# Patient Record
Sex: FEMALE | Race: WHITE | Employment: OTHER | URBAN - METROPOLITAN AREA
[De-identification: names, ages, dates, MRNs, and addresses within clinical notes are randomized per-mention and may not be internally consistent; named-entity substitution may affect disease eponyms.]

---

## 2017-02-28 ENCOUNTER — OFFICE VISIT (OUTPATIENT)
Dept: INTERNAL MEDICINE CLINIC | Age: 62
End: 2017-02-28

## 2017-02-28 VITALS
WEIGHT: 156 LBS | HEART RATE: 82 BPM | SYSTOLIC BLOOD PRESSURE: 156 MMHG | BODY MASS INDEX: 28.53 KG/M2 | TEMPERATURE: 97.8 F | RESPIRATION RATE: 18 BRPM | OXYGEN SATURATION: 98 % | DIASTOLIC BLOOD PRESSURE: 92 MMHG

## 2017-02-28 DIAGNOSIS — L43.9 LICHEN PLANUS: ICD-10-CM

## 2017-02-28 DIAGNOSIS — R03.0 ELEVATED BP WITHOUT DIAGNOSIS OF HYPERTENSION: ICD-10-CM

## 2017-02-28 DIAGNOSIS — E78.5 DYSLIPIDEMIA: Primary | ICD-10-CM

## 2017-02-28 PROCEDURE — 99213 OFFICE O/P EST LOW 20 MIN: CPT | Performed by: INTERNAL MEDICINE

## 2017-02-28 RX ORDER — TRIAMCINOLONE ACETONIDE 1 MG/G
CREAM TOPICAL 2 TIMES DAILY
COMMUNITY
End: 2018-12-19 | Stop reason: SDUPTHER

## 2017-02-28 ASSESSMENT — ENCOUNTER SYMPTOMS: COLOR CHANGE: 0

## 2017-03-01 LAB
CHOLESTEROL, TOTAL: 177 MG/DL (ref 0–199)
HDLC SERPL-MCNC: 42 MG/DL (ref 40–60)
LDL CHOLESTEROL CALCULATED: 109 MG/DL
TRIGL SERPL-MCNC: 128 MG/DL (ref 0–150)
VLDLC SERPL CALC-MCNC: 26 MG/DL

## 2017-04-04 ENCOUNTER — OFFICE VISIT (OUTPATIENT)
Dept: INTERNAL MEDICINE CLINIC | Age: 62
End: 2017-04-04

## 2017-04-04 VITALS
OXYGEN SATURATION: 97 % | HEART RATE: 80 BPM | BODY MASS INDEX: 28.35 KG/M2 | RESPIRATION RATE: 18 BRPM | TEMPERATURE: 97.7 F | DIASTOLIC BLOOD PRESSURE: 80 MMHG | SYSTOLIC BLOOD PRESSURE: 140 MMHG | WEIGHT: 155 LBS

## 2017-04-04 DIAGNOSIS — E78.5 DYSLIPIDEMIA: ICD-10-CM

## 2017-04-04 DIAGNOSIS — L43.9 LICHEN PLANUS: ICD-10-CM

## 2017-04-04 DIAGNOSIS — I10 HTN, GOAL BELOW 130/80: Primary | ICD-10-CM

## 2017-04-04 PROCEDURE — 99214 OFFICE O/P EST MOD 30 MIN: CPT | Performed by: INTERNAL MEDICINE

## 2017-04-04 RX ORDER — LISINOPRIL 10 MG/1
5 TABLET ORAL DAILY
Qty: 30 TABLET | Refills: 3 | Status: SHIPPED | OUTPATIENT
Start: 2017-04-04 | End: 2017-06-30 | Stop reason: SDUPTHER

## 2017-04-04 ASSESSMENT — ENCOUNTER SYMPTOMS: COLOR CHANGE: 1

## 2017-04-04 ASSESSMENT — PATIENT HEALTH QUESTIONNAIRE - PHQ9
SUM OF ALL RESPONSES TO PHQ QUESTIONS 1-9: 0
1. LITTLE INTEREST OR PLEASURE IN DOING THINGS: 0
SUM OF ALL RESPONSES TO PHQ9 QUESTIONS 1 & 2: 0
2. FEELING DOWN, DEPRESSED OR HOPELESS: 0

## 2017-05-16 ENCOUNTER — NURSE ONLY (OUTPATIENT)
Dept: INTERNAL MEDICINE CLINIC | Age: 62
End: 2017-05-16

## 2017-05-16 VITALS — DIASTOLIC BLOOD PRESSURE: 80 MMHG | SYSTOLIC BLOOD PRESSURE: 136 MMHG

## 2017-05-16 DIAGNOSIS — I10 HTN, GOAL BELOW 130/80: Primary | ICD-10-CM

## 2017-06-30 DIAGNOSIS — I10 HTN, GOAL BELOW 130/80: ICD-10-CM

## 2017-07-03 RX ORDER — LISINOPRIL 10 MG/1
5 TABLET ORAL DAILY
Qty: 30 TABLET | Refills: 3 | Status: SHIPPED | OUTPATIENT
Start: 2017-07-03 | End: 2017-07-05

## 2017-07-05 ENCOUNTER — OFFICE VISIT (OUTPATIENT)
Dept: INTERNAL MEDICINE CLINIC | Age: 62
End: 2017-07-05

## 2017-07-05 VITALS
RESPIRATION RATE: 18 BRPM | SYSTOLIC BLOOD PRESSURE: 134 MMHG | TEMPERATURE: 98.6 F | HEIGHT: 61 IN | OXYGEN SATURATION: 98 % | HEART RATE: 74 BPM | DIASTOLIC BLOOD PRESSURE: 80 MMHG

## 2017-07-05 DIAGNOSIS — E78.5 DYSLIPIDEMIA: ICD-10-CM

## 2017-07-05 DIAGNOSIS — I10 HTN, GOAL BELOW 130/80: Primary | ICD-10-CM

## 2017-07-05 DIAGNOSIS — Z72.0 TOBACCO ABUSE: ICD-10-CM

## 2017-07-05 DIAGNOSIS — Z13.1 ENCOUNTER FOR SCREENING FOR DIABETES MELLITUS: ICD-10-CM

## 2017-07-05 DIAGNOSIS — Z12.31 ENCOUNTER FOR SCREENING MAMMOGRAM FOR BREAST CANCER: ICD-10-CM

## 2017-07-05 LAB
A/G RATIO: 1 (ref 1.1–2.2)
ALBUMIN SERPL-MCNC: 4.1 G/DL (ref 3.4–5)
ALP BLD-CCNC: 731 U/L (ref 40–129)
ALT SERPL-CCNC: 65 U/L (ref 10–40)
ANION GAP SERPL CALCULATED.3IONS-SCNC: 19 MMOL/L (ref 3–16)
AST SERPL-CCNC: 66 U/L (ref 15–37)
BILIRUB SERPL-MCNC: 0.4 MG/DL (ref 0–1)
BUN BLDV-MCNC: 9 MG/DL (ref 7–20)
CALCIUM SERPL-MCNC: 9.7 MG/DL (ref 8.3–10.6)
CHLORIDE BLD-SCNC: 100 MMOL/L (ref 99–110)
CHOLESTEROL, TOTAL: 197 MG/DL (ref 0–199)
CO2: 23 MMOL/L (ref 21–32)
CREAT SERPL-MCNC: 0.6 MG/DL (ref 0.6–1.2)
GFR AFRICAN AMERICAN: >60
GFR NON-AFRICAN AMERICAN: >60
GLOBULIN: 4 G/DL
GLUCOSE BLD-MCNC: 106 MG/DL (ref 70–99)
HDLC SERPL-MCNC: 44 MG/DL (ref 40–60)
LDL CHOLESTEROL CALCULATED: 121 MG/DL
POTASSIUM SERPL-SCNC: 4.4 MMOL/L (ref 3.5–5.1)
SODIUM BLD-SCNC: 142 MMOL/L (ref 136–145)
TOTAL PROTEIN: 8.1 G/DL (ref 6.4–8.2)
TRIGL SERPL-MCNC: 161 MG/DL (ref 0–150)
VLDLC SERPL CALC-MCNC: 32 MG/DL

## 2017-07-05 PROCEDURE — 99213 OFFICE O/P EST LOW 20 MIN: CPT | Performed by: INTERNAL MEDICINE

## 2017-07-05 RX ORDER — LISINOPRIL 10 MG/1
10 TABLET ORAL DAILY
Qty: 90 TABLET | Refills: 3 | Status: SHIPPED | OUTPATIENT
Start: 2017-07-05 | End: 2018-01-05 | Stop reason: SDUPTHER

## 2017-07-05 RX ORDER — ATORVASTATIN CALCIUM 80 MG/1
80 TABLET, FILM COATED ORAL DAILY
Qty: 90 TABLET | Refills: 3 | Status: SHIPPED | OUTPATIENT
Start: 2017-07-05 | End: 2018-01-05 | Stop reason: SDUPTHER

## 2017-07-05 ASSESSMENT — PATIENT HEALTH QUESTIONNAIRE - PHQ9
2. FEELING DOWN, DEPRESSED OR HOPELESS: 0
SUM OF ALL RESPONSES TO PHQ9 QUESTIONS 1 & 2: 0
1. LITTLE INTEREST OR PLEASURE IN DOING THINGS: 0
SUM OF ALL RESPONSES TO PHQ QUESTIONS 1-9: 0

## 2017-07-05 ASSESSMENT — ENCOUNTER SYMPTOMS: GASTROINTESTINAL NEGATIVE: 1

## 2017-07-28 ENCOUNTER — HOSPITAL ENCOUNTER (OUTPATIENT)
Dept: MAMMOGRAPHY | Age: 62
Discharge: HOME | End: 2017-07-28
Attending: INTERNAL MEDICINE | Admitting: INTERNAL MEDICINE

## 2017-07-28 DIAGNOSIS — Z12.31 ENCOUNTER FOR SCREENING MAMMOGRAM FOR BREAST CANCER: ICD-10-CM

## 2018-01-05 ENCOUNTER — OFFICE VISIT (OUTPATIENT)
Dept: INTERNAL MEDICINE CLINIC | Age: 63
End: 2018-01-05

## 2018-01-05 VITALS
BODY MASS INDEX: 29.27 KG/M2 | RESPIRATION RATE: 16 BRPM | DIASTOLIC BLOOD PRESSURE: 76 MMHG | SYSTOLIC BLOOD PRESSURE: 130 MMHG | TEMPERATURE: 97.5 F | HEIGHT: 61 IN | HEART RATE: 76 BPM | OXYGEN SATURATION: 99 % | WEIGHT: 155 LBS

## 2018-01-05 DIAGNOSIS — R74.8 ELEVATED SERUM ALKALINE PHOSPHATASE LEVEL: Primary | ICD-10-CM

## 2018-01-05 DIAGNOSIS — E78.5 DYSLIPIDEMIA: ICD-10-CM

## 2018-01-05 DIAGNOSIS — Z72.89 OTHER PROBLEMS RELATED TO LIFESTYLE: ICD-10-CM

## 2018-01-05 DIAGNOSIS — I10 HTN, GOAL BELOW 130/80: ICD-10-CM

## 2018-01-05 LAB
A/G RATIO: 1 (ref 1.1–2.2)
ALBUMIN SERPL-MCNC: 3.9 G/DL (ref 3.4–5)
ALP BLD-CCNC: 712 U/L (ref 40–129)
ALT SERPL-CCNC: 49 U/L (ref 10–40)
ANION GAP SERPL CALCULATED.3IONS-SCNC: 14 MMOL/L (ref 3–16)
AST SERPL-CCNC: 54 U/L (ref 15–37)
BILIRUB SERPL-MCNC: 0.4 MG/DL (ref 0–1)
BUN BLDV-MCNC: 9 MG/DL (ref 7–20)
CALCIUM SERPL-MCNC: 9.7 MG/DL (ref 8.3–10.6)
CHLORIDE BLD-SCNC: 102 MMOL/L (ref 99–110)
CHOLESTEROL, TOTAL: 177 MG/DL (ref 0–199)
CO2: 25 MMOL/L (ref 21–32)
CREAT SERPL-MCNC: 0.6 MG/DL (ref 0.6–1.2)
GFR AFRICAN AMERICAN: >60
GFR NON-AFRICAN AMERICAN: >60
GLOBULIN: 4 G/DL
GLUCOSE BLD-MCNC: 102 MG/DL (ref 70–99)
HDLC SERPL-MCNC: 41 MG/DL (ref 40–60)
LDL CHOLESTEROL CALCULATED: 102 MG/DL
POTASSIUM SERPL-SCNC: 4.5 MMOL/L (ref 3.5–5.1)
SODIUM BLD-SCNC: 141 MMOL/L (ref 136–145)
TOTAL PROTEIN: 7.9 G/DL (ref 6.4–8.2)
TRIGL SERPL-MCNC: 169 MG/DL (ref 0–150)
VLDLC SERPL CALC-MCNC: 34 MG/DL

## 2018-01-05 PROCEDURE — 3017F COLORECTAL CA SCREEN DOC REV: CPT | Performed by: INTERNAL MEDICINE

## 2018-01-05 PROCEDURE — 4004F PT TOBACCO SCREEN RCVD TLK: CPT | Performed by: INTERNAL MEDICINE

## 2018-01-05 PROCEDURE — 3014F SCREEN MAMMO DOC REV: CPT | Performed by: INTERNAL MEDICINE

## 2018-01-05 PROCEDURE — 99213 OFFICE O/P EST LOW 20 MIN: CPT | Performed by: INTERNAL MEDICINE

## 2018-01-05 PROCEDURE — G8417 CALC BMI ABV UP PARAM F/U: HCPCS | Performed by: INTERNAL MEDICINE

## 2018-01-05 PROCEDURE — G8484 FLU IMMUNIZE NO ADMIN: HCPCS | Performed by: INTERNAL MEDICINE

## 2018-01-05 PROCEDURE — G8427 DOCREV CUR MEDS BY ELIG CLIN: HCPCS | Performed by: INTERNAL MEDICINE

## 2018-01-05 RX ORDER — ATORVASTATIN CALCIUM 80 MG/1
80 TABLET, FILM COATED ORAL DAILY
Qty: 90 TABLET | Refills: 3 | Status: SHIPPED | OUTPATIENT
Start: 2018-01-05 | End: 2018-05-08 | Stop reason: SDUPTHER

## 2018-01-05 RX ORDER — LISINOPRIL 10 MG/1
10 TABLET ORAL DAILY
Qty: 90 TABLET | Refills: 3 | Status: SHIPPED | OUTPATIENT
Start: 2018-01-05 | End: 2018-05-08 | Stop reason: SDUPTHER

## 2018-01-05 ASSESSMENT — ENCOUNTER SYMPTOMS
GASTROINTESTINAL NEGATIVE: 1
EYES NEGATIVE: 1
RESPIRATORY NEGATIVE: 1

## 2018-01-05 NOTE — PROGRESS NOTES
times daily Apply topically 2 times daily.  Multiple Vitamins-Minerals (MULTIVITAMIN PO) Take 1 capsule by mouth daily.  Ascorbic Acid (VITAMIN C) 500 MG tablet Take 500 mg by mouth daily.  vitamin E 400 UNIT capsule Take 400 Units by mouth daily. No current facility-administered medications for this visit. Vitals:    01/05/18 1000   BP: 130/76   Pulse: 76   Resp: 16   Temp: 97.5 °F (36.4 °C)   TempSrc: Oral   SpO2: 99%   Weight: 155 lb (70.3 kg)   Height: 5' 1\" (1.549 m)     Body mass index is 29.29 kg/m². Wt Readings from Last 3 Encounters:   01/05/18 155 lb (70.3 kg)   04/04/17 155 lb (70.3 kg)   02/28/17 156 lb (70.8 kg)     BP Readings from Last 3 Encounters:   01/05/18 130/76   07/05/17 134/80   05/16/17 136/80       Objective:   Physical Exam   Constitutional: She is oriented to person, place, and time. She appears well-developed and well-nourished. HENT:   Head: Normocephalic and atraumatic. Eyes: Conjunctivae and EOM are normal. Pupils are equal, round, and reactive to light. Neck: Normal range of motion. Neck supple. Cardiovascular: Normal rate, regular rhythm, normal heart sounds and intact distal pulses. Pulmonary/Chest: Effort normal and breath sounds normal. No respiratory distress. Abdominal: Soft. She exhibits no distension. Musculoskeletal: Normal range of motion. She exhibits no edema. Neurological: She is alert and oriented to person, place, and time. Skin: Skin is warm. Psychiatric: She has a normal mood and affect. Her behavior is normal. Judgment and thought content normal.   Nursing note and vitals reviewed. Lab Review   No visits with results within 6 Month(s) from this visit.    Latest known visit with results is:   Office Visit on 07/05/2017   Component Date Value    Cholesterol, Total 07/05/2017 197     Triglycerides 07/05/2017 161*    HDL 07/05/2017 44     LDL Calculated 07/05/2017 121*    VLDL CHOLESTEROL CALCULA* 07/05/2017 32

## 2018-01-08 LAB — HIV-1 AND HIV-2 ANTIBODIES: NORMAL

## 2018-01-11 ENCOUNTER — HOSPITAL ENCOUNTER (OUTPATIENT)
Dept: ULTRASOUND IMAGING | Age: 63
Discharge: HOME | End: 2018-01-11
Attending: INTERNAL MEDICINE | Admitting: INTERNAL MEDICINE

## 2018-01-11 ENCOUNTER — TELEPHONE (OUTPATIENT)
Dept: INTERNAL MEDICINE CLINIC | Age: 63
End: 2018-01-11

## 2018-01-11 DIAGNOSIS — R74.8 ELEVATED SERUM ALKALINE PHOSPHATASE LEVEL: ICD-10-CM

## 2018-01-11 DIAGNOSIS — R74.8 ABNORMAL LEVELS OF OTHER SERUM ENZYMES: ICD-10-CM

## 2018-01-11 DIAGNOSIS — R74.8 ELEVATED SERUM GGT LEVEL: Primary | ICD-10-CM

## 2018-01-11 LAB
BILIRUB SERPL-MCNC: 0.4 MG/DL (ref 0–1)
BILIRUBIN DIRECT: <0.2 MG/DL (ref 0–0.3)
BILIRUBIN, INDIRECT: NORMAL MG/DL (ref 0–1)
GAMMA GLUTAMYL TRANSFERASE: 699 U/L (ref 5–36)
TSH SERPL DL<=0.05 MIU/L-ACNC: 2.6 UIU/ML (ref 0.27–4.2)

## 2018-01-11 NOTE — TELEPHONE ENCOUNTER
----- Message from Prosper Messer MD sent at 1/5/2018 10:37 PM EST -----  Please call and ask her to schedule ultrasound and lab work. Please call until she answers.

## 2018-01-12 ENCOUNTER — TELEPHONE (OUTPATIENT)
Dept: INTERNAL MEDICINE CLINIC | Age: 63
End: 2018-01-12

## 2018-01-12 DIAGNOSIS — R74.8 ELEVATED SERUM GGT LEVEL: Primary | ICD-10-CM

## 2018-01-12 LAB
MITOCHONDRIAL M2 AB, IGG: 102.7 UNITS (ref 0–20)
MYELOPEROXIDASE AB: 3 AU/ML (ref 0–19)
SERINE PROTEASE 3 AB: 1 AU/ML (ref 0–19)

## 2018-01-12 NOTE — TELEPHONE ENCOUNTER
Patient would like to know if Dr. Karena Tejeda has decided on further testing as far as Dr. Karena Tejeda being in contact with another physician after viewing patient's blood results. Patient prefers not to wait until Monday.        Patient Phone   [phone]

## 2018-02-06 ENCOUNTER — TELEPHONE (OUTPATIENT)
Dept: INTERNAL MEDICINE CLINIC | Age: 63
End: 2018-02-06

## 2018-05-08 ENCOUNTER — OFFICE VISIT (OUTPATIENT)
Dept: INTERNAL MEDICINE CLINIC | Age: 63
End: 2018-05-08

## 2018-05-08 VITALS
HEIGHT: 61 IN | WEIGHT: 147.8 LBS | DIASTOLIC BLOOD PRESSURE: 65 MMHG | OXYGEN SATURATION: 98 % | SYSTOLIC BLOOD PRESSURE: 142 MMHG | BODY MASS INDEX: 27.9 KG/M2 | HEART RATE: 82 BPM | RESPIRATION RATE: 18 BRPM

## 2018-05-08 DIAGNOSIS — E78.5 DYSLIPIDEMIA: ICD-10-CM

## 2018-05-08 DIAGNOSIS — I10 HTN, GOAL BELOW 130/80: ICD-10-CM

## 2018-05-08 PROCEDURE — 99214 OFFICE O/P EST MOD 30 MIN: CPT | Performed by: INTERNAL MEDICINE

## 2018-05-08 RX ORDER — LISINOPRIL 20 MG/1
20 TABLET ORAL DAILY
Qty: 90 TABLET | Refills: 0 | Status: SHIPPED | OUTPATIENT
Start: 2018-05-08 | End: 2018-06-19 | Stop reason: SDUPTHER

## 2018-05-08 RX ORDER — ATORVASTATIN CALCIUM 80 MG/1
80 TABLET, FILM COATED ORAL DAILY
Qty: 90 TABLET | Refills: 3 | Status: SHIPPED | OUTPATIENT
Start: 2018-05-08 | End: 2018-06-19 | Stop reason: SDUPTHER

## 2018-05-08 ASSESSMENT — ENCOUNTER SYMPTOMS
SHORTNESS OF BREATH: 0
BLURRED VISION: 0
ORTHOPNEA: 0

## 2018-05-22 ENCOUNTER — NURSE ONLY (OUTPATIENT)
Dept: INTERNAL MEDICINE CLINIC | Age: 63
End: 2018-05-22

## 2018-05-22 VITALS — SYSTOLIC BLOOD PRESSURE: 122 MMHG | DIASTOLIC BLOOD PRESSURE: 70 MMHG

## 2018-05-22 DIAGNOSIS — I10 HTN, GOAL BELOW 130/80: ICD-10-CM

## 2018-05-22 PROCEDURE — 93790 AMBL BP MNTR W/SW I&R: CPT | Performed by: INTERNAL MEDICINE

## 2018-06-07 ENCOUNTER — HOSPITAL ENCOUNTER (OUTPATIENT)
Dept: ENDOSCOPY | Age: 63
Discharge: HOME | End: 2018-06-07
Attending: INTERNAL MEDICINE | Admitting: INTERNAL MEDICINE

## 2018-06-19 ENCOUNTER — OFFICE VISIT (OUTPATIENT)
Dept: INTERNAL MEDICINE CLINIC | Age: 63
End: 2018-06-19

## 2018-06-19 VITALS
HEIGHT: 61 IN | RESPIRATION RATE: 18 BRPM | DIASTOLIC BLOOD PRESSURE: 74 MMHG | WEIGHT: 144 LBS | OXYGEN SATURATION: 98 % | TEMPERATURE: 98 F | BODY MASS INDEX: 27.19 KG/M2 | SYSTOLIC BLOOD PRESSURE: 126 MMHG | HEART RATE: 76 BPM

## 2018-06-19 DIAGNOSIS — I10 HTN, GOAL BELOW 130/80: Primary | ICD-10-CM

## 2018-06-19 DIAGNOSIS — E78.5 DYSLIPIDEMIA: ICD-10-CM

## 2018-06-19 DIAGNOSIS — R74.8 ELEVATED SERUM GGT LEVEL: ICD-10-CM

## 2018-06-19 PROCEDURE — 99213 OFFICE O/P EST LOW 20 MIN: CPT | Performed by: INTERNAL MEDICINE

## 2018-06-19 RX ORDER — ATORVASTATIN CALCIUM 80 MG/1
80 TABLET, FILM COATED ORAL DAILY
Qty: 90 TABLET | Refills: 3 | Status: SHIPPED | OUTPATIENT
Start: 2018-06-19 | End: 2019-06-05 | Stop reason: SDUPTHER

## 2018-06-19 RX ORDER — LISINOPRIL 20 MG/1
20 TABLET ORAL DAILY
Qty: 90 TABLET | Refills: 0 | Status: SHIPPED | OUTPATIENT
Start: 2018-06-19 | End: 2018-11-07 | Stop reason: SDUPTHER

## 2018-08-09 DIAGNOSIS — I10 HTN, GOAL BELOW 130/80: ICD-10-CM

## 2018-08-14 RX ORDER — LISINOPRIL 20 MG/1
20 TABLET ORAL DAILY
Qty: 90 TABLET | Refills: 0 | Status: SHIPPED | OUTPATIENT
Start: 2018-08-14 | End: 2018-12-19 | Stop reason: SDUPTHER

## 2018-08-17 ENCOUNTER — TELEPHONE (OUTPATIENT)
Dept: INTERNAL MEDICINE CLINIC | Age: 63
End: 2018-08-17

## 2018-08-27 ENCOUNTER — PRE-PROCEDURE TELEPHONE (OUTPATIENT)
Dept: INTERVENTIONAL RADIOLOGY/VASCULAR | Age: 63
End: 2018-08-27

## 2018-08-29 ENCOUNTER — HOSPITAL ENCOUNTER (OUTPATIENT)
Dept: CT IMAGING | Age: 63
Discharge: HOME | End: 2018-08-29
Attending: INTERNAL MEDICINE | Admitting: INTERNAL MEDICINE

## 2018-08-29 DIAGNOSIS — R93.89 ABNORMAL FINDINGS ON DIAGNOSTIC IMAGING OF OTHER SPECIFIED BODY STRUCTURES: ICD-10-CM

## 2018-08-29 DIAGNOSIS — R93.89 ABNORMAL ULTRASOUND: ICD-10-CM

## 2018-08-29 LAB
BUN BLDV-MCNC: 7 MG/DL (ref 7–20)
CREAT SERPL-MCNC: 0.6 MG/DL (ref 0.6–1.2)
GFR AFRICAN AMERICAN: >60
GFR NON-AFRICAN AMERICAN: >60

## 2018-08-30 ENCOUNTER — HOSPITAL ENCOUNTER (OUTPATIENT)
Dept: INTERVENTIONAL RADIOLOGY/VASCULAR | Age: 63
Discharge: HOME | End: 2018-08-30
Attending: INTERNAL MEDICINE | Admitting: INTERNAL MEDICINE

## 2018-08-30 VITALS
OXYGEN SATURATION: 98 % | TEMPERATURE: 98 F | WEIGHT: 145 LBS | SYSTOLIC BLOOD PRESSURE: 127 MMHG | DIASTOLIC BLOOD PRESSURE: 69 MMHG | RESPIRATION RATE: 16 BRPM | HEIGHT: 61 IN | HEART RATE: 62 BPM | BODY MASS INDEX: 27.38 KG/M2

## 2018-08-30 DIAGNOSIS — K75.4 AUTOIMMUNE HEPATITIS (HCC): ICD-10-CM

## 2018-08-30 DIAGNOSIS — R93.89 ABNORMAL FINDINGS ON DIAGNOSTIC IMAGING OF OTHER SPECIFIED BODY STRUCTURES: ICD-10-CM

## 2018-08-30 LAB
A/G RATIO: 0.8 (ref 1.1–2.2)
ALBUMIN SERPL-MCNC: 3.6 G/DL (ref 3.4–5)
ALP BLD-CCNC: 314 U/L (ref 40–129)
ALT SERPL-CCNC: 32 U/L (ref 10–40)
ANION GAP SERPL CALCULATED.3IONS-SCNC: 14 MMOL/L (ref 3–16)
APTT: 44.3 SEC (ref 26–36)
AST SERPL-CCNC: 38 U/L (ref 15–37)
BANDED NEUTROPHILS RELATIVE PERCENT: 2 % (ref 0–7)
BASOPHILS ABSOLUTE: 0 K/UL (ref 0–0.2)
BASOPHILS RELATIVE PERCENT: 0 %
BILIRUB SERPL-MCNC: 0.4 MG/DL (ref 0–1)
BUN BLDV-MCNC: 8 MG/DL (ref 7–20)
CALCIUM SERPL-MCNC: 9.1 MG/DL (ref 8.3–10.6)
CHLORIDE BLD-SCNC: 100 MMOL/L (ref 99–110)
CO2: 23 MMOL/L (ref 21–32)
CREAT SERPL-MCNC: 0.5 MG/DL (ref 0.6–1.2)
EOSINOPHILS ABSOLUTE: 0.3 K/UL (ref 0–0.6)
EOSINOPHILS RELATIVE PERCENT: 2 %
GFR AFRICAN AMERICAN: >60
GFR NON-AFRICAN AMERICAN: >60
GLOBULIN: 4.4 G/DL
GLUCOSE BLD-MCNC: 119 MG/DL (ref 70–99)
HCT VFR BLD CALC: 43.9 % (ref 36–48)
HEMOGLOBIN: 14.6 G/DL (ref 12–16)
INR BLD: 1.04 (ref 0.86–1.14)
LYMPHOCYTES ABSOLUTE: 4.9 K/UL (ref 1–5.1)
LYMPHOCYTES RELATIVE PERCENT: 30 %
MCH RBC QN AUTO: 29.1 PG (ref 26–34)
MCHC RBC AUTO-ENTMCNC: 33.3 G/DL (ref 31–36)
MCV RBC AUTO: 87.2 FL (ref 80–100)
MONOCYTES ABSOLUTE: 1.5 K/UL (ref 0–1.3)
MONOCYTES RELATIVE PERCENT: 9 %
NEUTROPHILS ABSOLUTE: 9.6 K/UL (ref 1.7–7.7)
NEUTROPHILS RELATIVE PERCENT: 57 %
PDW BLD-RTO: 15.1 % (ref 12.4–15.4)
PLATELET # BLD: 333 K/UL (ref 135–450)
PMV BLD AUTO: 9.8 FL (ref 5–10.5)
POTASSIUM SERPL-SCNC: 4.1 MMOL/L (ref 3.5–5.1)
PROTHROMBIN TIME: 11.8 SEC (ref 9.8–13)
RBC # BLD: 5.03 M/UL (ref 4–5.2)
SODIUM BLD-SCNC: 137 MMOL/L (ref 136–145)
TOTAL PROTEIN: 8 G/DL (ref 6.4–8.2)
WBC # BLD: 16.3 K/UL (ref 4–11)

## 2018-08-30 RX ORDER — FENTANYL CITRATE 50 UG/ML
INJECTION, SOLUTION INTRAMUSCULAR; INTRAVENOUS
Status: COMPLETED | OUTPATIENT
Start: 2018-08-30 | End: 2018-08-30

## 2018-08-30 RX ORDER — MIDAZOLAM HYDROCHLORIDE 1 MG/ML
INJECTION INTRAMUSCULAR; INTRAVENOUS
Status: COMPLETED | OUTPATIENT
Start: 2018-08-30 | End: 2018-08-30

## 2018-08-30 RX ORDER — ACETAMINOPHEN 325 MG/1
650 TABLET ORAL EVERY 4 HOURS PRN
Status: DISCONTINUED | OUTPATIENT
Start: 2018-08-30 | End: 2018-08-31 | Stop reason: HOSPADM

## 2018-08-30 RX ORDER — CALCIUM CARBONATE 500(1250)
500 TABLET ORAL DAILY
COMMUNITY
End: 2019-06-19

## 2018-08-30 RX ADMIN — FENTANYL CITRATE 50 MCG: 50 INJECTION, SOLUTION INTRAMUSCULAR; INTRAVENOUS at 08:20

## 2018-08-30 RX ADMIN — MIDAZOLAM HYDROCHLORIDE 1 MG: 1 INJECTION INTRAMUSCULAR; INTRAVENOUS at 08:17

## 2018-08-30 RX ADMIN — MIDAZOLAM HYDROCHLORIDE 1 MG: 1 INJECTION INTRAMUSCULAR; INTRAVENOUS at 08:20

## 2018-08-30 RX ADMIN — FENTANYL CITRATE 50 MCG: 50 INJECTION, SOLUTION INTRAMUSCULAR; INTRAVENOUS at 08:17

## 2018-08-30 ASSESSMENT — PAIN SCALES - GENERAL: PAINLEVEL_OUTOF10: 0

## 2018-08-30 NOTE — BRIEF OP NOTE
Brief Postoperative Note    Rhina Linder  YOB: 1955  [id number]    Pre-operative Diagnosis: Elevated LFTs    Post-operative Diagnosis: Same    Procedure: US guided non target liver biopsy    Anesthesia: Moderate Sedation    Surgeons/Assistants: Deidre Chau DO    Estimated Blood Loss: less than 50     Complications: None    Specimens: Was Obtained: Two 18 gauge core samples were obtained from the inferior RHL under US guidance and sent for analysis. Findings: Two 18 gauge core samples were obtained from the inferior RHL under US guidance and sent for analysis.      Electronically signed by Brigida Rodriguez MD on 8/30/2018 at 8:31 AM

## 2018-08-30 NOTE — PRE SEDATION
Sedation Pre-Procedure Note    Patient Name: Chel Martínez   YOB: 1955  Room/Bed: Room/bed info not found  Medical Record Number: [id number]  Date: 2018   Time: 10:10 AM       Indication:  Elevated LFTs    Consent: I have discussed with the patient and/or the patient representative the indication, alternatives, and the possible risks and/or complications of the planned procedure and the anesthesia methods. The patient and/or patient representative appear to understand and agree to proceed. Vital Signs:   Vitals:    18 0925   BP: 122/69   Pulse: 64   Resp: 16   Temp:    SpO2: 93%       Past Medical History:   has a past medical history of Hyperlipidemia; Hypertension; and Lichen planus. Past Surgical History:   has a past surgical history that includes  section; Carpal tunnel release; Endometrial ablation; and Tonsillectomy. Medications:   Scheduled Meds:   Continuous Infusions:   PRN Meds: acetaminophen  Home Meds:   Prior to Admission medications    Medication Sig Start Date End Date Taking? Authorizing Provider   calcium carbonate (OSCAL) 500 MG TABS tablet Take 500 mg by mouth daily   Yes Historical Provider, MD   lisinopril (PRINIVIL;ZESTRIL) 20 MG tablet TAKE 1 TABLET BY MOUTH DAILY 18  Yes Agustin Whitaker MD   lisinopril (PRINIVIL;ZESTRIL) 20 MG tablet Take 1 tablet by mouth daily 18  Yes Agustin Whitaker MD   atorvastatin (LIPITOR) 80 MG tablet Take 1 tablet by mouth daily 18  Yes Agustin Whitaker MD   diphenhydrAMINE (BENADRYL) 25 MG tablet Take 25 mg by mouth every 6 hours as needed for Itching   Yes Historical Provider, MD   triamcinolone (KENALOG) 0.1 % cream Apply topically 2 times daily Apply topically 2 times daily. Yes Historical Provider, MD   Multiple Vitamins-Minerals (MULTIVITAMIN PO) Take 1 capsule by mouth daily. Yes Historical Provider, MD   Ascorbic Acid (VITAMIN C) 500 MG tablet Take 500 mg by mouth daily.

## 2018-10-17 LAB
ALBUMIN SERPL-MCNC: 4 G/DL (ref 3.4–5)
ALP BLD-CCNC: 306 U/L (ref 40–129)
ALT SERPL-CCNC: 22 U/L (ref 10–40)
AST SERPL-CCNC: 25 U/L (ref 15–37)
BILIRUB SERPL-MCNC: <0.2 MG/DL (ref 0–1)
BILIRUBIN DIRECT: <0.2 MG/DL (ref 0–0.3)
BILIRUBIN, INDIRECT: ABNORMAL MG/DL (ref 0–1)
HEPATITIS B SURFACE ANTIGEN INTERPRETATION: NORMAL
TOTAL PROTEIN: 7.7 G/DL (ref 6.4–8.2)
VITAMIN D 25-HYDROXY: 32.8 NG/ML

## 2018-10-21 LAB
ALPHA-TOCOPHEROL: 10.8 MG/L (ref 5.5–18)
GAMMA-TOCOPHEROL: 0.4 MG/L (ref 0–6)
RETINYL PALMITATE: 0.02 MG/L (ref 0–0.1)
VITAMIN A LEVEL: 0.41 MG/L (ref 0.3–1.2)
VITAMIN A, INTERP: NORMAL

## 2018-10-25 ENCOUNTER — HOSPITAL ENCOUNTER (OUTPATIENT)
Dept: GENERAL RADIOLOGY | Age: 63
Discharge: HOME | End: 2018-10-25
Payer: COMMERCIAL

## 2018-10-25 DIAGNOSIS — K74.3 PRIMARY BILIARY CHOLANGITIS (HCC): ICD-10-CM

## 2018-10-25 PROCEDURE — 77080 DXA BONE DENSITY AXIAL: CPT

## 2018-11-07 DIAGNOSIS — I10 HTN, GOAL BELOW 130/80: ICD-10-CM

## 2018-11-08 RX ORDER — LISINOPRIL 20 MG/1
20 TABLET ORAL DAILY
Qty: 90 TABLET | Refills: 0 | Status: SHIPPED | OUTPATIENT
Start: 2018-11-08 | End: 2018-12-19 | Stop reason: SDUPTHER

## 2018-11-19 ENCOUNTER — HOSPITAL ENCOUNTER (OUTPATIENT)
Dept: WOMENS IMAGING | Age: 63
Discharge: HOME | End: 2018-11-19
Payer: COMMERCIAL

## 2018-11-19 DIAGNOSIS — Z12.31 ENCOUNTER FOR SCREENING MAMMOGRAM FOR BREAST CANCER: ICD-10-CM

## 2018-11-19 PROCEDURE — 77067 SCR MAMMO BI INCL CAD: CPT

## 2018-12-19 ENCOUNTER — OFFICE VISIT (OUTPATIENT)
Dept: INTERNAL MEDICINE CLINIC | Age: 63
End: 2018-12-19
Payer: COMMERCIAL

## 2018-12-19 VITALS
WEIGHT: 147.2 LBS | DIASTOLIC BLOOD PRESSURE: 76 MMHG | RESPIRATION RATE: 16 BRPM | HEIGHT: 63 IN | OXYGEN SATURATION: 97 % | HEART RATE: 76 BPM | SYSTOLIC BLOOD PRESSURE: 124 MMHG | TEMPERATURE: 98.2 F | BODY MASS INDEX: 26.08 KG/M2

## 2018-12-19 DIAGNOSIS — I10 HTN, GOAL BELOW 130/80: ICD-10-CM

## 2018-12-19 DIAGNOSIS — R73.9 HYPERGLYCEMIA: Primary | ICD-10-CM

## 2018-12-19 DIAGNOSIS — L43.9 LICHEN PLANUS: ICD-10-CM

## 2018-12-19 LAB
CHOLESTEROL, TOTAL: 119 MG/DL (ref 0–199)
CREATININE URINE: 219.8 MG/DL (ref 28–259)
ESTIMATED AVERAGE GLUCOSE: 139.9 MG/DL
HBA1C MFR BLD: 6.5 %
HDLC SERPL-MCNC: 36 MG/DL (ref 40–60)
LDL CHOLESTEROL CALCULATED: 63 MG/DL
MICROALBUMIN UR-MCNC: <1.2 MG/DL
MICROALBUMIN/CREAT UR-RTO: NORMAL MG/G (ref 0–30)
TRIGL SERPL-MCNC: 99 MG/DL (ref 0–150)
VLDLC SERPL CALC-MCNC: 20 MG/DL

## 2018-12-19 PROCEDURE — 90471 IMMUNIZATION ADMIN: CPT | Performed by: INTERNAL MEDICINE

## 2018-12-19 PROCEDURE — 90732 PPSV23 VACC 2 YRS+ SUBQ/IM: CPT | Performed by: INTERNAL MEDICINE

## 2018-12-19 PROCEDURE — 99213 OFFICE O/P EST LOW 20 MIN: CPT | Performed by: INTERNAL MEDICINE

## 2018-12-19 RX ORDER — TRIAMCINOLONE ACETONIDE 1 MG/G
CREAM TOPICAL 2 TIMES DAILY
Qty: 453.6 G | Refills: 3 | Status: SHIPPED | OUTPATIENT
Start: 2018-12-19 | End: 2019-12-16 | Stop reason: SDUPTHER

## 2018-12-19 RX ORDER — LISINOPRIL 20 MG/1
20 TABLET ORAL DAILY
Qty: 90 TABLET | Refills: 1 | Status: SHIPPED | OUTPATIENT
Start: 2018-12-19 | End: 2019-08-02 | Stop reason: SDUPTHER

## 2018-12-19 RX ORDER — LISINOPRIL 20 MG/1
20 TABLET ORAL DAILY
Qty: 90 TABLET | Refills: 1 | Status: SHIPPED | OUTPATIENT
Start: 2018-12-19 | End: 2019-08-21

## 2018-12-19 RX ORDER — URSODIOL 250 MG/1
250 TABLET, FILM COATED ORAL 4 TIMES DAILY
COMMUNITY
End: 2020-04-17 | Stop reason: ALTCHOICE

## 2018-12-19 ASSESSMENT — ENCOUNTER SYMPTOMS
CHANGE IN BOWEL HABIT: 0
BLURRED VISION: 0
SWOLLEN GLANDS: 0
VISUAL CHANGE: 0
ORTHOPNEA: 0
VOMITING: 0
SORE THROAT: 0
ABDOMINAL PAIN: 0
SHORTNESS OF BREATH: 0
NAUSEA: 0

## 2018-12-19 ASSESSMENT — PATIENT HEALTH QUESTIONNAIRE - PHQ9
SUM OF ALL RESPONSES TO PHQ9 QUESTIONS 1 & 2: 0
1. LITTLE INTEREST OR PLEASURE IN DOING THINGS: 0
SUM OF ALL RESPONSES TO PHQ QUESTIONS 1-9: 0
SUM OF ALL RESPONSES TO PHQ QUESTIONS 1-9: 0
2. FEELING DOWN, DEPRESSED OR HOPELESS: 0

## 2018-12-19 NOTE — PATIENT INSTRUCTIONS
prescribed. Call your doctor if you think you are having a problem with your medicine. ? You may need to try several antidepressant medicines before you find the one that works best for you. ? Don't stop taking antidepressants, even after your symptoms go away. If you continue to take them, it helps prevent depression from coming back. ? Antidepressants may have side effects, but the side effects go away after a while. Talk to your doctor about any side effects or other concerns. · Get at least 30 minutes of exercise on most days of the week. Try to exercise first thing in the morning during winter. This may help improve your energy level and relieve depression. Walking is a good choice. You also may want to do other activities, such as running, swimming, cycling, or playing tennis or team sports. In bad weather, you can use an indoor treadmill or walk at a mall. · Eat a healthy, balanced diet to relieve some of the symptoms of SAD. · Try to spend time outside each day. Natural sunlight, even if hidden by clouds, is always helpful for people with SAD. · Ask your doctor about using complementary medicine, such as melatonin. · Do not use illegal drugs, and limit your use of alcohol. · Stay active. Try to do the things you usually enjoy, even if you don't feel like doing them. · Do not make major life decisions when you are depressed. You will make better decisions after you feel better. · If home treatment does not seem to work, consider counseling. A counselor can help you understand SAD and may help you prevent symptoms. When should you call for help? Call 911 anytime you think you may need emergency care.  For example, call if:    · Someone you know is about to attempt or is attempting suicide.     · You feel you cannot stop from hurting yourself or someone else.   [facility] your doctor now or seek immediate medical care if:    · You hear voices.     · Someone you know has depression and:  ? Starts to give away his or her possessions. ? Uses illegal drugs or drinks alcohol heavily. ? Talks or writes about death, including writing suicide notes and talking about guns, knives, or pills. ? Starts to spend a lot of time alone. ? Acts very aggressively or suddenly appears calm.     · You feel like hurting yourself, even in small ways.    Watch closely for changes in your health, and be sure to contact your doctor if:    · Your depression does not get better. Where can you learn more? Go to https://[email or web address].[employer]. org and sign in to your [employer] account. Enter I818 in the eFans box to learn more about \"Seasonal Affective Disorder: Care Instructions. \"     If you do not have an account, please click on the \"Sign Up Now\" link. Current as of: December 7, 2017  Content Version: 11.8  © 5765-0834 Healthwise, Incorporated. Care instructions adapted under license by [facility] ([facility]). If you have questions about a medical condition or this instruction, always ask your healthcare professional. [address] any warranty or liability for your use of this information.

## 2019-01-16 LAB
ALBUMIN SERPL-MCNC: 4 G/DL (ref 3.4–5)
ALP BLD-CCNC: 272 U/L (ref 40–129)
ALT SERPL-CCNC: 18 U/L (ref 10–40)
AST SERPL-CCNC: 22 U/L (ref 15–37)
BILIRUB SERPL-MCNC: 0.3 MG/DL (ref 0–1)
BILIRUBIN DIRECT: <0.2 MG/DL (ref 0–0.3)
BILIRUBIN, INDIRECT: ABNORMAL MG/DL (ref 0–1)
HEPATITIS B SURFACE ANTIGEN INTERPRETATION: NORMAL
TOTAL PROTEIN: 7.4 G/DL (ref 6.4–8.2)

## 2019-01-18 LAB — HEPATITIS B CORE TOTAL ANTIBODY: NEGATIVE

## 2019-05-21 LAB — HEPATITIS B SURFACE ANTIGEN INTERPRETATION: NORMAL

## 2019-05-22 LAB
ALBUMIN SERPL-MCNC: 4 G/DL (ref 3.4–5)
ALP BLD-CCNC: 301 U/L (ref 40–129)
ALT SERPL-CCNC: 16 U/L (ref 10–40)
AST SERPL-CCNC: 20 U/L (ref 15–37)
BILIRUB SERPL-MCNC: 0.3 MG/DL (ref 0–1)
BILIRUBIN DIRECT: <0.2 MG/DL (ref 0–0.3)
BILIRUBIN, INDIRECT: ABNORMAL MG/DL (ref 0–1)
TOTAL PROTEIN: 7.8 G/DL (ref 6.4–8.2)

## 2019-05-23 LAB — HEPATITIS BE ANTIGEN: NEGATIVE

## 2019-06-05 DIAGNOSIS — E78.5 DYSLIPIDEMIA: ICD-10-CM

## 2019-06-05 RX ORDER — ATORVASTATIN CALCIUM 80 MG/1
80 TABLET, FILM COATED ORAL DAILY
Qty: 90 TABLET | Refills: 0 | Status: SHIPPED | OUTPATIENT
Start: 2019-06-05 | End: 2019-06-19 | Stop reason: SDUPTHER

## 2019-06-12 NOTE — TELEPHONE ENCOUNTER
[facility]  [facility] Record Number:  [id number]  Place of Service where encounter took place:  [facility] [[id number]]  Chief Complaint   Patient presents with     Nursing Home Acute       HPI:    Josiane Zurita  is a 68 year old (1951), who is being seen today for an episodic care visit.  HPI information obtained from: facility chart records, facility staff, patient report and [facility] chart review. Today's concern is:     Sepsis, due to unspecified organism (H)  Mild intermittent asthma without complication  Lymphedema of both lower extremities  Morbid obesity (H)   Josiane reports she has been off O2 for at least 2 days, feeling well. She feels stronger, hopes to return to her apartment soon.  Nursing reports no new concerns.       Past Medical and Surgical History reviewed in Epic today.    MEDICATIONS:  Current Outpatient Medications   Medication Sig Dispense Refill     acetaminophen (TYLENOL) 500 MG tablet Take 500 mg by mouth every 6 hours as needed for mild pain       albuterol (PROAIR HFA/PROVENTIL HFA/VENTOLIN HFA) 108 (90 Base) MCG/ACT inhaler Inhale 2 puffs into the lungs every 6 hours as needed for shortness of breath / dyspnea or wheezing 1 Inhaler 11     albuterol (PROVENTIL) (2.5 MG/3ML) 0.083% neb solution Take 1 vial (2.5 mg) by nebulization 4 times daily       apixaban ANTICOAGULANT (ELIQUIS) 5 MG tablet Take 1 tablet (5 mg) by mouth 2 times daily 60 tablet 0     calcium carbonate (OS-DRAKE 500 MG [place]. [place]) 500 MG tablet Take 500 mg by mouth 2 times daily       digoxin (LANOXIN) 125 MCG tablet Take 2 tablets (250 mcg) by mouth daily 60 tablet 0     Ferrous Sulfate (IRON) 325 (65 Fe) MG tablet Take 1 tablet by mouth daily (with breakfast) 30 tablet 1     fluticasone (FLOVENT DISKUS) 250 MCG/BLIST inhaler Inhale 1 puff into the lungs every 12 hours       furosemide (LASIX) 20 MG tablet Take 1 tablet (20 mg) by mouth 2 times daily       loperamide  Report from [address]   Scanned and placed in basket (IMODIUM A-D) 2 MG tablet Take 1 tablet (2 mg) by mouth 4 times daily as needed for diarrhea       metoprolol tartrate (LOPRESSOR) 25 MG tablet Take 1 tablet (25 mg) by mouth 2 times daily 180 tablet 1     miconazole (MICATIN/MICRO GUARD) 2 % external powder Apply topically 2 times daily       multivitamin, therapeutic (THERA-VIT) TABS Take 1 tablet by mouth daily       potassium chloride ER (K-DUR/KLOR-CON M) 20 MEQ CR tablet Take 20 mEq by mouth daily        REVIEW OF SYSTEMS:  4 point ROS including Respiratory, CV, GI and , other than that noted in the HPI,  is negative    Objective:  /65   Pulse 96   Temp 97.6  F (36.4  C)   Resp 19   Wt (!) 154.2 kg (340 lb)   SpO2 95%   BMI 60.23 kg/m    Exam:  GENERAL APPEARANCE:  Alert, in no distress   HEAD:  Normal, normocephalic, atraumatic  EYE EXAM: normal external eye, conjunctiva, lids, KATAL[place]  CHEST/RESP:  respiratory effort normal, lung sounds CTA , no respiratory distress  CV:  Rate reg, rhythm reg, no  Murmur  M/S:   extremities abnormal, gait abnormal-walking only a few feet with rolling walker and staff assist.   NEUROLOGIC EXAM: Normal gross motor movement, tone and coordination. No tremor. Cranial nerves 2-12 are normal tested and grossly at patient's baseline  PSYCH:  Alert and oriented to person-place-time , affect pleasant     Labs:   Recent labs in EPIC reviewed by me today.     ASSESSMENT/PLAN:  Sepsis, due to unspecified organism (H)  Resolved, no further symptoms. No fever, feeling well.     Mild intermittent asthma without complication  No new symptoms, off o2 for several days.  O2 sats maintaining.   -discontinue scheduled duonebs    Lymphedema of both lower extremities  Chronic, has lymphedema wraps on now.     Morbid obesity (H)  Stable weight, encouraging activity and healthy nutrition      transcribed by : Latesha oMy  1. Discontinue wound care to legs- lymphedema wraps in place  2. Discontinue aquaphor to legs  sparingly every day- has lymphedema wraps in place  3. Discontinue scheduled duonebs  4. Duonebs po QID prn sob/hypoxia      Electronically signed by:  NORBERTO Robles CNP

## 2019-06-19 ENCOUNTER — OFFICE VISIT (OUTPATIENT)
Dept: INTERNAL MEDICINE CLINIC | Age: 64
End: 2019-06-19
Payer: MEDICARE

## 2019-06-19 VITALS
TEMPERATURE: 97.7 F | BODY MASS INDEX: 25.41 KG/M2 | HEART RATE: 76 BPM | DIASTOLIC BLOOD PRESSURE: 60 MMHG | SYSTOLIC BLOOD PRESSURE: 120 MMHG | WEIGHT: 143.4 LBS | OXYGEN SATURATION: 96 % | HEIGHT: 63 IN

## 2019-06-19 DIAGNOSIS — L98.9 SKIN LESION OF CHEST WALL: ICD-10-CM

## 2019-06-19 DIAGNOSIS — Z78.0 ASYMPTOMATIC MENOPAUSAL STATE: ICD-10-CM

## 2019-06-19 DIAGNOSIS — Z00.00 ROUTINE GENERAL MEDICAL EXAMINATION AT A HEALTH CARE FACILITY: ICD-10-CM

## 2019-06-19 DIAGNOSIS — Z23 NEED FOR HEPATITIS A IMMUNIZATION: Primary | ICD-10-CM

## 2019-06-19 DIAGNOSIS — E78.5 DYSLIPIDEMIA: ICD-10-CM

## 2019-06-19 DIAGNOSIS — Z13.6 SCREENING FOR CARDIOVASCULAR CONDITION: ICD-10-CM

## 2019-06-19 DIAGNOSIS — Z13.1 SCREENING FOR DIABETES MELLITUS (DM): ICD-10-CM

## 2019-06-19 DIAGNOSIS — Z23 NEED FOR PROPHYLACTIC VACCINATION AND INOCULATION AGAINST VARICELLA: ICD-10-CM

## 2019-06-19 DIAGNOSIS — Z11.59 NEED FOR HEPATITIS C SCREENING TEST: ICD-10-CM

## 2019-06-19 DIAGNOSIS — Z13.220 SCREENING FOR HYPERLIPIDEMIA: ICD-10-CM

## 2019-06-19 DIAGNOSIS — Z12.31 ENCOUNTER FOR SCREENING MAMMOGRAM FOR BREAST CANCER: ICD-10-CM

## 2019-06-19 PROBLEM — I10 ESSENTIAL HYPERTENSION: Status: ACTIVE | Noted: 2018-05-16

## 2019-06-19 PROBLEM — L43.9 LICHEN PLANUS: Status: ACTIVE | Noted: 2018-05-16

## 2019-06-19 PROBLEM — E78.00 HYPERCHOLESTEROLEMIA: Status: ACTIVE | Noted: 2018-05-16

## 2019-06-19 PROCEDURE — G0402 INITIAL PREVENTIVE EXAM: HCPCS | Performed by: INTERNAL MEDICINE

## 2019-06-19 PROCEDURE — 90471 IMMUNIZATION ADMIN: CPT | Performed by: INTERNAL MEDICINE

## 2019-06-19 PROCEDURE — 3017F COLORECTAL CA SCREEN DOC REV: CPT | Performed by: INTERNAL MEDICINE

## 2019-06-19 PROCEDURE — 90632 HEPA VACCINE ADULT IM: CPT | Performed by: INTERNAL MEDICINE

## 2019-06-19 PROCEDURE — 4040F PNEUMOC VAC/ADMIN/RCVD: CPT | Performed by: INTERNAL MEDICINE

## 2019-06-19 PROCEDURE — 1123F ACP DISCUSS/DSCN MKR DOCD: CPT | Performed by: INTERNAL MEDICINE

## 2019-06-19 RX ORDER — ATORVASTATIN CALCIUM 80 MG/1
80 TABLET, FILM COATED ORAL DAILY
Qty: 90 TABLET | Refills: 0 | Status: SHIPPED | OUTPATIENT
Start: 2019-06-19 | End: 2020-02-27

## 2019-06-19 ASSESSMENT — LIFESTYLE VARIABLES
HOW OFTEN DURING THE LAST YEAR HAVE YOU HAD A FEELING OF GUILT OR REMORSE AFTER DRINKING: 0
HOW OFTEN DO YOU HAVE SIX OR MORE DRINKS ON ONE OCCASION: 1
HOW OFTEN DURING THE LAST YEAR HAVE YOU FOUND THAT YOU WERE NOT ABLE TO STOP DRINKING ONCE YOU HAD STARTED: 0
HOW OFTEN DO YOU HAVE A DRINK CONTAINING ALCOHOL: 1
HOW OFTEN DURING THE LAST YEAR HAVE YOU BEEN UNABLE TO REMEMBER WHAT HAPPENED THE NIGHT BEFORE BECAUSE YOU HAD BEEN DRINKING: 0
AUDIT TOTAL SCORE: 2
AUDIT-C TOTAL SCORE: 2
HAVE YOU OR SOMEONE ELSE BEEN INJURED AS A RESULT OF YOUR DRINKING: 0
HOW OFTEN DURING THE LAST YEAR HAVE YOU NEEDED AN ALCOHOLIC DRINK FIRST THING IN THE MORNING TO GET YOURSELF GOING AFTER A NIGHT OF HEAVY DRINKING: 0
HAS A RELATIVE, FRIEND, DOCTOR, OR ANOTHER HEALTH PROFESSIONAL EXPRESSED CONCERN ABOUT YOUR DRINKING OR SUGGESTED YOU CUT DOWN: 0
HOW MANY STANDARD DRINKS CONTAINING ALCOHOL DO YOU HAVE ON A TYPICAL DAY: 0
HOW OFTEN DURING THE LAST YEAR HAVE YOU FAILED TO DO WHAT WAS NORMALLY EXPECTED FROM YOU BECAUSE OF DRINKING: 0

## 2019-06-19 ASSESSMENT — PATIENT HEALTH QUESTIONNAIRE - PHQ9
1. LITTLE INTEREST OR PLEASURE IN DOING THINGS: 1
2. FEELING DOWN, DEPRESSED OR HOPELESS: 1
SUM OF ALL RESPONSES TO PHQ QUESTIONS 1-9: 2
SUM OF ALL RESPONSES TO PHQ9 QUESTIONS 1 & 2: 2
SUM OF ALL RESPONSES TO PHQ QUESTIONS 1-9: 2

## 2019-06-19 ASSESSMENT — ANXIETY QUESTIONNAIRES: GAD7 TOTAL SCORE: 2

## 2019-06-19 NOTE — PATIENT INSTRUCTIONS
Personalized Preventive Plan for Moises Roca - 6/19/2019  Medicare offers a range of preventive health benefits. Some of the tests and screenings are paid in full while other may be subject to a deductible, co-insurance, and/or copay. Some of these benefits include a comprehensive review of your medical history including lifestyle, illnesses that may run in your family, and various assessments and screenings as appropriate. After reviewing your medical record and screening and assessments performed today your provider may have ordered immunizations, labs, imaging, and/or referrals for you. A list of these orders (if applicable) as well as your Preventive Care list are included within your After Visit Summary for your review. Other Preventive Recommendations:    · A preventive eye exam performed by an eye specialist is recommended every 1-2 years to screen for glaucoma; cataracts, macular degeneration, and other eye disorders. · A preventive dental visit is recommended every 6 months. · Try to get at least 150 minutes of exercise per week or 10,000 steps per day on a pedometer . · Order or download the FREE \"Exercise & Physical Activity: Your Everyday Guide\" from [employer]. Call 0-257.633.4078 or search [employer] online. · You need 1106-9509 mg of calcium and 4297-8013 IU of vitamin D per day. It is possible to meet your calcium requirement with diet alone, but a vitamin D supplement is usually necessary to meet this goal.  · When exposed to the sun, use a sunscreen that protects against both UVA and UVB radiation with an SPF of 30 or greater. Reapply every 2 to 3 hours or after sweating, drying off with a towel, or swimming. · Always wear a seat belt when traveling in a car. Always wear a helmet when riding a bicycle or motorcycle. Patient Education        Learning About Living [place]  What is a living will?     A living will is a legal form you use to write down the kind of care you want at the end of your life. It is used by the health professionals who will treat you if you aren't able to decide for yourself. If you put your wishes in writing, your loved ones and others will know what kind of care you want. They won't need to guess. This can ease your mind and be helpful to others. A living will is not the same as an estate or property will. An estate will explains what you want to happen with your money and property after you die. Is a living will a legal document? A living will is a legal document. Each state has its own laws about living [place]. If you move to another state, make sure that your living will is legal in the state where you now live. Or you might use a universal form that has been approved by many states. This kind of form can sometimes be completed and stored online. Your electronic copy will then be available wherever you have a connection to the Internet. In most cases, doctors will respect your wishes even if you have a form from a different state. · You don't need an  to complete a living will. But legal advice can be helpful if your state's laws are unclear, your health history is complicated, or your family can't agree on what should be in your living will. · You can change your living will at any time. Some people find that their wishes about end-of-life care change as their health changes. · In addition to making a living will, think about completing a medical power of  form. This form lets you name the person you want to make end-of-life treatment decisions for you (your \"health care agent\") if you're not able to. Many hospitals and nursing homes will give you the forms you need to complete a living will and a medical power of . · Your living will is used only if you can't make or communicate decisions for yourself anymore.  If you become able to make decisions again, you can accept or refuse any treatment, no matter what you wrote in your living will. · Your state may offer an online registry. This is a place where you can store your living will online so the doctors and nurses who need to treat you can find it right away. What should you think about when creating a living will? Talk about your end-of-life wishes with your family members and your doctor. Let them know what you want. That way the people making decisions for you won't be surprised by your choices. Think about these questions as you make your living will:  · Do you know enough about life support methods that might be used? If not, talk to your doctor so you know what might be done if you can't breathe on your own, your heart stops, or you're unable to swallow. · What things would you still want to be able to do after you receive life-support methods? Would you want to be able to walk? To speak? To eat on your own? To live without the help of machines? · If you have a choice, where do you want to be cared for? In your home? At a hospital or nursing home? · Do you want certain Synagogue practices performed if you become very ill? · If you have a choice at the end of your life, where would you prefer to die? At home? In a hospital or nursing home? Somewhere else? · Would you prefer to be buried or cremated? · Do you want your organs to be donated after you die? What should you do with your living will? · Make sure that your family members and your health care agent have copies of your living will. · Give your doctor a copy of your living will to keep in your medical record. If you have more than one doctor, make sure that each one has a copy. · You may want to put a copy of your living will where it can be easily found. Where can you learn more? Go to https://chmillyeb.[email or web address] [email or web address]. org and sign in to your [employer] account. Enter B122 in the UserZoom box to learn more about \"Learning About Living [place]. \"     If you do not have an account, please click on the \"Sign Up Now\" link. Current as of: April 18, 2018  Content Version: 12.0  © 9377-2433 Healthwise, Incorporated. Care instructions adapted under license by [facility] ([facility]). If you have questions about a medical condition or this instruction, always ask your healthcare professional. [address] any warranty or liability for your use of this information.

## 2019-06-19 NOTE — PROGRESS NOTES
Hypertension     Lichen planus     arms, legs     Past Surgical History:   Procedure Laterality Date    CARPAL TUNNEL RELEASE       SECTION      ENDOMETRIAL ABLATION      TONSILLECTOMY       Family History   Problem Relation Age of Onset    Cancer Mother         Lung Cancer    Stroke Mother     Diabetes Father     High Cholesterol Father     Hypertension Father     Heart Failure Father     Substance Abuse Sister        CareTeam (Including outside providers/suppliers regularly involved in providing care):   Patient Care Team:  Vanesa Turner MD as PCP - General (Pediatrics)  Vanesa Turner MD as PCP - [facility] Provider    Wt Readings from Last 3 Encounters:   19 143 lb 6.4 oz (65 kg)   18 147 lb 3.2 oz (66.8 kg)   18 145 lb (65.8 kg)     Vitals:    19 0847   BP: 120/60   Pulse: 76   Temp: 97.7 °F (36.5 °C)   TempSrc: Oral   SpO2: 96%   Weight: 143 lb 6.4 oz (65 kg)   Height: 5' 3\" (1.6 m)     Body mass index is 25.4 kg/m². Based upon direct observation of the patient, evaluation of cognition reveals recent and remote memory intact.     General Appearance: alert and oriented to person, place and time, well developed and well- nourished, in no acute distress  Skin: warm and dry, multiple lichen planus rash or erythema  Head: normocephalic and atraumatic  Eyes: pupils equal, round, and reactive to light, extraocular eye movements intact, conjunctivae normal  ENT: tympanic membrane, external ear and ear canal normal bilaterally, nose without deformity, nasal mucosa and turbinates normal without polyps  Neck: supple and non-tender without mass, no thyromegaly or thyroid nodules, no cervical lymphadenopathy  Pulmonary/Chest: clear to auscultation bilaterally- no wheezes, rales or rhonchi, normal air movement, no respiratory distress  Cardiovascular: normal rate, regular rhythm, normal S1 and S2, no murmurs, rubs, clicks, or gallops, distal pulses intact, screen  12/19/2019    Pneumococcal 65+ years Vaccine (1 of 2 - PCV13) 12/19/2019    Breast cancer screen  11/19/2020    DTaP/Tdap/Td vaccine (2 - Td) 02/17/2024    Colon cancer screen colonoscopy  06/07/2028    DEXA (modify frequency per FRAX score)  Completed    Hepatitis C screen  Completed    HIV screen  Completed     Recommendations for Preventive Services Due: see orders and patient instructions/AVS.  .   Recommended screening schedule for the next 5-10 years is provided to the patient in written form: see Patient Instructions/AVS.

## 2019-08-02 DIAGNOSIS — I10 HTN, GOAL BELOW 130/80: ICD-10-CM

## 2019-08-02 RX ORDER — LISINOPRIL 20 MG/1
20 TABLET ORAL DAILY
Qty: 90 TABLET | Refills: 0 | Status: SHIPPED | OUTPATIENT
Start: 2019-08-02 | End: 2019-08-21

## 2019-08-21 ENCOUNTER — OFFICE VISIT (OUTPATIENT)
Dept: INTERNAL MEDICINE CLINIC | Age: 64
End: 2019-08-21
Payer: MEDICARE

## 2019-08-21 VITALS
WEIGHT: 143.4 LBS | HEART RATE: 72 BPM | TEMPERATURE: 97.6 F | RESPIRATION RATE: 16 BRPM | OXYGEN SATURATION: 97 % | DIASTOLIC BLOOD PRESSURE: 60 MMHG | BODY MASS INDEX: 25.41 KG/M2 | SYSTOLIC BLOOD PRESSURE: 122 MMHG | HEIGHT: 63 IN

## 2019-08-21 DIAGNOSIS — E78.5 DYSLIPIDEMIA: ICD-10-CM

## 2019-08-21 DIAGNOSIS — I10 HTN, GOAL BELOW 130/80: ICD-10-CM

## 2019-08-21 DIAGNOSIS — Z13.9 ENCOUNTER FOR HEALTH-RELATED SCREENING: ICD-10-CM

## 2019-08-21 DIAGNOSIS — I95.1 ORTHOSTATIC HYPOTENSION: Primary | ICD-10-CM

## 2019-08-21 LAB
A/G RATIO: 1.2 (ref 1.1–2.2)
ALBUMIN SERPL-MCNC: 4.1 G/DL (ref 3.4–5)
ALBUMIN SERPL-MCNC: 4.2 G/DL (ref 3.4–5)
ALP BLD-CCNC: 250 U/L (ref 40–129)
ALP BLD-CCNC: 252 U/L (ref 40–129)
ALT SERPL-CCNC: 14 U/L (ref 10–40)
ALT SERPL-CCNC: 15 U/L (ref 10–40)
ANION GAP SERPL CALCULATED.3IONS-SCNC: 15 MMOL/L (ref 3–16)
AST SERPL-CCNC: 18 U/L (ref 15–37)
AST SERPL-CCNC: 19 U/L (ref 15–37)
BASOPHILS ABSOLUTE: 0.1 K/UL (ref 0–0.2)
BASOPHILS RELATIVE PERCENT: 0.8 %
BILIRUB SERPL-MCNC: 0.3 MG/DL (ref 0–1)
BILIRUB SERPL-MCNC: <0.2 MG/DL (ref 0–1)
BILIRUBIN DIRECT: <0.2 MG/DL (ref 0–0.3)
BILIRUBIN, INDIRECT: ABNORMAL MG/DL (ref 0–1)
BUN BLDV-MCNC: 10 MG/DL (ref 7–20)
CALCIUM SERPL-MCNC: 9.6 MG/DL (ref 8.3–10.6)
CHLORIDE BLD-SCNC: 103 MMOL/L (ref 99–110)
CHOLESTEROL, TOTAL: 120 MG/DL (ref 0–199)
CO2: 24 MMOL/L (ref 21–32)
CREAT SERPL-MCNC: <0.5 MG/DL (ref 0.6–1.2)
EOSINOPHILS ABSOLUTE: 0.3 K/UL (ref 0–0.6)
EOSINOPHILS RELATIVE PERCENT: 2.4 %
GFR AFRICAN AMERICAN: >60
GFR NON-AFRICAN AMERICAN: >60
GLOBULIN: 3.3 G/DL
GLUCOSE BLD-MCNC: 103 MG/DL (ref 70–99)
HCT VFR BLD CALC: 41.9 % (ref 36–48)
HDLC SERPL-MCNC: 41 MG/DL (ref 40–60)
HEMOGLOBIN: 13.7 G/DL (ref 12–16)
LDL CHOLESTEROL CALCULATED: 52 MG/DL
LYMPHOCYTES ABSOLUTE: 3 K/UL (ref 1–5.1)
LYMPHOCYTES RELATIVE PERCENT: 22.3 %
MCH RBC QN AUTO: 28.8 PG (ref 26–34)
MCHC RBC AUTO-ENTMCNC: 32.7 G/DL (ref 31–36)
MCV RBC AUTO: 88 FL (ref 80–100)
MONOCYTES ABSOLUTE: 1.3 K/UL (ref 0–1.3)
MONOCYTES RELATIVE PERCENT: 9.8 %
NEUTROPHILS ABSOLUTE: 8.7 K/UL (ref 1.7–7.7)
NEUTROPHILS RELATIVE PERCENT: 64.7 %
PDW BLD-RTO: 14.4 % (ref 12.4–15.4)
PLATELET # BLD: 305 K/UL (ref 135–450)
PMV BLD AUTO: 10.4 FL (ref 5–10.5)
POTASSIUM SERPL-SCNC: 4.4 MMOL/L (ref 3.5–5.1)
RBC # BLD: 4.76 M/UL (ref 4–5.2)
SODIUM BLD-SCNC: 142 MMOL/L (ref 136–145)
TOTAL PROTEIN: 7.4 G/DL (ref 6.4–8.2)
TOTAL PROTEIN: 7.4 G/DL (ref 6.4–8.2)
TRIGL SERPL-MCNC: 136 MG/DL (ref 0–150)
VLDLC SERPL CALC-MCNC: 27 MG/DL
WBC # BLD: 13.5 K/UL (ref 4–11)

## 2019-08-21 PROCEDURE — 4040F PNEUMOC VAC/ADMIN/RCVD: CPT | Performed by: INTERNAL MEDICINE

## 2019-08-21 PROCEDURE — 1090F PRES/ABSN URINE INCON ASSESS: CPT | Performed by: INTERNAL MEDICINE

## 2019-08-21 PROCEDURE — G8399 PT W/DXA RESULTS DOCUMENT: HCPCS | Performed by: INTERNAL MEDICINE

## 2019-08-21 PROCEDURE — G8419 CALC BMI OUT NRM PARAM NOF/U: HCPCS | Performed by: INTERNAL MEDICINE

## 2019-08-21 PROCEDURE — 1123F ACP DISCUSS/DSCN MKR DOCD: CPT | Performed by: INTERNAL MEDICINE

## 2019-08-21 PROCEDURE — 99213 OFFICE O/P EST LOW 20 MIN: CPT | Performed by: INTERNAL MEDICINE

## 2019-08-21 PROCEDURE — 1036F TOBACCO NON-USER: CPT | Performed by: INTERNAL MEDICINE

## 2019-08-21 PROCEDURE — G8427 DOCREV CUR MEDS BY ELIG CLIN: HCPCS | Performed by: INTERNAL MEDICINE

## 2019-08-21 PROCEDURE — 3017F COLORECTAL CA SCREEN DOC REV: CPT | Performed by: INTERNAL MEDICINE

## 2019-08-21 RX ORDER — LISINOPRIL 5 MG/1
5 TABLET ORAL DAILY
Qty: 90 TABLET | Refills: 1 | Status: SHIPPED | OUTPATIENT
Start: 2019-08-21 | End: 2019-08-21

## 2019-08-21 ASSESSMENT — ENCOUNTER SYMPTOMS
NAUSEA: 0
COUGH: 1
ABDOMINAL PAIN: 0
CHEST TIGHTNESS: 0
DIARRHEA: 0
VOMITING: 0

## 2019-08-30 DIAGNOSIS — E78.5 DYSLIPIDEMIA: ICD-10-CM

## 2019-08-30 RX ORDER — ATORVASTATIN CALCIUM 80 MG/1
80 TABLET, FILM COATED ORAL DAILY
Qty: 90 TABLET | Refills: 0 | Status: SHIPPED | OUTPATIENT
Start: 2019-08-30 | End: 2019-12-16 | Stop reason: SDUPTHER

## 2019-12-16 ENCOUNTER — OFFICE VISIT (OUTPATIENT)
Dept: INTERNAL MEDICINE CLINIC | Age: 64
End: 2019-12-16
Payer: MEDICARE

## 2019-12-16 ENCOUNTER — TELEPHONE (OUTPATIENT)
Dept: PHARMACY | Facility: CLINIC | Age: 64
End: 2019-12-16

## 2019-12-16 VITALS
BODY MASS INDEX: 26.39 KG/M2 | TEMPERATURE: 97.7 F | RESPIRATION RATE: 16 BRPM | WEIGHT: 149 LBS | OXYGEN SATURATION: 97 % | HEART RATE: 84 BPM | SYSTOLIC BLOOD PRESSURE: 116 MMHG | DIASTOLIC BLOOD PRESSURE: 64 MMHG

## 2019-12-16 DIAGNOSIS — L43.9 LICHEN PLANUS: ICD-10-CM

## 2019-12-16 DIAGNOSIS — J34.89 RHINORRHEA: ICD-10-CM

## 2019-12-16 DIAGNOSIS — Z12.31 ENCOUNTER FOR SCREENING MAMMOGRAM FOR BREAST CANCER: ICD-10-CM

## 2019-12-16 DIAGNOSIS — K74.3 PRIMARY BILIARY CHOLANGITIS (HCC): ICD-10-CM

## 2019-12-16 DIAGNOSIS — E78.5 DYSLIPIDEMIA: ICD-10-CM

## 2019-12-16 DIAGNOSIS — I10 HTN, GOAL BELOW 130/80: ICD-10-CM

## 2019-12-16 DIAGNOSIS — E11.9 CONTROLLED TYPE 2 DIABETES MELLITUS WITHOUT COMPLICATION, WITHOUT LONG-TERM CURRENT USE OF INSULIN (HCC): Primary | ICD-10-CM

## 2019-12-16 LAB
HBA1C MFR BLD: 6.6 %
VITAMIN D 25-HYDROXY: 40.5 NG/ML

## 2019-12-16 PROCEDURE — G8484 FLU IMMUNIZE NO ADMIN: HCPCS | Performed by: INTERNAL MEDICINE

## 2019-12-16 PROCEDURE — 99214 OFFICE O/P EST MOD 30 MIN: CPT | Performed by: INTERNAL MEDICINE

## 2019-12-16 PROCEDURE — G8427 DOCREV CUR MEDS BY ELIG CLIN: HCPCS | Performed by: INTERNAL MEDICINE

## 2019-12-16 PROCEDURE — G8417 CALC BMI ABV UP PARAM F/U: HCPCS | Performed by: INTERNAL MEDICINE

## 2019-12-16 PROCEDURE — 1036F TOBACCO NON-USER: CPT | Performed by: INTERNAL MEDICINE

## 2019-12-16 PROCEDURE — 3044F HG A1C LEVEL LT 7.0%: CPT | Performed by: INTERNAL MEDICINE

## 2019-12-16 PROCEDURE — 90632 HEPA VACCINE ADULT IM: CPT | Performed by: INTERNAL MEDICINE

## 2019-12-16 PROCEDURE — 1090F PRES/ABSN URINE INCON ASSESS: CPT | Performed by: INTERNAL MEDICINE

## 2019-12-16 PROCEDURE — G8399 PT W/DXA RESULTS DOCUMENT: HCPCS | Performed by: INTERNAL MEDICINE

## 2019-12-16 PROCEDURE — 2022F DILAT RTA XM EVC RTNOPTHY: CPT | Performed by: INTERNAL MEDICINE

## 2019-12-16 PROCEDURE — 4040F PNEUMOC VAC/ADMIN/RCVD: CPT | Performed by: INTERNAL MEDICINE

## 2019-12-16 PROCEDURE — 1123F ACP DISCUSS/DSCN MKR DOCD: CPT | Performed by: INTERNAL MEDICINE

## 2019-12-16 PROCEDURE — 90471 IMMUNIZATION ADMIN: CPT | Performed by: INTERNAL MEDICINE

## 2019-12-16 PROCEDURE — 83036 HEMOGLOBIN GLYCOSYLATED A1C: CPT | Performed by: INTERNAL MEDICINE

## 2019-12-16 PROCEDURE — 3017F COLORECTAL CA SCREEN DOC REV: CPT | Performed by: INTERNAL MEDICINE

## 2019-12-16 RX ORDER — CETIRIZINE HYDROCHLORIDE 10 MG/1
10 TABLET ORAL NIGHTLY
Qty: 90 TABLET | Refills: 0 | Status: SHIPPED | OUTPATIENT
Start: 2019-12-16 | End: 2020-04-17

## 2019-12-16 RX ORDER — LORATADINE 10 MG/1
10 TABLET ORAL DAILY
Qty: 90 TABLET | Refills: 3 | Status: SHIPPED | OUTPATIENT
Start: 2019-12-16 | End: 2020-04-17 | Stop reason: SDUPTHER

## 2019-12-16 RX ORDER — TRIAMCINOLONE ACETONIDE 1 MG/G
CREAM TOPICAL 2 TIMES DAILY
Qty: 453.6 G | Refills: 3 | Status: SHIPPED | OUTPATIENT
Start: 2019-12-16 | End: 2019-12-23

## 2019-12-16 RX ORDER — IBUPROFEN 200 MG
1 CAPSULE ORAL DAILY
COMMUNITY

## 2019-12-16 RX ORDER — LISINOPRIL 5 MG/1
5 TABLET ORAL DAILY
Qty: 90 TABLET | Refills: 1 | Status: SHIPPED | OUTPATIENT
Start: 2019-12-16 | End: 2020-04-17 | Stop reason: SDUPTHER

## 2019-12-16 RX ORDER — FLUTICASONE PROPIONATE 50 MCG
2 SPRAY, SUSPENSION (ML) NASAL DAILY
Qty: 3 BOTTLE | Refills: 1 | Status: SHIPPED | OUTPATIENT
Start: 2019-12-16 | End: 2019-12-23

## 2019-12-16 ASSESSMENT — ENCOUNTER SYMPTOMS
RHINORRHEA: 1
SHORTNESS OF BREATH: 0
STRIDOR: 0
SINUS COMPLAINT: 1
SINUS PAIN: 0
SWOLLEN GLANDS: 0
COUGH: 0
GASTROINTESTINAL NEGATIVE: 1
SINUS PRESSURE: 0
EYES NEGATIVE: 1
RESPIRATORY NEGATIVE: 1
WHEEZING: 0
HOARSE VOICE: 0
SORE THROAT: 0

## 2019-12-19 LAB
ALPHA-TOCOPHEROL: 8.8 MG/L (ref 5.5–18)
GAMMA-TOCOPHEROL: 0.5 MG/L (ref 0–6)
RETINYL PALMITATE: <0.02 MG/L (ref 0–0.1)
VITAMIN A LEVEL: 0.49 MG/L (ref 0.3–1.2)
VITAMIN A, INTERP: NORMAL
VITAMIN K1: 0.57 NMOL/L (ref 0.22–4.88)

## 2019-12-23 ENCOUNTER — SCHEDULED TELEPHONE ENCOUNTER (OUTPATIENT)
Dept: PHARMACY | Facility: CLINIC | Age: 64
End: 2019-12-23

## 2019-12-23 RX ORDER — TRIAMCINOLONE ACETONIDE 1 MG/G
CREAM TOPICAL
COMMUNITY
End: 2020-10-21 | Stop reason: SDUPTHER

## 2019-12-23 RX ORDER — FLUTICASONE PROPIONATE 50 MCG
2 SPRAY, SUSPENSION (ML) NASAL 2 TIMES DAILY
COMMUNITY
End: 2020-04-17 | Stop reason: SDUPTHER

## 2020-01-13 ENCOUNTER — CARE COORDINATION (OUTPATIENT)
Dept: CARE COORDINATION | Age: 65
End: 2020-01-13

## 2020-01-13 NOTE — LETTER
1/13/2020    [address] [facility]      Dear Keysha Valdez,    My name is Kenroy Vaughn and I am a registered nurse who partners with Jennifer Morel MD to improve patients' health. Jennifer Morel MD believes you would benefit from working with me. As a member of your health care team, I would work with other providers involved in your care, offer education for your specific health conditions, and connect you with additional resources as needed. I will collaborate with Jennifer Morel MD to support you in following your treatment plan. The additional support I provide is no additional cost to you. My primary focus is to help you achieve specific goals and improve your health. We are committed to walk with you on this journey and look forward to working with you. Please call me to further discuss your healthcare needs. I am available by phone or for appointments at the office. You can reach me at [phone].     In good health,     Kenroy Vaughn RN

## 2020-01-30 ENCOUNTER — CARE COORDINATION (OUTPATIENT)
Dept: CARE COORDINATION | Age: 65
End: 2020-01-30

## 2020-02-05 ENCOUNTER — CARE COORDINATION (OUTPATIENT)
Dept: CARE COORDINATION | Age: 65
End: 2020-02-05

## 2020-02-05 NOTE — LETTER
2/5/2020    [address] [facility]      Dear Russ Madden,    My name is Graciela Traore and I am a registered nurse who partners with Nida Cabral MD to improve patients' health. Nida Cabral MD believes you would benefit from working with me. As a member of your health care team, I would work with other providers involved in your care, offer education for your specific health conditions, and connect you with additional resources as needed. I will collaborate with Nida Cabral MD to support you in following your treatment plan. The additional support I provide is no additional cost to you. My primary focus is to help you achieve specific goals and improve your health. We are committed to walk with you on this journey and look forward to working with you. Please call me to further discuss your healthcare needs. I am available by phone or for appointments at the office. You can reach me at [phone].     In good health,     Graciela Traore RN

## 2020-02-05 NOTE — CARE COORDINATION
LM:  Called patient to follow up with care. Left message and call back number.   Care Coordination Introduction Letter sent

## 2020-02-06 RX ORDER — LISINOPRIL 20 MG/1
20 TABLET ORAL DAILY
Qty: 90 TABLET | Refills: 1 | Status: SHIPPED | OUTPATIENT
Start: 2020-02-06 | End: 2020-04-17 | Stop reason: ALTCHOICE

## 2020-02-24 ENCOUNTER — HOSPITAL ENCOUNTER (OUTPATIENT)
Age: 65
Discharge: HOME | End: 2020-02-24
Payer: MEDICARE

## 2020-02-24 LAB
ALBUMIN SERPL-MCNC: 3.7 G/DL (ref 3.4–5)
ALP BLD-CCNC: 281 U/L (ref 40–129)
ALT SERPL-CCNC: 17 U/L (ref 10–40)
AST SERPL-CCNC: 20 U/L (ref 15–37)
BILIRUB SERPL-MCNC: <0.2 MG/DL (ref 0–1)
BILIRUBIN DIRECT: <0.2 MG/DL (ref 0–0.3)
BILIRUBIN, INDIRECT: ABNORMAL MG/DL (ref 0–1)
TOTAL PROTEIN: 7.8 G/DL (ref 6.4–8.2)

## 2020-02-24 PROCEDURE — 80076 HEPATIC FUNCTION PANEL: CPT

## 2020-02-24 PROCEDURE — 36415 COLL VENOUS BLD VENIPUNCTURE: CPT

## 2020-02-27 RX ORDER — ATORVASTATIN CALCIUM 80 MG/1
80 TABLET, FILM COATED ORAL DAILY
Qty: 90 TABLET | Refills: 0 | Status: SHIPPED | OUTPATIENT
Start: 2020-02-27 | End: 2020-04-17 | Stop reason: SDUPTHER

## 2020-04-17 ENCOUNTER — TELEPHONE (OUTPATIENT)
Dept: INTERNAL MEDICINE CLINIC | Age: 65
End: 2020-04-17

## 2020-04-17 ENCOUNTER — VIRTUAL VISIT (OUTPATIENT)
Dept: INTERNAL MEDICINE CLINIC | Age: 65
End: 2020-04-17
Payer: MEDICARE

## 2020-04-17 VITALS — BODY MASS INDEX: 24.98 KG/M2 | WEIGHT: 141 LBS

## 2020-04-17 PROCEDURE — 3017F COLORECTAL CA SCREEN DOC REV: CPT | Performed by: INTERNAL MEDICINE

## 2020-04-17 PROCEDURE — 1090F PRES/ABSN URINE INCON ASSESS: CPT | Performed by: INTERNAL MEDICINE

## 2020-04-17 PROCEDURE — 99214 OFFICE O/P EST MOD 30 MIN: CPT | Performed by: INTERNAL MEDICINE

## 2020-04-17 PROCEDURE — G8427 DOCREV CUR MEDS BY ELIG CLIN: HCPCS | Performed by: INTERNAL MEDICINE

## 2020-04-17 PROCEDURE — 2022F DILAT RTA XM EVC RTNOPTHY: CPT | Performed by: INTERNAL MEDICINE

## 2020-04-17 PROCEDURE — G8399 PT W/DXA RESULTS DOCUMENT: HCPCS | Performed by: INTERNAL MEDICINE

## 2020-04-17 PROCEDURE — G8420 CALC BMI NORM PARAMETERS: HCPCS | Performed by: INTERNAL MEDICINE

## 2020-04-17 PROCEDURE — G8510 SCR DEP NEG, NO PLAN REQD: HCPCS | Performed by: INTERNAL MEDICINE

## 2020-04-17 PROCEDURE — 1036F TOBACCO NON-USER: CPT | Performed by: INTERNAL MEDICINE

## 2020-04-17 PROCEDURE — 1123F ACP DISCUSS/DSCN MKR DOCD: CPT | Performed by: INTERNAL MEDICINE

## 2020-04-17 PROCEDURE — 3046F HEMOGLOBIN A1C LEVEL >9.0%: CPT | Performed by: INTERNAL MEDICINE

## 2020-04-17 PROCEDURE — 4040F PNEUMOC VAC/ADMIN/RCVD: CPT | Performed by: INTERNAL MEDICINE

## 2020-04-17 RX ORDER — FLUTICASONE PROPIONATE 50 MCG
2 SPRAY, SUSPENSION (ML) NASAL 2 TIMES DAILY
Qty: 1 BOTTLE | Refills: 5 | Status: SHIPPED | OUTPATIENT
Start: 2020-04-17 | End: 2020-04-20

## 2020-04-17 RX ORDER — ATORVASTATIN CALCIUM 80 MG/1
80 TABLET, FILM COATED ORAL DAILY
Qty: 90 TABLET | Refills: 0 | Status: SHIPPED | OUTPATIENT
Start: 2020-04-17 | End: 2020-05-28

## 2020-04-17 RX ORDER — LISINOPRIL 5 MG/1
5 TABLET ORAL DAILY
Qty: 90 TABLET | Refills: 1 | Status: CANCELLED | OUTPATIENT
Start: 2020-04-17

## 2020-04-17 RX ORDER — ATORVASTATIN CALCIUM 80 MG/1
80 TABLET, FILM COATED ORAL DAILY
Qty: 90 TABLET | Refills: 0 | Status: CANCELLED | OUTPATIENT
Start: 2020-04-17

## 2020-04-17 RX ORDER — LISINOPRIL 20 MG/1
20 TABLET ORAL DAILY
Qty: 90 TABLET | Refills: 1 | Status: CANCELLED | OUTPATIENT
Start: 2020-04-17

## 2020-04-17 RX ORDER — LORATADINE 10 MG/1
10 TABLET ORAL DAILY
Qty: 90 TABLET | Refills: 3 | Status: SHIPPED | OUTPATIENT
Start: 2020-04-17 | End: 2020-10-21 | Stop reason: SDUPTHER

## 2020-04-17 RX ORDER — LISINOPRIL 5 MG/1
5 TABLET ORAL DAILY
Qty: 90 TABLET | Refills: 1 | Status: SHIPPED | OUTPATIENT
Start: 2020-04-17 | End: 2020-10-21 | Stop reason: SDUPTHER

## 2020-04-17 ASSESSMENT — PATIENT HEALTH QUESTIONNAIRE - PHQ9
SUM OF ALL RESPONSES TO PHQ QUESTIONS 1-9: 0
1. LITTLE INTEREST OR PLEASURE IN DOING THINGS: 0
SUM OF ALL RESPONSES TO PHQ9 QUESTIONS 1 & 2: 0
SUM OF ALL RESPONSES TO PHQ QUESTIONS 1-9: 0
2. FEELING DOWN, DEPRESSED OR HOPELESS: 0

## 2020-04-17 NOTE — PROGRESS NOTES
spray 2 sprays by Each Nostril route 2 times daily      calcium carbonate (OYSTER SHELL CALCIUM 500 MG) 1250 (500 Ca) MG tablet Take 1 tablet by mouth daily      lisinopril (PRINIVIL;ZESTRIL) 5 MG tablet Take 1 tablet by mouth daily 90 tablet 1    vitamin D (CHOLECALCIFEROL) 1000 UNIT TABS tablet Take 1,000 Units by mouth daily      Multiple Vitamins-Minerals (MULTIVITAMIN PO) Take 1 capsule by mouth daily.  Ascorbic Acid (VITAMIN C) 500 MG tablet Take 500 mg by mouth daily.  vitamin E 400 UNIT capsule Take 400 Units by mouth daily.  triamcinolone (KENALOG) 0.1 % cream Apply topically 2 times daily as needed      cetirizine (ZYRTEC ALLERGY) 10 MG tablet Take 1 tablet by mouth nightly (Patient not taking: Reported on 4/17/2020) 90 tablet 0    loratadine (CLARITIN) 10 MG tablet Take 1 tablet by mouth daily 90 tablet 3     No current facility-administered medications for this visit. Vitals:    04/17/20 0904   Weight: 141 lb (64 kg)     Body mass index is 26.39 kg/m². Wt Readings from Last 3 Encounters:   04/17/20 149 lb (67.6 kg)   12/16/19 149 lb (67.6 kg)   08/21/19 143 lb 6.4 oz (65 kg)     BP Readings from Last 3 Encounters:   12/16/19 116/64   08/21/19 122/60   06/19/19 120/60       Objective:   Physical Exam  Vitals signs and nursing note reviewed. Constitutional:       General: She is not in acute distress. Appearance: She is well-developed. HENT:      Head: Normocephalic. Pulmonary:      Effort: Pulmonary effort is normal.   Skin:     Capillary Refill: Capillary refill takes less than 2 seconds. Neurological:      General: No focal deficit present. Mental Status: She is alert and oriented to person, place, and time. Psychiatric:         Mood and Affect: Mood normal.         Behavior: Behavior normal.         Thought Content: Thought content normal.         Judgment: Judgment normal.         Assessment/Plan:  Nuris Donald was seen today for diabetes.     Diagnoses and

## 2020-04-20 RX ORDER — FLUTICASONE PROPIONATE 50 MCG
SPRAY, SUSPENSION (ML) NASAL
Qty: 48 G | Refills: 5 | Status: SHIPPED | OUTPATIENT
Start: 2020-04-20 | End: 2020-10-21 | Stop reason: SDUPTHER

## 2020-04-20 NOTE — TELEPHONE ENCOUNTER
I SENT OVER MEDICINE FOR HER ALLERGY SYMPTOMS. Current Outpatient Medications   Medication Sig Dispense Refill    fluticasone (FLONASE) 50 MCG/ACT nasal spray SHAKE LIQUID AND USE 2 SPRAYS IN EACH NOSTRIL TWICE DAILY 48 g 5    atorvastatin (LIPITOR) 80 MG tablet Take 1 tablet by mouth daily 90 tablet 0    lisinopril (PRINIVIL;ZESTRIL) 5 MG tablet Take 1 tablet by mouth daily 90 tablet 1    loratadine (CLARITIN) 10 MG tablet Take 1 tablet by mouth daily 90 tablet 3    triamcinolone (KENALOG) 0.1 % cream Apply topically 2 times daily as needed      calcium carbonate (OYSTER SHELL CALCIUM 500 MG) 1250 (500 Ca) MG tablet Take 1 tablet by mouth daily      vitamin D (CHOLECALCIFEROL) 1000 UNIT TABS tablet Take 1,000 Units by mouth daily      Multiple Vitamins-Minerals (MULTIVITAMIN PO) Take 1 capsule by mouth daily.  Ascorbic Acid (VITAMIN C) 500 MG tablet Take 500 mg by mouth daily.  vitamin E 400 UNIT capsule Take 400 Units by mouth daily. No current facility-administered medications for this visit.

## 2020-05-28 RX ORDER — ATORVASTATIN CALCIUM 80 MG/1
80 TABLET, FILM COATED ORAL DAILY
Qty: 90 TABLET | Refills: 0 | Status: SHIPPED | OUTPATIENT
Start: 2020-05-28 | End: 2020-08-31

## 2020-06-22 ENCOUNTER — TELEPHONE (OUTPATIENT)
Dept: INTERNAL MEDICINE CLINIC | Age: 65
End: 2020-06-22

## 2020-07-14 ENCOUNTER — OFFICE VISIT (OUTPATIENT)
Dept: INTERNAL MEDICINE CLINIC | Age: 65
End: 2020-07-14
Payer: MEDICARE

## 2020-07-14 VITALS
RESPIRATION RATE: 18 BRPM | WEIGHT: 136.4 LBS | BODY MASS INDEX: 25.75 KG/M2 | OXYGEN SATURATION: 97 % | SYSTOLIC BLOOD PRESSURE: 144 MMHG | DIASTOLIC BLOOD PRESSURE: 60 MMHG | HEIGHT: 61 IN | TEMPERATURE: 97.4 F | HEART RATE: 88 BPM

## 2020-07-14 LAB
BILIRUBIN, POC: NORMAL
BLOOD URINE, POC: NORMAL
CLARITY, POC: NORMAL
COLOR, POC: NORMAL
GLUCOSE URINE, POC: NORMAL
KETONES, POC: NORMAL
LEUKOCYTE EST, POC: NORMAL
NITRITE, POC: NORMAL
PH, POC: 5.5
PROTEIN, POC: NORMAL
SPECIFIC GRAVITY, POC: 1.02
UROBILINOGEN, POC: 0.2

## 2020-07-14 PROCEDURE — G0438 PPPS, INITIAL VISIT: HCPCS | Performed by: INTERNAL MEDICINE

## 2020-07-14 PROCEDURE — 81002 URINALYSIS NONAUTO W/O SCOPE: CPT | Performed by: INTERNAL MEDICINE

## 2020-07-14 PROCEDURE — 4040F PNEUMOC VAC/ADMIN/RCVD: CPT | Performed by: INTERNAL MEDICINE

## 2020-07-14 PROCEDURE — 3017F COLORECTAL CA SCREEN DOC REV: CPT | Performed by: INTERNAL MEDICINE

## 2020-07-14 PROCEDURE — 3046F HEMOGLOBIN A1C LEVEL >9.0%: CPT | Performed by: INTERNAL MEDICINE

## 2020-07-14 PROCEDURE — 1123F ACP DISCUSS/DSCN MKR DOCD: CPT | Performed by: INTERNAL MEDICINE

## 2020-07-14 RX ORDER — HYDROXYZINE HYDROCHLORIDE 25 MG/1
25 TABLET, FILM COATED ORAL NIGHTLY PRN
Qty: 30 TABLET | Refills: 0 | Status: SHIPPED | OUTPATIENT
Start: 2020-07-14 | End: 2020-10-12

## 2020-07-14 RX ORDER — NEOMYCIN SULFATE, POLYMYXIN B SULFATE AND DEXAMETHASONE 3.5; 10000; 1 MG/ML; [USP'U]/ML; MG/ML
1 SUSPENSION/ DROPS OPHTHALMIC
COMMUNITY
End: 2021-02-25

## 2020-07-14 ASSESSMENT — PATIENT HEALTH QUESTIONNAIRE - PHQ9
SUM OF ALL RESPONSES TO PHQ QUESTIONS 1-9: 0
SUM OF ALL RESPONSES TO PHQ QUESTIONS 1-9: 0

## 2020-07-14 ASSESSMENT — ENCOUNTER SYMPTOMS
VOMITING: 0
NAUSEA: 0

## 2020-07-14 NOTE — PROGRESS NOTES
Subjective:      Patient ID: Kaylyn Howard is a 76 y.o. female. Urinary Tract Infection    This is a new problem. The current episode started in the past 7 days. The problem occurs intermittently. The quality of the pain is described as aching. The pain is at a severity of 2/10. The pain is mild. There has been no fever. There is no history of pyelonephritis. Associated symptoms include frequency, hematuria and urgency. Pertinent negatives include no chills, discharge, flank pain, hesitancy, nausea, sweats or vomiting. She has tried nothing for the symptoms. The treatment provided no relief. Her past medical history is significant for recurrent UTIs. There is no history of catheterization, kidney stones, a single kidney, urinary stasis or a urological procedure. Review of Systems   Constitutional: Negative for chills. Gastrointestinal: Negative for nausea and vomiting. Genitourinary: Positive for frequency, hematuria and urgency. Negative for flank pain and hesitancy.      @DOS@    Allergies   Allergen Reactions    Penicillins Other (See Comments)     Doesn't know allergy reaction    Sulfa Antibiotics Other (See Comments)     n/a    Sulfasalazine Hives     n/a       Current Outpatient Medications   Medication Sig Dispense Refill    neomycin-polymyxin-dexameth (MAXITROL) 3.5-87440-0.1 ophthalmic suspension Place 1 drop into both eyes      atorvastatin (LIPITOR) 80 MG tablet TAKE 1 TABLET BY MOUTH DAILY 90 tablet 0    fluticasone (FLONASE) 50 MCG/ACT nasal spray SHAKE LIQUID AND USE 2 SPRAYS IN EACH NOSTRIL TWICE DAILY 48 g 5    lisinopril (PRINIVIL;ZESTRIL) 5 MG tablet Take 1 tablet by mouth daily 90 tablet 1    loratadine (CLARITIN) 10 MG tablet Take 1 tablet by mouth daily 90 tablet 3    calcium carbonate (OYSTER SHELL CALCIUM 500 MG) 1250 (500 Ca) MG tablet Take 1 tablet by mouth daily      vitamin D (CHOLECALCIFEROL) 1000 UNIT TABS tablet Take 1,000 Units by mouth daily      Multiple Vitamins-Minerals (MULTIVITAMIN PO) Take 1 capsule by mouth daily.  Ascorbic Acid (VITAMIN C) 500 MG tablet Take 500 mg by mouth daily.  vitamin E 400 UNIT capsule Take 400 Units by mouth daily.  triamcinolone (KENALOG) 0.1 % cream Apply topically 2 times daily as needed       No current facility-administered medications for this visit. Vitals:    07/14/20 1514   BP: (!) 144/60   Pulse: 88   Resp: 18   Temp: 97.4 °F (36.3 °C)   TempSrc: Temporal   SpO2: 97%   Weight: 136 lb 6.4 oz (61.9 kg)   Height: 5' 1\" (1.549 m)     Body mass index is 25.77 kg/m². Wt Readings from Last 3 Encounters:   07/14/20 136 lb 6.4 oz (61.9 kg)   04/17/20 141 lb (64 kg)   12/16/19 149 lb (67.6 kg)     BP Readings from Last 3 Encounters:   07/14/20 (!) 144/60   12/16/19 116/64   08/21/19 122/60       Objective:   Physical Exam  Vitals signs and nursing note reviewed. Constitutional:       General: She is not in acute distress. Appearance: Normal appearance. She is well-developed. She is not diaphoretic. HENT:      Head: Normocephalic and atraumatic. Right Ear: Hearing, tympanic membrane, ear canal and external ear normal.      Left Ear: Hearing, tympanic membrane, ear canal and external ear normal.      Nose: Rhinorrhea present. No nasal deformity, laceration or mucosal edema. Right Sinus: No maxillary sinus tenderness or frontal sinus tenderness. Left Sinus: No maxillary sinus tenderness or frontal sinus tenderness. Mouth/Throat:      Pharynx: Uvula midline. No oropharyngeal exudate. Eyes:      General: Lids are normal.         Right eye: No discharge. Left eye: No discharge. Conjunctiva/sclera: Conjunctivae normal.      Pupils: Pupils are equal, round, and reactive to light. Neck:      Musculoskeletal: Full passive range of motion without pain, normal range of motion and neck supple. Thyroid: No thyroid mass or thyromegaly. Vascular: Normal carotid pulses.  No carotid bruit, hepatojugular reflux or JVD. Trachea: Trachea and phonation normal.   Cardiovascular:      Rate and Rhythm: Normal rate and regular rhythm. Chest Wall: PMI is not displaced. Pulses: Normal pulses. Carotid pulses are 2+ on the right side and 2+ on the left side. Radial pulses are 2+ on the right side and 2+ on the left side. Heart sounds: Normal heart sounds, S1 normal and S2 normal.   Pulmonary:      Effort: Pulmonary effort is normal. No respiratory distress. Breath sounds: Normal breath sounds. No decreased breath sounds, wheezing or rhonchi. Abdominal:      General: Bowel sounds are normal. There is no distension. Palpations: Abdomen is soft. There is no mass. Tenderness: There is no abdominal tenderness. There is no guarding or rebound. Comments: No HSM   Musculoskeletal: Normal range of motion. Right shoulder: Normal.      Left shoulder: Normal.      Right hip: Normal.      Left hip: Normal.      Right knee: Normal.      Left knee: Normal.   Lymphadenopathy:      Head:      Right side of head: No submental, submandibular, tonsillar, preauricular, posterior auricular or occipital adenopathy. Left side of head: No submental, submandibular, tonsillar, preauricular, posterior auricular or occipital adenopathy. Cervical: No cervical adenopathy. Right cervical: No superficial, deep or posterior cervical adenopathy. Left cervical: No superficial, deep or posterior cervical adenopathy. Skin:     General: Skin is warm. Nails: There is no clubbing. Neurological:      Mental Status: She is alert and oriented to person, place, and time. GCS: GCS eye subscore is 4. GCS verbal subscore is 5. GCS motor subscore is 6. Cranial Nerves: No cranial nerve deficit. Sensory: No sensory deficit. Deep Tendon Reflexes: Reflexes are normal and symmetric.       Reflex Scores:       Tricep reflexes are 2+ on the right side and 2+ on the left side. Bicep reflexes are 2+ on the right side and 2+ on the left side. Psychiatric:         Speech: Speech normal.         Behavior: Behavior normal. Behavior is not slowed. Assessment/Plan:  Harley Stratton was seen today for medicare awv. Diagnoses and all orders for this visit:    Routine general medical examination at a health care facility    Controlled type 2 diabetes mellitus without complication, without long-term current use of insulin ([facility])  -      DIABETES FOOT EXAM  -     Hemoglobin A1C; Future  -     Comprehensive Metabolic Panel, Fasting; Future    Acute cystitis without hematuria  -     Cancel: POCT Urine with Microscopic -negative urinalysis  -     POCT Urinalysis no Micro    Need for prophylactic vaccination and inoculation against varicella  -     zoster recombinant adjuvanted vaccine (SHINGRIX) 50 MCG/0.5ML SUSR injection; Inject 0.5 mLs into the muscle once for 1 dose    Insomnia, unspecified type- due to itching  -     hydrOXYzine (ATARAX) 25 MG tablet; Take 1 tablet by mouth nightly as needed for Itching (nightly for itching and anxiety)    Generalized pruritus- continues to complain of pruritis likely due to PBC, w  -     hydrOXYzine (ATARAX) 25 MG tablet; Take 1 tablet by mouth nightly as needed for Itching (nightly for itching and anxiety)  -     camphor-menthol (SARNA) 0.5-0.5 % lotion; Apply topically as needed. Severiano Tafoya MD  Medicare Annual Wellness Visit  Name: Ester Mcnally Date: 2020   MRN: [id number] Sex: Female   Age: 76 y.o. Ethnicity: Non-/Non    : 1955 Race: Dipti Mominal is here for Medicare AWV (Annual Medicare Well Visit  )    Screenings for behavioral, psychosocial and functional/safety risks, and cognitive dysfunction are all negative except as indicated below.  These results, as well as other patient data from the Health Risk Assessment form, are documented in Flowsheets linked to this Encounter. Allergies   Allergen Reactions    Penicillins Other (See Comments)     Doesn't know allergy reaction    Sulfa Antibiotics Other (See Comments)     n/a    Sulfasalazine Hives     n/a       Prior to Visit Medications    Medication Sig Taking? Authorizing Provider   neomycin-polymyxin-dexameth (MAXITROL) 3.5-54698-6.1 ophthalmic suspension Place 1 drop into both eyes Yes Historical Provider, MD   zoster recombinant adjuvanted vaccine (SHINGRIX) 50 MCG/0.5ML SUSR injection Inject 0.5 mLs into the muscle once for 1 dose Yes Jose Cruz Roblero MD   atorvastatin (LIPITOR) 80 MG tablet TAKE 1 TABLET BY MOUTH DAILY Yes Jose Cruz Roblero MD   fluticasone (FLONASE) 50 MCG/ACT nasal spray SHAKE LIQUID AND USE 2 SPRAYS IN EACH NOSTRIL TWICE DAILY Yes Jose Cruz Roblero MD   lisinopril (PRINIVIL;ZESTRIL) 5 MG tablet Take 1 tablet by mouth daily Yes Jose Cruz Roblero MD   loratadine (CLARITIN) 10 MG tablet Take 1 tablet by mouth daily Yes Jose Cruz Roblero MD   calcium carbonate (OYSTER SHELL CALCIUM 500 MG) 1250 (500 Ca) MG tablet Take 1 tablet by mouth daily Yes Historical Provider, MD   vitamin D (CHOLECALCIFEROL) 1000 UNIT TABS tablet Take 1,000 Units by mouth daily Yes Historical Provider, MD   Multiple Vitamins-Minerals (MULTIVITAMIN PO) Take 1 capsule by mouth daily. Yes Historical Provider, MD   Ascorbic Acid (VITAMIN C) 500 MG tablet Take 500 mg by mouth daily. Yes Historical Provider, MD   vitamin E 400 UNIT capsule Take 400 Units by mouth daily.  Yes Historical Provider, MD   triamcinolone (KENALOG) 0.1 % cream Apply topically 2 times daily as needed  Historical Provider, MD       Past Medical History:   Diagnosis Date    Controlled type 2 diabetes mellitus without complication, without long-term current use of insulin ([facility] [address] 12/16/2019    Dyslipidemia 7/5/2017    Essential hypertension 5/16/2018    Hypercholesterolemia 5/16/2018    Hyperlipidemia  Hypertension     Lichen planus     arms, legs    [facility] cholangitis [facility] [address] 2019    Primary biliary cholangitis ([facility] [address] 2019       Past Surgical History:   Procedure Laterality Date    CARPAL TUNNEL RELEASE       SECTION      ENDOMETRIAL ABLATION      TONSILLECTOMY         Family History   Problem Relation Age of Onset    Cancer Mother         Lung Cancer    Stroke Mother     Diabetes Father     High Cholesterol Father     Hypertension Father     Heart Failure Father     Substance Abuse Sister        CareTeam (Including outside providers/suppliers regularly involved in providing care):   Patient Care Team:  Landon Kiser MD as PCP - General (Pediatrics)  Landon Kiser MD as PCP [facility]    Wt Readings from Last 3 Encounters:   20 136 lb 6.4 oz (61.9 kg)   20 141 lb (64 kg)   19 149 lb (67.6 kg)     Vitals:    20 1514   BP: (!) 144/60   Pulse: 88   Resp: 18   Temp: 97.4 °F (36.3 °C)   TempSrc: Temporal   SpO2: 97%   Weight: 136 lb 6.4 oz (61.9 kg)   Height: 5' 1\" (1.549 m)     Body mass index is 25.77 kg/m². Based upon direct observation of the patient, evaluation of cognition reveals recent and remote memory intact. See above    Patient's complete Health Risk Assessment and screening values have been reviewed and are found in Flowsheets. The following problems were reviewed today and where indicated follow up appointments were made and/or referrals ordered. Positive Risk Factor Screenings with Interventions:     General Health:  General  In general, how would you say your health is?: Good  In the past 7 days, have you experienced any of the following?  New or Increased Pain, New or Increased Fatigue, Loneliness, Social Isolation, Stress or Anger?: (!) Stress  Do you get the social and emotional support that you need?: Yes  Do you have a Living Will?: (!) No  General Health Risk Interventions:  · No Living Will: provided the state-specific advance directive document to the patient    Health Habits/Nutrition:  Health Habits/Nutrition  Do you exercise for at least 20 minutes 2-3 times per week?: Yes  Have you lost any weight without trying in the past 3 months?: (!) Yes  Do you eat fewer than 2 meals per day?: No  Have you seen a dentist within the past year?: Yes  Body mass index is 25.77 kg/m².   Health Habits/Nutrition Interventions:  · Inadequate physical activity:  patient agrees to exercise for at least 150 minutes/week    Hearing/Vision:  No exam data present  Hearing/Vision  Do you or your family notice any trouble with your hearing?: No  Do you have difficulty driving, watching TV, or doing any of your daily activities because of your eyesight?: No  Have you had an eye exam within the past year?: (!) No  Hearing/Vision Interventions:  · eye exam 2 weeks ago    Personalized Preventive Plan   Current Health Maintenance Status  Immunization History   Administered Date(s) Administered    Hepatitis A Adult (Havrix, Vaqta) 06/19/2019, 12/16/2019    Pneumococcal Polysaccharide ([id number]) 12/19/2018    Tdap (Boostrix, Adacel) 02/17/2014        Health Maintenance   Topic Date Due    Diabetic retinal exam  [birth year]09/1965    Shingles Vaccine (1 of 2) 05/09/2004    Annual Wellness Visit (AWV)  06/19/2019    Diabetic microalbuminuria test  12/19/2019    Diabetic foot exam  06/19/2020    Lipid screen  08/21/2020    Potassium monitoring  08/21/2020    Creatinine monitoring  08/21/2020    Flu vaccine (1) 09/01/2020    Breast cancer screen  11/19/2020    A1C test (Diabetic or Prediabetic)  12/16/2020    Pneumococcal 65+ years Vaccine (1 of 1 - PPSV23) 12/19/2023    DTaP/Tdap/Td vaccine (2 - Td) 02/17/2024    Colon cancer screen colonoscopy  06/07/2028    Hepatitis A vaccine  Completed    DEXA (modify frequency per FRAX score)  Completed    Hepatitis C screen  Completed    Hib vaccine  Aged Feliciano Barrera Meningococcal (ACWY) vaccine  Aged Out     Recommendations for Preventive Services Due: see orders and patient instructions/AVS.  . Recommended screening schedule for the next 5-10 years is provided to the patient in written form: see Patient Instructions/AVS.    Sherrill Quinteros was seen today for medicare awv. Diagnoses and all orders for this visit:    Routine general medical examination at a health care facility    Controlled type 2 diabetes mellitus without complication, without long-term current use of insulin ([facility])  -      DIABETES FOOT EXAM  -     Hemoglobin A1C; Future  -     Comprehensive Metabolic Panel, Fasting; Future    Acute cystitis without hematuria  -     POCT Urine with Microscopic    Need for prophylactic vaccination and inoculation against varicella  -     zoster recombinant adjuvanted vaccine (SHINGRIX) 50 MCG/0.5ML SUSR injection; Inject 0.5 mLs into the muscle once for 1 dose                  Advance Care Planning   Advanced Care Planning: Discussed the patients choices for care and treatment in case of a health event that adversely affects decision-making abilities. Also discussed the patients long-term treatment options. Reviewed with the patient the [address] [place] [address] Declaration forms  Reviewed the process of designating a competent adult as an Agent (or -in-fact) that could take make health care decisions for the patient if incompetent. Patient was asked to complete the declaration forms, either acknowledge the forms by a public notary or an eligible witness and provide a signed copy to the practice office.   Time spent (minutes): 5

## 2020-07-14 NOTE — PATIENT INSTRUCTIONS
(Maybe you're afraid of having pain, losing your independence, or being kept alive by machines.)  · Where would you prefer to die? (Your home? A hospital? A nursing home?)  · Do you want to donate your organs when you die? · Do you want certain Rastafari practices performed before you die? When should you call for help? Be sure to contact your doctor if you have any questions. Where can you learn more? Go to [email or web address] org and sign in to your [employer] account. Enter R264 in the MyDatingTree box to learn more about \"Advance Directives: Care Instructions. \"     If you do not have an account, please click on the \"Sign Up Now\" link. Current as of: December 9, 2019               Content Version: 12.5  © 6268-4060 Healthwise, Incorporated. Care instructions adapted under license by [facility] ([facility]). If you have questions about a medical condition or this instruction, always ask your healthcare professional. [place] [address] any warranty or liability for your use of this information. Learning About Living Phyllis Luo  What is a living will? A living will, also called a declaration, is a legal form. It tells your family and your doctor your wishes when you can't speak for yourself. It's used by the health professionals who will treat you as you near the end of your life or if you get seriously hurt or ill. If you put your wishes in writing, your loved ones and others will know what kind of care you want. They won't need to guess. This can ease your mind and be helpful to others. And you can change or cancel your living will at any time. A living will is not the same as an estate or property will. An estate will explains what you want to happen with your money and property after you die. How do you use it? A living will is used to describe the kinds of treatment or life support you want as you near the end of your life or if you get seriously hurt or ill. Keep these facts in mind about living [place]. · Your living will is used only if you can't speak or make decisions for yourself. Most often, one or more doctors must certify that you can't speak or decide for yourself before your living will takes effect. · If you get better and can speak for yourself again, you can accept or refuse any treatment. It doesn't matter what you said in your living will. · Some states may limit your right to refuse treatment in certain cases. For example, you may need to clearly state in your living will that you don't want artificial hydration and nutrition, such as being fed through a tube. Is a living will a legal document? A living will is a legal document. Each state has its own laws about living [place]. And a living will may be called something else in your state. Here are some things to know about living [place]. · You don't need an  to complete a living will. But legal advice can be helpful if your state's laws are unclear. It can also help if your health history is complicated or your family can't agree on what should be in your living will. · You can change your living will at any time. Some people find that their wishes about end-of-life care change as their health changes. If you make big changes to your living will, complete a new form. · If you move to another state, make sure that your living will is legal in the state where you now live. In most cases, doctors will respect your wishes even if you have a form from a different state. · You might use a universal form that has been approved by many states. This kind of form can sometimes be filled out and stored online. Your digital copy will then be available wherever you have a connection to the internet. The doctors and nurses who need to treat you can find it right away. · Your state may offer an online registry. This is another place where you can store your living will online.   · It's a good idea to get your living will notarized. This means using a person called a  to watch two people sign, or witness, your living will. What should you know when you create a living will? Here are some questions to ask yourself as you make your living will:  · Do you know enough about life support methods that might be used? If not, talk to your doctor so you know what might be done if you can't breathe on your own, your heart stops, or you can't swallow. · What things would you still want to be able to do after you receive life-support methods? Would you want to be able to walk? To speak? To eat on your own? To live without the help of machines? · Do you want certain Anglican practices performed if you become very ill? · If you have a choice, where do you want to be cared for? In your home? At a hospital or nursing home? · If you have a choice at the end of your life, where would you prefer to die? At home? In a hospital or nursing home? Somewhere else? · Would you prefer to be buried or cremated? · Do you want your organs to be donated after you die? What should you do with your living will? · Make sure that your family members and your health care agent have copies of your living will (also called a declaration). · Give your doctor a copy of your living will. Ask him or her to keep it as part of your medical record. If you have more than one doctor, make sure that each one has a copy. · Put a copy of your living will where it can be easily found. For example, some people may put a copy on their refrigerator door. If you are using a digital copy, be sure your doctor, family members, and health care agent know how to find and access it. Where can you learn more? Go to https://chpemalcolmeb.2U. org and sign in to your [employer] account. Enter K714 in the [employer] box to learn more about \"Learning About Living [place]. \"     If you do not have an account, please click on the \"Sign Up protects against both UVA and UVB radiation with an SPF of 30 or greater. Reapply every 2 to 3 hours or after sweating, drying off with a towel, or swimming. · Always wear a seat belt when traveling in a car. Always wear a helmet when riding a bicycle or motorcycle.

## 2020-07-15 DIAGNOSIS — E11.9 CONTROLLED TYPE 2 DIABETES MELLITUS WITHOUT COMPLICATION, WITHOUT LONG-TERM CURRENT USE OF INSULIN (HCC): ICD-10-CM

## 2020-07-15 LAB
A/G RATIO: 1.1 (ref 1.1–2.2)
ALBUMIN SERPL-MCNC: 3.7 G/DL (ref 3.4–5)
ALBUMIN SERPL-MCNC: 3.8 G/DL (ref 3.4–5)
ALP BLD-CCNC: 260 U/L (ref 40–129)
ALP BLD-CCNC: 261 U/L (ref 40–129)
ALT SERPL-CCNC: 17 U/L (ref 10–40)
ALT SERPL-CCNC: 17 U/L (ref 10–40)
ANION GAP SERPL CALCULATED.3IONS-SCNC: 14 MMOL/L (ref 3–16)
AST SERPL-CCNC: 20 U/L (ref 15–37)
AST SERPL-CCNC: 21 U/L (ref 15–37)
BILIRUB SERPL-MCNC: 0.3 MG/DL (ref 0–1)
BILIRUB SERPL-MCNC: 0.4 MG/DL (ref 0–1)
BILIRUBIN DIRECT: <0.2 MG/DL (ref 0–0.3)
BILIRUBIN, INDIRECT: ABNORMAL MG/DL (ref 0–1)
BUN BLDV-MCNC: 10 MG/DL (ref 7–20)
CALCIUM SERPL-MCNC: 9.8 MG/DL (ref 8.3–10.6)
CHLORIDE BLD-SCNC: 104 MMOL/L (ref 99–110)
CO2: 24 MMOL/L (ref 21–32)
CREAT SERPL-MCNC: <0.5 MG/DL (ref 0.6–1.2)
GFR AFRICAN AMERICAN: >60
GFR NON-AFRICAN AMERICAN: >60
GLOBULIN: 3.4 G/DL
GLUCOSE FASTING: 101 MG/DL (ref 70–99)
POTASSIUM SERPL-SCNC: 4.3 MMOL/L (ref 3.5–5.1)
SODIUM BLD-SCNC: 142 MMOL/L (ref 136–145)
TOTAL PROTEIN: 7.1 G/DL (ref 6.4–8.2)
TOTAL PROTEIN: 7.2 G/DL (ref 6.4–8.2)

## 2020-07-16 LAB
ESTIMATED AVERAGE GLUCOSE: 128.4 MG/DL
HBA1C MFR BLD: 6.1 %

## 2020-08-31 RX ORDER — ATORVASTATIN CALCIUM 80 MG/1
80 TABLET, FILM COATED ORAL DAILY
Qty: 90 TABLET | Refills: 0 | Status: SHIPPED | OUTPATIENT
Start: 2020-08-31 | End: 2020-10-21 | Stop reason: SDUPTHER

## 2020-10-13 ENCOUNTER — HOSPITAL ENCOUNTER (OUTPATIENT)
Dept: WOMENS IMAGING | Age: 65
Discharge: HOME | End: 2020-10-13
Payer: MEDICARE

## 2020-10-13 PROCEDURE — 77067 SCR MAMMO BI INCL CAD: CPT

## 2020-10-21 ENCOUNTER — OFFICE VISIT (OUTPATIENT)
Dept: INTERNAL MEDICINE CLINIC | Age: 65
End: 2020-10-21
Payer: MEDICARE

## 2020-10-21 VITALS
SYSTOLIC BLOOD PRESSURE: 120 MMHG | OXYGEN SATURATION: 98 % | HEART RATE: 118 BPM | BODY MASS INDEX: 22.47 KG/M2 | WEIGHT: 119 LBS | TEMPERATURE: 97.6 F | DIASTOLIC BLOOD PRESSURE: 80 MMHG | HEIGHT: 61 IN

## 2020-10-21 LAB — HBA1C MFR BLD: 6.6 %

## 2020-10-21 PROCEDURE — 83036 HEMOGLOBIN GLYCOSYLATED A1C: CPT | Performed by: INTERNAL MEDICINE

## 2020-10-21 PROCEDURE — G8399 PT W/DXA RESULTS DOCUMENT: HCPCS | Performed by: INTERNAL MEDICINE

## 2020-10-21 PROCEDURE — 99214 OFFICE O/P EST MOD 30 MIN: CPT | Performed by: INTERNAL MEDICINE

## 2020-10-21 PROCEDURE — 1036F TOBACCO NON-USER: CPT | Performed by: INTERNAL MEDICINE

## 2020-10-21 PROCEDURE — G0008 ADMIN INFLUENZA VIRUS VAC: HCPCS | Performed by: INTERNAL MEDICINE

## 2020-10-21 PROCEDURE — 1090F PRES/ABSN URINE INCON ASSESS: CPT | Performed by: INTERNAL MEDICINE

## 2020-10-21 PROCEDURE — G8484 FLU IMMUNIZE NO ADMIN: HCPCS | Performed by: INTERNAL MEDICINE

## 2020-10-21 PROCEDURE — 2022F DILAT RTA XM EVC RTNOPTHY: CPT | Performed by: INTERNAL MEDICINE

## 2020-10-21 PROCEDURE — G8427 DOCREV CUR MEDS BY ELIG CLIN: HCPCS | Performed by: INTERNAL MEDICINE

## 2020-10-21 PROCEDURE — 1123F ACP DISCUSS/DSCN MKR DOCD: CPT | Performed by: INTERNAL MEDICINE

## 2020-10-21 PROCEDURE — 4040F PNEUMOC VAC/ADMIN/RCVD: CPT | Performed by: INTERNAL MEDICINE

## 2020-10-21 PROCEDURE — 90694 VACC AIIV4 NO PRSRV 0.5ML IM: CPT | Performed by: INTERNAL MEDICINE

## 2020-10-21 PROCEDURE — G8420 CALC BMI NORM PARAMETERS: HCPCS | Performed by: INTERNAL MEDICINE

## 2020-10-21 PROCEDURE — 3017F COLORECTAL CA SCREEN DOC REV: CPT | Performed by: INTERNAL MEDICINE

## 2020-10-21 PROCEDURE — 3044F HG A1C LEVEL LT 7.0%: CPT | Performed by: INTERNAL MEDICINE

## 2020-10-21 RX ORDER — TRIAMCINOLONE ACETONIDE 1 MG/G
CREAM TOPICAL
Qty: 453.6 G | Refills: 1 | Status: SHIPPED | OUTPATIENT
Start: 2020-10-21 | End: 2021-09-01 | Stop reason: SDUPTHER

## 2020-10-21 RX ORDER — ATORVASTATIN CALCIUM 80 MG/1
80 TABLET, FILM COATED ORAL DAILY
Qty: 90 TABLET | Refills: 0 | Status: SHIPPED | OUTPATIENT
Start: 2020-10-21 | End: 2021-02-28

## 2020-10-21 RX ORDER — FLUTICASONE PROPIONATE 50 MCG
SPRAY, SUSPENSION (ML) NASAL
Qty: 48 G | Refills: 5 | Status: ON HOLD | OUTPATIENT
Start: 2020-10-21 | End: 2021-03-08 | Stop reason: HOSPADM

## 2020-10-21 RX ORDER — LISINOPRIL 5 MG/1
5 TABLET ORAL DAILY
Qty: 90 TABLET | Refills: 1 | Status: SHIPPED | OUTPATIENT
Start: 2020-10-21 | End: 2021-08-01

## 2020-10-21 RX ORDER — LORATADINE 10 MG/1
10 TABLET ORAL DAILY
Qty: 90 TABLET | Refills: 3 | Status: SHIPPED | OUTPATIENT
Start: 2020-10-21 | End: 2021-05-20

## 2020-10-21 ASSESSMENT — PATIENT HEALTH QUESTIONNAIRE - PHQ9
1. LITTLE INTEREST OR PLEASURE IN DOING THINGS: 1
2. FEELING DOWN, DEPRESSED OR HOPELESS: 1
SUM OF ALL RESPONSES TO PHQ QUESTIONS 1-9: 2
SUM OF ALL RESPONSES TO PHQ9 QUESTIONS 1 & 2: 2

## 2020-10-21 ASSESSMENT — ENCOUNTER SYMPTOMS
DIARRHEA: 0
VOMITING: 0
BLOOD IN STOOL: 0
ABDOMINAL PAIN: 0
CONSTIPATION: 0
NAUSEA: 0

## 2020-10-23 ENCOUNTER — PATIENT MESSAGE (OUTPATIENT)
Dept: INTERNAL MEDICINE CLINIC | Age: 65
End: 2020-10-23

## 2020-10-24 NOTE — TELEPHONE ENCOUNTER
From: Ghada Lea  To: Bibiana Roldan MD  Sent: 10/23/2020 4:18 PM EDT  Subject: Non-Urgent Medical Question    Re: CT scan on 10/30 - what NPO four hours prior mean?

## 2020-10-29 ENCOUNTER — TELEPHONE (OUTPATIENT)
Dept: INTERNAL MEDICINE CLINIC | Age: 65
End: 2020-10-29

## 2020-10-30 ENCOUNTER — HOSPITAL ENCOUNTER (OUTPATIENT)
Dept: CT IMAGING | Age: 65
Discharge: HOME | End: 2020-10-30
Payer: MEDICARE

## 2020-10-30 LAB
CREAT SERPL-MCNC: <0.5 MG/DL (ref 0.6–1.2)
GFR AFRICAN AMERICAN: >60
GFR NON-AFRICAN AMERICAN: >60

## 2020-10-30 PROCEDURE — 36415 COLL VENOUS BLD VENIPUNCTURE: CPT

## 2020-10-30 PROCEDURE — 74177 CT ABD & PELVIS W/CONTRAST: CPT

## 2020-10-30 PROCEDURE — 82565 ASSAY OF CREATININE: CPT

## 2020-10-30 PROCEDURE — 6360000004 HC RX CONTRAST MEDICATION: Performed by: INTERNAL MEDICINE

## 2020-10-30 RX ADMIN — IOHEXOL 50 ML: 240 INJECTION, SOLUTION INTRATHECAL; INTRAVASCULAR; INTRAVENOUS; ORAL at 14:15

## 2020-10-30 RX ADMIN — IOPAMIDOL 75 ML: 755 INJECTION, SOLUTION INTRAVENOUS at 15:26

## 2020-10-30 NOTE — PROGRESS NOTES
Pt having a CT scan now. Hospital requires a Creatinine clearance due to her age and h/o HTN.   Order placed in EPIC

## 2020-11-03 PROBLEM — I10 ESSENTIAL HYPERTENSION: Status: RESOLVED | Noted: 2018-05-16 | Resolved: 2020-11-03

## 2020-11-04 ENCOUNTER — TELEPHONE (OUTPATIENT)
Dept: INTERNAL MEDICINE CLINIC | Age: 65
End: 2020-11-04

## 2020-11-04 NOTE — TELEPHONE ENCOUNTER
Pt called to say that when she went to the GI office she had a cough at the beginning of the entry and they would not see her thinking she had covid, and now she is stating she need a note clarifying it could be allergy related. Pt would like a call back.  Please review

## 2020-11-04 NOTE — TELEPHONE ENCOUNTER
Please see note below. I can not guarantee her cough is allergic. She needs to schedule a virtual visit.

## 2020-11-04 NOTE — TELEPHONE ENCOUNTER
Please ask patient to schedule a virtual visit with GI.  I can not clear herm, however we can order a covid test

## 2020-11-05 NOTE — TELEPHONE ENCOUNTER
Pt informed of message. She said she is not scheduling a VV, she was seeing GI for an EGD. She is sure her dry cough is from not having anything to drink for 8 hrs.

## 2020-11-06 ENCOUNTER — VIRTUAL VISIT (OUTPATIENT)
Dept: INTERNAL MEDICINE CLINIC | Age: 65
End: 2020-11-06
Payer: MEDICARE

## 2020-11-06 PROCEDURE — G8420 CALC BMI NORM PARAMETERS: HCPCS | Performed by: INTERNAL MEDICINE

## 2020-11-06 PROCEDURE — 1036F TOBACCO NON-USER: CPT | Performed by: INTERNAL MEDICINE

## 2020-11-06 PROCEDURE — 1123F ACP DISCUSS/DSCN MKR DOCD: CPT | Performed by: INTERNAL MEDICINE

## 2020-11-06 PROCEDURE — 1090F PRES/ABSN URINE INCON ASSESS: CPT | Performed by: INTERNAL MEDICINE

## 2020-11-06 PROCEDURE — 99213 OFFICE O/P EST LOW 20 MIN: CPT | Performed by: INTERNAL MEDICINE

## 2020-11-06 PROCEDURE — 4040F PNEUMOC VAC/ADMIN/RCVD: CPT | Performed by: INTERNAL MEDICINE

## 2020-11-06 PROCEDURE — G8427 DOCREV CUR MEDS BY ELIG CLIN: HCPCS | Performed by: INTERNAL MEDICINE

## 2020-11-06 PROCEDURE — G8399 PT W/DXA RESULTS DOCUMENT: HCPCS | Performed by: INTERNAL MEDICINE

## 2020-11-06 PROCEDURE — G8484 FLU IMMUNIZE NO ADMIN: HCPCS | Performed by: INTERNAL MEDICINE

## 2020-11-06 PROCEDURE — 3017F COLORECTAL CA SCREEN DOC REV: CPT | Performed by: INTERNAL MEDICINE

## 2020-11-06 ASSESSMENT — ENCOUNTER SYMPTOMS
EYES NEGATIVE: 1
SHORTNESS OF BREATH: 0
GASTROINTESTINAL NEGATIVE: 1
STRIDOR: 0
COUGH: 1
ALLERGIC/IMMUNOLOGIC NEGATIVE: 1

## 2020-11-06 NOTE — PROGRESS NOTES
Subjective:      Patient ID: Erica Trujillo is a 76 y.o. female. Cough   This is a recurrent problem. The problem has been unchanged. Associated symptoms include postnasal drip. Pertinent negatives include no shortness of breath. Review of Systems   Constitutional: Negative. HENT: Positive for congestion and postnasal drip. Eyes: Negative. Respiratory: Positive for cough. Negative for shortness of breath and stridor. Cardiovascular: Negative. Gastrointestinal: Negative. Endocrine: Negative. Musculoskeletal: Negative. Skin: Negative. Allergic/Immunologic: Negative. Neurological: Negative. Hematological: Negative. Psychiatric/Behavioral: Negative. All other systems reviewed and are negative. Allergies   Allergen Reactions    Penicillins Other (See Comments)     Doesn't know allergy reaction    Sulfa Antibiotics Other (See Comments)     n/a    Sulfasalazine Hives     n/a       Current Outpatient Medications   Medication Sig Dispense Refill    atorvastatin (LIPITOR) 80 MG tablet Take 1 tablet by mouth daily 90 tablet 0    fluticasone (FLONASE) 50 MCG/ACT nasal spray SHAKE LIQUID AND USE 2 SPRAYS IN EACH NOSTRIL TWICE DAILY 48 g 5    lisinopril (PRINIVIL;ZESTRIL) 5 MG tablet Take 1 tablet by mouth daily 90 tablet 1    loratadine (CLARITIN) 10 MG tablet Take 1 tablet by mouth daily 90 tablet 3    neomycin-polymyxin-dexameth (MAXITROL) 3.5-53828-2.1 ophthalmic suspension Place 1 drop into both eyes      calcium carbonate (OYSTER SHELL CALCIUM 500 MG) 1250 (500 Ca) MG tablet Take 1 tablet by mouth daily      vitamin D (CHOLECALCIFEROL) 1000 UNIT TABS tablet Take 1,000 Units by mouth daily      Multiple Vitamins-Minerals (MULTIVITAMIN PO) Take 1 capsule by mouth daily.  Ascorbic Acid (VITAMIN C) 500 MG tablet Take 500 mg by mouth daily.  vitamin E 400 UNIT capsule Take 400 Units by mouth daily.       triamcinolone (KENALOG) 0.1 % cream Apply the [employer] Act, [facility] waiver authority and the Coronavirus Preparedness and [employer] Act, this Virtual Visit was conducted with patient's (and/or legal guardian's) consent, to reduce the patient's risk of exposure to COVID-19 and provide necessary medical care. The patient (and/or legal guardian) has also been advised to contact this office for worsening conditions or problems, and seek emergency medical treatment and/or call 911 if deemed necessary. Patient identification was verified at the start of the visit: Yes    Total time spent for this encounter: Not billed by time    Services were provided through a video synchronous discussion virtually to substitute for in-person clinic visit. Patient and provider were located at their individual homes. --Eliane Willis MD on 11/29/2020 at 9:27 PM    An electronic signature was used to authenticate this note.   Eliane Willis MD

## 2020-11-25 ENCOUNTER — VIRTUAL VISIT (OUTPATIENT)
Dept: PRIMARY CARE CLINIC | Age: 65
End: 2020-11-25
Payer: MEDICARE

## 2020-11-25 ENCOUNTER — NURSE TRIAGE (OUTPATIENT)
Dept: OTHER | Facility: CLINIC | Age: 65
End: 2020-11-25

## 2020-11-25 PROCEDURE — 99212 OFFICE O/P EST SF 10 MIN: CPT | Performed by: NURSE PRACTITIONER

## 2020-11-25 RX ORDER — URSODIOL 500 MG/1
TABLET, FILM COATED ORAL 2 TIMES DAILY
COMMUNITY
Start: 2020-10-09

## 2020-11-25 ASSESSMENT — ENCOUNTER SYMPTOMS
VOMITING: 0
SORE THROAT: 0
EYES NEGATIVE: 1
DIARRHEA: 0
WHEEZING: 0
SINUS PAIN: 1
CHEST TIGHTNESS: 0
SINUS PRESSURE: 1
SHORTNESS OF BREATH: 0
TROUBLE SWALLOWING: 0
COUGH: 1
CONSTIPATION: 0
NAUSEA: 0
RHINORRHEA: 1

## 2020-11-25 NOTE — PROGRESS NOTES
2020    TELEHEALTH EVALUATION -- Audio/Visual (During [facility]-04 public health emergency)    HPI:    Rukhsana Mendez (:  1955) has requested an audio/video evaluation for the following concern(s):    Chief Complaint   Patient presents with    Nasal Congestion     runny nose, cough, headache, and SOB when drainage isn't cleared x 1.5 weeks. Pt has allergies. Omari Lujan is new to me and was unable to be scheduled with her PCP, she is reporting worsening chronic \"allergy\" symptoms, nasal congestion, Rhinorrhea, pnd, clear/thick, copious. Reports will use a tissues to gain access to the pnd gaging and chocking while pulling up/out of her throat. Some sinus pain/pressure/HA and is frustrated as these symptoms have caused her EGD to be rescheduled for Dec 21st. She is unable to obtain a good nights rest d/t throat clearing/coughing up mucus. Denies f/n/v/d loss of taste or smell, fatigue,heartburn and or any other symptoms except she has been experiencing involuntary weight loss, noting 22 lbs loss over the last 5-6 mos, which is why she is having the EGD completed. She is currently using Flonase and Claritin helps some, has also used Zyrtec with this regiment however d/c as she felt it was not helping. Has also tried musinex which caused stomach upset. Robitussin DM has helped in the past.  Omari Lujan  does not feel her s/s are related to an infection and is requesting possible referrals to ENT and allergist.      Review of Systems   Constitutional: Positive for unexpected weight change. Negative for activity change, appetite change, chills, fatigue and fever. HENT: Positive for congestion, postnasal drip, rhinorrhea, sinus pressure and sinus pain. Negative for dental problem, ear pain, sore throat and trouble swallowing. Eyes: Negative. Respiratory: Positive for cough (d/t pnd). Negative for chest tightness, shortness of breath and wheezing.     Cardiovascular: Negative for chest pain, palpitations and leg swelling. Gastrointestinal: Negative for constipation, diarrhea, nausea and vomiting. Endocrine: Negative. Musculoskeletal: Negative. Skin: Negative for rash. Allergic/Immunologic: Positive for environmental allergies. Neurological: Positive for headaches (mild). Negative for weakness. Prior to Visit Medications    Medication Sig Taking? Authorizing Provider   ursodiol (ACTIGALL) 500 MG tablet TK 2 TS PO D Yes Historical Provider, MD   atorvastatin (LIPITOR) 80 MG tablet Take 1 tablet by mouth daily Yes Myles Guevara MD   fluticasone (FLONASE) 50 MCG/ACT nasal spray SHAKE LIQUID AND USE 2 SPRAYS IN EACH NOSTRIL TWICE DAILY Yes Myles Guevara MD   lisinopril (PRINIVIL;ZESTRIL) 5 MG tablet Take 1 tablet by mouth daily Yes Myles Guevara MD   loratadine (CLARITIN) 10 MG tablet Take 1 tablet by mouth daily Yes Myles Guevara MD   triamcinolone (KENALOG) 0.1 % cream Apply topically 2 times daily as needed Yes yMles Guevara MD   calcium carbonate (OYSTER SHELL CALCIUM 500 MG) 1250 (500 Ca) MG tablet Take 1 tablet by mouth daily Yes Historical Provider, MD   vitamin D (CHOLECALCIFEROL) 1000 UNIT TABS tablet Take 1,000 Units by mouth daily Yes Historical Provider, MD   Multiple Vitamins-Minerals (MULTIVITAMIN PO) Take 1 capsule by mouth daily. Yes Historical Provider, MD   Ascorbic Acid (VITAMIN C) 500 MG tablet Take 500 mg by mouth daily. Yes Historical Provider, MD   vitamin E 400 UNIT capsule Take 400 Units by mouth daily.  Yes Historical Provider, MD   neomycin-polymyxin-dexameth (MAXITROL) 3.5-90857-5.1 ophthalmic suspension Place 1 drop into both eyes  Historical Provider, MD       Social History     Tobacco Use    Smoking status: Former Smoker     Packs/day: 0.75     Years: 6.00     Pack years: 4.50     Types: Cigarettes    Smokeless tobacco: Former User     Quit date: 3/14/2018   Substance Use Topics    Alcohol use: Yes     Comment: occasional  Drug use: No        PHYSICAL EXAMINATION:Vital Signs: (As obtained by patient/caregiver or practitioner observation)    Blood pressure-  Heart rate-    Respiratory rate-    Temperature-  Pulse oximetry-     Constitutional: [x] Appears well-developed and well-nourished [x] No apparent distress      [] Abnormal-   Mental status  [x] Alert and awake  [x] Oriented to person/place/time [x]Able to follow commands      Eyes:  EOM    [x]  Normal  [] Abnormal-  Sclera  [x]  Normal  [] Abnormal -         Discharge [x]  None visible  [] Abnormal -    HENT:   [x] Normocephalic, atraumatic. [] Abnormal   [x] Mouth/Throat: Mucous membranes are moist.     External Ears [x] Normal  [] Abnormal-     Neck: [x] No visualized mass     Pulmonary/Chest: [x] Respiratory effort normal.  [x] No visualized signs of difficulty breathing or respiratory distress        [] Abnormal-      Musculoskeletal:   [] Normal gait with no signs of ataxia         [] Normal range of motion of neck        [] Abnormal-       Neurological:        [x] No Facial Asymmetry (Cranial nerve 7 motor function) (limited exam to video visit)          [x] No gaze palsy        [] Abnormal-         Skin:        [x] No significant exanthematous lesions or discoloration noted on facial skin         [] Abnormal-            Psychiatric:       [x] Normal Affect [] No Hallucinations        [] Abnormal-       ASSESSMENT/PLAN:    Pt will restart OTC Robitussin DM as tolerated and await EGD testing/results  Orders as indicated below, will forward note to PCP    1. Chronic sinusitis, unspecified location  - [place] [address], [address] [place] [address]  - Pina Mcintosh MD, Allergy & Immunology, [facility]    2. Chronic allergic rhinitis  - [place] [address], [address] [place] [address] [facility]  - Pina Mcintosh MD, Allergy & Immunology, [facility]    Return if symptoms worsen or fail to improve.      Patient given educational handouts and has had all questions answered. Patient voices understanding and agrees to plans along with risks and benefits of plan. Patient is instructed to continue prior meds, diet, and exercise plans as instructed. Patient agrees to follow up as instructed and sooner if needed. Patient agrees to go to ER if condition becomes emergent. Arturo Ovalle is a 76 y.o. female being evaluated by a Virtual Visit (video visit) encounter to address concerns as mentioned above. A caregiver was present when appropriate. Due to this being a TeleHealth encounter (During [facility] public health emergency), evaluation of the following organ systems was limited: Vitals/Constitutional/EENT/Resp/CV/GI//MS/Neuro/Skin/Heme-Lymph-Imm. Pursuant to the emergency declaration under the [address] [place] [address] authority and the [employer] and [employer] Act, this Virtual Visit was conducted with patient's (and/or legal guardian's) consent, to reduce the patient's risk of exposure to COVID-19 and provide necessary medical care. The patient (and/or legal guardian) has also been advised to contact this office for worsening conditions or problems, and seek emergency medical treatment and/or call 911 if deemed necessary. Patient identification was verified at the start of the visit: Yes    Total time spent on this encounter: 10 min    Services were provided through a video synchronous discussion virtually to substitute for in-person clinic visit. Patient and provider were located at their individual homes. --LINNEA Morel CNP on 11/25/2020 at 4:06 PM    An electronic signature was used to authenticate this note.

## 2020-11-25 NOTE — TELEPHONE ENCOUNTER
your patient. The patient was connected to triage in response to information provided to the ECC. Please do not respond through this encounter as the response is not directed to a shared pool.

## 2020-12-01 ENCOUNTER — OFFICE VISIT (OUTPATIENT)
Dept: ENT CLINIC | Age: 65
End: 2020-12-01
Payer: MEDICARE

## 2020-12-01 VITALS
BODY MASS INDEX: 21.92 KG/M2 | DIASTOLIC BLOOD PRESSURE: 85 MMHG | HEART RATE: 121 BPM | SYSTOLIC BLOOD PRESSURE: 151 MMHG | TEMPERATURE: 97.1 F | WEIGHT: 116 LBS

## 2020-12-01 PROCEDURE — G8484 FLU IMMUNIZE NO ADMIN: HCPCS | Performed by: STUDENT IN AN ORGANIZED HEALTH CARE EDUCATION/TRAINING PROGRAM

## 2020-12-01 PROCEDURE — 1123F ACP DISCUSS/DSCN MKR DOCD: CPT | Performed by: STUDENT IN AN ORGANIZED HEALTH CARE EDUCATION/TRAINING PROGRAM

## 2020-12-01 PROCEDURE — 3017F COLORECTAL CA SCREEN DOC REV: CPT | Performed by: STUDENT IN AN ORGANIZED HEALTH CARE EDUCATION/TRAINING PROGRAM

## 2020-12-01 PROCEDURE — G8427 DOCREV CUR MEDS BY ELIG CLIN: HCPCS | Performed by: STUDENT IN AN ORGANIZED HEALTH CARE EDUCATION/TRAINING PROGRAM

## 2020-12-01 PROCEDURE — 4040F PNEUMOC VAC/ADMIN/RCVD: CPT | Performed by: STUDENT IN AN ORGANIZED HEALTH CARE EDUCATION/TRAINING PROGRAM

## 2020-12-01 PROCEDURE — G8399 PT W/DXA RESULTS DOCUMENT: HCPCS | Performed by: STUDENT IN AN ORGANIZED HEALTH CARE EDUCATION/TRAINING PROGRAM

## 2020-12-01 PROCEDURE — G8420 CALC BMI NORM PARAMETERS: HCPCS | Performed by: STUDENT IN AN ORGANIZED HEALTH CARE EDUCATION/TRAINING PROGRAM

## 2020-12-01 PROCEDURE — 1090F PRES/ABSN URINE INCON ASSESS: CPT | Performed by: STUDENT IN AN ORGANIZED HEALTH CARE EDUCATION/TRAINING PROGRAM

## 2020-12-01 PROCEDURE — 1036F TOBACCO NON-USER: CPT | Performed by: STUDENT IN AN ORGANIZED HEALTH CARE EDUCATION/TRAINING PROGRAM

## 2020-12-01 PROCEDURE — 31575 DIAGNOSTIC LARYNGOSCOPY: CPT | Performed by: STUDENT IN AN ORGANIZED HEALTH CARE EDUCATION/TRAINING PROGRAM

## 2020-12-01 PROCEDURE — 99204 OFFICE O/P NEW MOD 45 MIN: CPT | Performed by: STUDENT IN AN ORGANIZED HEALTH CARE EDUCATION/TRAINING PROGRAM

## 2020-12-01 RX ORDER — FAMOTIDINE 20 MG/1
20 TABLET, FILM COATED ORAL NIGHTLY
Qty: 60 TABLET | Refills: 3 | Status: SHIPPED | OUTPATIENT
Start: 2020-12-01 | End: 2020-12-01

## 2020-12-01 RX ORDER — FAMOTIDINE 20 MG/1
TABLET, FILM COATED ORAL
Qty: 90 TABLET | Refills: 0 | Status: SHIPPED | OUTPATIENT
Start: 2020-12-01 | End: 2021-02-25

## 2020-12-01 NOTE — PROGRESS NOTES
[address] [facility] NECK SURGERY  NEW PATIENT HISTORY AND PHYSICAL NOTE      Patient Name: Abiola Rea Record Number:  [id number]  Primary Care Physician:  Kvng Jean MD    ChiefComplaint     Chief Complaint   Patient presents with    Sinus Problem     drainage in the back of throat        History of Present Illness     Anitra Baker is an 76 y.o. female presenting with throat issues. Currently going through tests trying to figure out why she is unexpectedly losing weight. Hx of primary biliary cirrhosis. Attempted to have ENG performed but had a cough which she states caused cancellation of procedure. Feels like she has something stuck back in throat, feels like thick mucus. Chronic. Frequent throat clearing. Occasional dry cough, occasional productive of clear sputum. No hx of reflux. No reflux meds. Denies heartburn.     + allergies. + PND, congestion. Takes Claritin and Flonase daily for the last 1 year. Tried neti pot few years ago, was not comfortable to use. Unable to tolerate mucinex as it upsets her stomach. Endorses occasional bilateral self limiting spotty epistaxis episodes.      Past Medical History     Past Medical History:   Diagnosis Date    Controlled type 2 diabetes mellitus without complication, without long-term current use of insulin ([address] 2019    Dyslipidemia 2017    Essential hypertension 2018    Hypercholesterolemia 2018    Hyperlipidemia     Hypertension     Lichen planus     arms, legs    Primary biliary cholangitis ([address] 2019    Primary biliary cholangitis ([address] 2019       Past Surgical History     Past Surgical History:   Procedure Laterality Date    CARPAL TUNNEL RELEASE       SECTION      ENDOMETRIAL ABLATION      TONSILLECTOMY      WISDOM TOOTH EXTRACTION         Family History     Family History   Problem Relation Age of Onset    Cancer Mother         Lung Cancer    Stroke Mother     Diabetes Father     High Cholesterol Father     Hypertension Father     Heart Failure Father     Substance Abuse Sister        Social History     Social History     Tobacco Use    Smoking status: Former Smoker     Packs/day: 0.75     Years: 6.00     Pack years: 4.50     Types: Cigarettes    Smokeless tobacco: Former User     Quit date: 3/14/2018   Substance Use Topics    Alcohol use: Yes     Comment: occasional     Drug use: No        Allergies     Allergies   Allergen Reactions    Penicillins Other (See Comments)     Doesn't know allergy reaction    Sulfa Antibiotics Other (See Comments)     n/a    Sulfasalazine Hives     n/a       Medications     Current Outpatient Medications   Medication Sig Dispense Refill    famotidine (PEPCID) 20 MG tablet Take 1 tablet by mouth nightly 60 tablet 3    mupirocin (BACTROBAN NASAL) 2 % nasal ointment Instill in each nostril once a day. 1 Tube 3    ursodiol (ACTIGALL) 500 MG tablet TK 2 TS PO D      atorvastatin (LIPITOR) 80 MG tablet Take 1 tablet by mouth daily 90 tablet 0    fluticasone (FLONASE) 50 MCG/ACT nasal spray SHAKE LIQUID AND USE 2 SPRAYS IN EACH NOSTRIL TWICE DAILY 48 g 5    lisinopril (PRINIVIL;ZESTRIL) 5 MG tablet Take 1 tablet by mouth daily 90 tablet 1    loratadine (CLARITIN) 10 MG tablet Take 1 tablet by mouth daily 90 tablet 3    triamcinolone (KENALOG) 0.1 % cream Apply topically 2 times daily as needed 453.6 g 1    neomycin-polymyxin-dexameth (MAXITROL) 3.5-89117-6.1 ophthalmic suspension Place 1 drop into both eyes      calcium carbonate (OYSTER SHELL CALCIUM 500 MG) 1250 (500 Ca) MG tablet Take 1 tablet by mouth daily      vitamin D (CHOLECALCIFEROL) 1000 UNIT TABS tablet Take 1,000 Units by mouth daily      Multiple Vitamins-Minerals (MULTIVITAMIN PO) Take 1 capsule by mouth daily.  Ascorbic Acid (VITAMIN C) 500 MG tablet Take 500 mg by mouth daily.       vitamin E 400 UNIT capsule Take 400 Units by mouth daily. No current facility-administered medications for this visit. Review of Systems     REVIEW OF SYSTEMS  The following systems were reviewed and revealed the following in addition to any already discussed in the HPI:    CONSTITUTIONAL: +weight loss, no fever, no night sweats, no chills  EYES: no vision changes, no blurry vision  EARS: no changes in hearing, no otalgia  NOSE: +epistaxis, no rhinorrhea  RESPIRATORY: no difficulty breathing, no shortness of breath  CV: no chest pain, no lower extremity swelling  HEME: no coagulation disorder, no known bleeding disorders  NEURO: no TIA or stroke-like symptoms  SKIN: no new rashes in the head and neck, no recently diagnosed skin cancers  MOUTH: no new oral ulcers, no recent tooth infections  GASTROINTESTINAL: no diarrhea, no stomach pain  PSYCH: no anxiety, no depression    PhysicalExam     Vitals:    12/01/20 1027   BP: (!) 151/85   Site: Left Upper Arm   Position: Sitting   Cuff Size: Medium Adult   Pulse: 121   Temp: 97.1 °F (36.2 °C)   TempSrc: Temporal   Weight: 116 lb (52.6 kg)       PHYSICAL EXAM  BP (!) 151/85 (Site: Left Upper Arm, Position: Sitting, Cuff Size: Medium Adult)   Pulse 121   Temp 97.1 °F (36.2 °C) (Temporal)   Wt 116 lb (52.6 kg)   BMI 21.92 kg/m²     GENERAL: No acute distress, alert and oriented, no hoarseness  EYES: EOMI, Anti-icteric  NOSE: On anterior rhinoscopy there is no epistaxis, nasal mucosa moist and normal appearing, no purulent drainage. EARS: Normal external appearance; on portable otomicroscopy:     -Ad: External auditory canal without stenosis, tympanic membrane clear, no middle ear effusions or retractions.      -As: External auditory canal without stenosis, tympanic membrane clear, no middle ear effusions or retractions.    Pneumatic otoscopy: Bilateral tympanic membranes mobile pneumatic otoscopy  FACE: HB 1/6 bilaterally, symmetric appearing, sensation equal bilaterally  ORAL CAVITY: No masses or lesions palpated, uvula is midline, moist mucous membranes, symmetric 1+ tonsils, dentition without evidence of major decay  NECK: Normal range of motion, no thyromegaly, trachea is midline, no palpable lymphadenopathy or neck masses, no crepitus. Easily palpable carotid pulses bilaterally. CHEST: Normal respiratory effort, breathing comfortably, no retractions  SKIN: No rashes, normal appearing skin, no evidence of skin lesions/tumors  NEURO: Cranial Nerves 2, 3, 4, 5, 6, 7, 11, 12 intact bilaterally     I have performed a head and neck physical exam personally or was physically present during the key or critical portions of the service. Procedure     Procedure: Flexible Laryngoscopy    Pre-op: Globus sensation  Post-op: LPR, Post nasal drip  Procedure : Flexible Nasopharyngolaryngoscopy  Surgeon: Dr. Catherine Bond DO  Anesthesia: Afrin with 2% lidocaine  Estimated Blood Loss: None    Description of Procedure:  After obtaining consent, the patient was placed in the examination chair in the upright position. Decongestant and topical anesthetic was sprayed in the bilateral nares and after allowing adequate time for hemostatic effect, the flexible 4 mm laryngoscope was passed via the bilateral nasal passages. Nasal Septum: No acute septal deviation, no septal hematoma or perforations noted   Nasal Findings: No active hemorrhage, no nasal masses appreciated   Nasopharynx: Cystic mucosal mass just to the right of midline in the nasopharynx.   Oropharynx: Bilateral tonsillar tissue, no exophytic masses  Base of Tongue: Lingual tonsils within normal limits, vallecula without effacement  Epiglottis: Upright, in normal anatomical position  True Vocal Cord: Anatomically normal, no masses or inflammation, normal abduction and adduction upon inspiration and phonation  False Vocal Cord: normal appearing without masses  Hypopharynx Mucosa: + Pooling of thick mucus secretions in the bilateral piriform sinuses, worse on bleeding. Follow Up     Return in about 6 weeks (around 1/12/2021). Sepideh Guanmansi   Department of Otolaryngology/Head & Neck Surgery  12/1/20    Medical Decision Making: The following items were considered in medical decision making:  Independent review of images  Review / order clinical lab tests  Review / order radiology tests  Decision to obtain old records    Portions of this note were dictated using Dragon.  There may be linguistic errors secondary to the use of this program.

## 2020-12-02 ENCOUNTER — TELEPHONE (OUTPATIENT)
Dept: INTERNAL MEDICINE CLINIC | Age: 65
End: 2020-12-02

## 2020-12-02 NOTE — TELEPHONE ENCOUNTER
----- Message from Yoandy Villanueva sent at 11/25/2020 10:52 AM EST -----  Subject: Message to Provider    QUESTIONS  Information for Provider? pt needs her appt on 12/21 to be rescheduled   since she has a endoscopy that day   but because of her current symptoms system prompts for a VV and pt would   need to be seen in person . please advise . pt screened red to be seen   today but unable to see pcp .   ---------------------------------------------------------------------------  --------------  CALL BACK INFO  What is the best way for the office to contact you? OK to leave message on   voicemail  Preferred Call Back Phone Number? [phone]  ---------------------------------------------------------------------------  --------------  SCRIPT ANSWERS  Relationship to Patient?  Self

## 2020-12-07 ENCOUNTER — TELEPHONE (OUTPATIENT)
Dept: ENT CLINIC | Age: 65
End: 2020-12-07

## 2020-12-07 NOTE — TELEPHONE ENCOUNTER
PT needs to have a lab order put epic to have her creatinine levels checked before her CT scan on Friday.

## 2020-12-10 ENCOUNTER — HOSPITAL ENCOUNTER (OUTPATIENT)
Age: 65
Discharge: HOME | End: 2020-12-10
Payer: MEDICARE

## 2020-12-10 LAB
ANION GAP SERPL CALCULATED.3IONS-SCNC: 8 MMOL/L (ref 3–16)
BUN BLDV-MCNC: 12 MG/DL (ref 7–20)
CALCIUM SERPL-MCNC: 10 MG/DL (ref 8.3–10.6)
CHLORIDE BLD-SCNC: 107 MMOL/L (ref 99–110)
CO2: 31 MMOL/L (ref 21–32)
CREAT SERPL-MCNC: <0.5 MG/DL (ref 0.6–1.2)
GFR AFRICAN AMERICAN: >60
GFR NON-AFRICAN AMERICAN: >60
GLUCOSE BLD-MCNC: 135 MG/DL (ref 70–99)
POTASSIUM SERPL-SCNC: 4 MMOL/L (ref 3.5–5.1)
SODIUM BLD-SCNC: 146 MMOL/L (ref 136–145)

## 2020-12-10 PROCEDURE — 80048 BASIC METABOLIC PNL TOTAL CA: CPT

## 2020-12-10 PROCEDURE — 36415 COLL VENOUS BLD VENIPUNCTURE: CPT

## 2020-12-11 ENCOUNTER — HOSPITAL ENCOUNTER (OUTPATIENT)
Dept: CT IMAGING | Age: 65
Discharge: HOME | End: 2020-12-11
Payer: MEDICARE

## 2020-12-11 PROCEDURE — 70487 CT MAXILLOFACIAL W/DYE: CPT

## 2020-12-11 PROCEDURE — 6360000004 HC RX CONTRAST MEDICATION: Performed by: STUDENT IN AN ORGANIZED HEALTH CARE EDUCATION/TRAINING PROGRAM

## 2020-12-11 RX ADMIN — IOPAMIDOL 75 ML: 755 INJECTION, SOLUTION INTRAVENOUS at 15:39

## 2020-12-24 ENCOUNTER — TELEPHONE (OUTPATIENT)
Dept: INTERNAL MEDICINE CLINIC | Age: 65
End: 2020-12-24

## 2020-12-24 NOTE — TELEPHONE ENCOUNTER
Called patient to schedule her an appointment, she refused and said that she did not want to make an appointment for her weight check until she has seen the GI.

## 2020-12-24 NOTE — TELEPHONE ENCOUNTER
----- Message from Kelly Valencia sent at 12/14/2020 10:18 AM EST -----  Subject: Appointment Request    Reason for Call: Routine Existing Condition Follow Up    QUESTIONS  Type of Appointment? Established Patient  Reason for appointment request? Available appointments did not meet   patient need  Additional Information for Provider? patient needs to come in for check   up. please advise and call back  ---------------------------------------------------------------------------  --------------  CALL BACK INFO  What is the best way for the office to contact you? OK to leave message on   voicemail  Preferred Call Back Phone Number? [phone]  ---------------------------------------------------------------------------  --------------  SCRIPT ANSWERS  Relationship to Patient? Self  Appointment reason? Well Care/Follow Ups  Select a Well Care/Follow Ups appointment reason? Adult Existing Condition   Follow Up [Diabetes   CHF   COPD   Hypertension/Blood Pressure Check]  (Is the patient requesting to be seen urgently for their symptoms?)? No  Is this follow up request related to routine Diabetes Management? No  Are you having any new concerns about your existing condition? No  Have you been diagnosed with   tested for   or told that you are suspected of having COVID-19 (Coronavirus)? No  Have you had a fever or taken medication to treat a fever within the past   3 days? No  Have you had a cough   shortness of breath or flu-like symptoms within the past 3 days?  Yes

## 2021-01-12 ENCOUNTER — HOSPITAL ENCOUNTER (OUTPATIENT)
Age: 66
Discharge: HOME | End: 2021-01-12
Payer: MEDICARE

## 2021-01-12 LAB
ALBUMIN SERPL-MCNC: 3.3 G/DL (ref 3.4–5)
ANION GAP SERPL CALCULATED.3IONS-SCNC: 11 MMOL/L (ref 3–16)
BUN BLDV-MCNC: 11 MG/DL (ref 7–20)
CALCIUM SERPL-MCNC: 9.6 MG/DL (ref 8.3–10.6)
CHLORIDE BLD-SCNC: 105 MMOL/L (ref 99–110)
CO2: 28 MMOL/L (ref 21–32)
CREAT SERPL-MCNC: <0.5 MG/DL (ref 0.6–1.2)
GFR AFRICAN AMERICAN: >60
GFR NON-AFRICAN AMERICAN: >60
GLUCOSE BLD-MCNC: 106 MG/DL (ref 70–99)
PHOSPHORUS: 4.2 MG/DL (ref 2.5–4.9)
POTASSIUM SERPL-SCNC: 3.7 MMOL/L (ref 3.5–5.1)
SODIUM BLD-SCNC: 144 MMOL/L (ref 136–145)

## 2021-01-12 PROCEDURE — 80069 RENAL FUNCTION PANEL: CPT

## 2021-01-12 PROCEDURE — 36415 COLL VENOUS BLD VENIPUNCTURE: CPT

## 2021-01-13 ENCOUNTER — HOSPITAL ENCOUNTER (OUTPATIENT)
Dept: CT IMAGING | Age: 66
Discharge: HOME | End: 2021-01-13
Payer: MEDICARE

## 2021-01-13 DIAGNOSIS — R63.4 ABNORMAL INTENTIONAL WEIGHT LOSS: ICD-10-CM

## 2021-01-13 PROCEDURE — 74177 CT ABD & PELVIS W/CONTRAST: CPT

## 2021-01-13 PROCEDURE — 6360000004 HC RX CONTRAST MEDICATION: Performed by: INTERNAL MEDICINE

## 2021-01-13 RX ADMIN — IOPAMIDOL 75 ML: 755 INJECTION, SOLUTION INTRAVENOUS at 14:27

## 2021-01-13 RX ADMIN — IOHEXOL 50 ML: 240 INJECTION, SOLUTION INTRATHECAL; INTRAVASCULAR; INTRAVENOUS; ORAL at 14:26

## 2021-01-14 ENCOUNTER — OFFICE VISIT (OUTPATIENT)
Dept: ENT CLINIC | Age: 66
End: 2021-01-14
Payer: MEDICARE

## 2021-01-14 VITALS — HEART RATE: 120 BPM | DIASTOLIC BLOOD PRESSURE: 74 MMHG | TEMPERATURE: 96.9 F | SYSTOLIC BLOOD PRESSURE: 180 MMHG

## 2021-01-14 DIAGNOSIS — R04.0 EPISTAXIS: ICD-10-CM

## 2021-01-14 DIAGNOSIS — J30.9 ALLERGIC RHINITIS, UNSPECIFIED SEASONALITY, UNSPECIFIED TRIGGER: ICD-10-CM

## 2021-01-14 DIAGNOSIS — R63.4 WEIGHT LOSS: ICD-10-CM

## 2021-01-14 DIAGNOSIS — J39.2 LESION OF NASOPHARYNX: Primary | ICD-10-CM

## 2021-01-14 PROCEDURE — 1123F ACP DISCUSS/DSCN MKR DOCD: CPT | Performed by: STUDENT IN AN ORGANIZED HEALTH CARE EDUCATION/TRAINING PROGRAM

## 2021-01-14 PROCEDURE — G8484 FLU IMMUNIZE NO ADMIN: HCPCS | Performed by: STUDENT IN AN ORGANIZED HEALTH CARE EDUCATION/TRAINING PROGRAM

## 2021-01-14 PROCEDURE — 1036F TOBACCO NON-USER: CPT | Performed by: STUDENT IN AN ORGANIZED HEALTH CARE EDUCATION/TRAINING PROGRAM

## 2021-01-14 PROCEDURE — 3017F COLORECTAL CA SCREEN DOC REV: CPT | Performed by: STUDENT IN AN ORGANIZED HEALTH CARE EDUCATION/TRAINING PROGRAM

## 2021-01-14 PROCEDURE — 1090F PRES/ABSN URINE INCON ASSESS: CPT | Performed by: STUDENT IN AN ORGANIZED HEALTH CARE EDUCATION/TRAINING PROGRAM

## 2021-01-14 PROCEDURE — G8399 PT W/DXA RESULTS DOCUMENT: HCPCS | Performed by: STUDENT IN AN ORGANIZED HEALTH CARE EDUCATION/TRAINING PROGRAM

## 2021-01-14 PROCEDURE — 4040F PNEUMOC VAC/ADMIN/RCVD: CPT | Performed by: STUDENT IN AN ORGANIZED HEALTH CARE EDUCATION/TRAINING PROGRAM

## 2021-01-14 PROCEDURE — 99214 OFFICE O/P EST MOD 30 MIN: CPT | Performed by: STUDENT IN AN ORGANIZED HEALTH CARE EDUCATION/TRAINING PROGRAM

## 2021-01-14 PROCEDURE — G8420 CALC BMI NORM PARAMETERS: HCPCS | Performed by: STUDENT IN AN ORGANIZED HEALTH CARE EDUCATION/TRAINING PROGRAM

## 2021-01-14 PROCEDURE — G8427 DOCREV CUR MEDS BY ELIG CLIN: HCPCS | Performed by: STUDENT IN AN ORGANIZED HEALTH CARE EDUCATION/TRAINING PROGRAM

## 2021-01-14 RX ORDER — PANTOPRAZOLE SODIUM 40 MG/1
TABLET, DELAYED RELEASE ORAL
COMMUNITY
Start: 2020-12-21 | End: 2021-04-28

## 2021-01-14 NOTE — PROGRESS NOTES
[address] VISIT      Patient Name: Abiola Rea Record Number:  [id number]  Primary Care Physician:  Karina Green MD    ChiefComplaint     Chief Complaint   Patient presents with    Follow-up     no new compaint       History of Present Illness     Jenaro Patterson is an 76 y.o. female previously seen for weight loss, nasopharyngeal lesion, allergic rhinitis, postnasal drip. Interval History:   Still going through tests on why he is continuing to lose weight. Had upper endoscopy Dec 22 by Dr. Woodrow Green  [facility]), report demonstrated erythematois esophagitis, sliding hiatial hernia, and erosive gastritis with biopsies taken. Biopsies were negative. Placed on Protonix after which she takes in the morning, continuing take Pepcid at night. Abdominal CT performed yesterday. Globus sensation much improved. Sinus rinses helped when she would do them, but hard to tolerate. Nosebleeds decreased, very slight now only once in a while. On triamciolone for lichen planus, rxed by derm.      Past Medical History     Past Medical History:   Diagnosis Date    Controlled type 2 diabetes mellitus without complication, without long-term current use of insulin ([address] 2019    Dyslipidemia 2017    Essential hypertension 2018    Hypercholesterolemia 2018    Hyperlipidemia     Hypertension     Lichen planus     arms, legs    Primary biliary cholangitis ([address] 2019    Primary biliary cholangitis ([address] 2019       Past Surgical History     Past Surgical History:   Procedure Laterality Date    CARPAL TUNNEL RELEASE       SECTION      ENDOMETRIAL ABLATION      TONSILLECTOMY      WISDOM TOOTH EXTRACTION         Family History     Family History   Problem Relation Age of Onset    Cancer Mother         Lung Cancer    Stroke Mother     Diabetes Father     High Cholesterol Father     Hypertension Father     Heart Failure Father     Substance Abuse Sister        Social History     Social History     Tobacco Use    Smoking status: Former Smoker     Packs/day: 0.75     Years: 6.00     Pack years: 4.50     Types: Cigarettes    Smokeless tobacco: Former User     Quit date: 3/14/2018   Substance Use Topics    Alcohol use: Yes     Comment: occasional     Drug use: No        Allergies     Allergies   Allergen Reactions    Penicillins Other (See Comments)     Doesn't know allergy reaction    Sulfa Antibiotics Other (See Comments)     n/a    Sulfasalazine Hives     n/a       Medications     Current Outpatient Medications   Medication Sig Dispense Refill    pantoprazole (PROTONIX) 40 MG tablet TAKE 1 TABLET BY MOUTH EVERY DAY      mupirocin (BACTROBAN NASAL) 2 % nasal ointment Instill in each nostril once a day. 1 Tube 3    famotidine (PEPCID) 20 MG tablet TAKE 1 TABLET BY MOUTH EVERY NIGHT 90 tablet 0    ursodiol (ACTIGALL) 500 MG tablet TK 2 TS PO D      atorvastatin (LIPITOR) 80 MG tablet Take 1 tablet by mouth daily 90 tablet 0    fluticasone (FLONASE) 50 MCG/ACT nasal spray SHAKE LIQUID AND USE 2 SPRAYS IN EACH NOSTRIL TWICE DAILY 48 g 5    lisinopril (PRINIVIL;ZESTRIL) 5 MG tablet Take 1 tablet by mouth daily 90 tablet 1    loratadine (CLARITIN) 10 MG tablet Take 1 tablet by mouth daily 90 tablet 3    triamcinolone (KENALOG) 0.1 % cream Apply topically 2 times daily as needed 453.6 g 1    calcium carbonate (OYSTER SHELL CALCIUM 500 MG) 1250 (500 Ca) MG tablet Take 1 tablet by mouth daily      vitamin D (CHOLECALCIFEROL) 1000 UNIT TABS tablet Take 1,000 Units by mouth daily      Multiple Vitamins-Minerals (MULTIVITAMIN PO) Take 1 capsule by mouth daily.  Ascorbic Acid (VITAMIN C) 500 MG tablet Take 500 mg by mouth daily.  vitamin E 400 UNIT capsule Take 400 Units by mouth daily.       neomycin-polymyxin-dexameth (MAXITROL) 3.5-03404-4.1 ophthalmic suspension Place 1 drop into both eyes       No current facility-administered medications for this visit. Review of Systems     REVIEW OF SYSTEMS  The following systems were reviewed and revealed the following in addition to any already discussed in the HPI:    CONSTITUTIONAL: no weight loss, no fever, no night sweats, no chills  EYES: no vision changes, no blurry vision  EARS: no changes in hearing, no otalgia  NOSE: +epistaxis, no rhinorrhea    PhysicalExam     Vitals:    01/14/21 1034   BP: (!) 180/74   Pulse: 120   Temp: 96.9 °F (36.1 °C)       PHYSICAL EXAM  BP (!) 180/74   Pulse 120   Temp 96.9 °F (36.1 °C)     GENERAL: No acute distress, alert and oriented, no hoarseness  EYES: EOMI, Anti-icteric  NOSE: On anterior rhinoscopy there is no epistaxis, nasal mucosa moist and normal appearing, no purulent drainage. EARS: Normal external appearance; on portable otomicroscopy:     -Ad: External auditory canal without stenosis, tympanic membrane clear, no middle ear effusions or retractions.      -As: External auditory canal without stenosis, tympanic membrane clear, no middle ear effusions or retractions. Pneumatic otoscopy: Bilateral tympanic membranes mobile pneumatic otoscopy  FACE: HB 1/6 bilaterally, symmetric appearing, sensation equal bilaterally  ORAL CAVITY: No masses or lesions palpated, uvula is midline, moist mucous membranes, symmetric 1+ tonsils, dentition without evidence of major decay  NECK: Normal range of motion, no thyromegaly, trachea is midline, no palpable lymphadenopathy or neck masses, no crepitus. Easily palpable carotid pulses bilaterally.    CHEST: Normal respiratory effort, breathing comfortably, no retractions  SKIN: No rashes, normal appearing skin, no evidence of skin lesions/tumors  NEURO: Cranial Nerves 2, 3, 4, 5, 6, 7, 11, 12 intact bilaterally     I have performed a head and neck physical exam personally or was physically present during the key or critical portions of the service. Data/Imaging Review     Images from CT sinus performed on 12/11/2020 were independently reviewed by myself which demonstrates right-sided nasopharyngeal lesion, approximately 1.4 cm maximal diameter. Assessment and Plan     1. Lesion of nasopharynx  2. Weight loss  -Would be appropriate to perform nasal endoscopy with biopsy of nasopharyngeal lesion to rule out malignancy which could be because of her recent unexplained long-term weight loss. Discussed this procedure in detail with the patient including the associated risk, benefits, alternatives. Discussed risks included but were not limited to infection, nerve damage, bleeding, damage to surrounding structure, postoperative hemorrhage, anesthesia risk, sensory insult. The patient understands these risk and would like to proceed with surgery and consent was signed in the office today. -PCP for preoperative clearance    3. Allergic rhinitis, unspecified seasonality, unspecified trigger  -Continue Flonase and Zyrtec  -Would likely continue to benefit from sinonasal hypertonic saline irrigations if she can tolerate it. 4. Epistaxis  -Significantly improved. Maintain improvement with nasal saline sprays as needed. Follow-Up     Return for post operative visit. April LindsayUniversity [facility]   Department of Otolaryngology/Head and Neck Surgery  1/14/21    Medical Decision Making: The following items were considered in medical decision making:  Independent review of images  Review / order clinical lab tests  Review / order radiology tests  Decision to obtain old records    Portions of this note were dictated using Dragon.  There may be linguistic errors secondary to the use of this program.

## 2021-01-18 ENCOUNTER — TELEPHONE (OUTPATIENT)
Dept: ENT CLINIC | Age: 66
End: 2021-01-18

## 2021-01-20 ENCOUNTER — TELEPHONE (OUTPATIENT)
Dept: INTERNAL MEDICINE CLINIC | Age: 66
End: 2021-01-20

## 2021-01-20 ENCOUNTER — TELEPHONE (OUTPATIENT)
Dept: ENT CLINIC | Age: 66
End: 2021-01-20

## 2021-01-20 NOTE — TELEPHONE ENCOUNTER
----- Message from Meena may sent at 1/20/2021 11:27 AM EST -----  Subject: Message to Provider    QUESTIONS  Information for Provider? Pt found out that she has a heart murmur and   unexplained weight loss. Says she just found out about her heart murmur. Pt says she getting a procedure on a cist in her nasal cavity.   ---------------------------------------------------------------------------  --------------  CALL BACK INFO  What is the best way for the office to contact you? OK to leave message on   voicemail  Preferred Call Back Phone Number? [phone]  ---------------------------------------------------------------------------  --------------  SCRIPT ANSWERS  Relationship to Patient? Self  Appointment reason? Symptomatic  Select script based on patient symptoms? Adult Pre-Op  Do you have question for your provider that need to be answered prior to   scheduling your pre-op appointment?  Yes

## 2021-01-20 NOTE — TELEPHONE ENCOUNTER
[phone] (home)     Spoke with patient regarding scheduling surgery. Patient currently has an appointment with PCP for end of February. Pt will call PCP to see if she can get in soon.

## 2021-01-26 ENCOUNTER — TELEPHONE (OUTPATIENT)
Dept: ENT CLINIC | Age: 66
End: 2021-01-26

## 2021-01-26 NOTE — TELEPHONE ENCOUNTER
Called patient to schedule surgery. Pt still has not heard back from PCP office to schedule for a pre-op. Patient frustrated with PCP, but glad ENT is taking an interest in her. Told pt to call back if she hears from her PCP, but informed her I will call back next week to check in on her if I do not hear from her.

## 2021-02-01 ENCOUNTER — TELEPHONE (OUTPATIENT)
Dept: ENT CLINIC | Age: 66
End: 2021-02-01

## 2021-02-19 ENCOUNTER — OFFICE VISIT (OUTPATIENT)
Dept: INTERNAL MEDICINE CLINIC | Age: 66
End: 2021-02-19
Payer: MEDICARE

## 2021-02-19 VITALS
SYSTOLIC BLOOD PRESSURE: 128 MMHG | BODY MASS INDEX: 20.78 KG/M2 | OXYGEN SATURATION: 98 % | DIASTOLIC BLOOD PRESSURE: 60 MMHG | HEART RATE: 104 BPM | TEMPERATURE: 97.3 F | WEIGHT: 110 LBS

## 2021-02-19 DIAGNOSIS — K74.3 PRIMARY BILIARY CHOLANGITIS (HCC): ICD-10-CM

## 2021-02-19 DIAGNOSIS — Z01.818 PRE-OP EXAM: Primary | ICD-10-CM

## 2021-02-19 DIAGNOSIS — J34.1 CYST, NASAL SINUS: ICD-10-CM

## 2021-02-19 DIAGNOSIS — R94.5 ABNORMAL RESULTS OF LIVER FUNCTION STUDIES: ICD-10-CM

## 2021-02-19 PROCEDURE — 99212 OFFICE O/P EST SF 10 MIN: CPT | Performed by: INTERNAL MEDICINE

## 2021-02-19 PROCEDURE — 1090F PRES/ABSN URINE INCON ASSESS: CPT | Performed by: INTERNAL MEDICINE

## 2021-02-19 PROCEDURE — G8484 FLU IMMUNIZE NO ADMIN: HCPCS | Performed by: INTERNAL MEDICINE

## 2021-02-19 PROCEDURE — 93000 ELECTROCARDIOGRAM COMPLETE: CPT | Performed by: INTERNAL MEDICINE

## 2021-02-19 PROCEDURE — G8420 CALC BMI NORM PARAMETERS: HCPCS | Performed by: INTERNAL MEDICINE

## 2021-02-19 PROCEDURE — G8427 DOCREV CUR MEDS BY ELIG CLIN: HCPCS | Performed by: INTERNAL MEDICINE

## 2021-02-19 ASSESSMENT — PATIENT HEALTH QUESTIONNAIRE - PHQ9
2. FEELING DOWN, DEPRESSED OR HOPELESS: 0
SUM OF ALL RESPONSES TO PHQ QUESTIONS 1-9: 0

## 2021-02-19 NOTE — PROGRESS NOTES
Preoperative Consultation      Petty Acosta  YOB: 1955    Date of Service:  2/19/2021    Vitals:    02/19/21 1518   BP: 128/60   Pulse: 104   Temp: 97.3 °F (36.3 °C)   TempSrc: Temporal   SpO2: 98%   Weight: 110 lb (49.9 kg)      Wt Readings from Last 2 Encounters:   02/19/21 110 lb (49.9 kg)   12/01/20 116 lb (52.6 kg)     BP Readings from Last 3 Encounters:   02/19/21 128/60   01/14/21 (!) 180/74   12/01/20 (!) 151/85        Chief Complaint   Patient presents with   Sawant Pre-op Exam     cyst in the nasal passage     Allergies   Allergen Reactions    Penicillins Other (See Comments)     Doesn't know allergy reaction    Sulfa Antibiotics Other (See Comments)     n/a    Sulfasalazine Hives     n/a     Outpatient Medications Marked as Taking for the 2/19/21 encounter (Office Visit) with Dalila Soto MD   Medication Sig Dispense Refill    pantoprazole (PROTONIX) 40 MG tablet TAKE 1 TABLET BY MOUTH EVERY DAY      mupirocin (BACTROBAN NASAL) 2 % nasal ointment Instill in each nostril once a day. 1 Tube 3    famotidine (PEPCID) 20 MG tablet TAKE 1 TABLET BY MOUTH EVERY NIGHT 90 tablet 0    ursodiol (ACTIGALL) 500 MG tablet TK 2 TS PO D      atorvastatin (LIPITOR) 80 MG tablet Take 1 tablet by mouth daily 90 tablet 0    fluticasone (FLONASE) 50 MCG/ACT nasal spray SHAKE LIQUID AND USE 2 SPRAYS IN EACH NOSTRIL TWICE DAILY 48 g 5    lisinopril (PRINIVIL;ZESTRIL) 5 MG tablet Take 1 tablet by mouth daily 90 tablet 1    loratadine (CLARITIN) 10 MG tablet Take 1 tablet by mouth daily 90 tablet 3    triamcinolone (KENALOG) 0.1 % cream Apply topically 2 times daily as needed 453.6 g 1    calcium carbonate (OYSTER SHELL CALCIUM 500 MG) 1250 (500 Ca) MG tablet Take 1 tablet by mouth daily      vitamin D (CHOLECALCIFEROL) 1000 UNIT TABS tablet Take 1,000 Units by mouth daily      Multiple Vitamins-Minerals (MULTIVITAMIN PO) Take 1 capsule by mouth daily.       Ascorbic Acid (VITAMIN C) 500 MG tablet Take 500 mg by mouth daily.  vitamin E 400 UNIT capsule Take 400 Units by mouth daily.        Chief Complaint   Patient presents with    Pre-op Exam     cyst in the nasal passage     Planned anesthesia: General   Known anesthesia problems: None - post operative nausea  Bleeding risk: No recent or remote history of abnormal bleeding  Personal or FH of DVT/PE: No    Patient objection to receiving blood products: No    Patient Active Problem List   Diagnosis    Tobacco use disorder    HTN, goal below 130/80    Dyslipidemia    Lichen planus    Hypercholesterolemia    Primary biliary cholangitis ([address]    Controlled type 2 diabetes mellitus without complication, without long-term current use of insulin ([address]       Past Medical History:   Diagnosis Date    Controlled type 2 diabetes mellitus without complication, without long-term current use of insulin ([address] 2019    Dyslipidemia 2017    Essential hypertension 2018    Hypercholesterolemia 2018    Hyperlipidemia     Hypertension     Lichen planus     arms, legs    Primary biliary cholangitis ([address] 2019    Primary biliary cholangitis ([address] 2019     Past Surgical History:   Procedure Laterality Date    CARPAL TUNNEL RELEASE       SECTION      ENDOMETRIAL ABLATION      TONSILLECTOMY      WISDOM TOOTH EXTRACTION       Family History   Problem Relation Age of Onset    Cancer Mother         Lung Cancer    Stroke Mother     Diabetes Father     High Cholesterol Father     Hypertension Father     Heart Failure Father     Substance Abuse Sister      Social History     Socioeconomic History    Marital status:      Spouse name: Not on file    Number of children: Not on file    Years of education: Not on file    Highest education level: Not on file   Occupational History    Occupation:    Social Needs    Financial resource strain: Not on file    Food insecurity     Worry: Not on file     Inability: Not on file    Transportation needs     Medical: Not on file     Non-medical: Not on file   Tobacco Use    Smoking status: Former Smoker     Packs/day: 0.75     Years: 6.00     Pack years: 4.50     Types: Cigarettes    Smokeless tobacco: Former User     Quit date: 3/14/2018   Substance and Sexual Activity    Alcohol use: Yes     Comment: occasional     Drug use: No    Sexual activity: Never   Lifestyle    Physical activity     Days per week: Not on file     Minutes per session: Not on file    Stress: Not on file   Relationships    Social connections     Talks on phone: Not on file     Gets together: Not on file     Attends Christian service: Not on file     Active member of club or organization: Not on file     Attends meetings of clubs or organizations: Not on file     Relationship status: Not on file    Intimate partner violence     Fear of current or ex partner: Not on file     Emotionally abused: Not on file     Physically abused: Not on file     Forced sexual activity: Not on file   Other Topics Concern    Not on file   Social History Narrative    Not on file       Review of Systems  A comprehensive review of systems was negative except for what was noted in the HPI. Physical Exam   Constitutional: She is oriented to person, place, and time. She appears well-developed and well-nourished. No distress. HENT:   Head: Normocephalic and atraumatic. Mouth/Throat: Uvula is midline, oropharynx is clear and moist and mucous membranes are normal.   Eyes: Conjunctivae and EOM are normal. Pupils are equal, round, and reactive to light. Neck: Trachea normal and normal range of motion. Neck supple. No JVD present. Carotid bruit is not present. No mass and no thyromegaly present. Cardiovascular: Normal rate, regular rhythm, normal heart sounds and intact distal pulses. Exam reveals no gallop and no friction rub. No murmur heard.   Pulmonary/Chest: Effort normal and breath sounds normal. No respiratory distress. She has no wheezes. She has no rales. Abdominal: Soft. Normal aorta and bowel sounds are normal. She exhibits no distension and no mass. There is no hepatosplenomegaly. No tenderness. Musculoskeletal: She exhibits no edema and no tenderness. Neurological: She is alert and oriented to person, place, and time. She has normal strength. No cranial nerve deficit or sensory deficit. Coordination and gait normal.   Skin: Skin is warm and dry. No rash noted. No erythema. Psychiatric: She has a normal mood and affect. Her behavior is normal.     EKG Interpretation:  normal EKG, normal sinus rhythm, unchanged from previous tracings. Lab Review   Hospital Outpatient Visit on 01/12/2021   Component Date Value    Sodium 01/12/2021 144     Potassium 01/12/2021 3.7     Chloride 01/12/2021 105     CO2 01/12/2021 28     Anion Gap 01/12/2021 11     Glucose 01/12/2021 106*    BUN 01/12/2021 11     CREATININE 01/12/2021 <0.5*    GFR Non- 01/12/2021 >60     GFR  01/12/2021 >60     Calcium 01/12/2021 9.6     Phosphorus 01/12/2021 4.2     Albumin 01/12/2021 3.3*           Assessment:       76 y.o. patient with planned surgery as above. Known risk factors for perioperative complications: None  Current medications which may produce withdrawal symptoms if withheld perioperatively: none      Plan:     1. Preoperative workup as follows: ECG  2. Change in medication regimen before surgery: None  3.  Prophylaxis for cardiac events with perioperative beta-blockers: Not indicated  ACC/AHA indications for pre-operative beta-blocker use:    · Vascular surgery with history of postitive stress test  · Intermediate or high risk surgery with history of CAD   · Intermediate or high risk surgery with multiple clinical predictors of CAD- 2 of the following: history of compensated or prior heart failure, history of cerebrovascular disease, DM, or renal insufficiency    Routine administration of higher-dose, long-acting metoprolol in beta-blocker-naïve patients on the day of surgery, and in the absence of dose titration is associated with an overall increase in mortality. Beta-blockers should be started days to weeks prior to surgery and titrated to pulse < 70.  4. Deep vein thrombosis prophylaxis: regimen to be chosen by surgical team  5.  No contraindications to planned surgery

## 2021-02-22 DIAGNOSIS — Z01.818 PRE-OP EXAM: ICD-10-CM

## 2021-02-22 DIAGNOSIS — R94.5 ABNORMAL RESULTS OF LIVER FUNCTION STUDIES: ICD-10-CM

## 2021-02-22 DIAGNOSIS — J34.1 CYST, NASAL SINUS: ICD-10-CM

## 2021-02-22 LAB
ALBUMIN SERPL-MCNC: 3.4 G/DL (ref 3.4–5)
ALP BLD-CCNC: 269 U/L (ref 40–129)
ALT SERPL-CCNC: 21 U/L (ref 10–40)
AST SERPL-CCNC: 31 U/L (ref 15–37)
BASOPHILS ABSOLUTE: 0 K/UL (ref 0–0.2)
BASOPHILS RELATIVE PERCENT: 0.5 %
BILIRUB SERPL-MCNC: 0.5 MG/DL (ref 0–1)
BILIRUBIN DIRECT: <0.2 MG/DL (ref 0–0.3)
BILIRUBIN, INDIRECT: ABNORMAL MG/DL (ref 0–1)
EOSINOPHILS ABSOLUTE: 0.2 K/UL (ref 0–0.6)
EOSINOPHILS RELATIVE PERCENT: 2.3 %
HCT VFR BLD CALC: 38.2 % (ref 36–48)
HEMOGLOBIN: 12.8 G/DL (ref 12–16)
INR BLD: 1.17 (ref 0.86–1.14)
LYMPHOCYTES ABSOLUTE: 3.2 K/UL (ref 1–5.1)
LYMPHOCYTES RELATIVE PERCENT: 35.7 %
MCH RBC QN AUTO: 28.2 PG (ref 26–34)
MCHC RBC AUTO-ENTMCNC: 33.5 G/DL (ref 31–36)
MCV RBC AUTO: 84.1 FL (ref 80–100)
MONOCYTES ABSOLUTE: 1.5 K/UL (ref 0–1.3)
MONOCYTES RELATIVE PERCENT: 16.8 %
NEUTROPHILS ABSOLUTE: 3.9 K/UL (ref 1.7–7.7)
NEUTROPHILS RELATIVE PERCENT: 44.7 %
PDW BLD-RTO: 13.8 % (ref 12.4–15.4)
PLATELET # BLD: 204 K/UL (ref 135–450)
PMV BLD AUTO: 12.8 FL (ref 5–10.5)
PROTHROMBIN TIME: 13.6 SEC (ref 10–13.2)
RBC # BLD: 4.55 M/UL (ref 4–5.2)
TOTAL PROTEIN: 7 G/DL (ref 6.4–8.2)
WBC # BLD: 8.8 K/UL (ref 4–11)

## 2021-02-23 ENCOUNTER — OFFICE VISIT (OUTPATIENT)
Dept: INTERNAL MEDICINE CLINIC | Age: 66
End: 2021-02-23
Payer: MEDICARE

## 2021-02-23 VITALS
BODY MASS INDEX: 20.44 KG/M2 | DIASTOLIC BLOOD PRESSURE: 60 MMHG | SYSTOLIC BLOOD PRESSURE: 120 MMHG | WEIGHT: 108.2 LBS | OXYGEN SATURATION: 95 % | TEMPERATURE: 97.7 F | HEART RATE: 86 BPM

## 2021-02-23 DIAGNOSIS — K74.3 PRIMARY BILIARY CHOLANGITIS (HCC): ICD-10-CM

## 2021-02-23 DIAGNOSIS — K74.3 PRIMARY BILIARY CIRRHOSIS (HCC): ICD-10-CM

## 2021-02-23 DIAGNOSIS — G47.9 SLEEP DISORDER: Primary | ICD-10-CM

## 2021-02-23 DIAGNOSIS — R63.4 WEIGHT LOSS, UNINTENTIONAL: ICD-10-CM

## 2021-02-23 DIAGNOSIS — E11.9 CONTROLLED TYPE 2 DIABETES MELLITUS WITHOUT COMPLICATION, WITHOUT LONG-TERM CURRENT USE OF INSULIN (HCC): ICD-10-CM

## 2021-02-23 LAB — HBA1C MFR BLD: 5.8 %

## 2021-02-23 PROCEDURE — 1090F PRES/ABSN URINE INCON ASSESS: CPT | Performed by: INTERNAL MEDICINE

## 2021-02-23 PROCEDURE — G8427 DOCREV CUR MEDS BY ELIG CLIN: HCPCS | Performed by: INTERNAL MEDICINE

## 2021-02-23 PROCEDURE — G8420 CALC BMI NORM PARAMETERS: HCPCS | Performed by: INTERNAL MEDICINE

## 2021-02-23 PROCEDURE — 99214 OFFICE O/P EST MOD 30 MIN: CPT | Performed by: INTERNAL MEDICINE

## 2021-02-23 PROCEDURE — 2022F DILAT RTA XM EVC RTNOPTHY: CPT | Performed by: INTERNAL MEDICINE

## 2021-02-23 PROCEDURE — 3044F HG A1C LEVEL LT 7.0%: CPT | Performed by: INTERNAL MEDICINE

## 2021-02-23 PROCEDURE — 4040F PNEUMOC VAC/ADMIN/RCVD: CPT | Performed by: INTERNAL MEDICINE

## 2021-02-23 PROCEDURE — 3017F COLORECTAL CA SCREEN DOC REV: CPT | Performed by: INTERNAL MEDICINE

## 2021-02-23 PROCEDURE — G8399 PT W/DXA RESULTS DOCUMENT: HCPCS | Performed by: INTERNAL MEDICINE

## 2021-02-23 PROCEDURE — 1123F ACP DISCUSS/DSCN MKR DOCD: CPT | Performed by: INTERNAL MEDICINE

## 2021-02-23 PROCEDURE — 1036F TOBACCO NON-USER: CPT | Performed by: INTERNAL MEDICINE

## 2021-02-23 PROCEDURE — G8484 FLU IMMUNIZE NO ADMIN: HCPCS | Performed by: INTERNAL MEDICINE

## 2021-02-23 PROCEDURE — 83036 HEMOGLOBIN GLYCOSYLATED A1C: CPT | Performed by: INTERNAL MEDICINE

## 2021-02-23 ASSESSMENT — ENCOUNTER SYMPTOMS
DIARRHEA: 0
BLOOD IN STOOL: 0
ABDOMINAL PAIN: 0
CONSTIPATION: 0
VOMITING: 0
NAUSEA: 0

## 2021-02-23 NOTE — PROGRESS NOTES
Subjective:      Patient ID: Petty Acosta is a 76 y.o. female. Chief Complaint   Patient presents with    Diabetes     F/U       Diabetes  Hypoglycemia symptoms include nervousness/anxiousness. Weight Loss:  Patient reports about 16 pounds of weight loss since July without trying. Her pants no longer fit properly. She also reports a decrease in her stamina and fatigue. She thinks it might be due to her PBC and to extra stress in her life lately. She also reports difficulty sleeping and some anxiety due to this. She has occasional night sweats but thinks this is due to temperature changes. She denies changes in her diet or BMs. Patient with persistent weight loss. Her high school graduation weight was 98lbs. She is 108 today and reports a good appetite,  She states she gets tired with exercise. She doesn't sleep well  Diabetes:  Treatment Adherence:   Medication compliance:  compliant most of the time  Diet compliance:  compliant most of the time  Weight trend: loss of 16 pounds  Current exercise: walks dog daily  Barriers: none  Diabetes Mellitus Type 2: Current symptoms/problems include none. Home blood sugar records: patient does not test  Any episodes of hypoglycemia? no  Eye exam current (within one year): no  Tobacco history: She  reports that she has quit smoking. Her smoking use included cigarettes. She has a 4.50 pack-year smoking history. She quit smokeless tobacco use about 2 years ago. Daily Aspirin? No: not indicated    Hypertension:  Home blood pressure monitoring: No.  She is not adherent to a low sodium diet. Patient denies headache, lightheadedness, blurred vision, peripheral edema and palpitations. Antihypertensive medication side effects: no medication side effects noted. Use of agents associated with hypertension: none. Hyperlipidemia:  No new myalgias or GI upset on atorvastatin (Lipitor).              Lab Results   Component Value Date    LABA1C 6.6 10/21/2020    LABA1C 6.1 07/15/2020    LABA1C 6.6 12/16/2019     Lab Results   Component Value Date    LABMICR <1.20 12/19/2018    CREATININE <0.5 (L) 01/12/2021     Lab Results   Component Value Date    ALT 21 02/22/2021    AST 31 02/22/2021     Lab Results   Component Value Date    CHOL 120 08/21/2019    TRIG 136 08/21/2019    HDL 41 08/21/2019    LDLCALC 52 08/21/2019          Review of Systems   Constitutional: Positive for unexpected weight change. Negative for activity change, appetite change, chills and fever. Gastrointestinal: Negative for abdominal pain, blood in stool, constipation, diarrhea, nausea and vomiting. Psychiatric/Behavioral: Positive for sleep disturbance. The patient is nervous/anxious. All other systems reviewed and are negative. Allergies   Allergen Reactions    Penicillins Other (See Comments)     Doesn't know allergy reaction    Sulfa Antibiotics Other (See Comments)     n/a    Sulfasalazine Hives     n/a       Current Outpatient Medications   Medication Sig Dispense Refill    pantoprazole (PROTONIX) 40 MG tablet TAKE 1 TABLET BY MOUTH EVERY DAY      mupirocin (BACTROBAN NASAL) 2 % nasal ointment Instill in each nostril once a day.  1 Tube 3    famotidine (PEPCID) 20 MG tablet TAKE 1 TABLET BY MOUTH EVERY NIGHT 90 tablet 0    ursodiol (ACTIGALL) 500 MG tablet TK 2 TS PO D      atorvastatin (LIPITOR) 80 MG tablet Take 1 tablet by mouth daily 90 tablet 0    fluticasone (FLONASE) 50 MCG/ACT nasal spray SHAKE LIQUID AND USE 2 SPRAYS IN EACH NOSTRIL TWICE DAILY 48 g 5    lisinopril (PRINIVIL;ZESTRIL) 5 MG tablet Take 1 tablet by mouth daily 90 tablet 1    loratadine (CLARITIN) 10 MG tablet Take 1 tablet by mouth daily 90 tablet 3    triamcinolone (KENALOG) 0.1 % cream Apply topically 2 times daily as needed 453.6 g 1    neomycin-polymyxin-dexameth (MAXITROL) 3.5-49923-7.1 ophthalmic suspension Place 1 drop into both eyes      calcium carbonate (OYSTER SHELL CALCIUM 500 MG) 1250 (500 Ca) MG tablet Take 1 tablet by mouth daily      vitamin D (CHOLECALCIFEROL) 1000 UNIT TABS tablet Take 1,000 Units by mouth daily      Multiple Vitamins-Minerals (MULTIVITAMIN PO) Take 1 capsule by mouth daily.  Ascorbic Acid (VITAMIN C) 500 MG tablet Take 500 mg by mouth daily.  vitamin E 400 UNIT capsule Take 400 Units by mouth daily. No current facility-administered medications for this visit. Vitals:    02/23/21 0954   BP: 120/60   Pulse: 86   Temp: 97.7 °F (36.5 °C)   SpO2: 95%   Weight: 108 lb 3.2 oz (49.1 kg)     Body mass index is 20.44 kg/m². Wt Readings from Last 3 Encounters:   02/23/21 108 lb 3.2 oz (49.1 kg)   02/19/21 110 lb (49.9 kg)   12/01/20 116 lb (52.6 kg)     Objective:   Physical Exam  Vitals signs and nursing note reviewed. Constitutional:       General: She is not in acute distress. Appearance: Normal appearance. She is well-developed. HENT:      Head: Normocephalic and atraumatic. Cardiovascular:      Rate and Rhythm: Normal rate and regular rhythm. Pulses: Normal pulses. Heart sounds: Normal heart sounds. No murmur. No gallop. Pulmonary:      Effort: Pulmonary effort is normal.      Breath sounds: No wheezing, rhonchi or rales. Abdominal:      General: Abdomen is flat. Palpations: Abdomen is soft. Tenderness: There is no abdominal tenderness. There is no guarding. Skin:     General: Skin is warm and dry. Capillary Refill: Capillary refill takes less than 2 seconds. Neurological:      General: No focal deficit present. Mental Status: She is alert and oriented to person, place, and time. Psychiatric:         Mood and Affect: Mood normal.         Behavior: Behavior normal.         Thought Content: Thought content normal.         Judgment: Judgment normal.       Assessment/Plan:  Federico Page was seen today for diabetes.     Diagnoses and all orders for this visit:    Sleep disorder  -     Baseline Diagnostic Sleep Study;

## 2021-02-25 DIAGNOSIS — E78.5 DYSLIPIDEMIA: ICD-10-CM

## 2021-02-25 RX ORDER — FAMOTIDINE 20 MG/1
TABLET, FILM COATED ORAL
Qty: 90 TABLET | Refills: 0 | Status: SHIPPED | OUTPATIENT
Start: 2021-02-25 | End: 2021-03-16 | Stop reason: SDUPTHER

## 2021-02-25 NOTE — PROGRESS NOTES
Name_______________________________________Printed:____________________  Date and time of surgery__3/8/2021_____1200_________________Arrival Time:__1030 masc______________   1. The instructions given regarding when and if a patient needs to stop oral intake prior to surgery varies. Follow the specific instructions you were given                  _x__Nothing to eat or to drink after Midnight the night before.                   ____Carbo loading or ERAS instructions will be given to select patients-if you have been given those instructions -please do the following                           The evening before your surgery after dinner before midnight drink 40 ounces of gatorade. If you are diabetic use sugar free. The morning of surgery drink 40 ounces of water. This needs to be finished 3 hours prior to your surgery start time. 2. Take the following pills with a small sip of water on the morning of surgery_protonix__________________________________________________                  Do not take blood pressure medications ending in pril or sartan the timur prior to surgery or the morning of surgery_   3. Aspirin, Ibuprofen, Advil, Naproxen, Vitamin E and other Anti-inflammatory products and supplements should be stopped for 5 -7days before surgery or as directed by your physician. 4. Check with your Doctor regarding stopping Plavix, Coumadin,Eliquis, Lovenox,Effient,Pradaxa,Xarelto, Fragmin or other blood thinners and follow their instructions. 5. Do not smoke, and do not drink any alcoholic beverages 24 hours prior to surgery. This includes NA Beer. Refrain from the usage of any recreational drugs. 6. You may brush your teeth and gargle the morning of surgery. DO NOT SWALLOW WATER   7. You MUST make arrangements for a responsible adult to stay on site while you are here and take you home after your surgery. You will not be allowed to leave alone or drive yourself home.   It is strongly suggested someone stay with you the first 24 hrs. Your surgery will be cancelled if you do not have a ride home. 8. A parent/legal guardian must accompany a child scheduled for surgery and plan to stay at the hospital until the child is discharged. Please do not bring other children with you. 9. Please wear simple, loose fitting clothing to the hospital.  Vereffietine Deeds not bring valuables (money, credit cards, checkbooks, etc.) Do not wear any makeup (including no eye makeup) or nail polish on your fingers or toes. 10. DO NOT wear any jewelry or piercings on day of surgery. All body piercing jewelry must be removed. 11. If you have ___dentures, they will be removed before going to the OR; we will provide you a container. If you wear ___contact lenses or __x_glasses, they will be removed; please bring a case for them. 12. Please see your family doctor/pediatrician for a history & physical and/or concerning medications. Bring any test results/reports from your physician's office. PCP_Chemo_________________Phone___________H&P Appt. Date_2/19/21_______             13 If you  have a Living Will and Durable Power of  for Healthcare, please bring in a copy. 15. Notify your Surgeon if you develop any illness between now and surgery  time, cough, cold, fever, sore throat, nausea, vomiting, etc.  Please notify your surgeon if you experience dizziness, shortness of breath or blurred vision between now & the time of your surgery             15. DO NOT shave your operative site 96 hours prior to surgery. For face & neck surgery, men may use an electric razor 48 hours prior to surgery. 16. Shower the night before or morning of surgery using an antibacterial soap or as you have been instructed. 17. To provide excellent care visitors will be limited to one in the room at any given time. 18.  Please bring picture ID and insurance card.              19.  Visit our web All above information reviewed with patient in person or by phone. Patient verbalizes understanding. All questions and concerns addressed.                                                                                                  Patient/Rep_patient___________________                                                                                                                                    PRE OP INSTRUCTIONS

## 2021-02-26 NOTE — TELEPHONE ENCOUNTER
Recent Visits  Date Type Provider Dept   02/23/21 Office Visit Claudia Sofia MD [facility]   02/19/21 Office Visit Claudia Sofia MD [facility]   10/21/20 Office Visit Claudia Sofia MD [facility]   07/14/20 Office Visit Claudia Sofia MD [facility]   12/16/19 Office Visit Claudia Sofia MD [facility]   Showing recent visits within past 540 days with a meds authorizing provider and meeting all other requirements     Future Appointments  No visits were found meeting these conditions.    Showing future appointments within next 150 days with a meds authorizing provider and meeting all other requirements

## 2021-02-28 RX ORDER — ATORVASTATIN CALCIUM 80 MG/1
80 TABLET, FILM COATED ORAL DAILY
Qty: 90 TABLET | Refills: 0 | Status: SHIPPED | OUTPATIENT
Start: 2021-02-28 | End: 2021-05-27

## 2021-03-04 ENCOUNTER — OFFICE VISIT (OUTPATIENT)
Dept: PRIMARY CARE CLINIC | Age: 66
End: 2021-03-04
Payer: MEDICARE

## 2021-03-04 DIAGNOSIS — Z20.828 EXPOSURE TO SARS-ASSOCIATED CORONAVIRUS: Primary | ICD-10-CM

## 2021-03-04 LAB — SARS-COV-2: NOT DETECTED

## 2021-03-04 PROCEDURE — G8420 CALC BMI NORM PARAMETERS: HCPCS | Performed by: NURSE PRACTITIONER

## 2021-03-04 PROCEDURE — 99211 OFF/OP EST MAY X REQ PHY/QHP: CPT | Performed by: NURSE PRACTITIONER

## 2021-03-04 PROCEDURE — G8428 CUR MEDS NOT DOCUMENT: HCPCS | Performed by: NURSE PRACTITIONER

## 2021-03-04 NOTE — PROGRESS NOTES
Dominic Rivera received a viral test for COVID-19. They were educated on isolation and quarantine as appropriate. For any symptoms, they were directed to seek care from their PCP, given contact information to establish with a doctor, directed to an urgent care or the emergency room.

## 2021-03-04 NOTE — PATIENT INSTRUCTIONS

## 2021-03-08 ENCOUNTER — HOSPITAL ENCOUNTER (OUTPATIENT)
Age: 66
Setting detail: OUTPATIENT SURGERY
Discharge: HOME | End: 2021-03-08
Attending: STUDENT IN AN ORGANIZED HEALTH CARE EDUCATION/TRAINING PROGRAM | Admitting: STUDENT IN AN ORGANIZED HEALTH CARE EDUCATION/TRAINING PROGRAM
Payer: MEDICARE

## 2021-03-08 ENCOUNTER — ANESTHESIA EVENT (OUTPATIENT)
Dept: OPERATING ROOM | Age: 66
End: 2021-03-08
Payer: MEDICARE

## 2021-03-08 ENCOUNTER — ANESTHESIA (OUTPATIENT)
Dept: OPERATING ROOM | Age: 66
End: 2021-03-08
Payer: MEDICARE

## 2021-03-08 VITALS
OXYGEN SATURATION: 100 % | DIASTOLIC BLOOD PRESSURE: 58 MMHG | SYSTOLIC BLOOD PRESSURE: 119 MMHG | RESPIRATION RATE: 18 BRPM

## 2021-03-08 VITALS
SYSTOLIC BLOOD PRESSURE: 142 MMHG | HEART RATE: 102 BPM | HEIGHT: 61 IN | WEIGHT: 107.5 LBS | BODY MASS INDEX: 20.3 KG/M2 | OXYGEN SATURATION: 95 % | RESPIRATION RATE: 22 BRPM | TEMPERATURE: 97.1 F | DIASTOLIC BLOOD PRESSURE: 65 MMHG

## 2021-03-08 LAB
GLUCOSE BLD-MCNC: 91 MG/DL (ref 70–99)
GLUCOSE BLD-MCNC: 96 MG/DL (ref 70–99)
PERFORMED ON: NORMAL
PERFORMED ON: NORMAL

## 2021-03-08 PROCEDURE — 7100000011 HC PHASE II RECOVERY - ADDTL 15 MIN: Performed by: STUDENT IN AN ORGANIZED HEALTH CARE EDUCATION/TRAINING PROGRAM

## 2021-03-08 PROCEDURE — 7100000001 HC PACU RECOVERY - ADDTL 15 MIN: Performed by: STUDENT IN AN ORGANIZED HEALTH CARE EDUCATION/TRAINING PROGRAM

## 2021-03-08 PROCEDURE — 2580000003 HC RX 258: Performed by: STUDENT IN AN ORGANIZED HEALTH CARE EDUCATION/TRAINING PROGRAM

## 2021-03-08 PROCEDURE — 6360000002 HC RX W HCPCS: Performed by: NURSE ANESTHETIST, CERTIFIED REGISTERED

## 2021-03-08 PROCEDURE — 6370000000 HC RX 637 (ALT 250 FOR IP): Performed by: FAMILY MEDICINE

## 2021-03-08 PROCEDURE — 31237 NSL/SINS NDSC SURG BX POLYPC: CPT | Performed by: STUDENT IN AN ORGANIZED HEALTH CARE EDUCATION/TRAINING PROGRAM

## 2021-03-08 PROCEDURE — 3700000000 HC ANESTHESIA ATTENDED CARE: Performed by: STUDENT IN AN ORGANIZED HEALTH CARE EDUCATION/TRAINING PROGRAM

## 2021-03-08 PROCEDURE — 2580000003 HC RX 258: Performed by: NURSE ANESTHETIST, CERTIFIED REGISTERED

## 2021-03-08 PROCEDURE — 2709999900 HC NON-CHARGEABLE SUPPLY: Performed by: STUDENT IN AN ORGANIZED HEALTH CARE EDUCATION/TRAINING PROGRAM

## 2021-03-08 PROCEDURE — 3600000014 HC SURGERY LEVEL 4 ADDTL 15MIN: Performed by: STUDENT IN AN ORGANIZED HEALTH CARE EDUCATION/TRAINING PROGRAM

## 2021-03-08 PROCEDURE — 6360000002 HC RX W HCPCS: Performed by: FAMILY MEDICINE

## 2021-03-08 PROCEDURE — 3600000004 HC SURGERY LEVEL 4 BASE: Performed by: STUDENT IN AN ORGANIZED HEALTH CARE EDUCATION/TRAINING PROGRAM

## 2021-03-08 PROCEDURE — 6370000000 HC RX 637 (ALT 250 FOR IP): Performed by: STUDENT IN AN ORGANIZED HEALTH CARE EDUCATION/TRAINING PROGRAM

## 2021-03-08 PROCEDURE — 88305 TISSUE EXAM BY PATHOLOGIST: CPT

## 2021-03-08 PROCEDURE — 7100000010 HC PHASE II RECOVERY - FIRST 15 MIN: Performed by: STUDENT IN AN ORGANIZED HEALTH CARE EDUCATION/TRAINING PROGRAM

## 2021-03-08 PROCEDURE — 7100000000 HC PACU RECOVERY - FIRST 15 MIN: Performed by: STUDENT IN AN ORGANIZED HEALTH CARE EDUCATION/TRAINING PROGRAM

## 2021-03-08 PROCEDURE — 3700000001 HC ADD 15 MINUTES (ANESTHESIA): Performed by: STUDENT IN AN ORGANIZED HEALTH CARE EDUCATION/TRAINING PROGRAM

## 2021-03-08 PROCEDURE — 2500000003 HC RX 250 WO HCPCS: Performed by: NURSE ANESTHETIST, CERTIFIED REGISTERED

## 2021-03-08 RX ORDER — SODIUM CHLORIDE 9 MG/ML
INJECTION, SOLUTION INTRAVENOUS CONTINUOUS
Status: DISCONTINUED | OUTPATIENT
Start: 2021-03-08 | End: 2021-03-08 | Stop reason: HOSPADM

## 2021-03-08 RX ORDER — HYDROMORPHONE HCL 110MG/55ML
0.25 PATIENT CONTROLLED ANALGESIA SYRINGE INTRAVENOUS EVERY 5 MIN PRN
Status: DISCONTINUED | OUTPATIENT
Start: 2021-03-08 | End: 2021-03-08 | Stop reason: HOSPADM

## 2021-03-08 RX ORDER — MEPERIDINE HYDROCHLORIDE 25 MG/ML
12.5 INJECTION INTRAMUSCULAR; INTRAVENOUS; SUBCUTANEOUS EVERY 5 MIN PRN
Status: DISCONTINUED | OUTPATIENT
Start: 2021-03-08 | End: 2021-03-08 | Stop reason: HOSPADM

## 2021-03-08 RX ORDER — DIPHENHYDRAMINE HYDROCHLORIDE 50 MG/ML
12.5 INJECTION INTRAMUSCULAR; INTRAVENOUS
Status: DISCONTINUED | OUTPATIENT
Start: 2021-03-08 | End: 2021-03-08 | Stop reason: HOSPADM

## 2021-03-08 RX ORDER — OXYCODONE HYDROCHLORIDE 5 MG/1
5 TABLET ORAL PRN
Status: COMPLETED | OUTPATIENT
Start: 2021-03-08 | End: 2021-03-08

## 2021-03-08 RX ORDER — OXYMETAZOLINE HYDROCHLORIDE 0.05 G/100ML
SPRAY NASAL
Status: COMPLETED | OUTPATIENT
Start: 2021-03-08 | End: 2021-03-08

## 2021-03-08 RX ORDER — FENTANYL CITRATE 50 UG/ML
INJECTION, SOLUTION INTRAMUSCULAR; INTRAVENOUS PRN
Status: DISCONTINUED | OUTPATIENT
Start: 2021-03-08 | End: 2021-03-08 | Stop reason: SDUPTHER

## 2021-03-08 RX ORDER — SODIUM CHLORIDE 9 MG/ML
INJECTION, SOLUTION INTRAVENOUS CONTINUOUS PRN
Status: DISCONTINUED | OUTPATIENT
Start: 2021-03-08 | End: 2021-03-08 | Stop reason: SDUPTHER

## 2021-03-08 RX ORDER — FENTANYL CITRATE 50 UG/ML
50 INJECTION, SOLUTION INTRAMUSCULAR; INTRAVENOUS EVERY 5 MIN PRN
Status: DISCONTINUED | OUTPATIENT
Start: 2021-03-08 | End: 2021-03-08 | Stop reason: HOSPADM

## 2021-03-08 RX ORDER — ONDANSETRON 2 MG/ML
INJECTION INTRAMUSCULAR; INTRAVENOUS PRN
Status: DISCONTINUED | OUTPATIENT
Start: 2021-03-08 | End: 2021-03-08 | Stop reason: SDUPTHER

## 2021-03-08 RX ORDER — DEXAMETHASONE SODIUM PHOSPHATE 4 MG/ML
INJECTION, SOLUTION INTRA-ARTICULAR; INTRALESIONAL; INTRAMUSCULAR; INTRAVENOUS; SOFT TISSUE PRN
Status: DISCONTINUED | OUTPATIENT
Start: 2021-03-08 | End: 2021-03-08 | Stop reason: SDUPTHER

## 2021-03-08 RX ORDER — PROPOFOL 10 MG/ML
INJECTION, EMULSION INTRAVENOUS PRN
Status: DISCONTINUED | OUTPATIENT
Start: 2021-03-08 | End: 2021-03-08 | Stop reason: SDUPTHER

## 2021-03-08 RX ORDER — HYDROMORPHONE HCL 110MG/55ML
0.5 PATIENT CONTROLLED ANALGESIA SYRINGE INTRAVENOUS EVERY 5 MIN PRN
Status: DISCONTINUED | OUTPATIENT
Start: 2021-03-08 | End: 2021-03-08 | Stop reason: HOSPADM

## 2021-03-08 RX ORDER — SCOLOPAMINE TRANSDERMAL SYSTEM 1 MG/1
1 PATCH, EXTENDED RELEASE TRANSDERMAL ONCE
Status: DISCONTINUED | OUTPATIENT
Start: 2021-03-08 | End: 2021-03-11

## 2021-03-08 RX ORDER — LIDOCAINE HYDROCHLORIDE 20 MG/ML
INJECTION, SOLUTION INFILTRATION; PERINEURAL PRN
Status: DISCONTINUED | OUTPATIENT
Start: 2021-03-08 | End: 2021-03-08 | Stop reason: SDUPTHER

## 2021-03-08 RX ORDER — LABETALOL HYDROCHLORIDE 5 MG/ML
5 INJECTION, SOLUTION INTRAVENOUS EVERY 10 MIN PRN
Status: DISCONTINUED | OUTPATIENT
Start: 2021-03-08 | End: 2021-03-08 | Stop reason: HOSPADM

## 2021-03-08 RX ORDER — PROMETHAZINE HYDROCHLORIDE 25 MG/ML
6.25 INJECTION, SOLUTION INTRAMUSCULAR; INTRAVENOUS PRN
Status: DISCONTINUED | OUTPATIENT
Start: 2021-03-08 | End: 2021-03-08 | Stop reason: HOSPADM

## 2021-03-08 RX ORDER — OXYCODONE HYDROCHLORIDE 5 MG/1
10 TABLET ORAL PRN
Status: COMPLETED | OUTPATIENT
Start: 2021-03-08 | End: 2021-03-08

## 2021-03-08 RX ORDER — SUCCINYLCHOLINE CHLORIDE 20 MG/ML
INJECTION INTRAMUSCULAR; INTRAVENOUS PRN
Status: DISCONTINUED | OUTPATIENT
Start: 2021-03-08 | End: 2021-03-08 | Stop reason: SDUPTHER

## 2021-03-08 RX ORDER — APREPITANT 40 MG/1
40 CAPSULE ORAL ONCE
Status: DISCONTINUED | OUTPATIENT
Start: 2021-03-08 | End: 2021-03-08

## 2021-03-08 RX ADMIN — DEXAMETHASONE SODIUM PHOSPHATE 8 MG: 4 INJECTION, SOLUTION INTRAMUSCULAR; INTRAVENOUS at 12:25

## 2021-03-08 RX ADMIN — SUCCINYLCHOLINE CHLORIDE 140 MG: 20 INJECTION, SOLUTION INTRAMUSCULAR; INTRAVENOUS at 12:06

## 2021-03-08 RX ADMIN — LIDOCAINE HYDROCHLORIDE 100 MG: 20 INJECTION, SOLUTION INFILTRATION; PERINEURAL at 12:05

## 2021-03-08 RX ADMIN — APREPITANT 40 MG: 40 CAPSULE ORAL at 12:00

## 2021-03-08 RX ADMIN — SODIUM CHLORIDE: 9 INJECTION, SOLUTION INTRAVENOUS at 12:24

## 2021-03-08 RX ADMIN — FENTANYL CITRATE 50 MCG: 50 INJECTION, SOLUTION INTRAMUSCULAR; INTRAVENOUS at 12:04

## 2021-03-08 RX ADMIN — PROPOFOL 200 MG: 10 INJECTION, EMULSION INTRAVENOUS at 12:06

## 2021-03-08 RX ADMIN — SODIUM CHLORIDE: 9 INJECTION, SOLUTION INTRAVENOUS at 11:12

## 2021-03-08 RX ADMIN — FENTANYL CITRATE 50 MCG: 50 INJECTION, SOLUTION INTRAMUSCULAR; INTRAVENOUS at 12:14

## 2021-03-08 RX ADMIN — OXYCODONE 5 MG: 5 TABLET ORAL at 13:24

## 2021-03-08 RX ADMIN — ONDANSETRON 4 MG: 2 INJECTION INTRAMUSCULAR; INTRAVENOUS at 12:25

## 2021-03-08 RX ADMIN — SODIUM CHLORIDE: 9 INJECTION, SOLUTION INTRAVENOUS at 12:03

## 2021-03-08 ASSESSMENT — PAIN DESCRIPTION - PAIN TYPE: TYPE: SURGICAL PAIN

## 2021-03-08 ASSESSMENT — PULMONARY FUNCTION TESTS
PIF_VALUE: 33
PIF_VALUE: 29
PIF_VALUE: 22
PIF_VALUE: 16
PIF_VALUE: 20
PIF_VALUE: 20
PIF_VALUE: 27
PIF_VALUE: 0
PIF_VALUE: 22
PIF_VALUE: 4
PIF_VALUE: 15
PIF_VALUE: 16
PIF_VALUE: 0
PIF_VALUE: 24
PIF_VALUE: 3
PIF_VALUE: 15
PIF_VALUE: 15
PIF_VALUE: 17
PIF_VALUE: 0
PIF_VALUE: 24
PIF_VALUE: 1
PIF_VALUE: 0
PIF_VALUE: 16
PIF_VALUE: 0
PIF_VALUE: 0
PIF_VALUE: 16
PIF_VALUE: 15
PIF_VALUE: 15
PIF_VALUE: 25
PIF_VALUE: 15
PIF_VALUE: 19

## 2021-03-08 ASSESSMENT — PAIN DESCRIPTION - PROGRESSION: CLINICAL_PROGRESSION: GRADUALLY WORSENING

## 2021-03-08 ASSESSMENT — PAIN DESCRIPTION - ORIENTATION: ORIENTATION: RIGHT

## 2021-03-08 ASSESSMENT — PAIN - FUNCTIONAL ASSESSMENT: PAIN_FUNCTIONAL_ASSESSMENT: 0-10

## 2021-03-08 ASSESSMENT — PAIN DESCRIPTION - LOCATION: LOCATION: NOSE

## 2021-03-08 NOTE — H&P
Date of Surgery Update:  Ladi Weinberg was seen, history and physical examination reviewed, and patient examined by me today. There have been no significant clinical changes since the completion of the previous history and physical performed by Dr. Essie Pal. The risk, benefits, and alternatives of the proposed procedure have been explained to the patient (or appropriate guardian) and understanding verbalized. All questions answered. Patient wishes to proceed.     Electronically signed by: Kamran Almonte DO,3/8/2021,11:38 AM

## 2021-03-08 NOTE — OP NOTE
portions. The bilateral fossa of Rosenmuller were examined and found to be free of any neoplastic lesions. The Afrin-soaked cotton pledgets were removed.   This concluded our procedure and the patient was turned over to Anesthesiology for awakening and extubation    Specimens:   ID Type Source Tests Collected by Time Destination   A :  Tissue Tissue SURGICAL PATHOLOGY Laure DO 3/8/2021 1232         Drains: None    Estimated Blood Loss: 5 ml    Complications: None

## 2021-03-08 NOTE — PROGRESS NOTES
Pt awake and alert. Pt on RA, VSS. Brother is here. Pt with some c/o pain, denies nausea, tolerating PO. Pt meets criteria to be discharged from phase 1.

## 2021-03-08 NOTE — ANESTHESIA POSTPROCEDURE EVALUATION
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        Anesthesia Post Evaluation    Level of consciousness: awake  Complications: no

## 2021-03-08 NOTE — PROGRESS NOTES
Discharge instructions reviewed with patient/brother. All home medications have been reviewed, questions answered and patient verbalized understanding. Discharge instructions signed. Pt dc'd per wheelchair. Patient discharged home with supplies for moustache dressing and other belongings. Brother taking stable pt home.

## 2021-03-08 NOTE — ANESTHESIA PRE PROCEDURE
Department of Anesthesiology  Preprocedure Note       Name:  Willa Mo   Age:  76 y.o.  :  1955                                          MRN:  [id number]         Date:  3/8/2021      Surgeon: Parveen Nick):  Mariza Vogel DO    Procedure: Procedure(s):  NASAL ENDOSCOPY; BIOPSY OF NASOPHARYNGEAL LESION    Medications prior to admission:   Prior to Admission medications    Medication Sig Start Date End Date Taking? Authorizing Provider   atorvastatin (LIPITOR) 80 MG tablet TAKE 1 TABLET BY MOUTH DAILY 21  Yes Delma Miller MD   famotidine (PEPCID) 20 MG tablet TAKE 1 TABLET BY MOUTH EVERY NIGHT 21  Yes Mariza Vogel DO   pantoprazole (PROTONIX) 40 MG tablet TAKE 1 TABLET BY MOUTH EVERY DAY 20  Yes Historical Provider, MD   ursodiol (ACTIGALL) 500 MG tablet 2 times daily  10/9/20  Yes Historical Provider, MD   fluticasone (FLONASE) 50 MCG/ACT nasal spray SHAKE LIQUID AND USE 2 SPRAYS IN EACH NOSTRIL TWICE DAILY 10/21/20  Yes Delma Miller MD   lisinopril (PRINIVIL;ZESTRIL) 5 MG tablet Take 1 tablet by mouth daily 10/21/20  Yes Delma Miller MD   loratadine (CLARITIN) 10 MG tablet Take 1 tablet by mouth daily 10/21/20  Yes Delma Miller MD   triamcinolone (KENALOG) 0.1 % cream Apply topically 2 times daily as needed 10/21/20  Yes Delma Miller MD   calcium carbonate (OYSTER SHELL CALCIUM 500 MG) 1250 (500 Ca) MG tablet Take 1 tablet by mouth daily   Yes Historical Provider, MD   vitamin D (CHOLECALCIFEROL) 1000 UNIT TABS tablet Take 1,000 Units by mouth daily   Yes Historical Provider, MD   Multiple Vitamins-Minerals (MULTIVITAMIN PO) Take 1 capsule by mouth daily. Yes Historical Provider, MD   Ascorbic Acid (VITAMIN C) 500 MG tablet Take 500 mg by mouth daily. Yes Historical Provider, MD   mupirocin (BACTROBAN NASAL) 2 % nasal ointment Instill in each nostril once a day.  20   Mariza Vogel DO       Current medications: Current Facility-Administered Medications   Medication Dose Route Frequency Provider Last Rate Last Admin    0.9 % sodium chloride infusion   Intravenous Continuous Willaim Feil,  mL/hr at 03/08/21 1112 New Bag at 03/08/21 1112    meperidine (DEMEROL) injection 12.5 mg  12.5 mg Intravenous Q5 Min PRN Freddy Aggarwal MD        HYDROmorphone (DILAUDID) injection 0.25 mg  0.25 mg Intravenous Q5 Min PRN Freddy Aggarwal MD        fentaNYL (SUBLIMAZE) injection 50 mcg  50 mcg Intravenous Q5 Min PRN Freddy Aggarwal, MD        HYDROmorphone (DILAUDID) injection 0.25 mg  0.25 mg Intravenous Q5 Min PRN Freddy Aggarwal, MD        HYDROmorphone (DILAUDID) injection 0.5 mg  0.5 mg Intravenous Q5 Min PRN Freddy Aggarwal, MD        oxyCODONE (ROXICODONE) immediate release tablet 5 mg  5 mg Oral PRN Freddy Aggarwal MD        Or    oxyCODONE (ROXICODONE) immediate release tablet 10 mg  10 mg Oral PRN Freddy Aggarwal MD        promethazine (PHENERGAN) injection 6.25 mg  6.25 mg Intravenous PRN Freddy Aggarwal MD        diphenhydrAMINE (BENADRYL) injection 12.5 mg  12.5 mg Intravenous Once PRN Freddy Aggarwal MD        labetalol (NORMODYNE;TRANDATE) injection 5 mg  5 mg Intravenous Q10 Min PRN Freddy Aggarwal MD           Allergies:     Allergies   Allergen Reactions    Penicillins Other (See Comments)     Doesn't know allergy reaction    Sulfa Antibiotics Other (See Comments)     n/a    Sulfasalazine Hives     n/a       Problem List:    Patient Active Problem List   Diagnosis Code    Tobacco use disorder F17.200    HTN, goal below 130/80 I10    Dyslipidemia P13.2    Lichen planus W33.0    Hypercholesterolemia E78.00    Primary biliary cholangitis ([facility] [address] K74.3    Controlled type 2 diabetes mellitus without complication, without long-term current use of insulin (HCC) E11.9       Past Medical History:        Diagnosis Date    Controlled type 2 diabetes mellitus without complication, without long-term current use of insulin [facility] 2019    no meds, low A1C    Dyslipidemia 2017    Essential hypertension 2018    GERD (gastroesophageal reflux disease)     Heart murmur     very early stages    Hypercholesterolemia 2018    Hyperlipidemia     Hypertension     Lichen planus     external, mainly arms, legs    PONV (postoperative nausea and vomiting)     usually the next day, vomits once    Primary biliary cholangitis ([address] 2019    Primary biliary cholangitis ([facility] [address] 2019       Past Surgical History:        Procedure Laterality Date    CARPAL TUNNEL RELEASE       SECTION      COLONOSCOPY      ENDOMETRIAL ABLATION      ENDOSCOPY, COLON, DIAGNOSTIC      LIVER BIOPSY N/A     TONSILLECTOMY      WISDOM TOOTH EXTRACTION         Social History:    Social History     Tobacco Use    Smoking status: Former Smoker     Packs/day: 0.75     Years: 6.00     Pack years: 4.50     Types: Cigarettes     Quit date: 2019     Years since quittin.0    Smokeless tobacco: Never Used   Substance Use Topics    Alcohol use: Yes     Comment: occasional / 2 per month                                Counseling given: Not Answered      Vital Signs (Current):   Vitals:    21 1616 21 1044   BP:  (!) 148/63   Pulse:  110   Resp:  14   Temp:  97.6 °F (36.4 °C)   TempSrc:  Temporal   SpO2:  95%   Weight: 109 lb (49.4 kg) 107 lb 8 oz (48.8 kg)   Height: 5' 1\" (1.549 m) 5' 1\" (1.549 m)                                              BP Readings from Last 3 Encounters:   21 (!) 148/63   21 120/60   21 128/60       NPO Status: Time of last liquid consumption: 0015                        Time of last solid consumption: 2345                        Date of last liquid consumption: 21                        Date of last solid food consumption: 21    BMI:   Wt Readings from Last 3 Encounters:   21 107 lb 8 oz (48.8 kg)   21 108 lb 3.2 oz (49.1 kg)   21 110 lb (49.9 kg)     Body mass index is 20.31 kg/m². CBC:   Lab Results   Component Value Date    WBC 8.8 02/22/2021    RBC 4.55 02/22/2021    HGB 12.8 02/22/2021    HCT 38.2 02/22/2021    MCV 84.1 02/22/2021    RDW 13.8 02/22/2021     02/22/2021       CMP:   Lab Results   Component Value Date     01/12/2021    K 3.7 01/12/2021     01/12/2021    CO2 28 01/12/2021    BUN 11 01/12/2021    CREATININE <0.5 01/12/2021    GFRAA >60 01/12/2021    AGRATIO 1.1 07/15/2020    LABGLOM >60 01/12/2021    GLUCOSE 106 01/12/2021    PROT 7.0 02/22/2021    CALCIUM 9.6 01/12/2021    BILITOT 0.5 02/22/2021    ALKPHOS 269 02/22/2021    AST 31 02/22/2021    ALT 21 02/22/2021       POC Tests:   Recent Labs     03/08/21  1104   POCGLU 91       Coags:   Lab Results   Component Value Date    PROTIME 13.6 02/22/2021    INR 1.17 02/22/2021    APTT 44.3 08/30/2018       HCG (If Applicable): No results found for: PREGTESTUR, PREGSERUM, HCG, HCGQUANT     ABGs: No results found for: PHART, PO2ART, YRI4MPS, RLF8FSR, BEART, P3TDOKNX     Type & Screen (If Applicable):  No results found for: LABABO, LABRH    Drug/Infectious Status (If Applicable):  No results found for: HIV, HEPCAB    COVID-19 Screening (If Applicable):   Lab Results   Component Value Date    COVID19 Not Detected 03/04/2021         Anesthesia Evaluation   history of anesthetic complications:   Airway: Mallampati: II  TM distance: >3 FB   Neck ROM: full  Mouth opening: > = 3 FB Dental:          Pulmonary:                              Cardiovascular:    (+) hypertension:,         Rhythm: regular  Rate: normal                    Neuro/Psych:               GI/Hepatic/Renal:   (+) GERD:, liver disease:,           Endo/Other:    (+) Diabetes, . Abdominal:           Vascular:                                        Anesthesia Plan      general     ASA 2       Induction: intravenous. Anesthetic plan and risks discussed with patient.       Plan discussed with CRNA.                   Consuelo Feng MD   3/8/2021

## 2021-03-16 ENCOUNTER — OFFICE VISIT (OUTPATIENT)
Dept: ENT CLINIC | Age: 66
End: 2021-03-16
Payer: MEDICARE

## 2021-03-16 VITALS — BODY MASS INDEX: 20.2 KG/M2 | HEIGHT: 61 IN | TEMPERATURE: 96.6 F | WEIGHT: 107 LBS

## 2021-03-16 DIAGNOSIS — K21.9 LARYNGOPHARYNGEAL REFLUX (LPR): ICD-10-CM

## 2021-03-16 DIAGNOSIS — J39.2 LESION OF NASOPHARYNX: Primary | ICD-10-CM

## 2021-03-16 PROCEDURE — G8484 FLU IMMUNIZE NO ADMIN: HCPCS | Performed by: STUDENT IN AN ORGANIZED HEALTH CARE EDUCATION/TRAINING PROGRAM

## 2021-03-16 PROCEDURE — 3017F COLORECTAL CA SCREEN DOC REV: CPT | Performed by: STUDENT IN AN ORGANIZED HEALTH CARE EDUCATION/TRAINING PROGRAM

## 2021-03-16 PROCEDURE — 1123F ACP DISCUSS/DSCN MKR DOCD: CPT | Performed by: STUDENT IN AN ORGANIZED HEALTH CARE EDUCATION/TRAINING PROGRAM

## 2021-03-16 PROCEDURE — G8420 CALC BMI NORM PARAMETERS: HCPCS | Performed by: STUDENT IN AN ORGANIZED HEALTH CARE EDUCATION/TRAINING PROGRAM

## 2021-03-16 PROCEDURE — G8399 PT W/DXA RESULTS DOCUMENT: HCPCS | Performed by: STUDENT IN AN ORGANIZED HEALTH CARE EDUCATION/TRAINING PROGRAM

## 2021-03-16 PROCEDURE — 99213 OFFICE O/P EST LOW 20 MIN: CPT | Performed by: STUDENT IN AN ORGANIZED HEALTH CARE EDUCATION/TRAINING PROGRAM

## 2021-03-16 PROCEDURE — G8427 DOCREV CUR MEDS BY ELIG CLIN: HCPCS | Performed by: STUDENT IN AN ORGANIZED HEALTH CARE EDUCATION/TRAINING PROGRAM

## 2021-03-16 PROCEDURE — 1090F PRES/ABSN URINE INCON ASSESS: CPT | Performed by: STUDENT IN AN ORGANIZED HEALTH CARE EDUCATION/TRAINING PROGRAM

## 2021-03-16 PROCEDURE — 4040F PNEUMOC VAC/ADMIN/RCVD: CPT | Performed by: STUDENT IN AN ORGANIZED HEALTH CARE EDUCATION/TRAINING PROGRAM

## 2021-03-16 PROCEDURE — 1036F TOBACCO NON-USER: CPT | Performed by: STUDENT IN AN ORGANIZED HEALTH CARE EDUCATION/TRAINING PROGRAM

## 2021-03-16 RX ORDER — FAMOTIDINE 20 MG/1
20 TABLET, FILM COATED ORAL NIGHTLY
Qty: 90 TABLET | Refills: 0 | Status: SHIPPED | OUTPATIENT
Start: 2021-03-16 | End: 2021-12-06

## 2021-03-16 NOTE — PROGRESS NOTES
[address] VISIT      Patient Name: Abiola Rea Record Number:  [id number]  Primary Care Physician:  Gisella Stevens MD    ChiefComplaint     Chief Complaint   Patient presents with   [facility] Post-Op Check     Patient is s/p nasal endoscopy with biopsy of nasopharyngeal lesion. She denies any pain or discomfort       History of Present Illness     Geo Mata is an 76 y.o. female previously seen for nasopharyngeal lesion. Underwent nasal endoscopy with biopsy and marsupialization of nasopharyngeal lesion on 3/8/2021. Interval History:   Doing well after procedure. Denies pain, epistaxis. She was placed on famotidine a couple months ago. She feels like since being placed on this she has had improvement in her reflux symptoms. Continues to have weight loss. She is set to see her gastroenterologist this coming Friday. History of primary biliary cholangitis.     Past Medical History     Past Medical History:   Diagnosis Date    Controlled type 2 diabetes mellitus without complication, without long-term current use of insulin ([address] 2019    no meds, low A1C    Dyslipidemia 2017    Essential hypertension 2018    GERD (gastroesophageal reflux disease)     Heart murmur     very early stages    Hypercholesterolemia 2018    Hyperlipidemia     Hypertension     Lichen planus     external, mainly arms, legs    PONV (postoperative nausea and vomiting)     usually the next day, vomits once    Primary biliary cholangitis ([address] 2019    Primary biliary cholangitis ([address] 2019       Past Surgical History     Past Surgical History:   Procedure Laterality Date    CARPAL TUNNEL RELEASE       SECTION      COLONOSCOPY      ENDOMETRIAL ABLATION      ENDOSCOPY, COLON, DIAGNOSTIC      LIVER BIOPSY N/A     SINUS ENDOSCOPY N/A 3/8/2021    NASAL ENDOSCOPY; BIOPSY OF NASOPHARYNGEAL LESION performed by Humaira Chavira DO at [address] [place] [address] EXTRACTION         Family History     Family History   Problem Relation Age of Onset    Cancer Mother         Lung Cancer    Stroke Mother     Diabetes Father     High Cholesterol Father     Hypertension Father     Heart Failure Father     Substance Abuse Sister        Social History     Social History     Tobacco Use    Smoking status: Former Smoker     Packs/day: 0.75     Years: 6.00     Pack years: 4.50     Types: Cigarettes     Quit date: 2019     Years since quittin.0    Smokeless tobacco: Never Used   Substance Use Topics    Alcohol use: Yes     Comment: occasional / 2 per month    Drug use: No        Allergies     Allergies   Allergen Reactions    Penicillins Other (See Comments)     Doesn't know allergy reaction    Sulfa Antibiotics Other (See Comments)     n/a    Sulfasalazine Hives     n/a       Medications     Current Outpatient Medications   Medication Sig Dispense Refill    famotidine (PEPCID) 20 MG tablet Take 1 tablet by mouth nightly 90 tablet 0    atorvastatin (LIPITOR) 80 MG tablet TAKE 1 TABLET BY MOUTH DAILY 90 tablet 0    pantoprazole (PROTONIX) 40 MG tablet TAKE 1 TABLET BY MOUTH EVERY DAY      mupirocin (BACTROBAN NASAL) 2 % nasal ointment Instill in each nostril once a day. 1 Tube 3    ursodiol (ACTIGALL) 500 MG tablet 2 times daily       lisinopril (PRINIVIL;ZESTRIL) 5 MG tablet Take 1 tablet by mouth daily 90 tablet 1    loratadine (CLARITIN) 10 MG tablet Take 1 tablet by mouth daily 90 tablet 3    triamcinolone (KENALOG) 0.1 % cream Apply topically 2 times daily as needed 453.6 g 1    calcium carbonate (OYSTER SHELL CALCIUM 500 MG) 1250 (500 Ca) MG tablet Take 1 tablet by mouth daily      vitamin D (CHOLECALCIFEROL) 1000 UNIT TABS tablet Take 1,000 Units by mouth daily      Multiple Vitamins-Minerals (MULTIVITAMIN PO) Take 1 capsule by mouth daily.       Ascorbic Acid (VITAMIN C) 500 MG tablet Take 500 mg by mouth daily. No current facility-administered medications for this visit. Review of Systems     REVIEW OF SYSTEMS  The following systems were reviewed and revealed the following in addition to any already discussed in the HPI:    CONSTITUTIONAL: no weight loss, no fever, no night sweats, no chills  EYES: no vision changes, no blurry vision  EARS: no changes in hearing, no otalgia  NOSE: no epistaxis, no rhinorrhea  THROAT: No voice changes, no sore throat, no dysphagia    PhysicalExam     Vitals:    03/16/21 0923   Temp: 96.6 °F (35.9 °C)   Weight: 107 lb (48.5 kg)   Height: 5' 1\" (1.549 m)       PHYSICAL EXAM  Temp 96.6 °F (35.9 °C)   Ht 5' 1\" (1.549 m)   Wt 107 lb (48.5 kg)   BMI 20.22 kg/m²     GENERAL: No acute distress, alert and oriented, no hoarseness  EYES: EOMI, Anti-icteric  NOSE: On anterior rhinoscopy there is no epistaxis, nasal mucosa moist and normal appearing, no purulent drainage. EARS: Normal external appearance, no otorrhea. FACE: HB 1/6 bilaterally, symmetric appearing, sensation equal bilaterally    I have performed a head and neck physical exam personally or was physically present during the key or critical portions of the service. Data/Imaging Review     FINAL DIAGNOSIS:     Nasopharynx biopsy:      - Fragments of benign respiratory mucosa/ soft tissue with chronic        inflammation and focal mildly dilated ducts- See Comment. COMMENT: The clinical finding of a cystic nasopharyngeal lesion is noted. The submitted specimen is in tissue aggregate and may represent cyst   wall. No neoplasm is identified. Clinical correlation. Assessment and Plan     1. Lesion of nasopharynx  -Doing well postoperatively. -Pathology reviewed with the patient. 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    2. Laryngopharyngeal reflux (LPR)  -Famotidine refilled    Follow-Up     Return if symptoms worsen or fail to improve.       Dr. Stacie Giraldo, DO [address]  Department of Otolaryngology/Head and Neck Surgery  3/16/21    Medical Decision Making: The following items were considered in medical decision making:  Independent review of images  Review / order clinical lab tests  Review / order radiology tests  Decision to obtain old records    Portions of this note were dictated using Dragon.  There may be linguistic errors secondary to the use of this program.

## 2021-03-23 DIAGNOSIS — E11.9 CONTROLLED TYPE 2 DIABETES MELLITUS WITHOUT COMPLICATION, WITHOUT LONG-TERM CURRENT USE OF INSULIN (HCC): ICD-10-CM

## 2021-03-23 LAB
CHOLESTEROL, TOTAL: 105 MG/DL (ref 0–199)
HDLC SERPL-MCNC: 37 MG/DL (ref 40–60)
LDL CHOLESTEROL CALCULATED: 52 MG/DL
TRIGL SERPL-MCNC: 81 MG/DL (ref 0–150)
VLDLC SERPL CALC-MCNC: 16 MG/DL

## 2021-03-30 ENCOUNTER — HOSPITAL ENCOUNTER (OUTPATIENT)
Dept: NON INVASIVE DIAGNOSTICS | Age: 66
Discharge: HOME | End: 2021-03-30
Payer: MEDICARE

## 2021-03-30 DIAGNOSIS — Z01.818 PRE-OP EXAM: ICD-10-CM

## 2021-03-30 DIAGNOSIS — K74.3 PRIMARY BILIARY CHOLANGITIS (HCC): ICD-10-CM

## 2021-03-30 LAB
LV EF: 68 %
LVEF MODALITY: NORMAL

## 2021-03-30 PROCEDURE — 93306 TTE W/DOPPLER COMPLETE: CPT

## 2021-04-19 ENCOUNTER — TELEPHONE (OUTPATIENT)
Dept: INTERNAL MEDICINE CLINIC | Age: 66
End: 2021-04-19

## 2021-04-19 NOTE — TELEPHONE ENCOUNTER
----- Message from Viviane Clark sent at 4/16/2021  1:57 PM EDT -----  Subject: Appointment Request    Reason for Call: Routine Existing Condition Follow Up    QUESTIONS  Type of Appointment? Established Patient  Reason for appointment request? No appointments available during search  Additional Information for Provider? Pt wants an apt to go over her echo   possibly a vv if possible   ---------------------------------------------------------------------------  --------------  CALL BACK INFO  What is the best way for the office to contact you? OK to leave message on   voicemail  Preferred Call Back Phone Number? [phone]  ---------------------------------------------------------------------------  --------------  SCRIPT ANSWERS  Relationship to Patient? Self  Appointment reason? Well Care/Follow Ups  Select a Well Care/Follow Ups appointment reason? Adult Existing Condition   Follow Up [Diabetes, CHF, COPD, Hypertension/Blood Pressure Check]  (Is the patient requesting to be seen urgently for their symptoms?)? No  Is this follow up request related to routine Diabetes Management? No  Are you having any new concerns about your existing condition? No  Have you been diagnosed with, tested for, or told that you are suspected   of having COVID-19 (Coronavirus)? No  Have you had a fever or taken medication to treat a fever within the past   3 days? No  Have you had a cough, shortness of breath or flu-like symptoms within the   past 3 days? No  Do you currently have flu-like symptoms including fever or chills, cough,   shortness of breath, or difficulty breathing, or new loss of taste or   smell? No  (Service Expert  click yes below to proceed with [employer] As Usual   Scheduling)?  Yes

## 2021-04-21 ENCOUNTER — IMMUNIZATION (OUTPATIENT)
Dept: PRIMARY CARE CLINIC | Age: 66
End: 2021-04-21
Payer: MEDICARE

## 2021-04-21 PROCEDURE — 0011A COVID-19, MODERNA VACCINE 100MCG/0.5ML DOSE: CPT | Performed by: FAMILY MEDICINE

## 2021-04-21 PROCEDURE — 91301 COVID-19, MODERNA VACCINE 100MCG/0.5ML DOSE: CPT | Performed by: FAMILY MEDICINE

## 2021-04-28 ENCOUNTER — OFFICE VISIT (OUTPATIENT)
Dept: INTERNAL MEDICINE CLINIC | Age: 66
End: 2021-04-28
Payer: MEDICARE

## 2021-04-28 VITALS
DIASTOLIC BLOOD PRESSURE: 58 MMHG | HEART RATE: 86 BPM | SYSTOLIC BLOOD PRESSURE: 140 MMHG | TEMPERATURE: 97.1 F | OXYGEN SATURATION: 98 % | HEIGHT: 61 IN | BODY MASS INDEX: 19.98 KG/M2 | WEIGHT: 105.8 LBS

## 2021-04-28 DIAGNOSIS — R63.4 WEIGHT LOSS, UNINTENTIONAL: ICD-10-CM

## 2021-04-28 DIAGNOSIS — I35.0 MODERATE AORTIC STENOSIS BY PRIOR ECHOCARDIOGRAM: Primary | ICD-10-CM

## 2021-04-28 PROCEDURE — 1090F PRES/ABSN URINE INCON ASSESS: CPT | Performed by: INTERNAL MEDICINE

## 2021-04-28 PROCEDURE — G8399 PT W/DXA RESULTS DOCUMENT: HCPCS | Performed by: INTERNAL MEDICINE

## 2021-04-28 PROCEDURE — G8427 DOCREV CUR MEDS BY ELIG CLIN: HCPCS | Performed by: INTERNAL MEDICINE

## 2021-04-28 PROCEDURE — 99214 OFFICE O/P EST MOD 30 MIN: CPT | Performed by: INTERNAL MEDICINE

## 2021-04-28 PROCEDURE — 1123F ACP DISCUSS/DSCN MKR DOCD: CPT | Performed by: INTERNAL MEDICINE

## 2021-04-28 PROCEDURE — 4040F PNEUMOC VAC/ADMIN/RCVD: CPT | Performed by: INTERNAL MEDICINE

## 2021-04-28 PROCEDURE — G8420 CALC BMI NORM PARAMETERS: HCPCS | Performed by: INTERNAL MEDICINE

## 2021-04-28 PROCEDURE — 3017F COLORECTAL CA SCREEN DOC REV: CPT | Performed by: INTERNAL MEDICINE

## 2021-04-28 PROCEDURE — 1036F TOBACCO NON-USER: CPT | Performed by: INTERNAL MEDICINE

## 2021-04-28 ASSESSMENT — PATIENT HEALTH QUESTIONNAIRE - PHQ9
SUM OF ALL RESPONSES TO PHQ QUESTIONS 1-9: 0

## 2021-04-28 ASSESSMENT — ENCOUNTER SYMPTOMS
DIARRHEA: 0
ABDOMINAL PAIN: 0
VOMITING: 0
BLOOD IN STOOL: 0
CONSTIPATION: 0
NAUSEA: 0

## 2021-04-28 NOTE — PROGRESS NOTES
Ef Subjective:      Patient ID: Tye Richards is a 76 y.o. female. Chief Complaint   Patient presents with    Diabetes     follow up       Diabetes  Hypoglycemia symptoms include nervousness/anxiousness. Weight Loss:  Patient reports about 16 pounds of weight loss since July without trying. Her pants no longer fit properly. She also reports a decrease in her stamina and fatigue. She thinks it might be due to her PBC and to extra stress in her life lately. She also reports difficulty sleeping and some anxiety due to this. She has occasional night sweats but thinks this is due to temperature changes. She denies changes in her diet or BMs. Patient with persistent weight loss. Her high school graduation weight was 98lbs. She is 108 today and reports a good appetite,  She states she gets tired with exercise. She doesn't sleep well  Diabetes:  Treatment Adherence:   Medication compliance:  compliant most of the time  Diet compliance:  compliant most of the time  Weight trend: loss of 16 pounds  Current exercise: walks dog daily  Barriers: none  Diabetes Mellitus Type 2: Current symptoms/problems include none. Home blood sugar records: patient does not test  Any episodes of hypoglycemia? no  Eye exam current (within one year): no  Tobacco history: She  reports that she quit smoking about 2 years ago. Her smoking use included cigarettes. She has a 4.50 pack-year smoking history. She has never used smokeless tobacco.   Daily Aspirin? No: not indicated    Hypertension:  Home blood pressure monitoring: No.  She is not adherent to a low sodium diet. Patient denies headache, lightheadedness, blurred vision, peripheral edema and palpitations. Antihypertensive medication side effects: no medication side effects noted. Use of agents associated with hypertension: none. Hyperlipidemia:  No new myalgias or GI upset on atorvastatin (Lipitor).              Lab Results   Component Value Date    LABA1C 5.8 02/23/2021 LABA1C 6.6 10/21/2020    LABA1C 6.1 07/15/2020     Lab Results   Component Value Date    LABMICR <1.20 12/19/2018    CREATININE <0.5 (L) 01/12/2021     Lab Results   Component Value Date    ALT 21 02/22/2021    AST 31 02/22/2021     Lab Results   Component Value Date    CHOL 105 03/23/2021    TRIG 81 03/23/2021    HDL 37 (L) 03/23/2021    LDLCALC 52 03/23/2021          Review of Systems   Constitutional: Positive for unexpected weight change. Negative for activity change, appetite change, chills and fever. Gastrointestinal: Negative for abdominal pain, blood in stool, constipation, diarrhea, nausea and vomiting. Psychiatric/Behavioral: Positive for sleep disturbance. The patient is nervous/anxious. All other systems reviewed and are negative. Allergies   Allergen Reactions    Penicillins Other (See Comments)     Doesn't know allergy reaction    Sulfa Antibiotics Other (See Comments)     n/a    Sulfasalazine Hives     n/a       Current Outpatient Medications   Medication Sig Dispense Refill    famotidine (PEPCID) 20 MG tablet Take 1 tablet by mouth nightly 90 tablet 0    atorvastatin (LIPITOR) 80 MG tablet TAKE 1 TABLET BY MOUTH DAILY 90 tablet 0    mupirocin (BACTROBAN NASAL) 2 % nasal ointment Instill in each nostril once a day. 1 Tube 3    ursodiol (ACTIGALL) 500 MG tablet 2 times daily       lisinopril (PRINIVIL;ZESTRIL) 5 MG tablet Take 1 tablet by mouth daily 90 tablet 1    loratadine (CLARITIN) 10 MG tablet Take 1 tablet by mouth daily 90 tablet 3    triamcinolone (KENALOG) 0.1 % cream Apply topically 2 times daily as needed 453.6 g 1    calcium carbonate (OYSTER SHELL CALCIUM 500 MG) 1250 (500 Ca) MG tablet Take 1 tablet by mouth daily      vitamin D (CHOLECALCIFEROL) 1000 UNIT TABS tablet Take 1,000 Units by mouth daily      Multiple Vitamins-Minerals (MULTIVITAMIN PO) Take 1 capsule by mouth daily.  Ascorbic Acid (VITAMIN C) 500 MG tablet Take 500 mg by mouth daily. No current facility-administered medications for this visit. Vitals:    04/28/21 1237 04/28/21 1241   BP: (!) 138/58 (!) 140/58   Pulse: 86    Temp: 97.1 °F (36.2 °C)    SpO2: 98%    Weight: 105 lb 12.8 oz (48 kg)    Height: 5' 1\" (1.549 m)      Body mass index is 19.99 kg/m². Wt Readings from Last 3 Encounters:   04/28/21 105 lb 12.8 oz (48 kg)   03/16/21 107 lb (48.5 kg)   03/08/21 107 lb 8 oz (48.8 kg)     Objective:   Physical Exam  Vitals signs and nursing note reviewed. Constitutional:       General: She is not in acute distress. Appearance: Normal appearance. She is well-developed. HENT:      Head: Normocephalic and atraumatic. Cardiovascular:      Rate and Rhythm: Normal rate and regular rhythm. Pulses: Normal pulses. Heart sounds: Normal heart sounds. No murmur. No gallop. Pulmonary:      Effort: Pulmonary effort is normal.      Breath sounds: No wheezing, rhonchi or rales. Abdominal:      General: Abdomen is flat. Palpations: Abdomen is soft. Tenderness: There is no abdominal tenderness. There is no guarding. Skin:     General: Skin is warm and dry. Capillary Refill: Capillary refill takes less than 2 seconds. Neurological:      General: No focal deficit present. Mental Status: She is alert and oriented to person, place, and time. Psychiatric:         Mood and Affect: Mood normal.         Behavior: Behavior normal.         Thought Content: Thought content normal.         Judgment: Judgment normal.       Narrative   EXAMINATION:   CT OF THE ABDOMEN AND PELVIS WITH CONTRAST 1/13/2021 2:26 pm       TECHNIQUE:   CT of the abdomen and pelvis was performed with the administration of   intravenous contrast. Multiplanar reformatted images are provided for review.    Dose modulation, iterative reconstruction, and/or weight based adjustment of   the mA/kV was utilized to reduce the radiation dose to as low as reasonably   achievable.     COMPARISON:   10/30/2020, 08/29/2018, 12/06/2015       HISTORY:   ORDERING SYSTEM PROVIDED HISTORY: Abnormal intentional weight loss   TECHNOLOGIST PROVIDED HISTORY:   Additional Contrast?->Oral   Reason for Exam: Abnormal intentional weight loss   Acuity: Unknown   Type of Exam: Unknown       FINDINGS:   Lower Chest: There is mild dependent atelectasis within both lower lobes. There is mild parenchymal scar within the right middle lobe. Ebony Finders is a   stable granuloma within the subpleural left lower lobe, unchanged from   12/06/2015.  The lung bases are otherwise clear.       Organs: The liver and spleen are normal.  The pancreas is unremarkable.  The   gallbladder is normal.  The adrenal glands are normal bilaterally.  The   bilateral kidneys are stable and unremarkable.       GI/Bowel: Evaluation of the hollow GI tract demonstrates no evidence of   abnormal bowel wall thickening, dilatation, or obstruction.  No abnormal   small bowel or colonic mass lesion is identified.       Pelvis: The urinary bladder is normal.  The uterus and bilateral adnexa are   unremarkable. Ebony Finders is no free pelvic fluid or pathologic pelvic   lymphadenopathy.       Peritoneum/Retroperitoneum: No intraperitoneal free air or free fluid is   identified. Ebony Finders are stable mildly enlarged paraesophageal and isaura   hepatic lymph nodes, unchanged dating back to 12/06/2015, with the largest   lymph node lying within the isaura hepatic space, and measuring approximately   25 x 16 mm.  These are likely benign and reactive in etiology given their   stability.  No new pathologic lymphadenopathy is present within the abdomen   or retroperitoneal spaces.  There is atherosclerotic disease of the abdominal   aorta, with a stable 2.8 cm fusiform ectasia, though no evidence of aneurysm. There is no evidence of ascites, omental caking, or peritoneal implants.  No   abdominal wall hernia is evident.       Bones/Soft Tissues:  There is mild

## 2021-05-17 PROBLEM — I35.0 NONRHEUMATIC AORTIC VALVE STENOSIS: Status: ACTIVE | Noted: 2021-05-17

## 2021-05-17 NOTE — PROGRESS NOTES
[address]    H+P // CONSULT // OUTPATIENT VISIT // Ildefonso Vanessa     Referring Doctor Taylor Santacruz MD   Encounter Type New     CHIEF COMPLAINT     Visit Type Acute   Symptoms SOB with exertion   Problems AS, HTN, CHOL     HISTORY OF PRESENT ILLNESS     GEN - New patient for mild-moderate AS. Reports sob with activity, exertion. No cp.  CV risks - htn, chol, family, smoking. Lost 40lbs in 1 year and pcp and gi doctor aware. AS - Borderline mild-moderate by echo with MG of 19. Denies cp, dizziness, syncope, palpitations. HTN - Ambulatory BP readings in good range. No HA or dizziness. CHOL - Last cholesterol reviewed and in good range. Tolerating statin without side effects. MED - Compliant with CV meds listed below without notable side effects. HISTORY/ALLERGIES/ROS     MedHx:  has a past medical history of Controlled type 2 diabetes mellitus without complication, without long-term current use of insulin ([address] Dyslipidemia, Essential hypertension, GERD (gastroesophageal reflux disease), Heart murmur, Hypercholesterolemia, Hyperlipidemia, Hypertension, Lichen planus, PONV (postoperative nausea and vomiting), Primary biliary cholangitis ([address] and Primary biliary cholangitis ([address] SurgHx:  has a past surgical history that includes  section; Carpal tunnel release; Endometrial ablation; Tonsillectomy; Conrad tooth extraction; liver biopsy (N/A, ); Endoscopy, colon, diagnostic; Colonoscopy; and Sinus endoscopy (N/A, 3/8/2021). SocHx:  reports that she quit smoking about 2 years ago. Her smoking use included cigarettes. She has a 4.50 pack-year smoking history. She has never used smokeless tobacco. She reports current alcohol use. She reports that she does not use drugs.    FamHx: family history includes Cancer in her mother; Diabetes in her father; Heart Failure in her father; High Cholesterol in her father; Hypertension in her father; Stroke in her mother; Substance Abuse in her sister. Allerg: Penicillins, Sulfa antibiotics, and Sulfasalazine   ROS: [x]Full ROS obtained and negative except as mentioned in HPI     MEDICATIONS      Current Outpatient Medications   Medication Sig Dispense Refill    famotidine (PEPCID) 20 MG tablet Take 1 tablet by mouth nightly 90 tablet 0    atorvastatin (LIPITOR) 80 MG tablet TAKE 1 TABLET BY MOUTH DAILY 90 tablet 0    mupirocin (BACTROBAN NASAL) 2 % nasal ointment Instill in each nostril once a day. 1 Tube 3    ursodiol (ACTIGALL) 500 MG tablet 2 times daily       lisinopril (PRINIVIL;ZESTRIL) 5 MG tablet Take 1 tablet by mouth daily 90 tablet 1    loratadine (CLARITIN) 10 MG tablet Take 1 tablet by mouth daily 90 tablet 3    triamcinolone (KENALOG) 0.1 % cream Apply topically 2 times daily as needed 453.6 g 1    calcium carbonate (OYSTER SHELL CALCIUM 500 MG) 1250 (500 Ca) MG tablet Take 1 tablet by mouth daily      vitamin D (CHOLECALCIFEROL) 1000 UNIT TABS tablet Take 1,000 Units by mouth daily      Multiple Vitamins-Minerals (MULTIVITAMIN PO) Take 1 capsule by mouth daily.  Ascorbic Acid (VITAMIN C) 500 MG tablet Take 500 mg by mouth daily. No current facility-administered medications for this visit.      Reviewed with patient and will remain unchanged except as mentioned in A/P  PHYSICAL EXAM     Vitals:    05/20/21 0802   BP: 120/60   Pulse: 114   Resp: 20   SpO2: 98%      Gen Alert, coop, no distress Heart  Rrr, 2/6   Head NC, AT, no abnorm Abd  Soft, NT, +BS, no mass, no OM   Eyes PER, conj/corn clear Ext  Ext nl, AT, no C/C/E   Nose Nares nl, no drain, NT Pulse 2+ and symmetric   Throat Lips, mucosa, tongue nl Skin Col/text/turg nl, no vis rash/les   Neck S/S, TM, NT, no bruit/JVD Psych Nl mood and affect   Lung CTA-B, unlabored, no DTP Lymph   No cervical or axillary LA   Ch wall NT, no deform Neuro  Nl gross M/S exam     ASSESSMENT AND PLAN     *AS/SOB    Date EF Detail   Sx     sob   DATA   Most recent TTE, LHC reviewed personally   TTE 3/21 70% Mod AS MG 19   C   None   Plan     Echo in 1 year for AS  Lexiscan MPI with sob (anginal eq?)   PFTs with SOB and long hx smoking   *HTN  Status Controlled, vitals and available ambulatory monitoring logs reviewed personally  Plan Counseled on diet/salt/exercise/weight, continue meds at doses above  *CHOL  Status  controlled with last LDL of 52 (goal <70) and HDL of 37 (3/21), labs reviewed personally  Plan Counseled on diet/exercise/weight, continue high-intensity statin, lipid/liver surveillance per PCP  *TOB  Status Not smoking currently, available PFTs reviewed personally  Plan Continued avoidance of 1st and 2nd hand smoke, counseled on risks/cessation  *COMPLIANCE  Status Compliant  Plan Discussed importance of compliance with meds/diet/salt/exercise; avoid tob/alc/drugs; patient verbalized understanding  *FOLLOWUP  Pending testing    [address] [facility] Erica Butler, am scribing for and in the presence of Nilay Hamilton MD.   SignedErica 05/17/21 2:23 PM   Provider Zoie Jean is working as a scribe for and in the presence of me (Nilay Hamilton MD). Working as a scribe, Erica Astorga may have prepopulated components of this note with my historical  intellectual property under my direct supervision. Any additions to this intellectual property were performed in my presence and at my direction.   Furthermore, the content and accuracy of this note have been reviewed by me Nilay Hamilton MD).  5/20/2021 7:44 AM

## 2021-05-19 ENCOUNTER — IMMUNIZATION (OUTPATIENT)
Dept: PRIMARY CARE CLINIC | Age: 66
End: 2021-05-19
Payer: MEDICARE

## 2021-05-19 PROCEDURE — 0012A COVID-19, MODERNA VACCINE 100MCG/0.5ML DOSE: CPT | Performed by: FAMILY MEDICINE

## 2021-05-19 PROCEDURE — 91301 COVID-19, MODERNA VACCINE 100MCG/0.5ML DOSE: CPT | Performed by: FAMILY MEDICINE

## 2021-05-20 ENCOUNTER — OFFICE VISIT (OUTPATIENT)
Dept: CARDIOLOGY CLINIC | Age: 66
End: 2021-05-20
Payer: MEDICARE

## 2021-05-20 VITALS
RESPIRATION RATE: 20 BRPM | HEIGHT: 61 IN | DIASTOLIC BLOOD PRESSURE: 60 MMHG | OXYGEN SATURATION: 98 % | BODY MASS INDEX: 20.12 KG/M2 | HEART RATE: 114 BPM | SYSTOLIC BLOOD PRESSURE: 120 MMHG | WEIGHT: 106.6 LBS

## 2021-05-20 DIAGNOSIS — I35.0 NONRHEUMATIC AORTIC VALVE STENOSIS: Primary | ICD-10-CM

## 2021-05-20 DIAGNOSIS — E78.00 HYPERCHOLESTEREMIA: ICD-10-CM

## 2021-05-20 DIAGNOSIS — R06.02 SOB (SHORTNESS OF BREATH): ICD-10-CM

## 2021-05-20 DIAGNOSIS — I10 ESSENTIAL HYPERTENSION: ICD-10-CM

## 2021-05-20 PROCEDURE — G8420 CALC BMI NORM PARAMETERS: HCPCS | Performed by: INTERNAL MEDICINE

## 2021-05-20 PROCEDURE — 3017F COLORECTAL CA SCREEN DOC REV: CPT | Performed by: INTERNAL MEDICINE

## 2021-05-20 PROCEDURE — 1090F PRES/ABSN URINE INCON ASSESS: CPT | Performed by: INTERNAL MEDICINE

## 2021-05-20 PROCEDURE — 1036F TOBACCO NON-USER: CPT | Performed by: INTERNAL MEDICINE

## 2021-05-20 PROCEDURE — 1123F ACP DISCUSS/DSCN MKR DOCD: CPT | Performed by: INTERNAL MEDICINE

## 2021-05-20 PROCEDURE — 99204 OFFICE O/P NEW MOD 45 MIN: CPT | Performed by: INTERNAL MEDICINE

## 2021-05-20 PROCEDURE — G8399 PT W/DXA RESULTS DOCUMENT: HCPCS | Performed by: INTERNAL MEDICINE

## 2021-05-20 PROCEDURE — 4040F PNEUMOC VAC/ADMIN/RCVD: CPT | Performed by: INTERNAL MEDICINE

## 2021-05-20 PROCEDURE — G8427 DOCREV CUR MEDS BY ELIG CLIN: HCPCS | Performed by: INTERNAL MEDICINE

## 2021-05-26 DIAGNOSIS — E78.5 DYSLIPIDEMIA: ICD-10-CM

## 2021-05-26 NOTE — TELEPHONE ENCOUNTER
Recent Visits  Date Type Provider Dept   04/28/21 Office Visit Meche Carlisle MD [facility]   03/04/21 Office Visit LINNEA Marks [facility]   02/23/21 Office Visit Meche Carlisle MD [facility]   02/19/21 Office Visit Meche Carlisle MD [facility]   10/21/20 Office Visit Meche Carlisle MD [facility]   07/14/20 Office Visit Meche Carlisle MD [facility]   12/16/19 Office Visit Meche Carlisle MD [facility]   Showing recent visits within past 540 days with a meds authorizing provider and meeting all other requirements  Future Appointments  Date Type Provider Dept   07/28/21 Appointment Meche Carlisle MD [facility]   Showing future appointments within next 150 days with a meds authorizing provider and meeting all other requirements     . Last script written:2/28/2021 #90 tablet refills 0

## 2021-05-27 ENCOUNTER — TELEPHONE (OUTPATIENT)
Dept: INTERNAL MEDICINE CLINIC | Age: 66
End: 2021-05-27

## 2021-05-27 RX ORDER — ATORVASTATIN CALCIUM 80 MG/1
80 TABLET, FILM COATED ORAL DAILY
Qty: 90 TABLET | Refills: 0 | Status: SHIPPED | OUTPATIENT
Start: 2021-05-27 | End: 2021-08-20

## 2021-05-27 NOTE — TELEPHONE ENCOUNTER
Cleveland Worthington called to advise that she was seeing an ENT and was prescribed famotidine (PEPCID) 20 MG tablet for her \"Gerd\". Since being released she only has 7 pills remaining and would like to know if Dr. Lorene Cool can prescribe it for her. Please reach out to Cleveland Worthington @ [phone]. It is okay to leave a message if Cleveland Worthington doesn't answer as to whether or not the prescription can be filled. Thank you!

## 2021-05-28 ENCOUNTER — HOSPITAL ENCOUNTER (OUTPATIENT)
Dept: NON INVASIVE DIAGNOSTICS | Age: 66
Discharge: HOME | End: 2021-05-28
Payer: MEDICARE

## 2021-05-28 DIAGNOSIS — R06.02 SOB (SHORTNESS OF BREATH): ICD-10-CM

## 2021-05-28 DIAGNOSIS — I35.0 NONRHEUMATIC AORTIC VALVE STENOSIS: ICD-10-CM

## 2021-05-28 LAB
LV EF: 72 %
LVEF MODALITY: NORMAL

## 2021-05-28 PROCEDURE — 93017 CV STRESS TEST TRACING ONLY: CPT | Performed by: INTERNAL MEDICINE

## 2021-05-28 PROCEDURE — 78452 HT MUSCLE IMAGE SPECT MULT: CPT | Performed by: INTERNAL MEDICINE

## 2021-05-28 PROCEDURE — 3430000000 HC RX DIAGNOSTIC RADIOPHARMACEUTICAL: Performed by: INTERNAL MEDICINE

## 2021-05-28 PROCEDURE — 6360000002 HC RX W HCPCS: Performed by: INTERNAL MEDICINE

## 2021-05-28 PROCEDURE — A9502 TC99M TETROFOSMIN: HCPCS | Performed by: INTERNAL MEDICINE

## 2021-05-28 RX ORDER — FAMOTIDINE 20 MG/1
20 TABLET, FILM COATED ORAL NIGHTLY
Qty: 90 TABLET | Refills: 0 | Status: CANCELLED | OUTPATIENT
Start: 2021-05-28

## 2021-05-28 RX ADMIN — REGADENOSON 0.4 MG: 0.08 INJECTION, SOLUTION INTRAVENOUS at 10:34

## 2021-05-28 RX ADMIN — TETROFOSMIN 30 MILLICURIE: 1.38 INJECTION, POWDER, LYOPHILIZED, FOR SOLUTION INTRAVENOUS at 10:36

## 2021-05-28 RX ADMIN — TETROFOSMIN 10 MILLICURIE: 1.38 INJECTION, POWDER, LYOPHILIZED, FOR SOLUTION INTRAVENOUS at 08:34

## 2021-05-28 NOTE — PROGRESS NOTES
Instructed on Lexiscan Stress Test Procedure including possible side effects/ adverse reactions. Patient verbalizes  understanding and denies having any questions . See [address] [place] [address] Cardiology

## 2021-05-28 NOTE — TELEPHONE ENCOUNTER
Recent Visits  Date Type Provider Dept   04/28/21 Office Visit Yenni Cobos MD [facility]   03/04/21 Office Visit LINNEA Vel[facility]quez [facility]   02/23/21 Office Visit Yenni Cobos MD [facility]   02/19/21 Office Visit Yenni Cobos MD [facility]   10/21/20 Office Visit Yenni Cobos MD [facility]   07/14/20 Office Visit Yenni Cobos MD [facility]   12/16/19 Office Visit Yenni Cobos MD [facility]   Showing recent visits within past 540 days with a meds authorizing provider and meeting all other requirements  Future Appointments  Date Type Provider Dept   07/28/21 Appointment Yenni Cobos MD [facility]   Showing future appointments within next 150 days with a meds authorizing provider and meeting all other requirements     4/28/2021

## 2021-06-01 ENCOUNTER — TELEPHONE (OUTPATIENT)
Dept: CARDIOLOGY CLINIC | Age: 66
End: 2021-06-01

## 2021-06-01 NOTE — TELEPHONE ENCOUNTER
MAXIMO ordered a PFT for this patient . She has the test scheduled and was told she needed a covid screening a few days before . Now she is hearing she doesn't need the screening . She will get the screening if she needs to , she just needs to know what to do .  Please call to advise

## 2021-06-01 NOTE — TELEPHONE ENCOUNTER
Please advise, patient stated she has already called scheduling and covid testing center and no one will give her an answer on if she needs to have covid testing.

## 2021-06-02 ENCOUNTER — CLINICAL DOCUMENTATION (OUTPATIENT)
Dept: OTHER | Age: 66
End: 2021-06-02

## 2021-06-21 ENCOUNTER — TELEPHONE (OUTPATIENT)
Dept: INTERNAL MEDICINE CLINIC | Age: 66
End: 2021-06-21

## 2021-06-21 NOTE — TELEPHONE ENCOUNTER
Called Pt to reschedule appointment on 7/28/21. However Pt was wanting a earlier time than I was able to find. Also requested for  to call her back if possible.      [phone]

## 2021-07-08 ENCOUNTER — HOSPITAL ENCOUNTER (OUTPATIENT)
Dept: PULMONOLOGY | Age: 66
Discharge: HOME | End: 2021-07-08
Payer: MEDICARE

## 2021-07-08 VITALS — HEART RATE: 75 BPM | RESPIRATION RATE: 16 BRPM | OXYGEN SATURATION: 97 %

## 2021-07-08 DIAGNOSIS — R06.02 SOB (SHORTNESS OF BREATH): ICD-10-CM

## 2021-07-08 LAB
DLCO %PRED: 78 %
DLCO PRED: NORMAL
DLCO/VA %PRED: NORMAL
DLCO/VA PRED: NORMAL
DLCO/VA: NORMAL
DLCO: NORMAL
EXPIRATORY TIME-POST: NORMAL
EXPIRATORY TIME: NORMAL
FEF 25-75% %CHNG: NORMAL
FEF 25-75% %PRED-POST: NORMAL
FEF 25-75% %PRED-PRE: NORMAL
FEF 25-75% PRED: NORMAL
FEF 25-75%-POST: NORMAL
FEF 25-75%-PRE: NORMAL
FEV1 %PRED-POST: 76 %
FEV1 %PRED-PRE: 79 %
FEV1 PRED: NORMAL
FEV1-POST: NORMAL
FEV1-PRE: NORMAL
FEV1/FVC %PRED-POST: NORMAL
FEV1/FVC %PRED-PRE: NORMAL
FEV1/FVC PRED: NORMAL
FEV1/FVC-POST: 94 %
FEV1/FVC-PRE: 92 %
FVC %PRED-POST: NORMAL
FVC %PRED-PRE: NORMAL
FVC PRED: NORMAL
FVC-POST: NORMAL
FVC-PRE: NORMAL
GAW %PRED: NORMAL
GAW PRED: NORMAL
GAW: NORMAL
IC %PRED: NORMAL
IC PRED: NORMAL
IC: NORMAL
MEP: NORMAL
MIP: NORMAL
MVV %PRED-PRE: NORMAL
MVV PRED: NORMAL
MVV-PRE: NORMAL
PEF %PRED-POST: NORMAL
PEF %PRED-PRE: NORMAL
PEF PRED: NORMAL
PEF%CHNG: NORMAL
PEF-POST: NORMAL
PEF-PRE: NORMAL
RAW %PRED: NORMAL
RAW PRED: NORMAL
RAW: NORMAL
RV %PRED: NORMAL
RV PRED: NORMAL
RV: NORMAL
SVC %PRED: NORMAL
SVC PRED: NORMAL
SVC: NORMAL
TLC %PRED: 146 %
TLC PRED: NORMAL
TLC: NORMAL
VA %PRED: NORMAL
VA PRED: NORMAL
VA: NORMAL
VTG %PRED: NORMAL
VTG PRED: NORMAL
VTG: NORMAL

## 2021-07-08 PROCEDURE — 94760 N-INVAS EAR/PLS OXIMETRY 1: CPT

## 2021-07-08 PROCEDURE — 6370000000 HC RX 637 (ALT 250 FOR IP): Performed by: INTERNAL MEDICINE

## 2021-07-08 PROCEDURE — 94200 LUNG FUNCTION TEST (MBC/MVV): CPT

## 2021-07-08 PROCEDURE — 94060 EVALUATION OF WHEEZING: CPT

## 2021-07-08 PROCEDURE — 94726 PLETHYSMOGRAPHY LUNG VOLUMES: CPT

## 2021-07-08 PROCEDURE — 94729 DIFFUSING CAPACITY: CPT

## 2021-07-08 RX ORDER — ALBUTEROL SULFATE 90 UG/1
4 AEROSOL, METERED RESPIRATORY (INHALATION) ONCE
Status: COMPLETED | OUTPATIENT
Start: 2021-07-08 | End: 2021-07-08

## 2021-07-08 RX ADMIN — Medication 4 PUFF: at 09:21

## 2021-07-08 ASSESSMENT — PULMONARY FUNCTION TESTS
FEV1_PERCENT_PREDICTED_PRE: 79
FEV1/FVC_PRE: 92
FEV1/FVC_POST: 94
FEV1_PERCENT_PREDICTED_POST: 76

## 2021-07-09 ENCOUNTER — TELEPHONE (OUTPATIENT)
Dept: INTERNAL MEDICINE CLINIC | Age: 66
End: 2021-07-09

## 2021-07-09 NOTE — TELEPHONE ENCOUNTER
Called and left message informing patient that we are cancelling her appt for Dr. Patricia Christy on 7/19. Requested patient callback to reschedule.

## 2021-07-09 NOTE — PROCEDURES
Pulmonary Function Testing      Patient name:  Liliana Wade     [facility] Unit #:   [id number]   Date of test: 7/8/2021  Date of interpretation:   7/9/2021    Ms. Liliana Wade is a 78y.o. year-old current smoker. The spirometry data were acceptable and reproducible. Spirometry:  Flow volume loops were restricted. The FEV-1/FVC ratio was normal. The  post-bronchodilator FEV-1 was 1.58 liters (76% of predicted), which was moderately decreased. The FVC was 2.19 liters (80% of predicted), which was normal. Response to inhaled bronchodilators (albuterol) was not significant. Lung volumes:  Lung volumes were tested by plethysmography. The total lung capacity was 6.20 liters (146% of predicted), which was increased. The residual volume was 3.85 liters (232% of predicted), which was increased. The ratio of residual volume to total lung capacity (RV/TLC) was 157%, which was increased. Specific airway resistance was increased. Diffusion capacity was found to be decreased. Interpretation:  Possible mild obstructive airway disease with evidence of hyperinflation.

## 2021-07-12 DIAGNOSIS — K74.3 PRIMARY BILIARY CHOLANGITIS (HCC): ICD-10-CM

## 2021-07-12 DIAGNOSIS — J34.89 RHINORRHEA: ICD-10-CM

## 2021-07-13 DIAGNOSIS — J34.89 RHINORRHEA: ICD-10-CM

## 2021-07-13 DIAGNOSIS — K74.3 PRIMARY BILIARY CHOLANGITIS (HCC): ICD-10-CM

## 2021-07-15 RX ORDER — LORATADINE 10 MG/1
10 TABLET ORAL DAILY
Qty: 90 TABLET | Refills: 1 | Status: SHIPPED | OUTPATIENT
Start: 2021-07-15 | End: 2022-05-17 | Stop reason: SDUPTHER

## 2021-07-15 RX ORDER — LORATADINE 10 MG/1
10 TABLET ORAL DAILY
Qty: 90 TABLET | Refills: 3 | Status: SHIPPED | OUTPATIENT
Start: 2021-07-15 | End: 2021-09-01

## 2021-07-24 RX ORDER — FAMOTIDINE 20 MG/1
20 TABLET, FILM COATED ORAL 2 TIMES DAILY PRN
Qty: 60 TABLET | Refills: 1 | Status: SHIPPED | OUTPATIENT
Start: 2021-07-24 | End: 2021-07-25

## 2021-07-25 RX ORDER — FAMOTIDINE 20 MG/1
TABLET, FILM COATED ORAL
Qty: 180 TABLET | Refills: 1 | Status: SHIPPED | OUTPATIENT
Start: 2021-07-25 | End: 2021-09-01

## 2021-07-27 DIAGNOSIS — I10 HTN, GOAL BELOW 130/80: ICD-10-CM

## 2021-08-01 RX ORDER — LISINOPRIL 5 MG/1
5 TABLET ORAL DAILY
Qty: 90 TABLET | Refills: 1 | Status: SHIPPED | OUTPATIENT
Start: 2021-08-01 | End: 2022-01-28

## 2021-08-20 DIAGNOSIS — E78.5 DYSLIPIDEMIA: ICD-10-CM

## 2021-08-20 RX ORDER — ATORVASTATIN CALCIUM 80 MG/1
80 TABLET, FILM COATED ORAL DAILY
Qty: 90 TABLET | Refills: 0 | Status: SHIPPED | OUTPATIENT
Start: 2021-08-20 | End: 2021-11-22

## 2021-09-01 ENCOUNTER — OFFICE VISIT (OUTPATIENT)
Dept: INTERNAL MEDICINE CLINIC | Age: 66
End: 2021-09-01
Payer: MEDICARE

## 2021-09-01 VITALS
OXYGEN SATURATION: 97 % | WEIGHT: 102 LBS | DIASTOLIC BLOOD PRESSURE: 62 MMHG | SYSTOLIC BLOOD PRESSURE: 150 MMHG | HEART RATE: 95 BPM | BODY MASS INDEX: 19.27 KG/M2

## 2021-09-01 DIAGNOSIS — Z00.00 ROUTINE GENERAL MEDICAL EXAMINATION AT A HEALTH CARE FACILITY: ICD-10-CM

## 2021-09-01 DIAGNOSIS — J44.9 CHRONIC OBSTRUCTIVE PULMONARY DISEASE, UNSPECIFIED COPD TYPE (HCC): ICD-10-CM

## 2021-09-01 DIAGNOSIS — K86.81 EXOCRINE PANCREATIC INSUFFICIENCY: Primary | ICD-10-CM

## 2021-09-01 DIAGNOSIS — E11.9 CONTROLLED TYPE 2 DIABETES MELLITUS WITHOUT COMPLICATION, WITHOUT LONG-TERM CURRENT USE OF INSULIN (HCC): ICD-10-CM

## 2021-09-01 DIAGNOSIS — R21 RASH AND NONSPECIFIC SKIN ERUPTION: ICD-10-CM

## 2021-09-01 PROCEDURE — 1123F ACP DISCUSS/DSCN MKR DOCD: CPT | Performed by: INTERNAL MEDICINE

## 2021-09-01 PROCEDURE — 3017F COLORECTAL CA SCREEN DOC REV: CPT | Performed by: INTERNAL MEDICINE

## 2021-09-01 PROCEDURE — 99214 OFFICE O/P EST MOD 30 MIN: CPT | Performed by: INTERNAL MEDICINE

## 2021-09-01 PROCEDURE — 3044F HG A1C LEVEL LT 7.0%: CPT | Performed by: INTERNAL MEDICINE

## 2021-09-01 PROCEDURE — 4040F PNEUMOC VAC/ADMIN/RCVD: CPT | Performed by: INTERNAL MEDICINE

## 2021-09-01 PROCEDURE — G0439 PPPS, SUBSEQ VISIT: HCPCS | Performed by: INTERNAL MEDICINE

## 2021-09-01 PROCEDURE — 3288F FALL RISK ASSESSMENT DOCD: CPT | Performed by: INTERNAL MEDICINE

## 2021-09-01 RX ORDER — PANCRELIPASE LIPASE, PANCRELIPASE PROTEASE, PANCRELIPASE AMYLASE 40000; 126000; 168000 [USP'U]/1; [USP'U]/1; [USP'U]/1
1 CAPSULE, DELAYED RELEASE ORAL
Qty: 120 CAPSULE | Refills: 1 | Status: SHIPPED | OUTPATIENT
Start: 2021-09-01 | End: 2021-12-07

## 2021-09-01 RX ORDER — TRIAMCINOLONE ACETONIDE 1 MG/G
CREAM TOPICAL
Qty: 453.6 G | Refills: 1 | Status: SHIPPED | OUTPATIENT
Start: 2021-09-01 | End: 2022-09-02 | Stop reason: SDUPTHER

## 2021-09-01 SDOH — ECONOMIC STABILITY: FOOD INSECURITY: WITHIN THE PAST 12 MONTHS, YOU WORRIED THAT YOUR FOOD WOULD RUN OUT BEFORE YOU GOT MONEY TO BUY MORE.: NEVER TRUE

## 2021-09-01 SDOH — ECONOMIC STABILITY: FOOD INSECURITY: WITHIN THE PAST 12 MONTHS, THE FOOD YOU BOUGHT JUST DIDN'T LAST AND YOU DIDN'T HAVE MONEY TO GET MORE.: NEVER TRUE

## 2021-09-01 ASSESSMENT — PATIENT HEALTH QUESTIONNAIRE - PHQ9
SUM OF ALL RESPONSES TO PHQ QUESTIONS 1-9: 0
SUM OF ALL RESPONSES TO PHQ QUESTIONS 1-9: 0
1. LITTLE INTEREST OR PLEASURE IN DOING THINGS: 0
SUM OF ALL RESPONSES TO PHQ QUESTIONS 1-9: 0
2. FEELING DOWN, DEPRESSED OR HOPELESS: 0
SUM OF ALL RESPONSES TO PHQ9 QUESTIONS 1 & 2: 0

## 2021-09-01 ASSESSMENT — SOCIAL DETERMINANTS OF HEALTH (SDOH): HOW HARD IS IT FOR YOU TO PAY FOR THE VERY BASICS LIKE FOOD, HOUSING, MEDICAL CARE, AND HEATING?: NOT HARD AT ALL

## 2021-09-01 ASSESSMENT — ENCOUNTER SYMPTOMS
DIARRHEA: 0
BLOOD IN STOOL: 0
VOMITING: 0
CONSTIPATION: 0
NAUSEA: 0
ABDOMINAL PAIN: 0

## 2021-09-01 NOTE — PROGRESS NOTES
Ef Subjective:      Patient ID: Jess Frey is a 78 y.o. female. Chief Complaint   Patient presents with    Weight Loss       Patient complains of change in Bowels, diarrhea and loose sudden fatty lose stool. . Symptoms have been present several years. Symptoms include diarrhea, early satiety, fatty stool, loss of control of bowel movements and recent change in bowel movements. Symptoms have been stable. No pain. She does get occasional pain in right lower hip. Aggravating factors include food ingestion and 1/2 -1 hour after meals. The patient has tried analgesics and diet restriction with no relief. Alcohol history:none. Prior diagnostic studies: Other significant workup with GI. She has had persistent weight loss. Diabetes  Hypoglycemia symptoms include nervousness/anxiousness. Weight Loss:  Patient reports about 16 pounds of weight loss since July without trying. Her pants no longer fit properly. She also reports a decrease in her stamina and fatigue. She thinks it might be due to her PBC and to extra stress in her life lately. She also reports difficulty sleeping and some anxiety due to this. She has occasional night sweats but thinks this is due to temperature changes. She denies changes in her diet or BMs. Patient with persistent weight loss. Her high school graduation weight was 98lbs. She is 108 today and reports a good appetite,  She states she gets tired with exercise. She doesn't sleep well  Diabetes:  Treatment Adherence:   Medication compliance:  compliant most of the time  Diet compliance:  compliant most of the time  Weight trend: loss of 16 pounds  Current exercise: walks dog daily  Barriers: none  Diabetes Mellitus Type 2: Current symptoms/problems include none. Home blood sugar records: patient does not test  Any episodes of hypoglycemia? no  Eye exam current (within one year): no  Tobacco history: She  reports that she quit smoking about 2 years ago.  Her smoking use included cigarettes. She has a 4.50 pack-year smoking history. She has never used smokeless tobacco.   Daily Aspirin? No: not indicated    Hypertension:  Home blood pressure monitoring: No.  She is not adherent to a low sodium diet. Patient denies headache, lightheadedness, blurred vision, peripheral edema and palpitations. Antihypertensive medication side effects: no medication side effects noted. Use of agents associated with hypertension: none. Hyperlipidemia:  No new myalgias or GI upset on atorvastatin (Lipitor). Lab Results   Component Value Date    LABA1C 5.8 02/23/2021    LABA1C 6.6 10/21/2020    LABA1C 6.1 07/15/2020     Lab Results   Component Value Date    LABMICR <1.20 12/19/2018    CREATININE <0.5 (L) 01/12/2021     Lab Results   Component Value Date    ALT 21 02/22/2021    AST 31 02/22/2021     Lab Results   Component Value Date    CHOL 105 03/23/2021    TRIG 81 03/23/2021    HDL 37 (L) 03/23/2021    LDLCALC 52 03/23/2021          Review of Systems   Constitutional: Positive for unexpected weight change. Negative for activity change, appetite change, chills and fever. Gastrointestinal: Negative for abdominal pain, blood in stool, constipation, diarrhea, nausea and vomiting. Psychiatric/Behavioral: Positive for sleep disturbance. The patient is nervous/anxious. All other systems reviewed and are negative.         Allergies   Allergen Reactions    Penicillins Other (See Comments)     Doesn't know allergy reaction    Sulfa Antibiotics Other (See Comments)     n/a    Sulfasalazine Hives     n/a       Current Outpatient Medications   Medication Sig Dispense Refill    atorvastatin (LIPITOR) 80 MG tablet TAKE 1 TABLET BY MOUTH DAILY 90 tablet 0    lisinopril (PRINIVIL;ZESTRIL) 5 MG tablet TAKE 1 TABLET BY MOUTH DAILY 90 tablet 1    loratadine (CLARITIN) 10 MG tablet TAKE 1 TABLET BY MOUTH DAILY 90 tablet 1    famotidine (PEPCID) 20 MG tablet Take 1 tablet by mouth nightly 90 Narrative   EXAMINATION:   CT OF THE ABDOMEN AND PELVIS WITH CONTRAST 1/13/2021 2:26 pm       TECHNIQUE:   CT of the abdomen and pelvis was performed with the administration of   intravenous contrast. Multiplanar reformatted images are provided for review. Dose modulation, iterative reconstruction, and/or weight based adjustment of   the mA/kV was utilized to reduce the radiation dose to as low as reasonably   achievable.       COMPARISON:   10/30/2020, 08/29/2018, 12/06/2015       HISTORY:   ORDERING SYSTEM PROVIDED HISTORY: Abnormal intentional weight loss   TECHNOLOGIST PROVIDED HISTORY:   Additional Contrast?->Oral   Reason for Exam: Abnormal intentional weight loss   Acuity: Unknown   Type of Exam: Unknown       FINDINGS:   Lower Chest: There is mild dependent atelectasis within both lower lobes. There is mild parenchymal scar within the right middle lobe. Angelica Verdin is a   stable granuloma within the subpleural left lower lobe, unchanged from   12/06/2015.  The lung bases are otherwise clear.       Organs: The liver and spleen are normal.  The pancreas is unremarkable.  The   gallbladder is normal.  The adrenal glands are normal bilaterally.  The   bilateral kidneys are stable and unremarkable.       GI/Bowel: Evaluation of the hollow GI tract demonstrates no evidence of   abnormal bowel wall thickening, dilatation, or obstruction.  No abnormal   small bowel or colonic mass lesion is identified.       Pelvis:  The urinary bladder is normal.  The uterus and bilateral adnexa are   unremarkable. Angelica Verdin is no free pelvic fluid or pathologic pelvic   lymphadenopathy.       Peritoneum/Retroperitoneum: No intraperitoneal free air or free fluid is   identified. Angelica Verdin are stable mildly enlarged paraesophageal and isaura   hepatic lymph nodes, unchanged dating back to 12/06/2015, with the largest   lymph node lying within the isaura hepatic space, and measuring approximately   25 x 16 mm.  These are likely benign and reactive in etiology given their   stability.  No new pathologic lymphadenopathy is present within the abdomen   or retroperitoneal spaces.  There is atherosclerotic disease of the abdominal   aorta, with a stable 2.8 cm fusiform ectasia, though no evidence of aneurysm. There is no evidence of ascites, omental caking, or peritoneal implants.  No   abdominal wall hernia is evident.       Bones/Soft Tissues: There is mild degenerative change throughout the   thoracolumbar spine.  No osteolytic or osteoblastic lesion is seen.           Impression   1. No acute process within the abdomen or pelvis. 2. No finding to account for patient history of weight loss. 3. Stable paraesophageal and isaura hepatic lymphadenopathy, unchanged from   12/06/2015, likely benign given its long-term stability.  No new pathologic   lymphadenopathy is identified. 4. Stable 2.8 cm fusiform ectasia of the abdominal aorta.  Further follow-up   of this abnormality is as advised below.       RECOMMENDATIONS:   For management of fusiform AAA:       2.6-2.9 cm aorta, recommend follow-up every 5 years or aortas meeting the   criteria for AAA (>1.5 x proximal normal segment; no f/u if < 1.5 x proximal   normal segment; no f/u for aortas < 2.6 cm).       References: J Am Lamin Radiol 2013; 10(10):789-794; J Vasc Surg. 2018; 67:2-77               Assessment/Plan:  Freeman Jarvis was seen today for diabetes. Diagnoses and all orders for this visit:    Moderate aortic stenosis by prior echocardiogram  -     [facility]    Weight loss, unintentional- likely due to activity  - patient with pruritis and never stops moving  - advised to start a food journal to adequately track calories  - work up is normal.    Assessment/Plan:  Freeman Jarvis was seen today for weight loss. Diagnoses and all orders for this visit:    Exocrine pancreatic insufficiency  -     Pancrelipase, Lip-Prot-Amyl, (ZENPEP) 46529-880015 units CPEP;  Take 1 capsule by mouth 5 times daily Before meals and snacks    Rash and nonspecific skin eruption  -     triamcinolone (KENALOG) 0.1 % cream; Apply topically 2 times daily as needed    Chronic obstructive pulmonary disease, unspecified COPD type (HCC)  -     Fluticasone furoate-vilanterol (BREO ELLIPTA) 200-25 MCG/INH AEPB inhaler; Inhale 1 puff into the lungs daily      No follow-ups on file. Krystle Goldstein MD             Medicare Annual Wellness Visit  Name: Mark Gould Date: 2021   MRN: [id number] Sex: Female   Age: 78 y.o. Ethnicity: Non- / Non    : 1955 Race: White (non-)      Mechele Mask is here for Weight Loss    Screenings for behavioral, psychosocial and functional/safety risks, and cognitive dysfunction are all negative except as indicated below. These results, as well as other patient data from the [address] form, are documented in Flowsheets linked to this Encounter. Allergies   Allergen Reactions    Penicillins Other (See Comments)     Doesn't know allergy reaction    Sulfa Antibiotics Other (See Comments)     n/a    Sulfasalazine Hives     n/a       Prior to Visit Medications    Medication Sig Taking?  Authorizing Provider   triamcinolone (KENALOG) 0.1 % cream Apply topically 2 times daily as needed Yes Geovanni Harrison MD   Pancrelipase, Lip-Prot-Amyl, (ZENPEP) 73759-627507 units CPEP Take 1 capsule by mouth 5 times daily Before meals and snacks Yes Geovanni Harrison MD   Fluticasone furoate-vilanterol (BREO ELLIPTA) 200-25 MCG/INH AEPB inhaler Inhale 1 puff into the lungs daily Yes Geovanni Harrison MD   atorvastatin (LIPITOR) 80 MG tablet TAKE 1 TABLET BY MOUTH DAILY Yes Geovanni Harrison MD   lisinopril (PRINIVIL;ZESTRIL) 5 MG tablet TAKE 1 TABLET BY MOUTH DAILY Yes Geovanni Harrison MD   loratadine (CLARITIN) 10 MG tablet TAKE 1 TABLET BY MOUTH DAILY Yes Geovanni Harrison MD   famotidine (PEPCID) 20 MG tablet Take 1 tablet by mouth nightly Yes Ignacio Vogel DO   ursodiol (ACTIGALL) 500 MG tablet 2 times daily  Yes Historical Provider, MD   calcium carbonate (OYSTER SHELL CALCIUM 500 MG) 1250 (500 Ca) MG tablet Take 1 tablet by mouth daily Yes Historical Provider, MD   vitamin D (CHOLECALCIFEROL) 1000 UNIT TABS tablet Take 1,000 Units by mouth daily Yes Historical Provider, MD   Multiple Vitamins-Minerals (MULTIVITAMIN PO) Take 1 capsule by mouth daily. Yes Historical Provider, MD   Ascorbic Acid (VITAMIN C) 500 MG tablet Take 500 mg by mouth daily.  Yes Historical Provider, MD       Past Medical History:   Diagnosis Date    Controlled type 2 diabetes mellitus without complication, without long-term current use of insulin ([address] 2019    no meds, low A1C    Dyslipidemia 2017    Essential hypertension 2018    GERD (gastroesophageal reflux disease)     Heart murmur     very early stages    Hypercholesterolemia 2018    Hyperlipidemia     Hypertension     Lichen planus     external, mainly arms, legs    PONV (postoperative nausea and vomiting)     usually the next day, vomits once    Primary biliary cholangitis ([address] 2019    Primary biliary cholangitis ([address] 2019       Past Surgical History:   Procedure Laterality Date    CARPAL TUNNEL RELEASE       SECTION      COLONOSCOPY      ENDOMETRIAL ABLATION      ENDOSCOPY, COLON, DIAGNOSTIC      LIVER BIOPSY N/A     SINUS ENDOSCOPY N/A 3/8/2021    NASAL ENDOSCOPY; BIOPSY OF NASOPHARYNGEAL LESION performed by Ignacio Vogel DO at [address]      WISDOM TOOTH EXTRACTION         Family History   Problem Relation Age of Onset    Cancer Mother         Lung Cancer    Stroke Mother     Diabetes Father     High Cholesterol Father     Hypertension Father     Heart Failure Father     Substance Abuse Sister        CareTeam (Including outside providers/suppliers regularly involved in providing care):   Patient Care Team:  Krystal Walters MD as PCP - General (Pediatrics)  Krystal Walters MD as PCP - [facility] Provider    Wt Readings from Last 3 Encounters:   09/01/21 102 lb (46.3 kg)   05/20/21 106 lb 9.6 oz (48.4 kg)   04/28/21 105 lb 12.8 oz (48 kg)     Vitals:    09/01/21 1127 09/01/21 1134   BP: (!) 148/66 (!) 150/62   Pulse: 95    SpO2: 97%    Weight: 102 lb (46.3 kg)      Body mass index is 19.27 kg/m². Based upon direct observation of the patient, evaluation of cognition reveals recent and remote memory intact. Patient's complete Health Risk Assessment and screening values have been reviewed and are found in Flowsheets. The following problems were reviewed today and where indicated follow up appointments were made and/or referrals ordered.     Positive Risk Factor Screenings with Interventions:          General Health and ACP:     Advance Directives     Power of  Living Will ACP-Advance Directive ACP-Power of     Not on File Not on File Not on File Not on File      General Health Risk Interventions:  No Living Will: Patient referred to ACP Clinical Specialist        Personalized Preventive Plan   Current Health Maintenance Status  Immunization History   Administered Date(s) Administered    COVID-19, Moderna, PF, 100mcg/0.5mL 04/21/2021, 05/19/2021    Hepatitis A Adult (Havrix, Vaqta) 06/19/2019, 12/16/2019    Influenza, Quadv, adjuvanted, 65 yrs +, IM, PF (Fluad) 10/21/2020    Pneumococcal Polysaccharide ([id number]) 12/19/2018    Tdap (Boostrix, Adacel) 02/17/2014        Health Maintenance   Topic Date Due    Diabetic retinal exam  Never done    Shingles Vaccine (1 of 2) Never done   [employer] Visit (AWV)  Never done    Diabetic microalbuminuria test  12/19/2019    Diabetic foot exam  07/14/2021    Flu vaccine (1) 09/01/2021    Potassium monitoring  01/12/2022    Creatinine monitoring  01/12/2022    A1C test (Diabetic or Prediabetic)  02/23/2022    Lipid screen  03/23/2022    Breast cancer screen  10/13/2022    Pneumococcal 65+ years Vaccine (1 of 1 - PPSV23) 12/19/2023    DTaP/Tdap/Td vaccine (2 - Td or Tdap) 02/17/2024    Colon cancer screen colonoscopy  06/07/2028    Hepatitis A vaccine  Completed    DEXA (modify frequency per FRAX score)  Completed    COVID-19 Vaccine  Completed    Hepatitis C screen  Completed    Hib vaccine  Aged Out    Meningococcal (ACWY) vaccine  Aged Out     Recommendations for [employer] Due: see orders and patient instructions/AVS.  . Recommended screening schedule for the next 5-10 years is provided to the patient in written form: see Patient Instructions/AVS.    Hossein Lavell was seen today for weight loss. Diagnoses and all orders for this visit:    Exocrine pancreatic insufficiency  -     Pancrelipase, Lip-Prot-Amyl, (ZENPEP) [id number] units CPEP; Take 1 capsule by mouth 5 times daily Before meals and snacks    Rash and nonspecific skin eruption  -     triamcinolone (KENALOG) 0.1 % cream; Apply topically 2 times daily as needed    Chronic obstructive pulmonary disease, unspecified COPD type (HCC)  -     Fluticasone furoate-vilanterol (BREO ELLIPTA) 200-25 MCG/INH AEPB inhaler; Inhale 1 puff into the lungs daily    Routine general medical examination at a health care facility                   Advance Care Planning   Advanced Care Planning: Discussed the patients choices for care and treatment in case of a health event that adversely affects decision-making abilities. Also discussed the patients long-term treatment options. Reviewed with the patient the [address] [place] [address] of [address] [place] [address] Declaration forms  Reviewed the process of designating a competent adult as an Agent (or -in-fact) that could take make health care decisions for the patient if incompetent.  Patient was asked to complete the declaration forms, either acknowledge the forms by a public

## 2021-09-01 NOTE — PATIENT INSTRUCTIONS
Advance Directives: Care Instructions  Overview  An advance directive is a legal way to state your wishes at the end of your life. It tells your family and your doctor what to do if you can't say what you want. There are two main types of advance directives. You can change them any time your wishes change. Living will. This form tells your family and your doctor your wishes about life support and other treatment. The form is also called a declaration. Medical power of . This form lets you name a person to make treatment decisions for you when you can't speak for yourself. This person is called a health care agent (health care proxy, health care surrogate). The form is also called a durable power of  for health care. If you do not have an advance directive, decisions about your medical care may be made by a family member, or by a doctor or a  who doesn't know you. It may help to think of an advance directive as a gift to the people who care for you. If you have one, they won't have to make tough decisions by themselves. Follow-up care is a key part of your treatment and safety. Be sure to make and go to all appointments, and call your doctor if you are having problems. It's also a good idea to know your test results and keep a list of the medicines you take. What should you include in an advance directive? Many states have a unique advance directive form. (It may ask you to address specific issues.) Or you might use a universal form that's approved by many states. If your form doesn't tell you what to address, it may be hard to know what to include in your advance directive. Use the questions below to help you get started. · Who do you want to make decisions about your medical care if you are not able to? · What life-support measures do you want if you have a serious illness that gets worse over time or can't be cured? · What are you most afraid of that might happen? (Maybe you're afraid of having pain, losing your independence, or being kept alive by machines.)  · Where would you prefer to die? (Your home? A hospital? A nursing home?)  · Do you want to donate your organs when you die? · Do you want certain [employer] practices performed before you die? When should you call for help? Be sure to contact your doctor if you have any questions. Where can you learn more? Go to [email or web address] and sign in to your [employer] account. Enter R264 in the [employer] box to learn more about \"Advance Directives: Care Instructions. \"     If you do not have an account, please click on the \"Sign Up Now\" link. Current as of: March 17, 2021               Content Version: 12.9  © 3577-2559 Healthwise, Incorporated. Care instructions adapted under license by [facility] [place] ([facility]). If you have questions about a medical condition or this instruction, always ask your healthcare professional. [place] [address] any warranty or liability for your use of this information. Personalized Preventive Plan for Stephanie Hansen - 9/1/2021  Medicare offers a range of preventive health benefits. Some of the tests and screenings are paid in full while other may be subject to a deductible, co-insurance, and/or copay. Some of these benefits include a comprehensive review of your medical history including lifestyle, illnesses that may run in your family, and various assessments and screenings as appropriate. After reviewing your medical record and screening and assessments performed today your provider may have ordered immunizations, labs, imaging, and/or referrals for you. A list of these orders (if applicable) as well as your Preventive Care list are included within your After Visit Summary for your review.     Other Preventive Recommendations:    · A preventive eye exam performed by an eye specialist is recommended every 1-2 years to screen for glaucoma; cataracts, macular degeneration, and other eye disorders. · A preventive dental visit is recommended every 6 months. · Try to get at least 150 minutes of exercise per week or 10,000 steps per day on a pedometer . · Order or download the FREE \"Exercise & Physical Activity: Your Everyday Guide\" from [employer]. Call 2-113.698.4104 or search [employer] online. · You need 8285-5298 mg of calcium and 5012-5584 IU of vitamin D per day. It is possible to meet your calcium requirement with diet alone, but a vitamin D supplement is usually necessary to meet this goal.  · When exposed to the sun, use a sunscreen that protects against both UVA and UVB radiation with an SPF of 30 or greater. Reapply every 2 to 3 hours or after sweating, drying off with a towel, or swimming. · Always wear a seat belt when traveling in a car. Always wear a helmet when riding a bicycle or motorcycle.

## 2021-09-30 ENCOUNTER — CLINICAL DOCUMENTATION (OUTPATIENT)
Dept: SPIRITUAL SERVICES | Age: 66
End: 2021-09-30

## 2021-09-30 NOTE — PROGRESS NOTES
Advance Care Planning   Ambulatory ACP Specialist Patient Outreach    Date:  9/30/2021  ACP Specialist:  Pita Rivera    Outreach call to patient in follow-up to ACP Specialist referral from: Antonieta Mays MD    [x] PCP  [] Provider   [] Ambulatory Care Management [] Other for Reason:    [x] Advance Directive Assistance  [] Code Status Discussion  [] Complete Portable DNR Order  [] Discuss Goals of Care  [] Complete POST/MOST  [] Early ACP Decision-Making  [] Other    Date Referral Received:9/1/21    Today's Outreach:  [x] First   [] Second  [] Third                               Third outreach made by [x]  phone  [] email []   [employer]     Intervention:  [x] Spoke with Patient  [] Left VM requesting return call      Outcome:Mailing forms for Pt to read over. Follow up in 1 month to see if she has questions and set up appointment to complete. Next Step:   [] ACP scheduled conversation  [] Outreach again in one week               [] Email / Mail ACP Info Sheets  [x] Email / Mail Advance Directive            [] Close Referral. Routing closure to referring provider/staff and to ACP Specialist .      Thank you for this referral.

## 2021-10-05 ENCOUNTER — TELEPHONE (OUTPATIENT)
Dept: INTERNAL MEDICINE CLINIC | Age: 66
End: 2021-10-05

## 2021-10-05 NOTE — TELEPHONE ENCOUNTER
Patient calling in because her medication zenpep cost too much for her to take, She needs to figure something else out about her meds because she cannot afford that. Please give her a call with an update on what to do.

## 2021-10-26 ENCOUNTER — CLINICAL DOCUMENTATION (OUTPATIENT)
Dept: SPIRITUAL SERVICES | Age: 66
End: 2021-10-26

## 2021-10-26 NOTE — PROGRESS NOTES
Advance Care Planning   Ambulatory ACP Specialist Patient Outreach    Date:  10/26/2021  ACP Specialist:  Davi Wilson    Outreach call to patient in follow-up to ACP Specialist referral from: Yosvany Nuno MD    [] PCP  [] Provider   [] Ambulatory Care Management [] Other for Reason:    [x] Advance Directive Assistance  [] Code Status Discussion  [] Complete Portable DNR Order  [] Discuss Goals of Care  [] Complete POST/MOST  [] Early ACP Decision-Making  [] Other    Date Referral Received:9/1/21    Today's Outreach:  [] First   [x] Second  [] Third                               Third outreach made by [x]  phone  [] email []   [employer]     Intervention:  [] Spoke with Patient  [] Left VM requesting return call      Outcome:Phone line busy. Will attempt to call again in 1 week. Next Step:   [] ACP scheduled conversation  [x] Outreach again in one week               [] Email / Mail ACP Info Sheets  [] Email / Mail Advance Directive            [] Close Referral. Routing closure to referring provider/staff and to ACP Specialist .      Thank you for this referral.

## 2021-10-30 ENCOUNTER — HOSPITAL ENCOUNTER (INPATIENT)
Age: 66
LOS: 4 days | Discharge: HOME | DRG: 872 | End: 2021-11-03
Attending: STUDENT IN AN ORGANIZED HEALTH CARE EDUCATION/TRAINING PROGRAM | Admitting: FAMILY MEDICINE
Payer: MEDICARE

## 2021-10-30 ENCOUNTER — APPOINTMENT (OUTPATIENT)
Dept: CT IMAGING | Age: 66
DRG: 872 | End: 2021-10-30
Payer: MEDICARE

## 2021-10-30 DIAGNOSIS — I71.40 ABDOMINAL AORTIC ANEURYSM (AAA) WITHOUT RUPTURE: ICD-10-CM

## 2021-10-30 DIAGNOSIS — E87.6 HYPOKALEMIA: ICD-10-CM

## 2021-10-30 DIAGNOSIS — R91.1 PULMONARY NODULE: ICD-10-CM

## 2021-10-30 DIAGNOSIS — A41.9 SEPTICEMIA (HCC): ICD-10-CM

## 2021-10-30 DIAGNOSIS — J44.9 CHRONIC OBSTRUCTIVE PULMONARY DISEASE, UNSPECIFIED COPD TYPE (HCC): ICD-10-CM

## 2021-10-30 DIAGNOSIS — R09.02 HYPOXIA: ICD-10-CM

## 2021-10-30 DIAGNOSIS — K52.9 ENTEROCOLITIS: Primary | ICD-10-CM

## 2021-10-30 LAB
A/G RATIO: 0.7 (ref 1.1–2.2)
ALBUMIN SERPL-MCNC: 3.2 G/DL (ref 3.4–5)
ALP BLD-CCNC: 399 U/L (ref 40–129)
ALT SERPL-CCNC: 22 U/L (ref 10–40)
ANION GAP SERPL CALCULATED.3IONS-SCNC: 18 MMOL/L (ref 3–16)
AST SERPL-CCNC: 38 U/L (ref 15–37)
BASE EXCESS VENOUS: -3.3 MMOL/L (ref -3–3)
BASOPHILS ABSOLUTE: 0.1 K/UL (ref 0–0.2)
BASOPHILS RELATIVE PERCENT: 0.6 %
BILIRUB SERPL-MCNC: 0.8 MG/DL (ref 0–1)
BILIRUBIN URINE: NEGATIVE
BLOOD, URINE: ABNORMAL
BUN BLDV-MCNC: 11 MG/DL (ref 7–20)
C DIFF TOXIN/ANTIGEN: NORMAL
CALCIUM SERPL-MCNC: 10.4 MG/DL (ref 8.3–10.6)
CARBOXYHEMOGLOBIN: 3.1 % (ref 0–1.5)
CHLORIDE BLD-SCNC: 100 MMOL/L (ref 99–110)
CLARITY: CLEAR
CO2: 18 MMOL/L (ref 21–32)
COLOR: YELLOW
COMMENT UA: ABNORMAL
CREAT SERPL-MCNC: 0.5 MG/DL (ref 0.6–1.2)
EOSINOPHILS ABSOLUTE: 0.2 K/UL (ref 0–0.6)
EOSINOPHILS RELATIVE PERCENT: 1 %
EPITHELIAL CELLS, UA: 7 /HPF (ref 0–5)
GFR AFRICAN AMERICAN: >60
GFR NON-AFRICAN AMERICAN: >60
GLUCOSE BLD-MCNC: 124 MG/DL (ref 70–99)
GLUCOSE URINE: NEGATIVE MG/DL
HCG QUALITATIVE: NEGATIVE
HCO3 VENOUS: 23.8 MMOL/L (ref 23–29)
HCT VFR BLD CALC: 44.9 % (ref 36–48)
HEMOGLOBIN, VEN, REDUCED: 17 %
HEMOGLOBIN: 14.7 G/DL (ref 12–16)
HYALINE CASTS: 15 /LPF (ref 0–8)
INR BLD: 1.2 (ref 0.88–1.12)
KETONES, URINE: NEGATIVE MG/DL
LACTIC ACID, SEPSIS: 1.4 MMOL/L (ref 0.4–1.9)
LACTIC ACID, SEPSIS: 4.9 MMOL/L (ref 0.4–1.9)
LEUKOCYTE ESTERASE, URINE: NEGATIVE
LIPASE: 40 U/L (ref 13–60)
LYMPHOCYTES ABSOLUTE: 0.6 K/UL (ref 1–5.1)
LYMPHOCYTES RELATIVE PERCENT: 3.3 %
MAGNESIUM: 2 MG/DL (ref 1.8–2.4)
MCH RBC QN AUTO: 27.7 PG (ref 26–34)
MCHC RBC AUTO-ENTMCNC: 32.7 G/DL (ref 31–36)
MCV RBC AUTO: 84.5 FL (ref 80–100)
METHEMOGLOBIN VENOUS: 0.1 %
MICROSCOPIC EXAMINATION: YES
MONOCYTES ABSOLUTE: 1.1 K/UL (ref 0–1.3)
MONOCYTES RELATIVE PERCENT: 5.4 %
NEUTROPHILS ABSOLUTE: 17.8 K/UL (ref 1.7–7.7)
NEUTROPHILS RELATIVE PERCENT: 89.7 %
NITRITE, URINE: NEGATIVE
O2 CONTENT, VEN: 18 VOL %
O2 SAT, VEN: 82 %
O2 THERAPY: ABNORMAL
OCCULT BLOOD DIAGNOSTIC: ABNORMAL
PCO2, VEN: 48.9 MMHG (ref 40–50)
PDW BLD-RTO: 14.1 % (ref 12.4–15.4)
PH UA: 6 (ref 5–8)
PH VENOUS: 7.29 (ref 7.35–7.45)
PLATELET # BLD: 194 K/UL (ref 135–450)
PMV BLD AUTO: 11.4 FL (ref 5–10.5)
PO2, VEN: 52.4 MMHG (ref 25–40)
POTASSIUM REFLEX MAGNESIUM: 3.2 MMOL/L (ref 3.5–5.1)
PRO-BNP: 1815 PG/ML (ref 0–124)
PROCALCITONIN: 0.19 NG/ML (ref 0–0.15)
PROTEIN UA: 30 MG/DL
PROTHROMBIN TIME: 13.7 SEC (ref 9.9–12.7)
RBC # BLD: 5.31 M/UL (ref 4–5.2)
RBC UA: 1 /HPF (ref 0–4)
SODIUM BLD-SCNC: 136 MMOL/L (ref 136–145)
SPECIFIC GRAVITY UA: >1.03 (ref 1–1.03)
TCO2 CALC VENOUS: 57 MMOL/L
TOTAL PROTEIN: 8 G/DL (ref 6.4–8.2)
TROPONIN: <0.01 NG/ML
URINE REFLEX TO CULTURE: ABNORMAL
URINE TYPE: ABNORMAL
UROBILINOGEN, URINE: 0.2 E.U./DL
WBC # BLD: 19.8 K/UL (ref 4–11)
WBC UA: 9 /HPF (ref 0–5)

## 2021-10-30 PROCEDURE — 2700000000 HC OXYGEN THERAPY PER DAY

## 2021-10-30 PROCEDURE — 36415 COLL VENOUS BLD VENIPUNCTURE: CPT

## 2021-10-30 PROCEDURE — 2580000003 HC RX 258: Performed by: STUDENT IN AN ORGANIZED HEALTH CARE EDUCATION/TRAINING PROGRAM

## 2021-10-30 PROCEDURE — 82270 OCCULT BLOOD FECES: CPT

## 2021-10-30 PROCEDURE — 6370000000 HC RX 637 (ALT 250 FOR IP): Performed by: FAMILY MEDICINE

## 2021-10-30 PROCEDURE — 6360000002 HC RX W HCPCS: Performed by: PHYSICIAN ASSISTANT

## 2021-10-30 PROCEDURE — 93005 ELECTROCARDIOGRAM TRACING: CPT | Performed by: PHYSICIAN ASSISTANT

## 2021-10-30 PROCEDURE — 83735 ASSAY OF MAGNESIUM: CPT

## 2021-10-30 PROCEDURE — 96375 TX/PRO/DX INJ NEW DRUG ADDON: CPT

## 2021-10-30 PROCEDURE — 6370000000 HC RX 637 (ALT 250 FOR IP): Performed by: PHYSICIAN ASSISTANT

## 2021-10-30 PROCEDURE — 87449 NOS EACH ORGANISM AG IA: CPT

## 2021-10-30 PROCEDURE — 87324 CLOSTRIDIUM AG IA: CPT

## 2021-10-30 PROCEDURE — 6360000004 HC RX CONTRAST MEDICATION: Performed by: PHYSICIAN ASSISTANT

## 2021-10-30 PROCEDURE — 80053 COMPREHEN METABOLIC PANEL: CPT

## 2021-10-30 PROCEDURE — 1200000000 HC SEMI PRIVATE

## 2021-10-30 PROCEDURE — 99285 EMERGENCY DEPT VISIT HI MDM: CPT

## 2021-10-30 PROCEDURE — 85610 PROTHROMBIN TIME: CPT

## 2021-10-30 PROCEDURE — 81001 URINALYSIS AUTO W/SCOPE: CPT

## 2021-10-30 PROCEDURE — 82803 BLOOD GASES ANY COMBINATION: CPT

## 2021-10-30 PROCEDURE — 83605 ASSAY OF LACTIC ACID: CPT

## 2021-10-30 PROCEDURE — 84703 CHORIONIC GONADOTROPIN ASSAY: CPT

## 2021-10-30 PROCEDURE — 83690 ASSAY OF LIPASE: CPT

## 2021-10-30 PROCEDURE — 85025 COMPLETE CBC W/AUTO DIFF WBC: CPT

## 2021-10-30 PROCEDURE — 74177 CT ABD & PELVIS W/CONTRAST: CPT

## 2021-10-30 PROCEDURE — 84484 ASSAY OF TROPONIN QUANT: CPT

## 2021-10-30 PROCEDURE — 71260 CT THORAX DX C+: CPT

## 2021-10-30 PROCEDURE — 2580000003 HC RX 258: Performed by: PHYSICIAN ASSISTANT

## 2021-10-30 PROCEDURE — 94761 N-INVAS EAR/PLS OXIMETRY MLT: CPT

## 2021-10-30 PROCEDURE — 96374 THER/PROPH/DIAG INJ IV PUSH: CPT

## 2021-10-30 PROCEDURE — 83880 ASSAY OF NATRIURETIC PEPTIDE: CPT

## 2021-10-30 PROCEDURE — 84145 PROCALCITONIN (PCT): CPT

## 2021-10-30 PROCEDURE — 87505 NFCT AGENT DETECTION GI: CPT

## 2021-10-30 PROCEDURE — 2580000003 HC RX 258: Performed by: FAMILY MEDICINE

## 2021-10-30 PROCEDURE — 87040 BLOOD CULTURE FOR BACTERIA: CPT

## 2021-10-30 PROCEDURE — 6360000002 HC RX W HCPCS: Performed by: FAMILY MEDICINE

## 2021-10-30 PROCEDURE — 6360000002 HC RX W HCPCS: Performed by: STUDENT IN AN ORGANIZED HEALTH CARE EDUCATION/TRAINING PROGRAM

## 2021-10-30 PROCEDURE — 94640 AIRWAY INHALATION TREATMENT: CPT

## 2021-10-30 RX ORDER — SODIUM CHLORIDE 9 MG/ML
25 INJECTION, SOLUTION INTRAVENOUS PRN
Status: DISCONTINUED | OUTPATIENT
Start: 2021-10-30 | End: 2021-11-03 | Stop reason: HOSPADM

## 2021-10-30 RX ORDER — MORPHINE SULFATE 4 MG/ML
4 INJECTION, SOLUTION INTRAMUSCULAR; INTRAVENOUS ONCE
Status: COMPLETED | OUTPATIENT
Start: 2021-10-30 | End: 2021-10-30

## 2021-10-30 RX ORDER — 0.9 % SODIUM CHLORIDE 0.9 %
500 INTRAVENOUS SOLUTION INTRAVENOUS ONCE
Status: COMPLETED | OUTPATIENT
Start: 2021-10-30 | End: 2021-10-30

## 2021-10-30 RX ORDER — 0.9 % SODIUM CHLORIDE 0.9 %
1000 INTRAVENOUS SOLUTION INTRAVENOUS ONCE
Status: COMPLETED | OUTPATIENT
Start: 2021-10-30 | End: 2021-10-30

## 2021-10-30 RX ORDER — SODIUM CHLORIDE 0.9 % (FLUSH) 0.9 %
5-40 SYRINGE (ML) INJECTION PRN
Status: DISCONTINUED | OUTPATIENT
Start: 2021-10-30 | End: 2021-11-03 | Stop reason: HOSPADM

## 2021-10-30 RX ORDER — POTASSIUM CHLORIDE 750 MG/1
40 TABLET, FILM COATED, EXTENDED RELEASE ORAL ONCE
Status: COMPLETED | OUTPATIENT
Start: 2021-10-30 | End: 2021-10-30

## 2021-10-30 RX ORDER — SODIUM CHLORIDE 0.9 % (FLUSH) 0.9 %
5-40 SYRINGE (ML) INJECTION EVERY 12 HOURS SCHEDULED
Status: DISCONTINUED | OUTPATIENT
Start: 2021-10-30 | End: 2021-11-03 | Stop reason: HOSPADM

## 2021-10-30 RX ORDER — ONDANSETRON 4 MG/1
4 TABLET, ORALLY DISINTEGRATING ORAL EVERY 8 HOURS PRN
Status: DISCONTINUED | OUTPATIENT
Start: 2021-10-30 | End: 2021-11-03 | Stop reason: HOSPADM

## 2021-10-30 RX ORDER — ATORVASTATIN CALCIUM 80 MG/1
80 TABLET, FILM COATED ORAL DAILY
Status: DISCONTINUED | OUTPATIENT
Start: 2021-10-30 | End: 2021-11-03 | Stop reason: HOSPADM

## 2021-10-30 RX ORDER — ONDANSETRON 2 MG/ML
4 INJECTION INTRAMUSCULAR; INTRAVENOUS EVERY 6 HOURS PRN
Status: DISCONTINUED | OUTPATIENT
Start: 2021-10-30 | End: 2021-11-03 | Stop reason: HOSPADM

## 2021-10-30 RX ORDER — VANCOMYCIN HYDROCHLORIDE 1 G/200ML
1000 INJECTION, SOLUTION INTRAVENOUS ONCE
Status: DISCONTINUED | OUTPATIENT
Start: 2021-10-30 | End: 2021-10-30

## 2021-10-30 RX ORDER — MORPHINE SULFATE 4 MG/ML
4 INJECTION, SOLUTION INTRAMUSCULAR; INTRAVENOUS EVERY 4 HOURS PRN
Status: DISCONTINUED | OUTPATIENT
Start: 2021-10-30 | End: 2021-11-03 | Stop reason: HOSPADM

## 2021-10-30 RX ORDER — ACETAMINOPHEN 650 MG/1
650 SUPPOSITORY RECTAL EVERY 6 HOURS PRN
Status: DISCONTINUED | OUTPATIENT
Start: 2021-10-30 | End: 2021-11-03 | Stop reason: HOSPADM

## 2021-10-30 RX ORDER — FAMOTIDINE 20 MG/1
20 TABLET, FILM COATED ORAL NIGHTLY
Status: DISCONTINUED | OUTPATIENT
Start: 2021-10-30 | End: 2021-11-03 | Stop reason: HOSPADM

## 2021-10-30 RX ORDER — IPRATROPIUM BROMIDE AND ALBUTEROL SULFATE 2.5; .5 MG/3ML; MG/3ML
1 SOLUTION RESPIRATORY (INHALATION) ONCE
Status: COMPLETED | OUTPATIENT
Start: 2021-10-30 | End: 2021-10-30

## 2021-10-30 RX ORDER — METHYLPREDNISOLONE SODIUM SUCCINATE 125 MG/2ML
125 INJECTION, POWDER, LYOPHILIZED, FOR SOLUTION INTRAMUSCULAR; INTRAVENOUS ONCE
Status: COMPLETED | OUTPATIENT
Start: 2021-10-30 | End: 2021-10-30

## 2021-10-30 RX ORDER — CIPROFLOXACIN 500 MG/1
500 TABLET, FILM COATED ORAL EVERY 12 HOURS SCHEDULED
Status: DISCONTINUED | OUTPATIENT
Start: 2021-10-30 | End: 2021-11-01

## 2021-10-30 RX ORDER — POLYETHYLENE GLYCOL 3350 17 G/17G
17 POWDER, FOR SOLUTION ORAL DAILY PRN
Status: DISCONTINUED | OUTPATIENT
Start: 2021-10-30 | End: 2021-11-03 | Stop reason: HOSPADM

## 2021-10-30 RX ORDER — SODIUM CHLORIDE 9 MG/ML
INJECTION, SOLUTION INTRAVENOUS CONTINUOUS
Status: DISCONTINUED | OUTPATIENT
Start: 2021-10-30 | End: 2021-10-31

## 2021-10-30 RX ORDER — ACETAMINOPHEN 325 MG/1
650 TABLET ORAL EVERY 6 HOURS PRN
Status: DISCONTINUED | OUTPATIENT
Start: 2021-10-30 | End: 2021-11-03 | Stop reason: HOSPADM

## 2021-10-30 RX ADMIN — MORPHINE SULFATE 4 MG: 4 INJECTION INTRAVENOUS at 18:25

## 2021-10-30 RX ADMIN — ONDANSETRON 4 MG: 2 INJECTION INTRAMUSCULAR; INTRAVENOUS at 21:08

## 2021-10-30 RX ADMIN — SODIUM CHLORIDE 1000 ML: 9 INJECTION, SOLUTION INTRAVENOUS at 15:47

## 2021-10-30 RX ADMIN — SODIUM CHLORIDE: 9 INJECTION, SOLUTION INTRAVENOUS at 21:15

## 2021-10-30 RX ADMIN — ENOXAPARIN SODIUM 40 MG: 100 INJECTION SUBCUTANEOUS at 21:09

## 2021-10-30 RX ADMIN — CEFEPIME HYDROCHLORIDE 2000 MG: 2 INJECTION, POWDER, FOR SOLUTION INTRAVENOUS at 18:21

## 2021-10-30 RX ADMIN — IOPAMIDOL 75 ML: 755 INJECTION, SOLUTION INTRAVENOUS at 16:31

## 2021-10-30 RX ADMIN — SODIUM CHLORIDE 500 ML: 9 INJECTION, SOLUTION INTRAVENOUS at 16:45

## 2021-10-30 RX ADMIN — POTASSIUM CHLORIDE 40 MEQ: 750 TABLET, FILM COATED, EXTENDED RELEASE ORAL at 18:45

## 2021-10-30 RX ADMIN — SODIUM CHLORIDE, PRESERVATIVE FREE 10 ML: 5 INJECTION INTRAVENOUS at 21:09

## 2021-10-30 RX ADMIN — ATORVASTATIN CALCIUM 80 MG: 80 TABLET, FILM COATED ORAL at 21:08

## 2021-10-30 RX ADMIN — MORPHINE SULFATE 4 MG: 4 INJECTION INTRAVENOUS at 21:08

## 2021-10-30 RX ADMIN — VANCOMYCIN HYDROCHLORIDE 1000 MG: 1 INJECTION, POWDER, LYOPHILIZED, FOR SOLUTION INTRAVENOUS at 18:51

## 2021-10-30 RX ADMIN — IPRATROPIUM BROMIDE AND ALBUTEROL SULFATE 1 AMPULE: .5; 3 SOLUTION RESPIRATORY (INHALATION) at 18:39

## 2021-10-30 RX ADMIN — FAMOTIDINE 20 MG: 20 TABLET, FILM COATED ORAL at 21:08

## 2021-10-30 RX ADMIN — CIPROFLOXACIN 500 MG: 500 TABLET, FILM COATED ORAL at 21:09

## 2021-10-30 RX ADMIN — METHYLPREDNISOLONE SODIUM SUCCINATE 125 MG: 125 INJECTION, POWDER, FOR SOLUTION INTRAMUSCULAR; INTRAVENOUS at 18:26

## 2021-10-30 ASSESSMENT — PAIN SCALES - GENERAL
PAINLEVEL_OUTOF10: 10
PAINLEVEL_OUTOF10: 6
PAINLEVEL_OUTOF10: 0
PAINLEVEL_OUTOF10: 7

## 2021-10-30 ASSESSMENT — ENCOUNTER SYMPTOMS
COUGH: 1
VOMITING: 1
WHEEZING: 0
PHOTOPHOBIA: 0
NAUSEA: 1
DIARRHEA: 0
SHORTNESS OF BREATH: 1
CONSTIPATION: 0
ABDOMINAL PAIN: 0
COLOR CHANGE: 0
BACK PAIN: 0
STRIDOR: 0
CHEST TIGHTNESS: 0

## 2021-10-30 ASSESSMENT — PAIN DESCRIPTION - ORIENTATION
ORIENTATION: LOWER;MID
ORIENTATION: LOWER;MID

## 2021-10-30 ASSESSMENT — PAIN DESCRIPTION - LOCATION
LOCATION: ABDOMEN
LOCATION: ABDOMEN

## 2021-10-30 ASSESSMENT — PAIN DESCRIPTION - PAIN TYPE
TYPE: ACUTE PAIN
TYPE: ACUTE PAIN

## 2021-10-30 NOTE — ED PROVIDER NOTES
I independently performed a history and physical on Dominic Rivera. All diagnostic, treatment, and disposition decisions were made by myself in conjunction with the advanced practice provider. Briefly, this is a 78 y.o. female here for diarrhea and vomiting. There was report of significant hypoxia to 60s on room air per EMS. She does not currently feel short of breath at rest.  She has a history of COPD per PFT formed in July. History of prior smoking. On exam pt is ill appearing but alert  pale  Lungs wheezes faint throughout, 2 L O2 requirement. No increased work of breathing. Abdomen soft with mild epigastric tenderness and no guarding  No calf edema or tenderness  Neuro no drift in extremities     EKG  The Ekg interpreted by me in the absence of a cardiologist shows. Normal sinus rhythm with ventricular rate of 95. Axis is normal.  QTc is appropriate. No specific ST or T wave changes. There is significant artifact limiting EKG interpretation. No significant change from prior 2-19-21      CT CHEST PULMONARY EMBOLISM W CONTRAST   Final Result   Organs: Normal attenuation throughout the liver. No discrete hepatic lesion   or intrahepatic bile duct dilatation is seen. The gallbladder, kidneys,   spleen, adrenal glands and pancreas appear unremarkable. GI/Bowel: The small bowel and stomach appear unremarkable. No diffuse or   focal bowel wall thickening evident. No inflammatory changes evident. No   obstruction is seen. The appendix is indistinguishable if present. Fluid-filled small bowel and colon possibly related to diarrhea. Mildly   prominent appearance of the mucosa throughout the small bowel and colon. Pelvis: The atrophied uterus and adnexal structures appear unremarkable. Urinary bladder is partially filled, unremarkable appearance. No adenopathy   or free fluid. Peritoneum/Retroperitoneum: Calcific atherosclerotic disease aorta.   2.8 x   2.8 cm infrarenal abdominal aorta aneurysm. Unremarkable appearance of the   IVC. No adenopathy or fluid. Bones/Soft Tissues: Age indeterminate but chronic appearing slight   compression superior endplate T5. Mild degenerative changes throughout the   thoracic spine. No acute superficial soft tissue abnormality appreciated. IMPRESSION:   1. CTA CHEST: No acute pulmonary embolism. 2. No acute pulmonary disease. 3. 3 mm mean diameter soft tissue pulmonary nodule right upper lobe almost   certainly reflects a benign, postinfectious/inflammatory nodule. No   additional evaluation or follow-up indicated. Dependent on lung cancer risk,   annual CT lung cancer screening should be considered. 4.  Pulmonary sequela typical of that seen with smoking, including COPD.   5. Mild calcific atherosclerosis aorta and coronary arteries. Prominent mural   thrombus noted left side of the descending thoracic aorta. 6. Small hiatal hernia. 7. CT ABDOMEN/PELVIS: Possible mild enterocolitis. Appearance of   mucosal/wall prominence of the small bowel and colon may simply be related to   the amount of fluid in the small bowel and colon acting as negative contrast.   Probable diarrhea. 8. No other CT evidence of an acute intra-abdominal or intrapelvic process. 9.  No findings to suggest acute appendicitis; no ureter calculus or   hydronephrosis. 10. 2.8 cm infrarenal abdominal aorta aneurysm; imaging every 5 years   indicated for surveillance. *   11. Age indeterminate but chronic appearing slight compression superior   endplate T5.   ______________________________________________________________________________   ____      *      For management of fusiform AAA:      2.6-2.9 cm aorta, recommend follow-up every 5 years or aortas meeting the   criteria for AAA (>1.5 x proximal normal segment; no f/u if < 1.5 x proximal   normal segment; no f/u for aortas < 2.6 cm).       Note: Recommend Vascular consultation if a fusiform AAA enlarges by >0.5 cm   in 6 months or >1 cm in 1 year or for a saccular AAA of any size. References: Jorge A Silverio Radiol 2013; 86(48):296-709; J Vasc Surg. 2018; 67:2-77      RECOMMENDATIONS:   Imaging AAA every 5 years         CT ABDOMEN PELVIS W IV CONTRAST Additional Contrast? None   Final Result   Organs: Normal attenuation throughout the liver. No discrete hepatic lesion   or intrahepatic bile duct dilatation is seen. The gallbladder, kidneys,   spleen, adrenal glands and pancreas appear unremarkable. GI/Bowel: The small bowel and stomach appear unremarkable. No diffuse or   focal bowel wall thickening evident. No inflammatory changes evident. No   obstruction is seen. The appendix is indistinguishable if present. Fluid-filled small bowel and colon possibly related to diarrhea. Mildly   prominent appearance of the mucosa throughout the small bowel and colon. Pelvis: The atrophied uterus and adnexal structures appear unremarkable. Urinary bladder is partially filled, unremarkable appearance. No adenopathy   or free fluid. Peritoneum/Retroperitoneum: Calcific atherosclerotic disease aorta. 2.8 x   2.8 cm infrarenal abdominal aorta aneurysm. Unremarkable appearance of the   IVC. No adenopathy or fluid. Bones/Soft Tissues: Age indeterminate but chronic appearing slight   compression superior endplate T5. Mild degenerative changes throughout the   thoracic spine. No acute superficial soft tissue abnormality appreciated. IMPRESSION:   1. CTA CHEST: No acute pulmonary embolism. 2. No acute pulmonary disease. 3. 3 mm mean diameter soft tissue pulmonary nodule right upper lobe almost   certainly reflects a benign, postinfectious/inflammatory nodule. No   additional evaluation or follow-up indicated. Dependent on lung cancer risk,   annual CT lung cancer screening should be considered.    4.  Pulmonary sequela typical of that seen with smoking, including COPD.   5. Mild calcific atherosclerosis aorta and coronary arteries. Prominent mural   thrombus noted left side of the descending thoracic aorta. 6. Small hiatal hernia. 7. CT ABDOMEN/PELVIS: Possible mild enterocolitis. Appearance of   mucosal/wall prominence of the small bowel and colon may simply be related to   the amount of fluid in the small bowel and colon acting as negative contrast.   Probable diarrhea. 8. No other CT evidence of an acute intra-abdominal or intrapelvic process. 9.  No findings to suggest acute appendicitis; no ureter calculus or   hydronephrosis. 10. 2.8 cm infrarenal abdominal aorta aneurysm; imaging every 5 years   indicated for surveillance. *   11. Age indeterminate but chronic appearing slight compression superior   endplate T5.   ______________________________________________________________________________   ____      *      For management of fusiform AAA:      2.6-2.9 cm aorta, recommend follow-up every 5 years or aortas meeting the   criteria for AAA (>1.5 x proximal normal segment; no f/u if < 1.5 x proximal   normal segment; no f/u for aortas < 2.6 cm). Note: Recommend Vascular consultation if a fusiform AAA enlarges by >0.5 cm   in 6 months or >1 cm in 1 year or for a saccular AAA of any size. References: Versa Minh Radiol 2013; 22(52):113-659; J Vasc Surg.  2018; 67:2-77      RECOMMENDATIONS:   Imaging AAA every 5 years           Labs Reviewed   CBC WITH AUTO DIFFERENTIAL - Abnormal; Notable for the following components:       Result Value    WBC 19.8 (*)     RBC 5.31 (*)     MPV 11.4 (*)     Neutrophils Absolute 17.8 (*)     Lymphocytes Absolute 0.6 (*)     All other components within normal limits    Narrative:     Performed at:  [facility] [address], [facility], [address]   Phone [phone]   URINE RT REFLEX TO CULTURE - Abnormal; Notable for the following components:    Blood, Urine TRACE (*)     Protein, UA 30 (*)     All other components within normal limits    Narrative:     Performed at:  [facility] [address], [place], [address]   Phone [phone]   LACTATE, SEPSIS - Abnormal; Notable for the following components:    Lactic Acid, Sepsis 4.9 (*)     All other components within normal limits    Narrative:     Daphne Fragoso  [facility] tel. [phone],  Chemistry results called to and read back by Mary Cowan, 10/30/2021 16:09,  by [facility]  Chemistry results called to and read back by Davidson Hoskins, 10/30/2021 16:08,  by [facility]  Chemistry results called to and read back by RN, [facility], 10/30/2021  16:07, by [facility]  Performed at:  [facility] [address], [place], [address]   Phone [phone]   PROCALCITONIN - Abnormal; Notable for the following components:    Procalcitonin 0.19 (*)     All other components within normal limits    Narrative:     Performed at:  [facility] [address], [place], [facility] [employer]   Phone [phone]   PROTIME-INR - Abnormal; Notable for the following components:    Protime 13.7 (*)     INR 1.20 (*)     All other components within normal limits    Narrative:     Performed at:  [facility] [address], [place], [address]   Phone [phone]   COMPREHENSIVE METABOLIC PANEL W/ REFLEX TO MG FOR LOW K - Abnormal; Notable for the following components:    Potassium reflex Magnesium 3.2 (*)     CO2 18 (*)     Anion Gap 18 (*)     Glucose 124 (*)     CREATININE 0.5 (*)     Albumin 3.2 (*)     Albumin/Globulin Ratio 0.7 (*)     Alkaline Phosphatase 399 (*)     AST 38 (*)     All other components within normal limits    Narrative:     Performed at:  [facility] [address], [place], [facility] [employer]   Phone [phone]   BRAIN NATRIURETIC PEPTIDE - Abnormal; Notable for the following components:    Pro-BNP 1,815 (*)     All other components within normal limits    Narrative:     Performed at:  [facility] [address] [facility], [facility] [employer]   Phone [phone]   BLOOD GAS, VENOUS - Abnormal; Notable for the following components:    pH, Chilo 7.295 (*)     pO2, Chilo 52.4 (*)     Base Excess, Chilo -3.3 (*)     Carboxyhemoglobin 3.1 (*)     All other components within normal limits    Narrative:     Performed at:  [facility] [address] [facility], [facility] [employer]   Phone [phone]   BLOOD OCCULT STOOL DIAGNOSTIC - Abnormal; Notable for the following components:    Occult Blood Diagnostic   (*)     Value: Result: POSITIVE  Normal range: Negative      All other components within normal limits    Narrative:     ORDER#: [id number]                          ORDERED BY: ABY WISE  SOURCE: Stool                              COLLECTED:  10/30/21 16:05  ANTIBIOTICS AT RADHA.:                      RECEIVED :  10/30/21 16:13  Performed at:  [facility] [address], [place], [facility] [employer]   Phone [phone]   MICROSCOPIC URINALYSIS - Abnormal; Notable for the following components:    Hyaline Casts, UA 15 (*)     WBC, UA 9 (*)     Epithelial Cells, UA 7 (*)     All other components within normal limits    Narrative:     Performed at:  [facility] [address], [place], [facility] [employer]   Phone [phone]   C DIFF TOXIN/ANTIGEN    Narrative:     ORDER#: [id number]                          ORDERED BY: Ronda Hernandez  SOURCE: Stool                              COLLECTED:  10/30/21 16:05  ANTIBIOTICS AT RADHA.:                      RECEIVED :  10/30/21 16:13  Collect White vial (sterile container)  Performed at:  [facility] [address], [place], [facility] [employer]   Phone [phone]   CULTURE, BLOOD 1   CULTURE, BLOOD 2   GASTROINTESTINAL PANEL, MOLECULAR   LIPASE Narrative:     Performed at:  [facility]  [address] [facility] [address] [employer]   Phone [phone]   HCG, SERUM, QUALITATIVE    Narrative:     Performed at:  [facility]  [address] [facility] [address]   Phone [phone]   LACTATE, SEPSIS    Narrative:     Performed at:  [facility]  [address] [facility],  [place], [address]   Phone [phone]   TROPONIN    Narrative:     Performed at:  [facility]  [address] [facility],  [place], [address] [employer]   Phone [phone]   MAGNESIUM    Narrative:     Performed at:  [facility]  [address] [facility],  [place], [employer]   Phone [phone]     Medications   cefepime (MAXIPIME) 2000 mg IVPB minibag (2,000 mg IntraVENous New Bag 10/30/21 1821)   potassium chloride (KLOR-CON) extended release tablet 40 mEq (has no administration in time range)   vancomycin 1000 mg IVPB in 250 mL D5W addavial (has no administration in time range)   0.9 % sodium chloride bolus (0 mLs IntraVENous Stopped 10/30/21 1820)   iopamidol (ISOVUE-370) 76 % injection 75 mL (75 mLs IntraVENous Given by Other 10/30/21 1631)   0.9 % sodium chloride bolus (500 mLs IntraVENous New Bag 10/30/21 1645)   morphine injection 4 mg (4 mg IntraVENous Given 10/30/21 1825)   methylPREDNISolone sodium (SOLU-MEDROL) injection 125 mg (125 mg IntraVENous Given 10/30/21 1826)   ipratropium-albuterol (DUONEB) nebulizer solution 1 ampule (1 ampule Inhalation Given 10/30/21 1839)         Patient is 58-year-old female presenting with nausea, vomiting, loose stool and shortness of breath. On arrival she is tachycardic with a stable blood pressure. She does have a new 2 L O2 requirement without any acute distress. She does have confirmed mild COPD on recent PFT and breathing treatments, steroids and IV fluid was initiated.   Abdominal exam is fairly benign on my evaluation however imaging today is concerning for colitis changes as well as multiple incidentals including a nonruptured less than 4 cm AAA and a pulmonary nodule. She does meet sepsis criteria with high suspicion for colitis contributing. C. difficile sent and negative, other stool studies are pending at this time. Broad-spectrum antibiotics were initiated and she will require admission for close monitoring, antibiotic treatment and IV fluids. She is agreeable to admission today for the above. Patient Referrals:  No follow-up provider specified. Discharge Medications:  New Prescriptions    No medications on file       FINAL IMPRESSION  1. Enterocolitis    2. Septicemia ([address]    3. Pulmonary nodule    4. Abdominal aortic aneurysm (AAA) without rupture ([facility] [address]    5. Hypokalemia    6. Hypoxia        Blood pressure (!) 129/49, pulse 126, temperature 98 °F (36.7 °C), temperature source Axillary, resp. rate 27, weight 103 lb (46.7 kg), SpO2 96 %, not currently breastfeeding.      For further details of [facility] emergency department encounter, please see documentation by advanced practice provider         Ella Goldman MD  10/30/21 4940

## 2021-10-30 NOTE — ED PROVIDER NOTES
[address] [facility]        Pt Name: Fadumo Ford  MRN: [id number]  [place] 1955  Date of evaluation: 10/30/2021  Provider: TJ Gonzalez  PCP: Anastacio Arroyo MD  Note Started: 3:20 PM EDT        I have seen and evaluated this patient with my supervising physician Harvinder Aquino MD.    [facility]       Chief Complaint   Patient presents with    Emesis     arrived via EMS d/t emesis, weakness, incont, difficult swallowing, SpO2 64% RA place on 2L 98%. /69. P79.  with hx of DM; swallowing issue started yesterday and emesis and incont started just after 12 today;       HISTORY OF PRESENT ILLNESS   (Location, Timing/Onset, Context/Setting, Quality, Duration, Modifying Factors, Severity, Associated Signs and Symptoms)  Note limiting factors. Chief Complaint: Nausea and vomiting    Fadumo Ford is a 78 y.o. female with past medical history of diabetes, hyperlipidemia, hypertension, documented biliary cholangitis who presents the ED with complaint of nausea and vomiting. Patient arrived by EMS with complaints of nausea and vomiting. Patient states she had difficulty swallowing and sore throat over the past couple of days. Patient denies any drooling, trismus, stridor or respiratory distress. Denies any changes in phonation. Patient states yesterday she had episode of nausea and vomiting. States ever since then she has felt like she has had some weakness, cough and difficulty breathing. Patient denies any abdominal pain. Denies any dysuria, frequency, urgency or hematuria. Apparently she had some urinary incontinence. Denies any changes in bowel movements. Denies fever chills. Denies sick contacts or recent travel. Patient does not use oxygen at home. EMS arrived and she was 64% on room air. Patient on 2 L nasal cannula oxygen this time with oxygen saturation at 98%.   Patient denies any history of gastroparesis or issues with her stomach. States she is a diabetic. Blood sugar 132 in route. Denies any known exposure to COVID-19 in the coronavirus. Patient states she has been vaccinated against COVID-19. Nursing Notes were all reviewed and agreed with or any disagreements were addressed in the HPI. REVIEW OF SYSTEMS    (2-9 systems for level 4, 10 or more for level 5)     Review of Systems   Constitutional: Positive for activity change, appetite change and fatigue. Negative for chills, diaphoresis and fever. Eyes: Negative for photophobia and visual disturbance. Respiratory: Positive for cough and shortness of breath. Negative for chest tightness, wheezing and stridor. Cardiovascular: Negative. Negative for chest pain, palpitations and leg swelling. Gastrointestinal: Positive for nausea and vomiting. Negative for abdominal pain, constipation and diarrhea. Genitourinary: Negative for decreased urine volume, difficulty urinating, dysuria, flank pain, frequency, hematuria, menstrual problem, pelvic pain, urgency, vaginal bleeding, vaginal discharge and vaginal pain. Musculoskeletal: Negative for arthralgias, back pain, myalgias, neck pain and neck stiffness. Skin: Negative for color change, pallor, rash and wound. Neurological: Negative for dizziness, tremors, seizures, syncope, speech difficulty, weakness, light-headedness, numbness and headaches. Positives and Pertinent negatives as per HPI. Except as noted above in the ROS, all other systems were reviewed and negative.        PAST MEDICAL HISTORY     Past Medical History:   Diagnosis Date    Controlled type 2 diabetes mellitus without complication, without long-term current use of insulin ([facility] [address] 12/16/2019    no meds, low A1C    Dyslipidemia 7/5/2017    Essential hypertension 5/16/2018    GERD (gastroesophageal reflux disease)     Heart murmur     very early stages    Hypercholesterolemia 5/16/2018    Hyperlipidemia  Hypertension     Lichen planus     external, mainly arms, legs    PONV (postoperative nausea and vomiting)     usually the next day, vomits once    Primary biliary cholangitis ([facility] [address] 2019    Primary biliary cholangitis ([facility] [address] 2019         SURGICAL HISTORY     Past Surgical History:   Procedure Laterality Date    CARPAL TUNNEL RELEASE       SECTION      COLONOSCOPY      ENDOMETRIAL ABLATION      ENDOSCOPY, COLON, DIAGNOSTIC      LIVER BIOPSY N/A     SINUS ENDOSCOPY N/A 3/8/2021    NASAL ENDOSCOPY; BIOPSY OF NASOPHARYNGEAL LESION performed by Sherice Diego DO at [address] [facility]       Previous Medications    ASCORBIC ACID (VITAMIN C) 500 MG TABLET    Take 500 mg by mouth daily. ATORVASTATIN (LIPITOR) 80 MG TABLET    TAKE 1 TABLET BY MOUTH DAILY    CALCIUM CARBONATE (OYSTER SHELL CALCIUM 500 MG) 1250 (500 CA) MG TABLET    Take 1 tablet by mouth daily    FAMOTIDINE (PEPCID) 20 MG TABLET    Take 1 tablet by mouth nightly    FLUTICASONE FUROATE-VILANTEROL (BREO ELLIPTA) 200-25 MCG/INH AEPB INHALER    Inhale 1 puff into the lungs daily    LISINOPRIL (PRINIVIL;ZESTRIL) 5 MG TABLET    TAKE 1 TABLET BY MOUTH DAILY    LORATADINE (CLARITIN) 10 MG TABLET    TAKE 1 TABLET BY MOUTH DAILY    MULTIPLE VITAMINS-MINERALS (MULTIVITAMIN PO)    Take 1 capsule by mouth daily.     PANCRELIPASE, LIP-PROT-AMYL, (ZENPEP) 36237-630478 UNITS CPEP    Take 1 capsule by mouth 5 times daily Before meals and snacks    TRIAMCINOLONE (KENALOG) 0.1 % CREAM    Apply topically 2 times daily as needed    URSODIOL (ACTIGALL) 500 MG TABLET    2 times daily     VITAMIN D (CHOLECALCIFEROL) 1000 UNIT TABS TABLET    Take 1,000 Units by mouth daily         ALLERGIES     Penicillins, Sulfa antibiotics, and Sulfasalazine    FAMILYHISTORY       Family History   Problem Relation Age of Onset    Cancer Mother         Lung Cancer    Stroke Mother    Elina Roberts Diabetes Father     High Cholesterol Father     Hypertension Father     Heart Failure Father     Substance Abuse Sister           SOCIAL HISTORY       Social History     Tobacco Use    Smoking status: Former Smoker     Packs/day: 0.75     Years: 6.00     Pack years: 4.50     Types: Cigarettes     Quit date: 2019     Years since quittin.6    Smokeless tobacco: Never Used    Tobacco comment: \"last week\" 10/30/21   Vaping Use    Vaping Use: Never used    Passive vaping exposure Yes   Substance Use Topics    Alcohol use: Yes     Alcohol/week: 2.0 standard drinks     Types: 2 Shots of liquor per week     Comment: occasional / 2 per month    Drug use: No       SCREENINGS             PHYSICAL EXAM    (up to 7 for level 4, 8 or more for level 5)     ED Triage Vitals   BP Temp Temp src Pulse Resp SpO2 Height Weight   -- -- -- -- -- -- -- --       Physical Exam  Constitutional:       General: She is in acute distress. Appearance: Normal appearance. She is well-developed. She is ill-appearing. She is not toxic-appearing or diaphoretic. HENT:      Head: Normocephalic and atraumatic. Right Ear: External ear normal.      Left Ear: External ear normal.      Nose: Nose normal. No congestion or rhinorrhea. Mouth/Throat:      Mouth: Mucous membranes are moist.      Pharynx: No oropharyngeal exudate or posterior oropharyngeal erythema. Comments: Posterior pharynx unremarkable without any tonsillar edema, exudate or erythema. Uvula midline. Handling secretions without difficulty. No drooling, trismus, stridor or changes in phonation. Eyes:      General:         Right eye: No discharge. Left eye: No discharge. Conjunctiva/sclera: Conjunctivae normal.   Cardiovascular:      Rate and Rhythm: Regular rhythm. Tachycardia present. Pulses: Normal pulses. Heart sounds: Normal heart sounds. No murmur heard. No friction rub. No gallop.        Comments: 2+ radial pulses bilaterally. No pedal edema. No calf tenderness. No JVD. Pulmonary:      Effort: Pulmonary effort is normal. No respiratory distress. Breath sounds: No stridor. Rhonchi present. No wheezing or rales. Comments: Patient on 2 L nasal cannula oxygen this time with oxygen saturation in the upper 90s. Does have some conversational dyspnea. No accessory muscle use was noted. Rhonchorous lung sounds throughout. Chest:      Chest wall: No tenderness. Abdominal:      General: Abdomen is flat. Bowel sounds are normal. There is no distension. Palpations: Abdomen is soft. There is no mass. Tenderness: There is generalized abdominal tenderness. There is no right CVA tenderness, left CVA tenderness, guarding or rebound. Hernia: No hernia is present. Musculoskeletal:         General: Normal range of motion. Cervical back: Normal range of motion and neck supple. No rigidity or tenderness. Lymphadenopathy:      Cervical: No cervical adenopathy. Skin:     General: Skin is warm and dry. Coloration: Skin is not pale. Findings: No erythema or rash. Neurological:      Mental Status: She is alert and oriented to person, place, and time.    Psychiatric:         Behavior: Behavior normal.         DIAGNOSTIC RESULTS   LABS:    Labs Reviewed   CBC WITH AUTO DIFFERENTIAL - Abnormal; Notable for the following components:       Result Value    WBC 19.8 (*)     RBC 5.31 (*)     MPV 11.4 (*)     Neutrophils Absolute 17.8 (*)     Lymphocytes Absolute 0.6 (*)     All other components within normal limits    Narrative:     Performed at:  [facility] [address], [place], [address]   Phone [phone]   URINE RT REFLEX TO CULTURE - Abnormal; Notable for the following components:    Blood, Urine TRACE (*)     Protein, UA 30 (*)     All other components within normal limits    Narrative:     Performed at:  [facility]  [address]   Phone [phone]   LACTATE, SEPSIS - Abnormal; Notable for the following components:    Lactic Acid, Sepsis 4.9 (*)     All other components within normal limits    Narrative:     Mary Jo Weston  [facility] tel. [phone],  Chemistry results called to and read back by Inna Byrd, 10/30/2021 16:09,  by [facility]  Chemistry results called to and read back by Mariano Cool, 10/30/2021 16:08,  by [facility]  Chemistry results called to and read back by RN, Mariano Cool, 10/30/2021  16:07, by [facility]  Performed at:  [facility] [address], [place], [address]   Phone [phone]   PROCALCITONIN - Abnormal; Notable for the following components:    Procalcitonin 0.19 (*)     All other components within normal limits    Narrative:     Performed at:  [facility] [address], [place], [address]   Phone [phone]   PROTIME-INR - Abnormal; Notable for the following components:    Protime 13.7 (*)     INR 1.20 (*)     All other components within normal limits    Narrative:     Performed at:  [facility] [address], [place], [address]   Phone [phone]   COMPREHENSIVE METABOLIC PANEL W/ REFLEX TO MG FOR LOW K - Abnormal; Notable for the following components:    Potassium reflex Magnesium 3.2 (*)     CO2 18 (*)     Anion Gap 18 (*)     Glucose 124 (*)     CREATININE 0.5 (*)     Albumin 3.2 (*)     Albumin/Globulin Ratio 0.7 (*)     Alkaline Phosphatase 399 (*)     AST 38 (*)     All other components within normal limits    Narrative:     Performed at:  [facility] [address], [place], [address]   Phone [phone]  - Abnormal; Notable for the following components:    Pro-BNP 1,815 (*)     All other components within normal limits    Narrative:     Performed at:  [facility]  [address] [address] [employer]   Phone [phone]   BLOOD GAS, VENOUS - Abnormal; Notable for the following components:    pH, Chilo 7.295 (*)     pO2, Chilo 52.4 (*)     Base Excess, Chilo -3.3 (*)     Carboxyhemoglobin 3.1 (*)     All other components within normal limits    Narrative:     Performed at:  [facility] [address], [place], [address] [employer] [employer]   Phone [phone]   BLOOD OCCULT STOOL DIAGNOSTIC - Abnormal; Notable for the following components:    Occult Blood Diagnostic   (*)     Value: Result: POSITIVE  Normal range: Negative      All other components within normal limits    Narrative:     ORDER#: [id number]                          ORDERED BY: ABY WISE  SOURCE: Stool                              COLLECTED:  10/30/21 16:05  ANTIBIOTICS AT RADHA.:                      RECEIVED :  10/30/21 16:13  Performed at:  [facility] [address], [place], [address] [employer]   Phone [phone]   MICROSCOPIC URINALYSIS - Abnormal; Notable for the following components:    Hyaline Casts, UA 15 (*)     WBC, UA 9 (*)     Epithelial Cells, UA 7 (*)     All other components within normal limits    Narrative:     Performed at:  [facility] [address], [place], [address] [employer]   Phone [phone]   C DIFF TOXIN/ANTIGEN    Narrative:     ORDER#: [id number]                          ORDERED BY: Kathy Good  SOURCE: Stool                              COLLECTED:  10/30/21 16:05  ANTIBIOTICS AT RADHA.:                      RECEIVED :  10/30/21 16:13  Collect White vial (sterile container)  Performed at:  [facility] [address], [place], [address] [employer]   Phone [phone]   CULTURE, BLOOD 1   CULTURE, BLOOD 2   GASTROINTESTINAL PANEL, MOLECULAR   LIPASE    Narrative:     Performed at:  [facility] [address], [place], [address] [employer]   Phone [phone]   HCG, SERUM, QUALITATIVE    Narrative:     Performed at:  [facility]  [address],  [place], [address]   Phone [phone]   LACTATE, SEPSIS    Narrative:     Performed at:  [facility]  [address],  [place], [address]   Phone [phone]   TROPONIN    Narrative:     Performed at:  [facility]  [address],  [place], [address]   Phone [phone]   MAGNESIUM    Narrative:     Performed at:  [facility]  [address],  [place], [address]   Phone [phone]       When ordered only abnormal lab results are displayed. All other labs were within normal range or not returned as of this dictation. EKG: When ordered, EKG's are interpreted by the Emergency Department Physician in the absence of a cardiologist.  Please see their note for interpretation of EKG. RADIOLOGY:   Non-plain film images such as CT, Ultrasound and MRI are read by the radiologist. Plain radiographic images are visualized and preliminarily interpreted by the ED Provider with the below findings:        Interpretation per the Radiologist below, if available at the time of this note:    CT CHEST PULMONARY EMBOLISM W CONTRAST   Final Result   Organs: Normal attenuation throughout the liver. No discrete hepatic lesion   or intrahepatic bile duct dilatation is seen. The gallbladder, kidneys,   spleen, adrenal glands and pancreas appear unremarkable. GI/Bowel: The small bowel and stomach appear unremarkable. No diffuse or   focal bowel wall thickening evident. No inflammatory changes evident. No   obstruction is seen. The appendix is indistinguishable if present. Fluid-filled small bowel and colon possibly related to diarrhea. Mildly   prominent appearance of the mucosa throughout the small bowel and colon. Pelvis:  The atrophied uterus and adnexal structures appear unremarkable. Urinary bladder is partially filled, unremarkable appearance. No adenopathy   or free fluid. Peritoneum/Retroperitoneum: Calcific atherosclerotic disease aorta. 2.8 x   2.8 cm infrarenal abdominal aorta aneurysm. Unremarkable appearance of the   IVC. No adenopathy or fluid. Bones/Soft Tissues: Age indeterminate but chronic appearing slight   compression superior endplate T5. Mild degenerative changes throughout the   thoracic spine. No acute superficial soft tissue abnormality appreciated. IMPRESSION:   1. CTA CHEST: No acute pulmonary embolism. 2. No acute pulmonary disease. 3. 3 mm mean diameter soft tissue pulmonary nodule right upper lobe almost   certainly reflects a benign, postinfectious/inflammatory nodule. No   additional evaluation or follow-up indicated. Dependent on lung cancer risk,   annual CT lung cancer screening should be considered. 4.  Pulmonary sequela typical of that seen with smoking, including COPD.   5. Mild calcific atherosclerosis aorta and coronary arteries. Prominent mural   thrombus noted left side of the descending thoracic aorta. 6. Small hiatal hernia. 7. CT ABDOMEN/PELVIS: Possible mild enterocolitis. Appearance of   mucosal/wall prominence of the small bowel and colon may simply be related to   the amount of fluid in the small bowel and colon acting as negative contrast.   Probable diarrhea. 8. No other CT evidence of an acute intra-abdominal or intrapelvic process. 9.  No findings to suggest acute appendicitis; no ureter calculus or   hydronephrosis. 10. 2.8 cm infrarenal abdominal aorta aneurysm; imaging every 5 years   indicated for surveillance. *   11.  Age indeterminate but chronic appearing slight compression superior   endplate T5.   ______________________________________________________________________________   ____      *      For management of fusiform AAA:      2.6-2.9 cm aorta, recommend follow-up every 5 years or aortas meeting the   criteria for AAA (>1.5 x proximal normal segment; no f/u if < 1.5 x proximal   normal segment; no f/u for aortas < 2.6 cm). Note: Recommend Vascular consultation if a fusiform AAA enlarges by >0.5 cm   in 6 months or >1 cm in 1 year or for a saccular AAA of any size. References: Xavier Osborne Radiol 2013; 31(06):044-422; J Vasc Surg. 2018; 67:2-77      RECOMMENDATIONS:   Imaging AAA every 5 years         CT ABDOMEN PELVIS W IV CONTRAST Additional Contrast? None   Final Result   Organs: Normal attenuation throughout the liver. No discrete hepatic lesion   or intrahepatic bile duct dilatation is seen. The gallbladder, kidneys,   spleen, adrenal glands and pancreas appear unremarkable. GI/Bowel: The small bowel and stomach appear unremarkable. No diffuse or   focal bowel wall thickening evident. No inflammatory changes evident. No   obstruction is seen. The appendix is indistinguishable if present. Fluid-filled small bowel and colon possibly related to diarrhea. Mildly   prominent appearance of the mucosa throughout the small bowel and colon. Pelvis: The atrophied uterus and adnexal structures appear unremarkable. Urinary bladder is partially filled, unremarkable appearance. No adenopathy   or free fluid. Peritoneum/Retroperitoneum: Calcific atherosclerotic disease aorta. 2.8 x   2.8 cm infrarenal abdominal aorta aneurysm. Unremarkable appearance of the   IVC. No adenopathy or fluid. Bones/Soft Tissues: Age indeterminate but chronic appearing slight   compression superior endplate T5. Mild degenerative changes throughout the   thoracic spine. No acute superficial soft tissue abnormality appreciated. IMPRESSION:   1. CTA CHEST: No acute pulmonary embolism. 2. No acute pulmonary disease. 3. 3 mm mean diameter soft tissue pulmonary nodule right upper lobe almost   certainly reflects a benign, postinfectious/inflammatory nodule.   No   additional evaluation or follow-up indicated. Dependent on lung cancer risk,   annual CT lung cancer screening should be considered. 4.  Pulmonary sequela typical of that seen with smoking, including COPD.   5. Mild calcific atherosclerosis aorta and coronary arteries. Prominent mural   thrombus noted left side of the descending thoracic aorta. 6. Small hiatal hernia. 7. CT ABDOMEN/PELVIS: Possible mild enterocolitis. Appearance of   mucosal/wall prominence of the small bowel and colon may simply be related to   the amount of fluid in the small bowel and colon acting as negative contrast.   Probable diarrhea. 8. No other CT evidence of an acute intra-abdominal or intrapelvic process. 9.  No findings to suggest acute appendicitis; no ureter calculus or   hydronephrosis. 10. 2.8 cm infrarenal abdominal aorta aneurysm; imaging every 5 years   indicated for surveillance. *   11. Age indeterminate but chronic appearing slight compression superior   endplate T5.   ______________________________________________________________________________   ____      *      For management of fusiform AAA:      2.6-2.9 cm aorta, recommend follow-up every 5 years or aortas meeting the   criteria for AAA (>1.5 x proximal normal segment; no f/u if < 1.5 x proximal   normal segment; no f/u for aortas < 2.6 cm). Note: Recommend Vascular consultation if a fusiform AAA enlarges by >0.5 cm   in 6 months or >1 cm in 1 year or for a saccular AAA of any size. References: Helyn Cough Radiol 2013; 41(07):178-326; J Vasc Surg. 2018; 67:2-77      RECOMMENDATIONS:   Imaging AAA every 5 years           No results found. PROCEDURES   Unless otherwise noted below, none     Procedures    CRITICAL CARE TIME   The total critical care time spent while evaluating and treating this patient was 36 minutes. This excludes time spent doing separately billable procedures.   This includes time at the bedside, data interpretation, medication management, obtaining critical history from collateral sources if the patient is unable to provide it directly, and physician consultation. Specifics of interventions taken and potentially life-threatening diagnostic considerations are listed above in the medical decision making. CONSULTS:  None      EMERGENCY DEPARTMENT COURSE and DIFFERENTIAL DIAGNOSIS/MDM:   Vitals:    Vitals:    10/30/21 1550 10/30/21 1551 10/30/21 1600 10/30/21 1615   BP: 106/82  (!) 112/96 (!) 129/49   Pulse: 90  148 126   Resp: 30 22 27   Temp: 98 °F (36.7 °C)      TempSrc: Axillary      SpO2: 97%   100%   Weight:  103 lb (46.7 kg)         Patient was given the following medications:  Medications   0.9 % sodium chloride bolus (has no administration in time range)   morphine injection 4 mg (has no administration in time range)   cefepime (MAXIPIME) 2000 mg IVPB minibag (has no administration in time range)   vancomycin (VANCOCIN) 1000 mg in dextrose 5% 200 mL IVPB (has no administration in time range)   methylPREDNISolone sodium (SOLU-MEDROL) injection 125 mg (has no administration in time range)   ipratropium-albuterol (DUONEB) nebulizer solution 1 ampule (has no administration in time range)   0.9 % sodium chloride bolus (1,000 mLs IntraVENous New Bag 10/30/21 1547)   iopamidol (ISOVUE-370) 76 % injection 75 mL (75 mLs IntraVENous Given by Other 10/30/21 1631)           Patient is a 69-year-old female who presents to the ED with complaint of generalized abdominal pain with associated nausea and vomiting. Patient states symptoms started yesterday. Denies any sick contacts or recent travel. Patient was noted to be tachypneic upon arrival.  64% on room air upon arrival by EMS. Patient was on 2 L nasal cannula oxygen with improvement. Patient is tachycardic. Generalized tenderness palpation throughout the abdomen. There is no rebound or guarding. CBC showed white count of 19.8. Normal hemoglobin and platelets.   Lipase was normal.  Urinalysis unremarkable. Pregnancy was negative. CMP showed alk phos 399 with anion gap of 18 and CO2 of 18. Potassium 3.2 and ordered potassium replacement here in the emergency department orally. Troponin normal.  BNP 1815. VBG showed pH of 7.295. Normal CO2 and bicarb. Magnesium normal.  INR 1.2. Procalcitonin 0.19. Lactic acid 4.9. Lactic acid did improve with fluids here in the ED. Repeat lactic acid 1.4. Patient was given 30 mL/kg fluid bolus here in the emergency department given elevated lactic acidosis, tachypnea, tachycardia and elevated white blood cell count. Given broad-spectrum antibiotics here in the emergency department. Had some diarrhea here and stool studies sent. Blood streaks noted in stool per nursing staff and guaiac was positive. C. difficile was negative. GI pathogen they are currently pending. CT of the chest abdomen pelvis obtained showed incidental pulmonary nodule and infrarenal abdominal aortic aneurysm. Appears to have evidence of enterocolitis. No evidence of aspiration or pneumonia. Patient has rhonchorous lung sounds and given DuoNeb treatment and Solu-Medrol here in the ED given underlying history of appears to be COPD with hypoxia. Concern for nausea, vomiting and diarrhea secondary to enterocolitis with concern for aspiration. Will require admission given oxygen requirement. Patient already on broad-spectrum antibiotics. Case discussed with hospitalist service for admission. SEP-1 CORE MEASURE DATA    Classification: severe sepsis    Amount of fluids ordered: at least 30mL/kg based on entered actual weight at time of triage    Time at which sepsis was identified: 1800    Broad-spectrum antibiotics chosen: Vanc/Cefepime based on suspected source of: Unknown    Repeat lactate level: improving    On reassessment after fluid resuscitation:   pending, to be completed by inpatient team      FINAL IMPRESSION      1. Enterocolitis    2.  Septicemia ([facility] [address]    3. Pulmonary nodule    4. Abdominal aortic aneurysm (AAA) without rupture ([facility] [address]    5. Hypokalemia    6. Hypoxia          DISPOSITION/PLAN   DISPOSITION Decision To Admit 10/30/2021 06:12:27 PM      PATIENT REFERRED TO:  No follow-up provider specified.     DISCHARGE MEDICATIONS:  New Prescriptions    No medications on file       DISCONTINUED MEDICATIONS:  Discontinued Medications    No medications on file              (Please note that portions of this note were completed with a voice recognition program.  Efforts were made to edit the dictations but occasionally words are mis-transcribed.)    TJ Duque (electronically signed)          TJ Sanz  10/30/21 1800

## 2021-10-30 NOTE — ED TRIAGE NOTES
Pt arrived via EMS d/t emesis, weakness, and difficlty swallowing. Upon EMS arrival SpO2 reported to be 64%. O2 at 2 L via NC place Currently SpO2 @ 98%. Pt noted to be shivering and reports chills.

## 2021-10-30 NOTE — ED NOTES
Patient report called to [facility] RN, v/u. Patient transport request placed at this time.       Jeannette Booth RN  10/30/21 1946

## 2021-10-31 LAB
ANION GAP SERPL CALCULATED.3IONS-SCNC: 15 MMOL/L (ref 3–16)
BUN BLDV-MCNC: 19 MG/DL (ref 7–20)
CALCIUM SERPL-MCNC: 8.9 MG/DL (ref 8.3–10.6)
CHLORIDE BLD-SCNC: 106 MMOL/L (ref 99–110)
CO2: 19 MMOL/L (ref 21–32)
CREAT SERPL-MCNC: 0.5 MG/DL (ref 0.6–1.2)
EKG ATRIAL RATE: 95 BPM
EKG DIAGNOSIS: NORMAL
EKG P AXIS: 63 DEGREES
EKG P-R INTERVAL: 144 MS
EKG Q-T INTERVAL: 332 MS
EKG QRS DURATION: 58 MS
EKG QTC CALCULATION (BAZETT): 417 MS
EKG R AXIS: 33 DEGREES
EKG T AXIS: 58 DEGREES
EKG VENTRICULAR RATE: 95 BPM
GFR AFRICAN AMERICAN: >60
GFR NON-AFRICAN AMERICAN: >60
GI BACTERIAL PATHOGENS BY PCR: NORMAL
GLUCOSE BLD-MCNC: 140 MG/DL (ref 70–99)
HCT VFR BLD CALC: 35.3 % (ref 36–48)
HEMOGLOBIN: 11.8 G/DL (ref 12–16)
MCH RBC QN AUTO: 27.6 PG (ref 26–34)
MCHC RBC AUTO-ENTMCNC: 33.3 G/DL (ref 31–36)
MCV RBC AUTO: 82.9 FL (ref 80–100)
PDW BLD-RTO: 13.9 % (ref 12.4–15.4)
PLATELET # BLD: 159 K/UL (ref 135–450)
PMV BLD AUTO: 10.8 FL (ref 5–10.5)
POTASSIUM SERPL-SCNC: 3.6 MMOL/L (ref 3.5–5.1)
RBC # BLD: 4.26 M/UL (ref 4–5.2)
SODIUM BLD-SCNC: 140 MMOL/L (ref 136–145)
WBC # BLD: 13.9 K/UL (ref 4–11)

## 2021-10-31 PROCEDURE — 92610 EVALUATE SWALLOWING FUNCTION: CPT

## 2021-10-31 PROCEDURE — 92526 ORAL FUNCTION THERAPY: CPT

## 2021-10-31 PROCEDURE — 80048 BASIC METABOLIC PNL TOTAL CA: CPT

## 2021-10-31 PROCEDURE — 2580000003 HC RX 258: Performed by: FAMILY MEDICINE

## 2021-10-31 PROCEDURE — 36415 COLL VENOUS BLD VENIPUNCTURE: CPT

## 2021-10-31 PROCEDURE — 6370000000 HC RX 637 (ALT 250 FOR IP): Performed by: FAMILY MEDICINE

## 2021-10-31 PROCEDURE — 6370000000 HC RX 637 (ALT 250 FOR IP): Performed by: NURSE PRACTITIONER

## 2021-10-31 PROCEDURE — 85027 COMPLETE CBC AUTOMATED: CPT

## 2021-10-31 PROCEDURE — 2500000003 HC RX 250 WO HCPCS: Performed by: PHYSICIAN ASSISTANT

## 2021-10-31 PROCEDURE — 2580000003 HC RX 258: Performed by: PHYSICIAN ASSISTANT

## 2021-10-31 PROCEDURE — 6360000002 HC RX W HCPCS: Performed by: FAMILY MEDICINE

## 2021-10-31 PROCEDURE — 1200000000 HC SEMI PRIVATE

## 2021-10-31 PROCEDURE — 93005 ELECTROCARDIOGRAM TRACING: CPT | Performed by: PHYSICIAN ASSISTANT

## 2021-10-31 PROCEDURE — 93010 ELECTROCARDIOGRAM REPORT: CPT | Performed by: INTERNAL MEDICINE

## 2021-10-31 RX ORDER — SODIUM CHLORIDE 9 MG/ML
INJECTION, SOLUTION INTRAVENOUS CONTINUOUS
Status: DISCONTINUED | OUTPATIENT
Start: 2021-10-31 | End: 2021-11-02

## 2021-10-31 RX ORDER — POTASSIUM CHLORIDE 20 MEQ/1
40 TABLET, EXTENDED RELEASE ORAL PRN
Status: DISCONTINUED | OUTPATIENT
Start: 2021-10-31 | End: 2021-11-03 | Stop reason: HOSPADM

## 2021-10-31 RX ORDER — POTASSIUM CHLORIDE 7.45 MG/ML
10 INJECTION INTRAVENOUS PRN
Status: DISCONTINUED | OUTPATIENT
Start: 2021-10-31 | End: 2021-11-03 | Stop reason: HOSPADM

## 2021-10-31 RX ORDER — DIPHENHYDRAMINE HCL 25 MG
25 TABLET ORAL NIGHTLY PRN
Status: DISCONTINUED | OUTPATIENT
Start: 2021-10-31 | End: 2021-11-03 | Stop reason: HOSPADM

## 2021-10-31 RX ADMIN — DIPHENHYDRAMINE HYDROCHLORIDE 25 MG: 25 TABLET ORAL at 23:44

## 2021-10-31 RX ADMIN — SODIUM CHLORIDE: 9 INJECTION, SOLUTION INTRAVENOUS at 06:52

## 2021-10-31 RX ADMIN — ATORVASTATIN CALCIUM 80 MG: 80 TABLET, FILM COATED ORAL at 10:01

## 2021-10-31 RX ADMIN — CIPROFLOXACIN 500 MG: 500 TABLET, FILM COATED ORAL at 20:40

## 2021-10-31 RX ADMIN — METRONIDAZOLE 500 MG: 500 INJECTION, SOLUTION INTRAVENOUS at 10:08

## 2021-10-31 RX ADMIN — SODIUM CHLORIDE: 9 INJECTION, SOLUTION INTRAVENOUS at 15:45

## 2021-10-31 RX ADMIN — ENOXAPARIN SODIUM 40 MG: 100 INJECTION SUBCUTANEOUS at 10:03

## 2021-10-31 RX ADMIN — METRONIDAZOLE 500 MG: 500 INJECTION, SOLUTION INTRAVENOUS at 18:40

## 2021-10-31 RX ADMIN — SODIUM CHLORIDE, PRESERVATIVE FREE 10 ML: 5 INJECTION INTRAVENOUS at 10:02

## 2021-10-31 RX ADMIN — CIPROFLOXACIN 500 MG: 500 TABLET, FILM COATED ORAL at 10:01

## 2021-10-31 RX ADMIN — FAMOTIDINE 20 MG: 20 TABLET, FILM COATED ORAL at 20:40

## 2021-10-31 ASSESSMENT — PAIN SCALES - GENERAL
PAINLEVEL_OUTOF10: 2
PAINLEVEL_OUTOF10: 0
PAINLEVEL_OUTOF10: 2
PAINLEVEL_OUTOF10: 0
PAINLEVEL_OUTOF10: 0

## 2021-10-31 ASSESSMENT — PAIN DESCRIPTION - LOCATION: LOCATION: ABDOMEN

## 2021-10-31 NOTE — PLAN OF CARE
Problem: Falls - Risk of:  Goal: Will remain free from falls  Description: Will remain free from falls  Outcome: Ongoing  Goal: Absence of physical injury  Description: Absence of physical injury  Outcome: Ongoing     Problem: Pain:  Goal: Pain level will decrease  Description: Pain level will decrease  Outcome: Ongoing  Goal: Control of acute pain  Description: Control of acute pain  Outcome: Ongoing     Problem: Nausea/Vomiting:  Goal: Absence of nausea/vomiting  Description: Absence of nausea/vomiting  Outcome: Ongoing

## 2021-10-31 NOTE — H&P
HOSPITALISTS HISTORY AND PHYSICAL    10/30/21  Patient Information:  Dm Barber is a 78 y.o. female [id number]  PCP:  Aaron Kidd MD (Tel: [phone] )    Chief complaint:    Chief Complaint   Patient presents with    Emesis     arrived via EMS d/t emesis, weakness, incont, difficult swallowing, SpO2 64% RA place on 2L 98%. /69. P79.  with hx of DM; swallowing issue started yesterday and emesis and incont started just after 12 today; History of Present Illness:  Laurent Sofia is a 78 y.o. female with h/o COPD, HTN , DM , nicotine use  presented with c/o vomiting and diarrhea. Symptoms are ongoing for the past one day. Denies jamari blood in the vomitus. Denies blood in the stool or melena. No abdominal pain . Denies fever. CT abdomen and pelvis showed Enterocolitis . Stool is Guaiac positive . hb is  14.7. Pt Bp was low in the ED. That responded to fluid resuscitation . She remains tachycardic and nauseated. EKG showed sinus tachycardia WBC count is elevated 19.8 K    REVIEW OF SYSTEMS:   Constitutional: Negative for fever,chills or night sweats  ENT: Negative for rhinorrhea, epistaxis, hoarseness, sore throat. Respiratory: Negative for shortness of breath,wheezing  Cardiovascular: Negative for chest pain, palpitations   Gastrointestinal: +Ve for nausea, vomiting, diarrhea  Genitourinary: Negative for polyuria, dysuria   Hematologic/Lymphatic: Negative for bleeding tendency, easy bruising  Musculoskeletal: Negative for myalgias and arthralgias  Neurologic: Negative for confusion,dysarthria. Skin: Negative for itching,rash  Psychiatric: Negative for depression,anxiety, agitation. Endocrine: Negative for polydipsia,polyuria,heat /cold intolerance.     Past Medical History:   has a past medical history of Controlled type 2 diabetes mellitus without complication, without long-term current use of insulin ([facility] [address] Dyslipidemia, Essential hypertension, GERD (gastroesophageal reflux disease), Heart murmur, Hypercholesterolemia, Hyperlipidemia, Hypertension, Lichen planus, PONV (postoperative nausea and vomiting), Primary biliary cholangitis ([address] and Primary biliary cholangitis ([address] Past Surgical History:   has a past surgical history that includes  section; Carpal tunnel release; Endometrial ablation; Tonsillectomy; [place] tooth extraction; liver biopsy (N/A, ); Endoscopy, colon, diagnostic; Colonoscopy; and Sinus endoscopy (N/A, 3/8/2021). Medications:  No current facility-administered medications on file prior to encounter. Current Outpatient Medications on File Prior to Encounter   Medication Sig Dispense Refill    atorvastatin (LIPITOR) 80 MG tablet TAKE 1 TABLET BY MOUTH DAILY 90 tablet 0    lisinopril (PRINIVIL;ZESTRIL) 5 MG tablet TAKE 1 TABLET BY MOUTH DAILY 90 tablet 1    loratadine (CLARITIN) 10 MG tablet TAKE 1 TABLET BY MOUTH DAILY 90 tablet 1    famotidine (PEPCID) 20 MG tablet Take 1 tablet by mouth nightly 90 tablet 0    ursodiol (ACTIGALL) 500 MG tablet 2 times daily       triamcinolone (KENALOG) 0.1 % cream Apply topically 2 times daily as needed 453.6 g 1    Pancrelipase, Lip-Prot-Amyl, (ZENPEP) 92229-437754 units CPEP Take 1 capsule by mouth 5 times daily Before meals and snacks 120 capsule 1    Fluticasone furoate-vilanterol (BREO ELLIPTA) 200-25 MCG/INH AEPB inhaler Inhale 1 puff into the lungs daily 60 each 3    calcium carbonate (OYSTER SHELL CALCIUM 500 MG) 1250 (500 Ca) MG tablet Take 1 tablet by mouth daily      vitamin D (CHOLECALCIFEROL) 1000 UNIT TABS tablet Take 1,000 Units by mouth daily      Multiple Vitamins-Minerals (MULTIVITAMIN PO) Take 1 capsule by mouth daily.  Ascorbic Acid (VITAMIN C) 500 MG tablet Take 500 mg by mouth daily.        Current Facility-Administered Medications   Medication Dose Route Frequency Provider Last Rate Last Admin    potassium chloride (KLOR-CON M) extended release tablet 40 mEq  40 mEq Oral PRN Nish Rodriguez MD        Or   Viktoria Enriquez potassium bicarb-citric acid (EFFER-K) effervescent tablet 40 mEq  40 mEq Oral PRN Nish Rodriguez MD        Or   Viktoria Enriquez potassium chloride 10 mEq/100 mL IVPB (Peripheral Line)  10 mEq IntraVENous PRN Nish Rodriguez MD        ciprofloxacin (CIPRO) tablet 500 mg  500 mg Oral 2 times per day Nish Rodriguez MD   500 mg at 10/30/21 2109    0.9 % sodium chloride infusion   IntraVENous Continuous Nish Rodriguez  mL/hr at 10/30/21 2257 Rate Verify at 10/30/21 2257    atorvastatin (LIPITOR) tablet 80 mg  80 mg Oral Daily Nish Rodriguez MD   80 mg at 10/30/21 2108    famotidine (PEPCID) tablet 20 mg  20 mg Oral Nightly Nish Rodriguez MD   20 mg at 10/30/21 2108    morphine injection 4 mg  4 mg IntraVENous Q4H PRN Nish Rodriguez MD   4 mg at 10/30/21 2108    sodium chloride flush 0.9 % injection 5-40 mL  5-40 mL IntraVENous 2 times per day Nish Rodriguez MD   10 mL at 10/30/21 2109    sodium chloride flush 0.9 % injection 5-40 mL  5-40 mL IntraVENous PRN Nish Rodriguez MD        0.9 % sodium chloride infusion  25 mL IntraVENous PRN Nish Rodriguez MD        enoxaparin (LOVENOX) injection 40 mg  40 mg SubCUTAneous Daily Nish Rodriguez MD   40 mg at 10/30/21 2109    ondansetron (ZOFRAN-ODT) disintegrating tablet 4 mg  4 mg Oral Q8H PRN Nish Rodriguez MD        Or    ondansetron [facility]) injection 4 mg  4 mg IntraVENous Q6H PRN Nish Rodriguez MD   4 mg at 10/30/21 2108    polyethylene glycol (GLYCOLAX) packet 17 g  17 g Oral Daily PRN Nish Rodriguez MD        acetaminophen (TYLENOL) tablet 650 mg  650 mg Oral Q6H PRN Nish Rodriguez MD        Or   Viktoria Enriquez acetaminophen (TYLENOL) suppository 650 mg  650 mg Rectal Q6H PRN Nish Rodriguez MD         Allergies:   Allergies   Allergen Reactions    Penicillins Other (See Comments)     Doesn't know allergy reaction    Sulfa Antibiotics Other (See Comments)     n/a    Sulfasalazine Hives n/a        Social History:   reports that she quit smoking about 2 years ago. Her smoking use included cigarettes. She has a 4.50 pack-year smoking history. She has never used smokeless tobacco. She reports current alcohol use of about 2.0 standard drinks of alcohol per week. She reports that she does not use drugs. Family History:  family history includes Cancer in her mother; Diabetes in her father; Heart Failure in her father; High Cholesterol in her father; Hypertension in her father; Stroke in her mother; Substance Abuse in her sister. , *    Physical Exam:  /62   Pulse 134   Temp 98.4 °F (36.9 °C) (Oral)   Resp 18   Ht 5' 1\" (1.549 m)   Wt 98 lb 1.6 oz (44.5 kg)   SpO2 96%   BMI 18.54 kg/m²     General appearance:  Appears comfortable. Well nourished  Eyes: Sclera clear, pupils equal  ENT: Moist mucus membranes, no thrush. Trachea midline. Cardiovascular: Regular rhythm, normal S1, S2. No murmur, gallop, rub. No edema in lower extremities  Respiratory: Clear to auscultation bilaterally, no wheeze, good inspiratory effort  Gastrointestinal: Abdomen soft, non-tender, not distended, normal bowel sounds  Musculoskeletal: No cyanosis in digits, neck supple  Neurology: Cranial nerves grossly intact. Alert and oriented in time, place and person. No speech or motor deficits  Psychiatry: Appropriate affect.  Not agitated  Skin: Warm, dry, normal turgor, no rash    Labs:  CBC:   Lab Results   Component Value Date    WBC 19.8 10/30/2021    RBC 5.31 10/30/2021    HGB 14.7 10/30/2021    HCT 44.9 10/30/2021    MCV 84.5 10/30/2021    MCH 27.7 10/30/2021    MCHC 32.7 10/30/2021    RDW 14.1 10/30/2021     10/30/2021    MPV 11.4 10/30/2021     BMP:    Lab Results   Component Value Date     10/30/2021    K 3.2 10/30/2021     10/30/2021    CO2 18 10/30/2021    BUN 11 10/30/2021    CREATININE 0.5 10/30/2021    CALCIUM 10.4 10/30/2021    GFRAA >60 10/30/2021    LABGLOM >60 10/30/2021 GLUCOSE 124 10/30/2021       Chest Xray:   EKG:        Problem List  Active Problems:    Enterocolitis  Resolved Problems:    * No resolved hospital problems. *        Assessment/Plan:         1. Entero colitis  Bowel rest , IV fluids, IV abx  Cultures pending    HTN   bp is on the low side  Holding antihypertensives    Hypokalemia d/t GI loss  Replace and recheck    Chronic respiratory failure d/t COPD   Cont homd dose of O2  Cont breathing treatments    Admit as inpatient. I anticipate hospitalization spanning more than two midnights for investigation and treatment of the above medically necessary diagnoses.       Subhash De León MD    10/30/21

## 2021-10-31 NOTE — PLAN OF CARE
Problem: Falls - Risk of:  Goal: Will remain free from falls  Description: Will remain free from falls  10/31/2021 1055 by Za Aguilar RN  Outcome: Ongoing  10/30/2021 2234 by Gary Braun RN  Outcome: Ongoing  Goal: Absence of physical injury  Description: Absence of physical injury  10/31/2021 1055 by Za Aguilar RN  Outcome: Ongoing  10/30/2021 2234 by Gary Braun RN  Outcome: Ongoing     Problem: Pain:  Goal: Pain level will decrease  Description: Pain level will decrease  10/31/2021 1055 by Za Aguilar RN  Outcome: Ongoing  10/30/2021 2234 by Gary Braun RN  Outcome: Ongoing  Goal: Control of acute pain  Description: Control of acute pain  10/31/2021 1055 by Za Aguilar RN  Outcome: Ongoing  10/30/2021 2234 by Gary Braun RN  Outcome: Ongoing     Problem: Nausea/Vomiting:  Goal: Absence of nausea/vomiting  Description: Absence of nausea/vomiting  10/31/2021 1055 by Za Aguilar RN  Outcome: Ongoing  10/30/2021 2234 by Gary Braun RN  Outcome: Ongoing

## 2021-10-31 NOTE — PROGRESS NOTES
Facility/Department: [address] ORTHO/NEURO NURSING  Initial Assessment  DYSPHAGIA BEDSIDE SWALLOW EVALUATION     Patient: eMllisa Prabhakar   : 1955   MRN: [id number]      Evaluation Date: 10/31/2021   Admitting Diagnosis: Enterocolitis [K52.9]  Hypokalemia [E87.6]  Septicemia ([address] [A41.9]  Hypoxia [R09.02]  Pulmonary nodule [R91.1]  Abdominal aortic aneurysm (AAA) without rupture (HCC) [I71.4]  Pain: No pain reported. H&P: Per chart review, Mellisa Prabhakar is a 78 y.o. female with past medical history of diabetes, hyperlipidemia, hypertension, documented biliary cholangitis who presents the ED with complaint of nausea and vomiting. Patient arrived by EMS with complaints of nausea and vomiting. Patient states she had difficulty swallowing and sore throat over the past couple of days. Patient denies any drooling, trismus, stridor or respiratory distress. Denies any changes in phonation. Patient states yesterday she had episode of nausea and vomiting. States ever since then she has felt like she has had some weakness, cough and difficulty breathing. Patient denies any abdominal pain. Denies any dysuria, frequency, urgency or hematuria. Apparently she had some urinary incontinence. Denies any changes in bowel movements. Denies fever chills. Denies sick contacts or recent travel. Patient does not use oxygen at home. EMS arrived and she was 64% on room air. Patient on 2 L nasal cannula oxygen this time with oxygen saturation at 98%. Patient denies any history of gastroparesis or issues with her stomach. States she is a diabetic. Blood sugar 132 in route. Denies any known exposure to COVID-19 in the coronavirus. Patient states she has been vaccinated against COVID-19. Recent Chest CT 10/30/21:  Impression   Organs: Normal attenuation throughout the liver.  No discrete hepatic lesion   or intrahepatic bile duct dilatation is seen.  The gallbladder, kidneys, spleen, adrenal glands and pancreas appear unremarkable.       GI/Bowel: The small bowel and stomach appear unremarkable. No diffuse or   focal bowel wall thickening evident. No inflammatory changes evident. No   obstruction is seen.  The appendix is indistinguishable if present. Fluid-filled small bowel and colon possibly related to diarrhea. Charolotte Brow   prominent appearance of the mucosa throughout the small bowel and colon.       Pelvis: The atrophied uterus and adnexal structures appear unremarkable. Urinary bladder is partially filled, unremarkable appearance.  No adenopathy   or free fluid.       Peritoneum/Retroperitoneum: Calcific atherosclerotic disease aorta.  2.8 x   2.8 cm infrarenal abdominal aorta aneurysm.  Unremarkable appearance of the   IVC. No adenopathy or fluid.       Bones/Soft Tissues: Age indeterminate but chronic appearing slight   compression superior endplate T5.  Mild degenerative changes throughout the   thoracic spine.  No acute superficial soft tissue abnormality appreciated.       IMPRESSION:   1.  CTA CHEST: No acute pulmonary embolism. 2. No acute pulmonary disease. 3. 3 mm mean diameter soft tissue pulmonary nodule right upper lobe almost   certainly reflects a benign, postinfectious/inflammatory nodule.  No   additional evaluation or follow-up indicated.  Dependent on lung cancer risk,   annual CT lung cancer screening should be considered. 4.  Pulmonary sequela typical of that seen with smoking, including COPD.   5. Mild calcific atherosclerosis aorta and coronary arteries. Prominent mural   thrombus noted left side of the descending thoracic aorta. 6. Small hiatal hernia. 7. CT ABDOMEN/PELVIS: Possible mild enterocolitis.  Appearance of   mucosal/wall prominence of the small bowel and colon may simply be related to   the amount of fluid in the small bowel and colon acting as negative contrast.   Probable diarrhea.    8. No other CT evidence of an acute intra-abdominal or intrapelvic process. 9.  No findings to suggest acute appendicitis; no ureter calculus or   hydronephrosis. 10. 2.8 cm infrarenal abdominal aorta aneurysm; imaging every 5 years   indicated for surveillance. *   11. Age indeterminate but chronic appearing slight compression superior   endplate T5.   ______________________________________________________________________________   ____       *       For management of fusiform AAA:       2.6-2.9 cm aorta, recommend follow-up every 5 years or aortas meeting the   criteria for AAA (>1.5 x proximal normal segment; no f/u if < 1.5 x proximal   normal segment; no f/u for aortas < 2.6 cm).       Note: Recommend Vascular consultation if a fusiform AAA enlarges by >0.5 cm   in 6 months or >1 cm in 1 year or for a saccular AAA of any size.       References: J Am Lamin Radiol 2013; 10(10):789-794; J Vasc Surg. 2018; 67:2-77       RECOMMENDATIONS:   Imaging AAA every 5 years       History/Prior Level of Function:   Living Status: Pt resides independently in private residence. Prior Dysphagia History: Per chart review and pt report, no history of dysphagia prior to new onset of symptoms. Dysphagia Impressions/Diagnosis: Oropharyngeal Dysphagia   Pt upright in bed upon SLP arrival, currently on 1.5L O2 via nasal cannula. Pt alert and providing history for onset of dysphagia symptoms. Pt reported feeling an obstruction or as if something is stuck in throat when swallowing and need to cut food items into small bites in order to consume. Oral motor exam was unremarkable; strength, ROM and coordination appear to be grossly within functional limits. A few missing teeth noted in upper, R side of oral cavity with pt reporting having dental work done ~10 days ago. RN allowed trials of solids along with liquids this date. Various textures provided to assess swallow function.  Thin liquids via cup revealed suspected premature bolus loss and delayed swallow initiation. Pt swallow appears very effortful with each sip of thin liquids as well as successive drinks of thin liquids via straw. Prolonged however functional mastication assessed with soft and regular solids; adequate oral clearance achieved. Pt reported increased difficulty with swallowing regular solids vs soft solids. A slight wet vocal quality noted intermittently with trials of thin liquids, puree and soft solids; suspect reduced pharyngeal clearance. One episode of a delayed throat clear noted following all trials and not consistent to any specific texture. Based on recent onset of symptoms, pt report of globus sensation and effortful swallow with each presentation, recommend completion of a Modified Barium Swallow study to further assess swallow structure and function and determine pt safest level of oral intake. With MD orders for solid food items, recommend upgrade to Dysphagia III/Soft and Bite-Sized with Thin Liquids/no straw and meds one at a a time in puree with use of aspiration precautions. Recommended Diet and Intervention 10/31/2021:  Diet Solids Recommendation:  Dysphagia III/Soft and Bite-Sized -  Upgrade with MD orders  Liquid Consistency Recommendation: Thin Liquids/No straws Recommended form of Meds:   Meds one at a time with water      Compensatory Swallowing Strategies: Alternate solids/liquids , Effortful Swallows , Upright as possible with all PO intake , No straws , Small bites/sips , Swallow 2 times per bite , Eat/feed slowly, Remain upright 30-45 min     SHORT TERM DYSPHAGIA GOALS/PLAN OF CARE: Speech therapy for dysphagia tx 3-5 times per week during acute care stay.     Pt will functionally tolerate recommended diet with no overt clinical s/s of aspiration  Pt will demonstrate understanding of aspiration risk and precautions via education/demonstration with occasional prompting  Pt will advance to least restrictive diet as indicated   If clinical s/s of aspiration/penetration continue to be noted, Pt will participate in Modified Barium Swallow Study    Dysphagia Therapeutic Intervention:  Oral Care, Diet Tolerance Monitoring , Patient/Family Education , Therapeutic Trials with SLP     Discharge Recommendations: Recommend ongoing SLP for intensive dysphagia therapy upon discharge from hospital     Patient Positioning: Upright in bed     Current Diet Level (prior to evaluation): Clear Liquids Only     Respiratory Status:   []Room Air   [x]O2 via nasal cannula: 1.5L O2 via nasal cannula   []Other:    Dentition:  [x]Adequate  []Dentures   []Missing Many Teeth  []Edentulous  [x]Other: missing some posterior, upper R sided teeth     Baseline Vocal Quality:  [x]Normal  []Dysphonic   []Aphonic   []Hoarse  []Wet  []Weak  []Other:    Volitional Cough:  [x]Strong  []Weak  []Wet  []Absent  []Congested  []Other:    Volitional Swallow:   []Absent   []Delayed     [x]Adequate     []Required use of drink     Oral Mechanism Exam:  [x]WFL []Mild   [] Moderate  []Severe  []To be assessed    Oral Phase: []WFL [x]Mild   [] Moderate  []Severe  []To be assessed   [x]Impaired/Prolonged Mastication: mild    []Spillage Left:   []Spillage Right:  []Pocketing Left:   []Pocketing Right:   [x]Decreased Anterior to Posterior Transit: regular solids    [x]Suspected Premature Bolus Loss: thin liquids    []Lingual/Palatal Residue:   []Other:     Pharyngeal Phase: []WFL [x]Mild   [] Moderate  []Severe  []To be assessed   [x]Delayed Swallow: mild   []Suspected Pharyngeal Pooling:   [x]Decreased Laryngeal Elevation: mild    []Absent Swallow:  [x]Wet Vocal Quality: slight throughout trials of thins, puree and soft solids    []Throat Clearing-Immediate:   [x]Throat Clearing-Delayed: following all trials    []Cough-Immediate:   []Cough-Delayed:  []Change in Vital Signs:  [x]Suspected Delayed Pharyngeal Clearing: thins, puree   []Other:       Eating Assistance:  [x]Independent  []Setup or clean-up assistance   [] Supervision or touching assistance   [] Partial or moderate assistance   [] Substantial or maximal assistance  [] Dependent       EDUCATION:   Provided education regarding role of SLP, results of assessment, recommendations and general speech pathology plan of care. [x] Pt verbalized understanding and agreement   [x] Pt requires ongoing learning   [] No evidence of comprehension     If patient discharges prior to next visit, this note will serve as discharge.      Timed Code Minutes: 0 minutes  Total Treatment Minutes: 25 minutes    Electronically signed by:      Luciano Burger M.A., [address] [place] [id number]  Speech-Language Pathologist  10/31/2021 10:41 AM

## 2021-10-31 NOTE — PROGRESS NOTES
(46.3 kg)   05/20/21 106 lb 9.6 oz (48.4 kg)       General appearance:  Appears comfortable  Eyes: Sclera clear. Pupils equal.  ENT: Moist oral mucosa. Trachea midline, no adenopathy. Cardiovascular: Regular rhythm, normal S1, S2. No murmur. No edema in lower extremities  Respiratory: Not using accessory muscles. Good inspiratory effort. Clear to auscultation bilaterally, no wheeze or crackles. GI: Abdomen soft, no tenderness, not distended, normal bowel sounds  Musculoskeletal: No cyanosis in digits, neck supple  Neurology: CN 2-12 grossly intact. No speech or motor deficits  Psych: Normal affect. Alert and oriented in time, place and person  Skin: Warm, dry, normal turgor    Labs and Tests:  CBC:   Recent Labs     10/30/21  1533 10/31/21  0506   WBC 19.8* 13.9*   HGB 14.7 11.8*    159     BMP:    Recent Labs     10/30/21  1533 10/31/21  0506    140   K 3.2* 3.6    106   CO2 18* 19*   BUN 11 19   CREATININE 0.5* 0.5*   GLUCOSE 124* 140*     Hepatic:   Recent Labs     10/30/21  1533   AST 38*   ALT 22   BILITOT 0.8   ALKPHOS 399*     1.  CTA CHEST: No acute pulmonary embolism. 2. No acute pulmonary disease. 3. 3 mm mean diameter soft tissue pulmonary nodule right upper lobe almost   certainly reflects a benign, postinfectious/inflammatory nodule.  No   additional evaluation or follow-up indicated.  Dependent on lung cancer risk,   annual CT lung cancer screening should be considered. 4.  Pulmonary sequela typical of that seen with smoking, including COPD.   5. Mild calcific atherosclerosis aorta and coronary arteries. Prominent mural   thrombus noted left side of the descending thoracic aorta. 6. Small hiatal hernia. 7. CT ABDOMEN/PELVIS: Possible mild enterocolitis.  Appearance of   mucosal/wall prominence of the small bowel and colon may simply be related to   the amount of fluid in the small bowel and colon acting as negative contrast.   Probable diarrhea.    8. No other CT evidence of an acute intra-abdominal or intrapelvic process. 9.  No findings to suggest acute appendicitis; no ureter calculus or   hydronephrosis. 10. 2.8 cm infrarenal abdominal aorta aneurysm; imaging every 5 years   indicated for surveillance. *   11. Age indeterminate but chronic appearing slight compression superior   endplate T5. ECHO 3/21   Normal left ventricle size, wall thickness and systolic function with an   estimated ejection fraction of 65-70%. No regional wall motion abnormalities are seen. Trivial mitral regurgitation is present. Moderate aortic stenosis with a peak velocity of 3.1 m/s and a mean pressure   gradient of 19 mmHg. Trivial tricuspid regurgitation with a PASP of 40 mmHg. Trivial pulmonic regurgitation present. Indeterminate diastolic function. Problem List  Active Problems:    Enterocolitis  Resolved Problems:    * No resolved hospital problems. *       Assessment & Plan:   1. Enterocolitis-continue cipro, add flagyl. Diarrhea and leukocytosis improving. 2. Thoracic Aorta Mural thrombus-No previous mention of this. ? New. Will get vascular/hematology opinion. 3. Acute respiratory failure-no evidence of PE, pneumonia, lungs clear. Currently 96% on RA. Dc the oxygen and monitor. May need to repeat the CXR since she has now been hydrated. 4. Tachycardia--continue iv fluids. Check EKG  5. Dysphagia-sudden in onset, consult speech, GI.   6. 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 Diet: ADULT DIET;  Clear Liquid  Code:Full Code  DVT PPX: lovenox      Verla StarchFLAVIO   10/31/2021 9:42 AM

## 2021-11-01 ENCOUNTER — APPOINTMENT (OUTPATIENT)
Dept: GENERAL RADIOLOGY | Age: 66
DRG: 872 | End: 2021-11-01
Payer: MEDICARE

## 2021-11-01 LAB
ANION GAP SERPL CALCULATED.3IONS-SCNC: 10 MMOL/L (ref 3–16)
BUN BLDV-MCNC: 16 MG/DL (ref 7–20)
CALCIUM SERPL-MCNC: 9 MG/DL (ref 8.3–10.6)
CHLORIDE BLD-SCNC: 114 MMOL/L (ref 99–110)
CO2: 20 MMOL/L (ref 21–32)
CREAT SERPL-MCNC: <0.5 MG/DL (ref 0.6–1.2)
EKG ATRIAL RATE: 88 BPM
EKG DIAGNOSIS: NORMAL
EKG P AXIS: 73 DEGREES
EKG P-R INTERVAL: 172 MS
EKG Q-T INTERVAL: 350 MS
EKG QRS DURATION: 80 MS
EKG QTC CALCULATION (BAZETT): 423 MS
EKG R AXIS: 18 DEGREES
EKG T AXIS: 43 DEGREES
EKG VENTRICULAR RATE: 88 BPM
GFR AFRICAN AMERICAN: >60
GFR NON-AFRICAN AMERICAN: >60
GLUCOSE BLD-MCNC: 97 MG/DL (ref 70–99)
HCT VFR BLD CALC: 32.5 % (ref 36–48)
HCT VFR BLD CALC: 35.9 % (ref 36–48)
HEMOGLOBIN: 10.8 G/DL (ref 12–16)
HEMOGLOBIN: 11.8 G/DL (ref 12–16)
LACTIC ACID: 1.2 MMOL/L (ref 0.4–2)
MCH RBC QN AUTO: 27.7 PG (ref 26–34)
MCHC RBC AUTO-ENTMCNC: 33.2 G/DL (ref 31–36)
MCV RBC AUTO: 83.2 FL (ref 80–100)
PDW BLD-RTO: 14.1 % (ref 12.4–15.4)
PLATELET # BLD: 173 K/UL (ref 135–450)
PMV BLD AUTO: 10.9 FL (ref 5–10.5)
POTASSIUM SERPL-SCNC: 3.9 MMOL/L (ref 3.5–5.1)
PROCALCITONIN: 1.73 NG/ML (ref 0–0.15)
RBC # BLD: 3.9 M/UL (ref 4–5.2)
SODIUM BLD-SCNC: 144 MMOL/L (ref 136–145)
WBC # BLD: 18 K/UL (ref 4–11)

## 2021-11-01 PROCEDURE — 2580000003 HC RX 258: Performed by: PHYSICIAN ASSISTANT

## 2021-11-01 PROCEDURE — 82607 VITAMIN B-12: CPT

## 2021-11-01 PROCEDURE — 84145 PROCALCITONIN (PCT): CPT

## 2021-11-01 PROCEDURE — 74230 X-RAY XM SWLNG FUNCJ C+: CPT

## 2021-11-01 PROCEDURE — 6360000002 HC RX W HCPCS: Performed by: FAMILY MEDICINE

## 2021-11-01 PROCEDURE — 92526 ORAL FUNCTION THERAPY: CPT

## 2021-11-01 PROCEDURE — 80048 BASIC METABOLIC PNL TOTAL CA: CPT

## 2021-11-01 PROCEDURE — 6360000002 HC RX W HCPCS: Performed by: PHYSICIAN ASSISTANT

## 2021-11-01 PROCEDURE — 6370000000 HC RX 637 (ALT 250 FOR IP): Performed by: FAMILY MEDICINE

## 2021-11-01 PROCEDURE — 82746 ASSAY OF FOLIC ACID SERUM: CPT

## 2021-11-01 PROCEDURE — 2580000003 HC RX 258: Performed by: FAMILY MEDICINE

## 2021-11-01 PROCEDURE — 93010 ELECTROCARDIOGRAM REPORT: CPT | Performed by: INTERNAL MEDICINE

## 2021-11-01 PROCEDURE — 99221 1ST HOSP IP/OBS SF/LOW 40: CPT | Performed by: SURGERY

## 2021-11-01 PROCEDURE — 6370000000 HC RX 637 (ALT 250 FOR IP): Performed by: NURSE PRACTITIONER

## 2021-11-01 PROCEDURE — 85027 COMPLETE CBC AUTOMATED: CPT

## 2021-11-01 PROCEDURE — 83605 ASSAY OF LACTIC ACID: CPT

## 2021-11-01 PROCEDURE — 1200000000 HC SEMI PRIVATE

## 2021-11-01 PROCEDURE — 2500000003 HC RX 250 WO HCPCS: Performed by: PHYSICIAN ASSISTANT

## 2021-11-01 PROCEDURE — 85014 HEMATOCRIT: CPT

## 2021-11-01 PROCEDURE — 85018 HEMOGLOBIN: CPT

## 2021-11-01 PROCEDURE — 92610 EVALUATE SWALLOWING FUNCTION: CPT

## 2021-11-01 PROCEDURE — 71046 X-RAY EXAM CHEST 2 VIEWS: CPT

## 2021-11-01 PROCEDURE — APPNB30 APP NON BILLABLE TIME 0-30 MINS: Performed by: NURSE PRACTITIONER

## 2021-11-01 PROCEDURE — 36415 COLL VENOUS BLD VENIPUNCTURE: CPT

## 2021-11-01 PROCEDURE — APPSS60 APP SPLIT SHARED TIME 46-60 MINUTES: Performed by: NURSE PRACTITIONER

## 2021-11-01 RX ORDER — LEVOFLOXACIN 5 MG/ML
750 INJECTION, SOLUTION INTRAVENOUS EVERY 24 HOURS
Status: DISCONTINUED | OUTPATIENT
Start: 2021-11-01 | End: 2021-11-03 | Stop reason: HOSPADM

## 2021-11-01 RX ADMIN — ATORVASTATIN CALCIUM 80 MG: 80 TABLET, FILM COATED ORAL at 09:35

## 2021-11-01 RX ADMIN — SODIUM CHLORIDE: 9 INJECTION, SOLUTION INTRAVENOUS at 01:45

## 2021-11-01 RX ADMIN — SODIUM CHLORIDE, PRESERVATIVE FREE 10 ML: 5 INJECTION INTRAVENOUS at 09:38

## 2021-11-01 RX ADMIN — ENOXAPARIN SODIUM 40 MG: 100 INJECTION SUBCUTANEOUS at 09:35

## 2021-11-01 RX ADMIN — SODIUM CHLORIDE: 9 INJECTION, SOLUTION INTRAVENOUS at 09:53

## 2021-11-01 RX ADMIN — FAMOTIDINE 20 MG: 20 TABLET, FILM COATED ORAL at 21:08

## 2021-11-01 RX ADMIN — METRONIDAZOLE 500 MG: 500 INJECTION, SOLUTION INTRAVENOUS at 09:35

## 2021-11-01 RX ADMIN — METRONIDAZOLE 500 MG: 500 INJECTION, SOLUTION INTRAVENOUS at 01:46

## 2021-11-01 RX ADMIN — METRONIDAZOLE 500 MG: 500 INJECTION, SOLUTION INTRAVENOUS at 17:45

## 2021-11-01 RX ADMIN — CIPROFLOXACIN 500 MG: 500 TABLET, FILM COATED ORAL at 09:35

## 2021-11-01 RX ADMIN — MORPHINE SULFATE 4 MG: 4 INJECTION INTRAVENOUS at 23:37

## 2021-11-01 RX ADMIN — LEVOFLOXACIN 750 MG: 5 INJECTION, SOLUTION INTRAVENOUS at 19:09

## 2021-11-01 RX ADMIN — SODIUM CHLORIDE: 9 INJECTION, SOLUTION INTRAVENOUS at 19:10

## 2021-11-01 RX ADMIN — DIPHENHYDRAMINE HYDROCHLORIDE 25 MG: 25 TABLET ORAL at 23:37

## 2021-11-01 ASSESSMENT — PAIN SCALES - GENERAL
PAINLEVEL_OUTOF10: 3
PAINLEVEL_OUTOF10: 7
PAINLEVEL_OUTOF10: 3
PAINLEVEL_OUTOF10: 0
PAINLEVEL_OUTOF10: 3
PAINLEVEL_OUTOF10: 0
PAINLEVEL_OUTOF10: 3
PAINLEVEL_OUTOF10: 3

## 2021-11-01 ASSESSMENT — PAIN DESCRIPTION - LOCATION
LOCATION: BACK
LOCATION: ABDOMEN;BACK;HAND

## 2021-11-01 ASSESSMENT — PAIN DESCRIPTION - DESCRIPTORS
DESCRIPTORS: ACHING
DESCRIPTORS: ACHING;CONSTANT;DISCOMFORT

## 2021-11-01 ASSESSMENT — PAIN DESCRIPTION - ONSET: ONSET: ON-GOING

## 2021-11-01 ASSESSMENT — PAIN DESCRIPTION - PAIN TYPE
TYPE: ACUTE PAIN
TYPE: ACUTE PAIN

## 2021-11-01 ASSESSMENT — PAIN DESCRIPTION - PROGRESSION: CLINICAL_PROGRESSION: NOT CHANGED

## 2021-11-01 ASSESSMENT — PAIN DESCRIPTION - ORIENTATION: ORIENTATION: LOWER

## 2021-11-01 ASSESSMENT — PAIN DESCRIPTION - FREQUENCY
FREQUENCY: INTERMITTENT
FREQUENCY: INTERMITTENT

## 2021-11-01 ASSESSMENT — PAIN - FUNCTIONAL ASSESSMENT: PAIN_FUNCTIONAL_ASSESSMENT: ACTIVITIES ARE NOT PREVENTED

## 2021-11-01 NOTE — PLAN OF CARE
Results for Alferdo Arias (MRN [id number]) as of 11/1/2021 07:25   Ref.  Range 10/31/2021 05:06 11/1/2021 05:16   WBC Latest Ref Range: 4.0 - 11.0 K/uL 13.9 (H) 18.0 (H)   RBC Latest Ref Range: 4.00 - 5.20 M/uL 4.26 3.90 (L)   Hemoglobin Quant Latest Ref Range: 12.0 - 16.0 g/dL 11.8 (L) 10.8 (L)   Hematocrit Latest Ref Range: 36.0 - 48.0 % 35.3 (L) 32.5 (L)     Jaden Jarquin BSN, RN   [phone]

## 2021-11-01 NOTE — PROGRESS NOTES
[facility] PROGRESS NOTE    11/1/2021 5:02 PM        Name: Michael Bailey . Admitted: 10/30/2021  Primary Care Provider: Amelia Kaur MD (Tel: [phone])      Subjective:  . Patient admitted with sudden onset of profuse diarrhea and abdominal pain-had at least 15 BM Saturday. Symptoms improving today. Had 5 Bm yesterday. Reports occasional productive cough. Abdominal pain improving.      Reviewed interval ancillary notes    Current Medications  levoFLOXacin (LEVAQUIN) 750 MG/150ML infusion 750 mg, Q24H  metronidazole (FLAGYL) 500 mg in NaCl 100 mL IVPB premix, Q8H  potassium chloride (KLOR-CON M) extended release tablet 40 mEq, PRN   Or  potassium bicarb-citric acid (EFFER-K) effervescent tablet 40 mEq, PRN   Or  potassium chloride 10 mEq/100 mL IVPB (Peripheral Line), PRN  0.9 % sodium chloride infusion, Continuous  diphenhydrAMINE (BENADRYL) tablet 25 mg, Nightly PRN  atorvastatin (LIPITOR) tablet 80 mg, Daily  famotidine (PEPCID) tablet 20 mg, Nightly  morphine injection 4 mg, Q4H PRN  sodium chloride flush 0.9 % injection 5-40 mL, 2 times per day  sodium chloride flush 0.9 % injection 5-40 mL, PRN  0.9 % sodium chloride infusion, PRN  enoxaparin (LOVENOX) injection 40 mg, Daily  ondansetron (ZOFRAN-ODT) disintegrating tablet 4 mg, Q8H PRN   Or  ondansetron (ZOFRAN) injection 4 mg, Q6H PRN  polyethylene glycol (GLYCOLAX) packet 17 g, Daily PRN  acetaminophen (TYLENOL) tablet 650 mg, Q6H PRN   Or  acetaminophen (TYLENOL) suppository 650 mg, Q6H PRN        Objective:  /62   Pulse 80   Temp 97.9 °F (36.6 °C) (Oral)   Resp 14   Ht 5' 1\" (1.549 m)   Wt 105 lb 8 oz (47.9 kg)   SpO2 96%   BMI 19.93 kg/m²     Intake/Output Summary (Last 24 hours) at 11/1/2021 1702  Last data filed at 11/1/2021 1020  Gross per 24 hour   Intake 7303.34 ml   Output --   Net 7303.34 ml      Wt Readings from Last 3 Encounters:   11/01/21 105 lb 8 oz (47.9 kg)   09/01/21 102 lb (46.3 kg)   05/20/21 106 lb 9.6 oz (48.4 kg)       General appearance:  Appears comfortable  Eyes: Sclera clear. Pupils equal.  ENT: Moist oral mucosa. Trachea midline, no adenopathy. Cardiovascular: Regular rhythm, normal S1, S2. No murmur. No edema in lower extremities  Respiratory: Not using accessory muscles. Good inspiratory effort. Clear to auscultation bilaterally, no wheeze or crackles. GI: Abdomen soft, no tenderness, not distended, normal bowel sounds  Musculoskeletal: No cyanosis in digits, neck supple  Neurology: CN 2-12 grossly intact. No speech or motor deficits  Psych: Normal affect. Alert and oriented in time, place and person  Skin: Warm, dry, normal turgor    Labs and Tests:  CBC:   Recent Labs     10/30/21  1533 10/31/21  0506 11/01/21  0516   WBC 19.8* 13.9* 18.0*   HGB 14.7 11.8* 10.8*    159 173     BMP:    Recent Labs     10/30/21  1533 10/31/21  0506 11/01/21  0516    140 144   K 3.2* 3.6 3.9    106 114*   CO2 18* 19* 20*   BUN 11 19 16   CREATININE 0.5* 0.5* <0.5*   GLUCOSE 124* 140* 97     Hepatic:   Recent Labs     10/30/21  1533   AST 38*   ALT 22   BILITOT 0.8   ALKPHOS 399*     1.  CTA CHEST: No acute pulmonary embolism. 2. No acute pulmonary disease. 3. 3 mm mean diameter soft tissue pulmonary nodule right upper lobe almost   certainly reflects a benign, postinfectious/inflammatory nodule.  No   additional evaluation or follow-up indicated.  Dependent on lung cancer risk,   annual CT lung cancer screening should be considered. 4.  Pulmonary sequela typical of that seen with smoking, including COPD.   5. Mild calcific atherosclerosis aorta and coronary arteries. Prominent mural   thrombus noted left side of the descending thoracic aorta. 6. Small hiatal hernia.    7. CT ABDOMEN/PELVIS: Possible mild enterocolitis.  Appearance of   mucosal/wall prominence of the small bowel and colon may simply be related to   the amount of fluid in the small bowel and colon acting as negative contrast.   Probable diarrhea. 8. No other CT evidence of an acute intra-abdominal or intrapelvic process. 9.  No findings to suggest acute appendicitis; no ureter calculus or   hydronephrosis. 10. 2.8 cm infrarenal abdominal aorta aneurysm; imaging every 5 years   indicated for surveillance. *   11. Age indeterminate but chronic appearing slight compression superior   endplate T5. ECHO 3/21   Normal left ventricle size, wall thickness and systolic function with an   estimated ejection fraction of 65-70%. No regional wall motion abnormalities are seen. Trivial mitral regurgitation is present. Moderate aortic stenosis with a peak velocity of 3.1 m/s and a mean pressure   gradient of 19 mmHg. Trivial tricuspid regurgitation with a PASP of 40 mmHg. Trivial pulmonic regurgitation present. Indeterminate diastolic function. Problem List  Active Problems:    Enterocolitis  Resolved Problems:    * No resolved hospital problems. *       Assessment & Plan:   1. Enterocolitis-WBC back up but overall appears improved. Stool studies were unremarkable. Change to levaquin in light of cough. Continue flagyl. Diarrhea and leukocytosis improving. Check lactic acid. 2. Thoracic Aorta Mural thrombus-Vascular surgery consult appreciate. Appears to be more atheromatous plaque. Needs asa 81 mg daily. 3. Acute respiratory failure-no evidence of PE, pneumonia, lungs clear. Currently 96% on RA. Off oxygen. Now that she is hydrated, will repeat CXR and check procalc. 4. Anemia-I suspect this initial hemoglobin of 14 was concentrated due to dehydration. Hemoglobin down one gram from yesterday. Has some guiac positive stool but no jamari blood and has enterocolitis. Repeat H and H. Consult GI pending repeat labs. 5. Tachycardia-resolved with hydration  6. Dysphagia-sudden in onset, going for MBS today  7.  Primary Biliary Cirrhosis-followed by Dr Arcelia Silva. Diet: ADULT DIET; Regular;  No Drinking Straws  Diet NPO  Code:Full Code  DVT PPX: eliana Germain PA-C   11/1/2021 5:02 PM

## 2021-11-01 NOTE — PLAN OF CARE
Pt being transported to [facility] via bed    [facility], [place]   [phone]

## 2021-11-01 NOTE — PLAN OF CARE
Problem: Falls - Risk of:  Goal: Will remain free from falls  Description: Will remain free from falls  10/31/2021 2217 by Bianca Norwood RN  Outcome: Ongoing     Problem: Falls - Risk of:  Goal: Absence of physical injury  Description: Absence of physical injury  10/31/2021 2217 by Bianca Norwood RN  Outcome: Ongoing     Problem: Pain:  Goal: Pain level will decrease  Description: Pain level will decrease  10/31/2021 2217 by Bianca Norwood RN  Outcome: Ongoing     Problem: Pain:  Goal: Control of acute pain  Description: Control of acute pain  10/31/2021 2217 by Bianca Norwood RN  Outcome: Ongoing     Problem: Nausea/Vomiting:  Goal: Absence of nausea/vomiting  Description: Absence of nausea/vomiting  10/31/2021 2217 by Bianca Norwood RN  Outcome: Ongoing    Continue with plan of care.

## 2021-11-01 NOTE — PLAN OF CARE
Problem: Falls - Risk of:  Goal: Will remain free from falls  Description: Will remain free from falls  11/1/2021 0450 by Vince Mann RN  Outcome: Ongoing  10/31/2021 2217 by Antwon Walter RN  Outcome: Ongoing  Goal: Absence of physical injury  Description: Absence of physical injury  11/1/2021 0450 by Vince Mann RN  Outcome: Ongoing  10/31/2021 2217 by Antwon Walter RN  Outcome: Ongoing     Problem: Pain:  Goal: Pain level will decrease  Description: Pain level will decrease  11/1/2021 0450 by Vince Mann RN  Outcome: Ongoing  10/31/2021 2217 by Antwon Walter RN  Outcome: Ongoing  Goal: Control of acute pain  Description: Control of acute pain  11/1/2021 0450 by Vince Mann RN  Outcome: Ongoing  10/31/2021 2217 by Antwon Walter RN  Outcome: Ongoing     Problem: Nausea/Vomiting:  Goal: Absence of nausea/vomiting  Description: Absence of nausea/vomiting  11/1/2021 0450 by Vince Mann RN  Outcome: Ongoing  10/31/2021 2217 by Antwon Watler RN  Outcome: Ongoing

## 2021-11-01 NOTE — PROGRESS NOTES
Assessment complete. Alert, oriented x4. Requesting sleep aid, given PRN Benadryl per MAR. Patient reports bowel movements are slowing down and she is feeling better compared to admission. The care plan and education has been reviewed and mutually agreed upon with the patient. In bed, alarm on, bed in lowest position, call light and table within reach. No further needs expressed at this time.

## 2021-11-01 NOTE — PLAN OF CARE
Call received from Speech regarding Barium Swallow- Per Speech pt has to be able to tolerate solids- pt is on Clear Liquid Diet. Waiting on response.      Galina MAYSN, RN   [phone]

## 2021-11-01 NOTE — PLAN OF CARE
Problem: Falls - Risk of:  Goal: Will remain free from falls  Description: Will remain free from falls  11/1/2021 0928 by Radha Worthy RN  Outcome: Ongoing  Note: Fall TIP Education provided.    11/1/2021 0450 by Emperatriz Red RN  Outcome: Ongoing  10/31/2021 2217 by Shari Barraza RN  Outcome: Ongoing  Goal: Absence of physical injury  Description: Absence of physical injury  11/1/2021 0928 by Radha Worthy RN  Outcome: Ongoing  11/1/2021 0450 by Emperatriz Red RN  Outcome: Ongoing  10/31/2021 2217 by Shari Barraza RN  Outcome: Ongoing     Problem: Pain:  Goal: Pain level will decrease  Description: Pain level will decrease  11/1/2021 0928 by Radha Worthy RN  Outcome: Ongoing  11/1/2021 0450 by Emperatriz Red RN  Outcome: Ongoing  10/31/2021 2217 by Shari Barraza RN  Outcome: Ongoing  Goal: Control of acute pain  Description: Control of acute pain  11/1/2021 0928 by Radha Worthy RN  Outcome: Ongoing  11/1/2021 0450 by Emperatriz Red RN  Outcome: Ongoing  10/31/2021 2217 by Shari Barraza RN  Outcome: Ongoing     Problem: Nausea/Vomiting:  Goal: Absence of nausea/vomiting  Description: Absence of nausea/vomiting  11/1/2021 0928 by Radha Worthy RN  Outcome: Ongoing  11/1/2021 0450 by Emperatriz Red RN  Outcome: Ongoing  10/31/2021 2217 by Shari Barraza RN  Outcome: Ongoing    Radha CALABRESE, RN   [phone]

## 2021-11-01 NOTE — PLAN OF CARE
Diet advanced to regular with no straws. Pt provided menu to review.      Cristhian Ibarra BSN, RN   [phone]

## 2021-11-01 NOTE — PROCEDURES
with puree   Strategies: Alternate solids/liquids , Upright as possible with all PO intake , Small bites/sips , Swallow 2 times per bite , Eat/feed slowly, Remain upright 30-45 min   Treatments: Speech Therapy for dysphagia treatment  Goals:  1. Pt will functionally tolerate recommended diet level without s/s of aspiration/penetration   2. Pt/family will demonstrate understanding of results Modified Barium Swallow Study, risk for aspiration, rationale for diet level and compensatory strategies   3. Pt will utilize appropriate compensatory strategies as indicated with min with cues   4.   Pt will demonstrate improved sensory motor function via targeted exercises and appropriate treatment modalities     Consistencies given: Thin, Mildly Thick (Nectar) Liquids, Moderately Thick (honey) Liquids, Puree, Soft solid, Solid    Oral Phase  -Reduced bolus control with intermittent premature bolus loss to pharynx  -slow prolonged chewing/mashing with complete re-collection   -Trace residue lining oral structures     Pharyngeal Phase  -Mildly delayed swallow onset  -Complete epiglottic inversion  -Decreased laryngeal elevation; Partial superior movement of thyroid cartilage/partial approximation of arytenoids to epiglottic petiole   -Partial anterior hyoid movement   -Pooling to the vallecular space with thin single sips, puree and solids, pooling to the pyriforms with sequential thin liquids   -Mildly decreased tongue base retraction   -Collection of residue on base of tongue and within the vallecular space, Trace residue on posterior pharyngeal wall; able to clear the majority of residue with secondary swallow   -Questionable trace laryngeal penetration intermittently with thin liquids, no aspiration was viewed   -Decreased pharyngoesophageal segment opening with mildly delayed pharyngeal clearing      Esophageal Phase  Unremarkable    Following Evaluation:  Results/recommendations and education given to Patient and nurse, who verbalized understanding    Timed Code Treatment:  0 minutes     Total Treatment Time: 40 minutes     TABATHA Astorga  Speech-Language Pathology Student    The speech-language pathologist was present, directed the patient's care, made skilled judgment and was responsible for assessment and treatment.     Norris Bernal, [facility]  Speech Language Pathologist

## 2021-11-01 NOTE — CONSULTS
[facility]  Consultation Note    Chief Complaint / Reason for Consultation  Thrombus thoracic aorta     History of Present Illness  Patient is a 78 y.o. female with past medical history of HLD, HTN, DM, GERD and tobacco use who presented to the ED with complaints of nausea, vomiting and diarrhea. Patient reports she had a sudden onset of nausea and vomiting on Saturday that was associated with diarrhea. She was in her bathroom on the floor and very weak and called her daughter to take her to the hospital.  In ED, CT chest and abd/pelvis was done which showed no PE and questions of mural thrombus in the descending thoracic aorta as well as incidental 2.8 cm AAA. Patient denies any history of blood clots or clotting disorders. She was smoking tobacco about 1 PPD, but has recently quit. She reports her father did have a history of abdominal aortic aneurysm. We have been consulted regarding findings on CT scan. Review of Systems  + nausea, + vomiting, + diarrhea. Denies fevers, chills, chest pain, shortness of breath, hematemesis,  constipation, melena, hematochezia, wt changes, vision problems, blindness, hearing problems, facial droop, slurred speech, extremity weakness, extremity numbness, dysuria.     Past Medical History:   Diagnosis Date    Controlled type 2 diabetes mellitus without complication, without long-term current use of insulin ([address] 12/16/2019    no meds, low A1C    Dyslipidemia 7/5/2017    Essential hypertension 5/16/2018    GERD (gastroesophageal reflux disease)     Heart murmur     very early stages    Hypercholesterolemia 5/16/2018    Hyperlipidemia     Hypertension     Lichen planus     external, mainly arms, legs    PONV (postoperative nausea and vomiting)     usually the next day, vomits once    Primary biliary cholangitis ([address] 12/16/2019    Primary biliary cholangitis ([address] 12/16/2019       Past Surgical History:   Procedure Laterality Date    CARPAL TUNNEL RELEASE       SECTION      COLONOSCOPY      ENDOMETRIAL ABLATION      ENDOSCOPY, COLON, DIAGNOSTIC      LIVER BIOPSY N/A     SINUS ENDOSCOPY N/A 3/8/2021    NASAL ENDOSCOPY; BIOPSY OF NASOPHARYNGEAL LESION performed by Alisha Villasenor DO at [address] [place] [address] EXTRACTION         Allergies   Allergen Reactions    Penicillins Other (See Comments)     Doesn't know allergy reaction    Sulfa Antibiotics Other (See Comments)     n/a    Sulfasalazine Hives     n/a       Social History     Socioeconomic History    Marital status: Single     Spouse name: Not on file    Number of children: Not on file    Years of education: Not on file    Highest education level: Not on file   Occupational History    Occupation:    Tobacco Use    Smoking status: Former Smoker     Packs/day: 0.75     Years: 6.00     Pack years: 4.50     Types: Cigarettes     Quit date: 2019     Years since quittin.6    Smokeless tobacco: Never Used    Tobacco comment: \"last week\" 10/30/21   Vaping Use    Vaping Use: Never used    Passive vaping exposure Yes   Substance and Sexual Activity    Alcohol use: Yes     Alcohol/week: 2.0 standard drinks     Types: 2 Shots of liquor per week     Comment: occasional / 2 per month    Drug use: No    Sexual activity: Never   Other Topics Concern    Not on file   Social History Narrative    Not on file     Social Determinants of Health     Financial Resource Strain: Low Risk     Difficulty of Paying Living Expenses: Not hard at all   Food Insecurity: No Food Insecurity    Worried About Running Out of Food in the Last Year: Never true    [address] in the Last Year: Never true   Transportation Needs:     Lack of Transportation (Medical):      Lack of Transportation (Non-Medical):    Physical Activity:     Days of Exercise per Week:     Minutes of Exercise per Session:    Stress:     Feeling of Stress : Social Connections:     Frequency of Communication with Friends and Family:     Frequency of Social Gatherings with Friends and Family:     Attends Baptism Services:     Active Member of Clubs or Organizations:     Attends Club or Organization Meetings:     Marital Status:    Intimate Partner Violence:     Fear of Current or Ex-Partner:     Emotionally Abused:     Physically Abused:     Sexually Abused:        Family History   Problem Relation Age of Onset    Cancer Mother         Lung Cancer    Stroke Mother     Diabetes Father     High Cholesterol Father     Hypertension Father     Heart Failure Father     Substance Abuse Sister      - No history of bleeding or clotting disorders    Vital Signs  Vitals:    11/01/21 0000 11/01/21 0317 11/01/21 0915 11/01/21 0930   BP:  127/61 (!) 161/73 (!) 147/63   Pulse:  83 91    Resp:  14 14    Temp:  97.6 °F (36.4 °C) 97.7 °F (36.5 °C)    TempSrc:  Oral     SpO2:  96% 96%    Weight: 105 lb 8 oz (47.9 kg)      Height: 5' 1\" (1.549 m)          Physical Examination  General: no apparent distress, appears stated age  Psychiatric: affect appropriate  Head/Eyes/Ears/Nose/Throat:  Normocephalic, atraumatic, PERRLA, face symmetric  Neck:  no adenopathy, no carotid bruit, no JVD, supple, symmetrical, trachea midline, thyroid not enlarged, no tenderness/mass/nodules  Chest/Lungs: clear to auscultation bilaterally, no accessory muscle use  Cardiac:  regular rate and rhythm, S1, S2 normal, no murmur, click, rub or gallop  Abdomen: soft, nontender, active bowel sounds  Extremities: warm and well perfused, no signs of cyanosis or ischemia, no significant edema, bilateral upper and lower extremity motorsensory intact  Vascular exam:  - R radial: 2+  - L radial: 2+  - R femoral: 2+  - L femoral: 2+  - R DP: 2+  - L DP: 2+  - R PT: 2+  - L PT: 2+    Labs  Lab Results   Component Value Date    WBC 18.0 11/01/2021    HGB 10.8 11/01/2021    HCT 32.5 11/01/2021    MCV 83.2 11/01/2021     11/01/2021     Lab Results   Component Value Date     11/01/2021    K 3.9 11/01/2021    K 3.2 10/30/2021     11/01/2021    CO2 20 11/01/2021    PHOS 4.2 01/12/2021    BUN 16 11/01/2021    CREATININE <0.5 11/01/2021      No components found for: GLU    Scheduled Meds:    levofloxacin  750 mg IntraVENous Q24H    metroNIDAZOLE  500 mg IntraVENous Q8H    atorvastatin  80 mg Oral Daily    famotidine  20 mg Oral Nightly    sodium chloride flush  5-40 mL IntraVENous 2 times per day    enoxaparin  40 mg SubCUTAneous Daily     Continuous Infusions:    sodium chloride 150 mL/hr at 11/01/21 0953    sodium chloride         Imaging:     CT chest/abd/pelvis w/ contrast 10/30/21:  Impression   Organs: Normal attenuation throughout the liver. No discrete hepatic lesion   or intrahepatic bile duct dilatation is seen.  The gallbladder, kidneys,   spleen, adrenal glands and pancreas appear unremarkable.       GI/Bowel: The small bowel and stomach appear unremarkable. No diffuse or   focal bowel wall thickening evident. No inflammatory changes evident. No   obstruction is seen.  The appendix is indistinguishable if present. Fluid-filled small bowel and colon possibly related to diarrhea. Sarai Mends   prominent appearance of the mucosa throughout the small bowel and colon.       Pelvis: The atrophied uterus and adnexal structures appear unremarkable. Urinary bladder is partially filled, unremarkable appearance.  No adenopathy   or free fluid.       Peritoneum/Retroperitoneum: Calcific atherosclerotic disease aorta.  2.8 x   2.8 cm infrarenal abdominal aorta aneurysm.  Unremarkable appearance of the   IVC.  No adenopathy or fluid.       Bones/Soft Tissues: Age indeterminate but chronic appearing slight   compression superior endplate T5.  Mild degenerative changes throughout the   thoracic spine.  No acute superficial soft tissue abnormality appreciated.       IMPRESSION:   1.  CTA CHEST: No acute pulmonary embolism. 2. No acute pulmonary disease. 3. 3 mm mean diameter soft tissue pulmonary nodule right upper lobe almost   certainly reflects a benign, postinfectious/inflammatory nodule.  No   additional evaluation or follow-up indicated.  Dependent on lung cancer risk,   annual CT lung cancer screening should be considered. 4.  Pulmonary sequela typical of that seen with smoking, including COPD.   5. Mild calcific atherosclerosis aorta and coronary arteries. Prominent mural   thrombus noted left side of the descending thoracic aorta. 6. Small hiatal hernia. 7. CT ABDOMEN/PELVIS: Possible mild enterocolitis.  Appearance of   mucosal/wall prominence of the small bowel and colon may simply be related to   the amount of fluid in the small bowel and colon acting as negative contrast.   Probable diarrhea. 8. No other CT evidence of an acute intra-abdominal or intrapelvic process. 9.  No findings to suggest acute appendicitis; no ureter calculus or   hydronephrosis. 10. 2.8 cm infrarenal abdominal aorta aneurysm; imaging every 5 years   indicated for surveillance. *   11. Age indeterminate but chronic appearing slight compression superior   endplate T5. Assessment:   Atherosclerotic atheroma in descending thoracic aorta - atheroma likely associated with atherosclerosis  Infrarenal AAA, asymptomatic - 2.8 cm, incidental finding   Enterocolitis  HTN  HLD  Tobacco use    Plan:  No further vascular testing or intervention at this time. Would not anticoagulate for findings on CT scan. Would recommend Aspirin 81 mg daily. Recommend quitting smoking - this was discussed with patient. Continue statin therapy. Should follow up with Dr. Grover Larson in 2 years with aortic ultrasound for surveillance of AAA. Patient is stable from vascular standpoint. We will sign off for now, but please do not hesitate to contact us with any questions. Thank you for the consultation.        Plan discussed with Dr. Evin Brito. Thank you for the consultation. Patient educated on plan of care and disease process. All questions answered. Electronically signed by LINNEA Gutierrez CNP on 11/1/2021 at 10:56 AM     VASCULAR STAFF  Seen with and discussed with A Marko GAO. Agree with above history, exam and assessment. Independent exam and evaluation performed. Diffuse aortic disease consistent with long term tobacco abuse. Changes in aorta described as \"thrombus\" are in fact chronic atheromatous changes associated with atherosclerosis in this pt with multiple risk factors (tobacco abuse, hyperlipidemia, HTN, family history). No planned further vascular diagnostics or intervention. Surveillance long term with aortic duplex with OV in 2 years. Recommend tobacco cessation, statin, BP control. Will see as needed. Please call for any further questions or problems.     Yas Santana

## 2021-11-01 NOTE — PROGRESS NOTES
QUERY TEXT:    Pt admitted with nausea, vomiting and diarrhea. Noted documentation of sepsis   by ED. Pt noted to have enterocolitis, WBC 19.8-->13.9, Lactic acid 4.9,   procalcitonin 0.19, pH 7.295 and heart rate . If possible, please   document in progress notes and discharge summary:    The medical record reflects the following:  Risk Factors: Enterocolitis  Clinical Indicators: Pt admitted with nausea, vomiting and diarrhea. Pt noted   to have enterocolitis, WBC 19.8-->13.9, Lactic acid 4.9, procalcitonin 0.19,   pH 7.295 and heart rate . Internal medicine progress note-Patient   admitted with sudden onset of profuse diarrhea and abdominal pain-had at least   15 BM yesterday. Treatment: Cefepime, Cipro, Flagyl and vancomycin and IVF  Options provided:  -- Sepsis confirmed present on admission  -- Sepsis ruled out  -- Other - I will add my own diagnosis  -- Disagree - Not applicable / Not valid  -- Disagree - Clinically unable to determine / Unknown  -- Refer to Clinical Documentation Reviewer    PROVIDER RESPONSE TEXT:    The diagnosis of sepsis was confirmed as present on admission.     Query created by: Thanh Chase on 11/1/2021 10:38 AM      Electronically signed by:  Ty Bhakta PA-C 11/1/2021 11:34 AM

## 2021-11-02 DIAGNOSIS — I65.23 BILATERAL CAROTID ARTERY STENOSIS: Primary | ICD-10-CM

## 2021-11-02 LAB
ANION GAP SERPL CALCULATED.3IONS-SCNC: 8 MMOL/L (ref 3–16)
BASOPHILS ABSOLUTE: 0.1 K/UL (ref 0–0.2)
BASOPHILS RELATIVE PERCENT: 0.4 %
BUN BLDV-MCNC: 13 MG/DL (ref 7–20)
CALCIUM SERPL-MCNC: 8.7 MG/DL (ref 8.3–10.6)
CHLORIDE BLD-SCNC: 112 MMOL/L (ref 99–110)
CO2: 20 MMOL/L (ref 21–32)
CREAT SERPL-MCNC: <0.5 MG/DL (ref 0.6–1.2)
EOSINOPHILS ABSOLUTE: 0 K/UL (ref 0–0.6)
EOSINOPHILS RELATIVE PERCENT: 0.2 %
FERRITIN: 200.1 NG/ML (ref 15–150)
FOLATE: >20 NG/ML (ref 4.78–24.2)
GFR AFRICAN AMERICAN: >60
GFR NON-AFRICAN AMERICAN: >60
GLUCOSE BLD-MCNC: 83 MG/DL (ref 70–99)
GLUCOSE BLD-MCNC: 88 MG/DL (ref 70–99)
HCT VFR BLD CALC: 32.6 % (ref 36–48)
HCT VFR BLD CALC: 32.8 % (ref 36–48)
HEMOGLOBIN: 10.9 G/DL (ref 12–16)
IMMATURE RETIC FRACT: 0.33 (ref 0.21–0.37)
IRON SATURATION: 75 % (ref 15–50)
IRON: 97 UG/DL (ref 37–145)
LYMPHOCYTES ABSOLUTE: 4.1 K/UL (ref 1–5.1)
LYMPHOCYTES RELATIVE PERCENT: 34 %
MAGNESIUM: 1.8 MG/DL (ref 1.8–2.4)
MCH RBC QN AUTO: 27.8 PG (ref 26–34)
MCHC RBC AUTO-ENTMCNC: 33.3 G/DL (ref 31–36)
MCV RBC AUTO: 83.6 FL (ref 80–100)
MONOCYTES ABSOLUTE: 1.4 K/UL (ref 0–1.3)
MONOCYTES RELATIVE PERCENT: 11.4 %
NEUTROPHILS ABSOLUTE: 6.6 K/UL (ref 1.7–7.7)
NEUTROPHILS RELATIVE PERCENT: 54 %
PDW BLD-RTO: 14.3 % (ref 12.4–15.4)
PERFORMED ON: NORMAL
PLATELET # BLD: 161 K/UL (ref 135–450)
PMV BLD AUTO: 11.4 FL (ref 5–10.5)
POTASSIUM REFLEX MAGNESIUM: 3.5 MMOL/L (ref 3.5–5.1)
POTASSIUM SERPL-SCNC: 3.5 MMOL/L (ref 3.5–5.1)
PROCALCITONIN: 1.27 NG/ML (ref 0–0.15)
RBC # BLD: 3.9 M/UL (ref 4–5.2)
RETICULOCYTE ABSOLUTE COUNT: 0.03 M/UL (ref 0.02–0.1)
RETICULOCYTE COUNT PCT: 0.69 % (ref 0.5–2.18)
SODIUM BLD-SCNC: 140 MMOL/L (ref 136–145)
TOTAL IRON BINDING CAPACITY: 129 UG/DL (ref 260–445)
VITAMIN B-12: 1383 PG/ML (ref 211–911)
WBC # BLD: 12.1 K/UL (ref 4–11)

## 2021-11-02 PROCEDURE — 6360000002 HC RX W HCPCS: Performed by: PHYSICIAN ASSISTANT

## 2021-11-02 PROCEDURE — 6370000000 HC RX 637 (ALT 250 FOR IP): Performed by: FAMILY MEDICINE

## 2021-11-02 PROCEDURE — 1200000000 HC SEMI PRIVATE

## 2021-11-02 PROCEDURE — 85045 AUTOMATED RETICULOCYTE COUNT: CPT

## 2021-11-02 PROCEDURE — 80048 BASIC METABOLIC PNL TOTAL CA: CPT

## 2021-11-02 PROCEDURE — 84145 PROCALCITONIN (PCT): CPT

## 2021-11-02 PROCEDURE — 6360000002 HC RX W HCPCS: Performed by: FAMILY MEDICINE

## 2021-11-02 PROCEDURE — 82728 ASSAY OF FERRITIN: CPT

## 2021-11-02 PROCEDURE — 85025 COMPLETE CBC W/AUTO DIFF WBC: CPT

## 2021-11-02 PROCEDURE — 6370000000 HC RX 637 (ALT 250 FOR IP): Performed by: NURSE PRACTITIONER

## 2021-11-02 PROCEDURE — 2500000003 HC RX 250 WO HCPCS: Performed by: PHYSICIAN ASSISTANT

## 2021-11-02 PROCEDURE — 36415 COLL VENOUS BLD VENIPUNCTURE: CPT

## 2021-11-02 PROCEDURE — 92526 ORAL FUNCTION THERAPY: CPT

## 2021-11-02 PROCEDURE — 83540 ASSAY OF IRON: CPT

## 2021-11-02 PROCEDURE — 83735 ASSAY OF MAGNESIUM: CPT

## 2021-11-02 PROCEDURE — 83550 IRON BINDING TEST: CPT

## 2021-11-02 PROCEDURE — 2580000003 HC RX 258: Performed by: FAMILY MEDICINE

## 2021-11-02 PROCEDURE — 2580000003 HC RX 258: Performed by: PHYSICIAN ASSISTANT

## 2021-11-02 RX ADMIN — POTASSIUM BICARBONATE 40 MEQ: 782 TABLET, EFFERVESCENT ORAL at 14:45

## 2021-11-02 RX ADMIN — ATORVASTATIN CALCIUM 80 MG: 80 TABLET, FILM COATED ORAL at 09:01

## 2021-11-02 RX ADMIN — METRONIDAZOLE 500 MG: 500 INJECTION, SOLUTION INTRAVENOUS at 00:50

## 2021-11-02 RX ADMIN — SODIUM CHLORIDE, PRESERVATIVE FREE 10 ML: 5 INJECTION INTRAVENOUS at 22:20

## 2021-11-02 RX ADMIN — ENOXAPARIN SODIUM 40 MG: 100 INJECTION SUBCUTANEOUS at 09:01

## 2021-11-02 RX ADMIN — METRONIDAZOLE 500 MG: 500 INJECTION, SOLUTION INTRAVENOUS at 18:01

## 2021-11-02 RX ADMIN — METRONIDAZOLE 500 MG: 500 INJECTION, SOLUTION INTRAVENOUS at 09:02

## 2021-11-02 RX ADMIN — LEVOFLOXACIN 750 MG: 5 INJECTION, SOLUTION INTRAVENOUS at 16:31

## 2021-11-02 RX ADMIN — FAMOTIDINE 20 MG: 20 TABLET, FILM COATED ORAL at 22:20

## 2021-11-02 RX ADMIN — SODIUM CHLORIDE: 9 INJECTION, SOLUTION INTRAVENOUS at 06:45

## 2021-11-02 RX ADMIN — DIPHENHYDRAMINE HYDROCHLORIDE 25 MG: 25 TABLET ORAL at 23:41

## 2021-11-02 RX ADMIN — SODIUM CHLORIDE 25 ML: 9 INJECTION, SOLUTION INTRAVENOUS at 16:30

## 2021-11-02 ASSESSMENT — PAIN SCALES - GENERAL
PAINLEVEL_OUTOF10: 4
PAINLEVEL_OUTOF10: 0

## 2021-11-02 NOTE — CONSULTS
Gastroenterology Consult Note        Patient: Antoinette Alas  : 1955  Acct#:      Date:  2021    Subjective:       History of Present Illness  Patient is a 78 y.o.  female admitted with Enterocolitis [K52.9]  Hypokalemia [E87.6]  Septicemia ([address] [A41.9]  Hypoxia [R09.02]  Pulmonary nodule [R91.1]  Abdominal aortic aneurysm (AAA) without rupture ([address] [I71.4] who is seen in consult for enteritis and anemia. History of diabetes, GERD, hypertension, hyperlipidemia. History of PBC on ursodiol followed by Dr. Zaire Shaw. History of aortic stenosis. Patient had sudden onset of nausea, vomiting, diarrhea on Saturday (3 days ago). She felt very weak and was brought to the ER. CT with mild prominence of the mucosa in the small bowel and colon with fluid-filled small bowel and colon concerning for an enterocolitis. Stool was negative for C. difficile and GI bacterial pathogens. Over time, her nausea and vomiting and diarrhea resolved. Had some heartburn last night but resolved with having 2 soft bowel movements. Patient not aware of any melena or hematochezia. No sick contacts. Patient feels better currently is eating. No abdominal pain.       Past Medical History:   Diagnosis Date    Controlled type 2 diabetes mellitus without complication, without long-term current use of insulin ([address] 2019    no meds, low A1C    Dyslipidemia 2017    Essential hypertension 2018    GERD (gastroesophageal reflux disease)     Heart murmur     very early stages    Hypercholesterolemia 2018    Hyperlipidemia     Hypertension     Lichen planus     external, mainly arms, legs    PONV (postoperative nausea and vomiting)     usually the next day, vomits once    Primary biliary cholangitis ([address] 2019    Primary biliary cholangitis ([address] 2019      Past Surgical History:   Procedure Laterality Date    CARPAL TUNNEL RELEASE       SECTION      COLONOSCOPY  ENDOMETRIAL ABLATION      ENDOSCOPY, COLON, DIAGNOSTIC      LIVER BIOPSY N/A 2020    SINUS ENDOSCOPY N/A 3/8/2021    NASAL ENDOSCOPY; BIOPSY OF NASOPHARYNGEAL LESION performed by Salinas Cardona DO at [address] [place] [address] EXTRACTION        Past Endoscopic History  Colonoscopy 2018 with Dr Michelle Garcia  Colonoscopy for history of polyps. Sigmoid: Diverticulosis, 3 polyps, hemorrhoids. Follow-up in 3 years? Admission Meds  No current facility-administered medications on file prior to encounter. Current Outpatient Medications on File Prior to Encounter   Medication Sig Dispense Refill    atorvastatin (LIPITOR) 80 MG tablet TAKE 1 TABLET BY MOUTH DAILY 90 tablet 0    lisinopril (PRINIVIL;ZESTRIL) 5 MG tablet TAKE 1 TABLET BY MOUTH DAILY 90 tablet 1    loratadine (CLARITIN) 10 MG tablet TAKE 1 TABLET BY MOUTH DAILY 90 tablet 1    famotidine (PEPCID) 20 MG tablet Take 1 tablet by mouth nightly 90 tablet 0    ursodiol (ACTIGALL) 500 MG tablet 2 times daily       triamcinolone (KENALOG) 0.1 % cream Apply topically 2 times daily as needed 453.6 g 1    Pancrelipase, Lip-Prot-Amyl, (ZENPEP) 56237-997423 units CPEP Take 1 capsule by mouth 5 times daily Before meals and snacks 120 capsule 1    calcium carbonate (OYSTER SHELL CALCIUM 500 MG) 1250 (500 Ca) MG tablet Take 1 tablet by mouth daily      vitamin D (CHOLECALCIFEROL) 1000 UNIT TABS tablet Take 1,000 Units by mouth daily      Multiple Vitamins-Minerals (MULTIVITAMIN PO) Take 1 capsule by mouth daily.  Ascorbic Acid (VITAMIN C) 500 MG tablet Take 500 mg by mouth daily. Rare ibuprofen use.     Allergies  Allergies   Allergen Reactions    Penicillins Other (See Comments)     Doesn't know allergy reaction    Sulfa Antibiotics Other (See Comments)     n/a    Sulfasalazine Hives     n/a      Social   Social History     Tobacco Use    Smoking status: Former Smoker     Packs/day: 0.75     Years: 6.00     Pack years: 4.50     Types: Cigarettes     Quit date: 2019     Years since quittin.6    Smokeless tobacco: Never Used    Tobacco comment: \"last week\" 10/30/21   Substance Use Topics    Alcohol use: Yes     Alcohol/week: 2.0 standard drinks     Types: 2 Shots of liquor per week     Comment: occasional / 2 per month        Family History   Problem Relation Age of Onset    Cancer Mother         Lung Cancer    Stroke Mother     Diabetes Father     High Cholesterol Father     Hypertension Father     Heart Failure Father     Substance Abuse Sister         Review of Systems  Constitutional: negative for fevers, chills, sweats    Ears, nose, mouth, throat, and face: negative for nasal congestion and sore throat   Respiratory: negative for cough and shortness of breath   Cardiovascular: negative for chest pain and dyspnea   Gastrointestinal: see hpi   Genitourinary:negative for dysuria and frequency   Integument/breast: negative for pruritus and rash   Hematologic/lymphatic: negative for bleeding and easy bruising   Musculoskeletal:negative for arthralgias and myalgias   Neurological: negative for dizziness and weakness         Physical Exam  Blood pressure 136/72, pulse 75, temperature 98.1 °F (36.7 °C), temperature source Oral, resp. rate 16, height 5' 1\" (1.549 m), weight 105 lb 8 oz (47.9 kg), SpO2 94 %, not currently breastfeeding. General appearance: thin, alert, cooperative, no distress, appears stated age  Eyes: Anicteric  Head: Normocephalic, without obvious abnormality  Lungs: clear to auscultation bilaterally, Normal Effort  Heart: regular rate and rhythm, normal S1 and S2, + systolic murmur   Abdomen: soft, non-tender. Bowel sounds normal. No masses,  no organomegaly.    Extremities: atraumatic, no cyanosis or edema  Skin: warm and dry, no jaundice  Neuro: Grossly intact, A&OX3  Musculoskeletal:   strength BUE      Data Review:    Recent Labs     10/31/21  0506 10/31/21  0506 21  0516 11/01/21  1656 11/02/21  0641   WBC 13.9*  --  18.0*  --  12.1*   HGB 11.8*   < > 10.8* 11.8* 10.9*   HCT 35.3*   < > 32.5* 35.9* 32.6*   MCV 82.9  --  83.2  --  83.6     --  173  --  161    < > = values in this interval not displayed. Recent Labs     10/31/21  0506 10/31/21  0506 11/01/21  0516 11/02/21  0641     --  144 140   K 3.6   < > 3.9 3.5  3.5     --  114* 112*   CO2 19*  --  20* 20*   BUN 19  --  16 13   CREATININE 0.5*  --  <0.5* <0.5*    < > = values in this interval not displayed. Recent Labs     10/30/21  1533   AST 38*   ALT 22   BILITOT 0.8   ALKPHOS 399*     Recent Labs     10/30/21  1533   LIPASE 40.0     Recent Labs     10/30/21  1533   PROTIME 13.7*   INR 1.20*     No results for input(s): PTT in the last 72 hours. No results for input(s): OCCULTBLD in the last 72 hours. Imaging Studies:                 CT-scan of chest abdomen and pelvis w iv contrast 10/30/21     IMPRESSION:   1.  CTA CHEST: No acute pulmonary embolism. 2. No acute pulmonary disease. 3. 3 mm mean diameter soft tissue pulmonary nodule right upper lobe almost   certainly reflects a benign, postinfectious/inflammatory nodule.  No   additional evaluation or follow-up indicated.  Dependent on lung cancer risk,   annual CT lung cancer screening should be considered. 4.  Pulmonary sequela typical of that seen with smoking, including COPD.   5. Mild calcific atherosclerosis aorta and coronary arteries. Prominent mural   thrombus noted left side of the descending thoracic aorta. 6. Small hiatal hernia. 7. CT ABDOMEN/PELVIS: Possible mild enterocolitis.  Appearance of   mucosal/wall prominence of the small bowel and colon may simply be related to   the amount of fluid in the small bowel and colon acting as negative contrast.   Probable diarrhea. 8. No other CT evidence of an acute intra-abdominal or intrapelvic process.    9.  No findings to suggest acute appendicitis; no ureter calculus or hydronephrosis. 10. 2.8 cm infrarenal abdominal aorta aneurysm; imaging every 5 years   indicated for surveillance. *   11. Age indeterminate but chronic appearing slight compression superior   endplate T5. Assessment:     Active Problems:    Enterocolitis  Resolved Problems:    * No resolved hospital problems. *    Acute enterocolitis -sudden onset of nausea, vomiting, diarrhea on Saturday. CAT scan concerning for an enterocolitis. Stool negative for C.diff and GI bacterial pathogens. Symptoms now resolved. Anemia - baseline hgb is 12.  hgb 10.9 currently. No gross GI bleeding. PBC -followed by Dr. Joselyn Brock. On ursodiol. Recommendations:   - check iron studies, B12, folate, retics  -Follow-up with Dr. Joselyn Brock as an outpatient for [facility] and for EGD and colonoscopy for anemia. Discussed with Dr. Aydin Back, FLAVIO  [address] [facility]  I have personally performed a face to face diagnostic evaluation on this patient. I have interviewed and examined the patient and I agree with the findings and recommended plan of care. In summary, my findings and plan are the following: As above, elderly woman with PBC admitted with enterocolitis likely from food born vs viral pathogen, now resolving. Still some diarrhea, but improving. She is tolerating a regular diet. On exam abd is soft and NT and pain has resolved. Stool tests are (-) for enteric pathogens. Agree with anemia w/u and outpatient f/u with her hepatologist, Dr. Joselyn Brock. If iron def on w/u, would proceed with EGD and colonoscopy. Will sign off. Please call with questions.     Nona Thomas MD  [id number]  11/2/2021

## 2021-11-02 NOTE — PROGRESS NOTES
Speech Language Pathology  Dysphagia Treatment Note    Name:  Olinda Metz  :   1955  Medical Diagnosis:  Enterocolitis [K52.9]  Hypokalemia [E87.6]  Septicemia ([facility] [address] [A41.9]  Hypoxia [R09.02]  Pulmonary nodule [R91.1]  Abdominal aortic aneurysm (AAA) without rupture ([facility] [address] [I71.4]  Treatment Diagnosis: Oropharyngeal Dysphagia  Pain level: Pt reports constant back pain    Diet level prior to treatment: Regular diet with thin liquids, meds with puree  Tolerance of Current Diet Level: Pt reports no difficulty with recent meals    MBSS: 2021  \"Modified Barium Swallow evaluation completed on 2021. Results indicate mild oropharyngeal dysphagia. Episodic questionable trace laryngeal penetration was noted with thin liquids, no aspiration was viewed during this study. Oral phase was characterized by slow/prolonged chewing, reduced bolus control with premature bolus loss and trace residue post swallows. Pharyngeal phase of the swallow was characterized by pharyngeal pooling, minimally delayed swallow initiation and delayed pharyngeal clearing. Delayed pharyngeal clearing resulted in pharyngeal stasis post swallows. Pt was able to clear the majority of stasis utilizing double swallows. Delayed pharyngeal clearing appeared to be due to decreased tongue base retraction, reduced anterior hyoid movement and delayed/decreased pharyngoesophageal segment opening. This may contribute to her sensation of difficulty swallowing and globus sensation with eating/drinking. Pt also with known hx of laryngopharyngeal reflux which may further contribute to her globus sensation. Recommend strict reflux precautions, use of alternating liquids/solids and double swallows. See below for more information. \"     Assessment of Texture Tolerance:  -Impressions: Pt alert and receptive, positioned in bed with HOB elevated. On RA with RR around 16/min.  Education provided re: rationale for swallow study and results/recommendations, pt denies questions or concerns at this time. Pt agreeable to PO presentations of solids and thin liquids off of breakfast tray, fed independently. Oral phase characterized by adequate oral acceptance and labial seal, mildly prolonged oral manipulation. Mastication and A-P transit were complete, although clinical s/s delayed swallow initiation noted. Pt with no immediate or delayed overt clinical s/s aspiration across consistencies. Discussed safe swallowing strategies, including alternating liquids/solids, slow rate, upright seating, and double swallow. Pt independently demonstrates small bites/sips. Given good tolerance of regular textured consistencies this date, recommend continuation of regular textured diet with thin liquids and aspiration precautions, will continue to follow for diet tolerance and safe swallowing education. Diet and Treatment Recommendations 11/2/2021:  Continue Regular diet with thin liquids, meds with puree    Compensatory strategies: Alternate solids/liquids , Upright as possible with all PO intake , Small bites/sips , Swallow 2 times per bite , Eat/feed slowly, Remain upright 30-45 min     Dysphagia Goals:  1. Pt will functionally tolerate recommended diet level without s/s of aspiration/penetration. (ongoing 11/2/2021)   2. Pt/family will demonstrate understanding of results Modified Barium Swallow Study, risk for aspiration, rationale for diet level and compensatory strategies. (ongoing 11/2/2021)  3. Pt will utilize appropriate compensatory strategies as indicated with min with cues. (ongoing 11/2/2021)  4. Pt will demonstrate improved sensory motor function via targeted exercises and appropriate treatment modalities. (ongoing 11/2/2021)    Plan: Continued daily Dysphagia treatment with goals per plan of care. Patient/Family Education: Education given to the Pt and nurse, who verbalized understanding.     Discharge Recommendations: Pt will benefit from continued skilled Speech Therapy for Dysphagia services, prior to returning home. Timed Code Treatment: 0    Total Treatment Time: 15    If patient discharges prior to next session this note will serve as a discharge summary.        Signature:  Charlie Santiago, [address] [facility]  Speech-Language Pathologist  [place]. [address]

## 2021-11-02 NOTE — PROGRESS NOTES
[facility]    11/2/2021 8:35 AM        Name: Micah Hope . Admitted: 10/30/2021  Primary Care Provider: Yosvany Nuno MD (Tel: [phone])      Subjective:  . Patient admitted with sudden onset of profuse diarrhea and abdominal pain-had at least 15 BM Saturday. Symptoms improving today. Had semi-formed BM today. Abdominal pain resolved. Had some nausea last night.      Reviewed interval ancillary notes    Current Medications  levoFLOXacin (LEVAQUIN) 750 MG/150ML infusion 750 mg, Q24H  metronidazole (FLAGYL) 500 mg in NaCl 100 mL IVPB premix, Q8H  potassium chloride (KLOR-CON M) extended release tablet 40 mEq, PRN   Or  potassium bicarb-citric acid (EFFER-K) effervescent tablet 40 mEq, PRN   Or  potassium chloride 10 mEq/100 mL IVPB (Peripheral Line), PRN  diphenhydrAMINE (BENADRYL) tablet 25 mg, Nightly PRN  atorvastatin (LIPITOR) tablet 80 mg, Daily  famotidine (PEPCID) tablet 20 mg, Nightly  morphine injection 4 mg, Q4H PRN  sodium chloride flush 0.9 % injection 5-40 mL, 2 times per day  sodium chloride flush 0.9 % injection 5-40 mL, PRN  0.9 % sodium chloride infusion, PRN  enoxaparin (LOVENOX) injection 40 mg, Daily  ondansetron (ZOFRAN-ODT) disintegrating tablet 4 mg, Q8H PRN   Or  ondansetron (ZOFRAN) injection 4 mg, Q6H PRN  polyethylene glycol (GLYCOLAX) packet 17 g, Daily PRN  acetaminophen (TYLENOL) tablet 650 mg, Q6H PRN   Or  acetaminophen (TYLENOL) suppository 650 mg, Q6H PRN        Objective:  /72   Pulse 75   Temp 98.1 °F (36.7 °C) (Oral)   Resp 16   Ht 5' 1\" (1.549 m)   Wt 105 lb 8 oz (47.9 kg)   SpO2 94%   BMI 19.93 kg/m²     Intake/Output Summary (Last 24 hours) at 11/2/2021 0835  Last data filed at 11/1/2021 1749  Gross per 24 hour   Intake 4810.31 ml   Output --   Net 4810.31 ml      Wt Readings from Last 3 Encounters:   11/01/21 105 lb 8 oz (47.9 kg) 09/01/21 102 lb (46.3 kg)   05/20/21 106 lb 9.6 oz (48.4 kg)       General appearance:  Appears comfortable  Eyes: Sclera clear. Pupils equal.  ENT: Moist oral mucosa. Trachea midline, no adenopathy. Cardiovascular: Regular rhythm, normal S1, S2. No murmur. No edema in lower extremities  Respiratory: Not using accessory muscles. Good inspiratory effort. Clear to auscultation bilaterally, no wheeze or crackles. GI: Abdomen soft, no tenderness, not distended, normal bowel sounds  Musculoskeletal: No cyanosis in digits, neck supple  Neurology: CN 2-12 grossly intact. No speech or motor deficits  Psych: Normal affect. Alert and oriented in time, place and person  Skin: Warm, dry, normal turgor    Labs and Tests:  CBC:   Recent Labs     10/31/21  0506 10/31/21  0506 11/01/21  0516 11/01/21  1656 11/02/21  0641   WBC 13.9*  --  18.0*  --  12.1*   HGB 11.8*   < > 10.8* 11.8* 10.9*     --  173  --  161    < > = values in this interval not displayed. BMP:    Recent Labs     10/31/21  0506 10/31/21  0506 11/01/21  0516 11/02/21  0641     --  144 140   K 3.6   < > 3.9 3.5  3.5     --  114* 112*   CO2 19*  --  20* 20*   BUN 19  --  16 13   CREATININE 0.5*  --  <0.5* <0.5*   GLUCOSE 140*  --  97 83    < > = values in this interval not displayed. Hepatic:   Recent Labs     10/30/21  1533   AST 38*   ALT 22   BILITOT 0.8   ALKPHOS 399*     1.  CTA CHEST: No acute pulmonary embolism. 2. No acute pulmonary disease. 3. 3 mm mean diameter soft tissue pulmonary nodule right upper lobe almost   certainly reflects a benign, postinfectious/inflammatory nodule.  No   additional evaluation or follow-up indicated.  Dependent on lung cancer risk,   annual CT lung cancer screening should be considered. 4.  Pulmonary sequela typical of that seen with smoking, including COPD.   5. Mild calcific atherosclerosis aorta and coronary arteries.  Prominent mural   thrombus noted left side of the descending thoracic aorta.   6. Small hiatal hernia. 7. CT ABDOMEN/PELVIS: Possible mild enterocolitis.  Appearance of   mucosal/wall prominence of the small bowel and colon may simply be related to   the amount of fluid in the small bowel and colon acting as negative contrast.   Probable diarrhea. 8. No other CT evidence of an acute intra-abdominal or intrapelvic process. 9.  No findings to suggest acute appendicitis; no ureter calculus or   hydronephrosis. 10. 2.8 cm infrarenal abdominal aorta aneurysm; imaging every 5 years   indicated for surveillance. *   11. Age indeterminate but chronic appearing slight compression superior   endplate T5. ECHO 3/21   Normal left ventricle size, wall thickness and systolic function with an   estimated ejection fraction of 65-70%. No regional wall motion abnormalities are seen. Trivial mitral regurgitation is present. Moderate aortic stenosis with a peak velocity of 3.1 m/s and a mean pressure   gradient of 19 mmHg. Trivial tricuspid regurgitation with a PASP of 40 mmHg. Trivial pulmonic regurgitation present. Indeterminate diastolic function. Problem List  Active Problems:    Enterocolitis  Resolved Problems:    * No resolved hospital problems. *       Assessment & Plan:   1. Enterocolitis-WBC improved. Stool studies were unremarkable. Continue levaquin and flagyl. Diarrhea and leukocytosis improving. 2. Thoracic Aorta Mural thrombus-Vascular surgery consult appreciate. Appears to be more atheromatous plaque. Needs asa 81 mg daily. 3. Acute respiratory failure-no evidence of PE, pneumonia, lungs clear. Currently 96% on RA. Off oxygen. Imaging including CT and repeat CXR is unremarkable. 4. Anemia-I suspect this initial hemoglobin of 14 was concentrated due to dehydration. Hemoglobin stable. Has some guiac positive stool but no jamari blood and has enterocolitis. 5. Tachycardia-resolved with hydration  6. Dysphagia-sudden in onset, almost resolved.  Had MBS yesterday  7. 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 Diet: ADULT DIET;  Regular  Code:Full Code  DVT PPX: eliana Elizabeth PA-C   11/2/2021 8:35 AM

## 2021-11-03 ENCOUNTER — CLINICAL DOCUMENTATION (OUTPATIENT)
Dept: SPIRITUAL SERVICES | Age: 66
End: 2021-11-03

## 2021-11-03 VITALS
RESPIRATION RATE: 16 BRPM | TEMPERATURE: 97.8 F | BODY MASS INDEX: 19.92 KG/M2 | WEIGHT: 105.5 LBS | SYSTOLIC BLOOD PRESSURE: 156 MMHG | HEART RATE: 73 BPM | DIASTOLIC BLOOD PRESSURE: 72 MMHG | HEIGHT: 61 IN | OXYGEN SATURATION: 86 %

## 2021-11-03 LAB
ANION GAP SERPL CALCULATED.3IONS-SCNC: 9 MMOL/L (ref 3–16)
BLOOD CULTURE, ROUTINE: NORMAL
BUN BLDV-MCNC: 10 MG/DL (ref 7–20)
CALCIUM SERPL-MCNC: 8.5 MG/DL (ref 8.3–10.6)
CHLORIDE BLD-SCNC: 110 MMOL/L (ref 99–110)
CO2: 21 MMOL/L (ref 21–32)
CREAT SERPL-MCNC: <0.5 MG/DL (ref 0.6–1.2)
CULTURE, BLOOD 2: NORMAL
GFR AFRICAN AMERICAN: >60
GFR NON-AFRICAN AMERICAN: >60
GLUCOSE BLD-MCNC: 77 MG/DL (ref 70–99)
HCT VFR BLD CALC: 32.8 % (ref 36–48)
HEMOGLOBIN: 11 G/DL (ref 12–16)
MCH RBC QN AUTO: 28 PG (ref 26–34)
MCHC RBC AUTO-ENTMCNC: 33.5 G/DL (ref 31–36)
MCV RBC AUTO: 83.6 FL (ref 80–100)
PDW BLD-RTO: 14.2 % (ref 12.4–15.4)
PLATELET # BLD: 143 K/UL (ref 135–450)
PMV BLD AUTO: 10.8 FL (ref 5–10.5)
POTASSIUM SERPL-SCNC: 4.1 MMOL/L (ref 3.5–5.1)
RBC # BLD: 3.93 M/UL (ref 4–5.2)
SODIUM BLD-SCNC: 140 MMOL/L (ref 136–145)
WBC # BLD: 10.5 K/UL (ref 4–11)

## 2021-11-03 PROCEDURE — 97116 GAIT TRAINING THERAPY: CPT

## 2021-11-03 PROCEDURE — 6360000002 HC RX W HCPCS: Performed by: FAMILY MEDICINE

## 2021-11-03 PROCEDURE — 2500000003 HC RX 250 WO HCPCS: Performed by: PHYSICIAN ASSISTANT

## 2021-11-03 PROCEDURE — 6370000000 HC RX 637 (ALT 250 FOR IP): Performed by: PHYSICIAN ASSISTANT

## 2021-11-03 PROCEDURE — 92526 ORAL FUNCTION THERAPY: CPT

## 2021-11-03 PROCEDURE — 97161 PT EVAL LOW COMPLEX 20 MIN: CPT

## 2021-11-03 PROCEDURE — 97530 THERAPEUTIC ACTIVITIES: CPT

## 2021-11-03 PROCEDURE — 2580000003 HC RX 258: Performed by: FAMILY MEDICINE

## 2021-11-03 PROCEDURE — 97165 OT EVAL LOW COMPLEX 30 MIN: CPT

## 2021-11-03 PROCEDURE — 6370000000 HC RX 637 (ALT 250 FOR IP): Performed by: FAMILY MEDICINE

## 2021-11-03 PROCEDURE — 80048 BASIC METABOLIC PNL TOTAL CA: CPT

## 2021-11-03 PROCEDURE — 85027 COMPLETE CBC AUTOMATED: CPT

## 2021-11-03 PROCEDURE — 36415 COLL VENOUS BLD VENIPUNCTURE: CPT

## 2021-11-03 RX ORDER — LISINOPRIL 10 MG/1
10 TABLET ORAL DAILY
Status: DISCONTINUED | OUTPATIENT
Start: 2021-11-03 | End: 2021-11-03 | Stop reason: HOSPADM

## 2021-11-03 RX ORDER — LEVOFLOXACIN 500 MG/1
500 TABLET, FILM COATED ORAL DAILY
Qty: 7 TABLET | Refills: 0 | Status: SHIPPED | OUTPATIENT
Start: 2021-11-03 | End: 2021-11-10

## 2021-11-03 RX ORDER — METRONIDAZOLE 500 MG/1
500 TABLET ORAL 3 TIMES DAILY
Qty: 21 TABLET | Refills: 0 | Status: SHIPPED | OUTPATIENT
Start: 2021-11-03 | End: 2021-11-10

## 2021-11-03 RX ADMIN — METRONIDAZOLE 500 MG: 500 INJECTION, SOLUTION INTRAVENOUS at 01:57

## 2021-11-03 RX ADMIN — SODIUM CHLORIDE, PRESERVATIVE FREE 10 ML: 5 INJECTION INTRAVENOUS at 09:25

## 2021-11-03 RX ADMIN — ENOXAPARIN SODIUM 40 MG: 100 INJECTION SUBCUTANEOUS at 09:25

## 2021-11-03 RX ADMIN — METRONIDAZOLE 500 MG: 500 INJECTION, SOLUTION INTRAVENOUS at 09:27

## 2021-11-03 RX ADMIN — LISINOPRIL 10 MG: 10 TABLET ORAL at 10:01

## 2021-11-03 RX ADMIN — ATORVASTATIN CALCIUM 80 MG: 80 TABLET, FILM COATED ORAL at 09:25

## 2021-11-03 ASSESSMENT — PAIN SCALES - GENERAL: PAINLEVEL_OUTOF10: 0

## 2021-11-03 NOTE — PLAN OF CARE
Problem: Falls - Risk of:  Goal: Will remain free from falls  Description: Will remain free from falls  11/3/2021 1644 by Maria De Jesus Escobedo RN  Outcome: Completed  11/3/2021 0939 by Maria De Jesus Escobedo RN  Outcome: Ongoing  11/3/2021 0314 by Phuong Pat RN  Outcome: Ongoing  Goal: Absence of physical injury  Description: Absence of physical injury  11/3/2021 1644 by Maria De Jesus Escobedo RN  Outcome: Completed  11/3/2021 0939 by Maria De Jesus Escobedo RN  Outcome: Ongoing  11/3/2021 0314 by Phuong Pat RN  Outcome: Ongoing     Problem: Pain:  Goal: Pain level will decrease  Description: Pain level will decrease  11/3/2021 1644 by Maria De Jesus Escobedo RN  Outcome: Completed  11/3/2021 0939 by Maria De Jesus Escobedo RN  Outcome: Ongoing  11/3/2021 0314 by Phuong Pat RN  Outcome: Ongoing  Goal: Control of acute pain  Description: Control of acute pain  11/3/2021 1644 by Maria De Jesus Escobedo RN  Outcome: Completed  11/3/2021 0939 by Maria De Jesus Escobedo RN  Outcome: Ongoing  11/3/2021 0314 by Phuong Pat RN  Outcome: Ongoing     Problem: Nausea/Vomiting:  Goal: Absence of nausea/vomiting  Description: Absence of nausea/vomiting  11/3/2021 1644 by Maria De Jesus Escobedo RN  Outcome: Completed  11/3/2021 0939 by Maria De Jesus Escobedo RN  Outcome: Ongoing  11/3/2021 0314 by Phuong Pat RN  Outcome: Ongoing     Problem: GI  Goal: No bowel complications  52/1/5900 1644 by Maria De Jesus Escobedo RN  Outcome: Completed  11/3/2021 0939 by Maria De Jesus Escobedo RN  Outcome: Ongoing  11/3/2021 0314 by Phuong Pat RN  Outcome: Ongoing  Goal: Bowel movement at least every other day  11/3/2021 1644 by Maria De Jesus Escobedo RN  Outcome: Completed  11/3/2021 0939 by Maria De Jesus Escobedo RN  Outcome: Ongoing  11/3/2021 0314 by Phuong Pat RN  Outcome: Ongoing     Problem: Cardiac:  Goal: Hemodynamic stability will improve  Description: Hemodynamic stability will improve  11/3/2021 1644 by Maria De Jesus Escobedo RN  Outcome: Completed  11/3/2021 0939 by Maria De Jesus Escobedo RN  Outcome: Ongoing  11/3/2021 0314 by Tiburcio Barrera RN  Outcome: Ongoing  Goal: Complications related to the disease process, condition or treatment will be avoided or minimized  Description: Complications related to the disease process, condition or treatment will be avoided or minimized  11/3/2021 1644 by Aneta Tee RN  Outcome: Completed  11/3/2021 0939 by Aneta Tee RN  Outcome: Ongoing  11/3/2021 0314 by Tiburcio Barrera RN  Outcome: Ongoing  Goal: Cerebral tissue perfusion will improve  Description: Cerebral tissue perfusion will improve  11/3/2021 1644 by Aneta Tee RN  Outcome: Completed  11/3/2021 0939 by Aneta Tee RN  Outcome: Ongoing  11/3/2021 0314 by Tiburcio Barrera RN  Outcome: Ongoing

## 2021-11-03 NOTE — PROGRESS NOTES
Speech Language Pathology  Dysphagia Treatment Note    Name:  Lakesha Jones  :   1955  Medical Diagnosis:  Enterocolitis [K52.9]  Hypokalemia [E87.6]  Septicemia ([facility] [address] [A41.9]  Hypoxia [R09.02]  Pulmonary nodule [R91.1]  Abdominal aortic aneurysm (AAA) without rupture ([facility] [address] [I71.4]  Treatment Diagnosis: Oropharyngeal Dysphagia  Pain level: None reported     Diet level prior to treatment: Regular diet with thin liquids, meds with puree  Tolerance of Current Diet Level: Per chart review and RN report, no noted difficulty with current diet. MBSS: 2021  \"Modified Barium Swallow evaluation completed on 2021. Results indicate mild oropharyngeal dysphagia. Episodic questionable trace laryngeal penetration was noted with thin liquids, no aspiration was viewed during this study. Oral phase was characterized by slow/prolonged chewing, reduced bolus control with premature bolus loss and trace residue post swallows. Pharyngeal phase of the swallow was characterized by pharyngeal pooling, minimally delayed swallow initiation and delayed pharyngeal clearing. Delayed pharyngeal clearing resulted in pharyngeal stasis post swallows. Pt was able to clear the majority of stasis utilizing double swallows. Delayed pharyngeal clearing appeared to be due to decreased tongue base retraction, reduced anterior hyoid movement and delayed/decreased pharyngoesophageal segment opening. This may contribute to her sensation of difficulty swallowing and globus sensation with eating/drinking. Pt also with known hx of laryngopharyngeal reflux which may further contribute to her globus sensation. Recommend strict reflux precautions, use of alternating liquids/solids and double swallows. See below for more information. \"     Assessment of Texture Tolerance:  -Impressions: Pt upright in bed upon SLP arrival, currently on RA.  Pt seen consuming lunch meal of thin liquids, soft solids and regular solids (tea, chicken salad and crackers) to assess texture tolerance. Pt overall tolerating of thin liquids with a slight delayed swallow initiation however no overt clinical s/s of aspiration/penetration. Soft and regular solids revealed functional mastication in isolation and when combined. Pt able to achieve good oral clearance of both textures. Pt seen utilizing compensatory swallowing strategies independently during lunch meal. Recommend continuation of regular solids diet with thin liquids with continued use of general safe swallowing strategies. Diet and Treatment Recommendations 11/3/2021:  Continue Regular diet with thin liquids, meds with puree    Compensatory strategies: Alternate solids/liquids , Upright as possible with all PO intake , Small bites/sips , Swallow 2 times per bite , Eat/feed slowly, Remain upright 30-45 min     Dysphagia Goals:  1. Pt will functionally tolerate recommended diet level without s/s of aspiration/penetration. (ongoing 11/3/2021)   2. Pt/family will demonstrate understanding of results Modified Barium Swallow Study, risk for aspiration, rationale for diet level and compensatory strategies. (ongoing 11/3/2021)  3. Pt will utilize appropriate compensatory strategies as indicated with min with cues. (ongoing 11/3/2021)  4. Pt will demonstrate improved sensory motor function via targeted exercises and appropriate treatment modalities. (ongoing 11/3/2021)    Plan: Continued daily Dysphagia treatment with goals per plan of care. Patient/Family Education: Education given to the Pt and nurse, who verbalized understanding. Discharge Recommendations: Pt will benefit from continued skilled Speech Therapy for Dysphagia services, prior to returning home. Timed Code Treatment: 0 minutes     Total Treatment Time: 12 minutes     If patient discharges prior to next session this note will serve as a discharge summary.      Signature:  Pati Rasheed M.A., [address] [place] [address]  Speech-Language Pathologist  11/3/2021 12:38 PM

## 2021-11-03 NOTE — DISCHARGE INSTR - COC
Continuity of Care Form    Patient Name: Patricia Lang   :  1955  MRN:  [id number]    Admit date:  10/30/2021  Discharge date:  ***    Code Status Order: Full Code   Advance Directives:     Admitting Physician:  Erick Hayward MD  PCP: Latoya Perez MD    Discharging Nurse: [facility] Unit/Room#: 7IL-[id number]/0114-17  Discharging Unit Phone Number: ***    Emergency Contact:   Extended Emergency Contact Information  Primary Emergency Contact: [address] [facility] Phone: [phone]  Mobile Phone: [phone]  Relation: Child  Secondary Emergency Contact: [address] [facility] Phone: [phone]  Mobile Phone: [phone]  Relation: Brother/Sister    Past Surgical History:  Past Surgical History:   Procedure Laterality Date    CARPAL TUNNEL RELEASE       SECTION      COLONOSCOPY      ENDOMETRIAL ABLATION      ENDOSCOPY, COLON, DIAGNOSTIC      LIVER BIOPSY N/A     SINUS ENDOSCOPY N/A 3/8/2021    NASAL ENDOSCOPY; BIOPSY OF NASOPHARYNGEAL LESION performed by Giorgio Huynh DO at [address] WISDOM TOOTH EXTRACTION         Immunization History:   Immunization History   Administered Date(s) Administered    COVID-19, Vivian Bran, Primary or Immunocompromised, PF, 100mcg/0.5mL 2021, 2021    Hepatitis A Adult (Havrix, Vaqta) 2019, 2019    Influenza, Quadv, adjuvanted, 65 yrs +, IM, PF (Fluad) 10/21/2020    Pneumococcal Polysaccharide ([id number]) 2018    Tdap (Boostrix, Adacel) 2014       Active Problems:  Patient Active Problem List   Diagnosis Code    Tobacco use disorder F17.200    HTN, goal below 130/80 I10    Dyslipidemia V15.5    Lichen planus J30.0    Hypercholesteremia E78.00    Essential hypertension I10    Primary biliary cholangitis ([facility] [address] K74.3    Controlled type 2 diabetes mellitus without complication, without long-term current use of insulin (HCC) E11.9    Nonrheumatic aortic valve stenosis I35.0    Enterocolitis K52.9       Isolation/Infection:   Isolation          No Isolation        Patient Infection Status     Infection Onset Added Last Indicated Last Indicated By Review Planned Expiration Resolved Resolved By    None active    Resolved    C-diff Rule Out 10/31/21 10/31/21 10/31/21 GI Bacterial Pathogens By PCR (Ordered)   10/31/21 Rule-Out Test Resulted    C-diff Rule Out 10/30/21 10/30/21 10/30/21 Gastrointestinal Panel, Molecular (Ordered)   10/30/21 Rule-Out Test Resulted          Nurse Assessment:  Last Vital Signs: BP (!) 156/72   Pulse 73   Temp 97.8 °F (36.6 °C) (Oral)   Resp 16   Ht 5' 1\" (1.549 m)   Wt 105 lb 8 oz (47.9 kg)   SpO2 (!) 86%   BMI 19.93 kg/m²     Last documented pain score (0-10 scale): Pain Level: 0  Last Weight:   Wt Readings from Last 1 Encounters:   11/01/21 105 lb 8 oz (47.9 kg)     Mental Status:  {IP PT MENTAL STATUS:20030}    IV Access:  { TRICIA IV ACCESS:214843685}    Nursing Mobility/ADLs:  Walking   {CHP DME AJXN:338096237}  Transfer  {CHP DME IXVZ:337919609}  Bathing  {CHP DME CTYY:759794289}  Dressing  {CHP DME VYFX:919456859}  Toileting  {CHP DME WVAO:423453223}  Feeding  {CHP DME DOIC:911988152}  Med Admin  {P DME IUZT:361187530}  Med Delivery   { TRICIA MED Delivery:719034477}    Wound Care Documentation and Therapy:        Elimination:  Continence:   · Bowel: {YES / CP:46073}  · Bladder: {YES / JER:07099}  Urinary Catheter: {Urinary Catheter:095553033}   Colostomy/Ileostomy/Ileal Conduit: {YES / VM:29441}       Date of Last BM: ***    Intake/Output Summary (Last 24 hours) at 11/3/2021 1644  Last data filed at 11/3/2021 0839  Gross per 24 hour   Intake 120 ml   Output --   Net 120 ml     I/O last 3 completed shifts:   In: 120 [P.O.:120]  Out: -     Safety Concerns:     [address] Concerns:637112239}    Impairments/Disabilities:      [address] Impairments/Disabilities:748409753}    Nutrition Therapy:  Current Nutrition Therapy:   [address] Diet JZSQ:154083406}    Routes of Feeding: {CHP DME Other Feedings:980415316}  Liquids: {Slp liquid thickness:09148}  Daily Fluid Restriction: {CHP DME Yes amt example:617704641}  Last Modified Barium Swallow with Video (Video Swallowing Test): {Done Not Done EWPD:409620445}    Treatments at the Time of Hospital Discharge:   Respiratory Treatments: ***  Oxygen Therapy:  {Therapy; copd oxygen:38563}  Ventilator:    {MH CC Vent WZCW:067986583}    Rehab Therapies: {THERAPEUTIC INTERVENTION:9626526532}  Weight Bearing Status/Restrictions: [facility] CC Weight Bearin}  Other Medical Equipment (for information only, NOT a DME order):  {EQUIPMENT:597783561}  Other Treatments: ***    Patient's personal belongings (please select all that are sent with patient):  {CHP DME Belongings:557128719}    RN SIGNATURE:  {Esignature:337558017}    CASE MANAGEMENT/SOCIAL WORK SECTION    Inpatient Status Date:    10/30/21    Readmission Risk Assessment Score:  Readmission Risk              Risk of Unplanned Readmission:  15           Discharging to Facility/ Agency     FACILITY:     [address] [place] [address]:   [address] [place] [address]   PHONE:        [phone]:              [phone]     / signature: Electronically signed by Iván Nugent RN on 11/3/21 at 4:45 PM EDT    PHYSICIAN SECTION    Prognosis: {Prognosis:8185693436}    Condition at Discharge: [address] [facility] Patient Condition:497055368}    Rehab Potential (if transferring to Rehab): {Prognosis:9753648023}    Recommended Labs or Other Treatments After Discharge: ***    Physician Certification: I certify the above information and transfer of Micah Hope  is necessary for the continuing treatment of the diagnosis listed and that she requires {Admit to Appropriate Level of Care:50687} for {GREATER/LESS:876812435} 30 days.      Update Admission H&P: [facility]511[facility]}    PHYSICIAN SIGNATURE:  {[facility]:492160388}

## 2021-11-03 NOTE — CARE COORDINATION
Discharge Planning Assessment    RN/SW discharge planner met with patient to discuss reason for admission, current living situation, and potential needs at the time of discharge    Demographics/Insurance verified Yes    Current type of dwelling: The patient currently lives in a two story townhouse. All the bedrooms are on the second floor. There are two steps to enter the home. Patient from ECF/SW confirmed with: N/A    Living arrangements: The patient currently lives alone with her dog. Level of function/Support: The patient is alert and oriented. Anne Marie Goldsmith MD     Last Visit to PCP: 09/01/2021    DME: None; Therapy is recommending a rolling walker at the time of discharge. Active with any community resources/agencies/skilled home care: N/A    Medication compliance issues: The patient states she is compliant with her medications. Financial issues that could impact healthcare: N/A    Tentative discharge plan:   The patient plans to go to her daughter's house upon discharge for 1 to 2 days and then return home. The patient is agreeable to home health care providing home PT. A walker was delivered to the patient. A referral was placed to [facility].       Transportation at the time of discharge: Daughter      Electronically signed by Patti Pyle RN on 11/3/2021 at 4:32 PM

## 2021-11-03 NOTE — PROGRESS NOTES
Occupational Therapy   Occupational Therapy Initial Assessment  Date: 11/3/2021   Patient Name: Brisa Brunson  MRN: [id number]     : 1955    Date of Service: 11/3/2021    Discharge Recommendations: Brisa Brunson scored a 22/24 on the AM-PAC ADL Inpatient form. Current research shows that an AM-PAC score of 18 or greater is typically associated with a discharge to the patient's home setting. Based on the patient's AM-PAC score, and their current ADL deficits, it is recommended that the patient have 2-3 sessions per week of Occupational Therapy at d/c to increase the patient's independence. At this time, this patient demonstrates the endurance and safety to discharge home with home services (home vs OP services) and a follow up treatment frequency of 2-3x/wk. Please see assessment section for further patient specific details. If patient discharges prior to next session this note will serve as a discharge summary. Please see below for the latest assessment towards goals. HOME HEALTH CARE: LEVEL 1 STANDARD    - Initial home health evaluation to occur within 24-48 hours, in patient home   - Therapy to evaluate with goal of regaining prior level of functioning   - Therapy to evaluate if patient has [address] needs for personal care       OT Equipment Recommendations  Equipment Needed: No    Assessment   Performance deficits / Impairments: Decreased functional mobility ; Decreased balance;Decreased ADL status; Decreased endurance;Decreased high-level IADLs  Assessment: Patient presents slightly below baseline d/t above deficits; OT indicated to maximize safety/independence with ADL and IADL  Treatment Diagnosis: Above deficits associated with enterocolitis  Prognosis: Good  Decision Making: Low Complexity  Exam: as above  OT Education: OT Role;Plan of Care  Patient Education: eval, discharge--pt v/u  REQUIRES OT FOLLOW UP: Yes  Activity Tolerance  Activity Tolerance: Patient Tolerated treatment well  Safety Devices  Safety Devices in place: Yes  Type of devices: All fall risk precautions in place;Nurse notified;Call light within reach; Left in chair;Gait belt (no alarm upon arrival)           Patient Diagnosis(es): The primary encounter diagnosis was Enterocolitis. Diagnoses of Septicemia ([address] Pulmonary nodule, Abdominal aortic aneurysm (AAA) without rupture ([address] Hypokalemia, Hypoxia, and Chronic obstructive pulmonary disease, unspecified COPD type ([address] were also pertinent to this visit. has a past medical history of Controlled type 2 diabetes mellitus without complication, without long-term current use of insulin ([address] Dyslipidemia, Essential hypertension, GERD (gastroesophageal reflux disease), Heart murmur, Hypercholesterolemia, Hyperlipidemia, Hypertension, Lichen planus, PONV (postoperative nausea and vomiting), Primary biliary cholangitis ([address] and Primary biliary cholangitis ([address] has a past surgical history that includes  section; Carpal tunnel release; Endometrial ablation; Tonsillectomy; [place] tooth extraction; liver biopsy (N/A, ); Endoscopy, colon, diagnostic; Colonoscopy; and Sinus endoscopy (N/A, 3/8/2021). Treatment Diagnosis: Above deficits associated with enterocolitis      Restrictions  Restrictions/Precautions  Restrictions/Precautions: Fall Risk (High)  Required Braces or Orthoses?: No  Position Activity Restriction  Other position/activity restrictions: Raza Michael is a 78 y.o. female with past medical history of diabetes, hyperlipidemia, hypertension, documented biliary cholangitis who presents the ED with complaint of nausea and vomiting. Patient arrived by EMS with complaints of nausea and vomiting. Patient states she had difficulty swallowing and sore throat over the past couple of days. Patient denies any drooling, trismus, stridor or respiratory distress. Denies any changes in phonation.   Patient states yesterday she had episode of nausea and vomiting. States ever since then she has felt like she has had some weakness, cough and difficulty breathing. Patient denies any abdominal pain. Denies any dysuria, frequency, urgency or hematuria. Apparently she had some urinary incontinence. Denies any changes in bowel movements. Denies fever chills. Denies sick contacts or recent travel. Patient does not use oxygen at home. EMS arrived and she was 64% on room air. Patient on 2 L nasal cannula oxygen this time with oxygen saturation at 98%. Patient denies any history of gastroparesis or issues with her stomach. States she is a diabetic. Blood sugar 132 in route. Denies any known exposure to COVID-19 in the coronavirus. Patient states she has been vaccinated against COVID-19. Subjective   General  Chart Reviewed: Yes  Diagnosis: Enterocolitis  Subjective  Subjective: Patient lying upright in bed upon arrival, agreeable to to evaluation. Denies pain  Patient Currently in Pain: Denies  Pre Treatment Pain Screening  Intervention List: Nurse/Physician notified; Patient able to continue with treatment  Vital Signs  Temp: 97.8 °F (36.6 °C)  Temp Source: Oral  Pulse: 73  Heart Rate Source: Monitor  Resp: 16  BP: (!) 156/72  BP Location: Right upper arm  Patient Position: Sitting  Patient Currently in Pain: Denies  Oxygen Therapy  SpO2: (!) 86 %  O2 Device: None (Room air)  O2 Flow Rate (L/min): 0 L/min  Social/Functional History  Social/Functional History  Lives With: Alone  Type of Home: House ([facility])  Home Layout: Two level, Laundry in basement, Bed/Bath upstairs, 1/2 bath on main level  Home Access: Stairs to enter without rails  Entrance Stairs - Number of Steps: 1 REY  Bathroom Shower/Tub: Tub/Shower unit  Bathroom Toilet: Standard  Bathroom Equipment: Grab bars in shower, Hand-held shower  Home Equipment:  (N/A)  ADL Assistance: Independent  Homemaking Assistance: Independent  Homemaking Responsibilities: Yes  Ambulation Assistance: Independent  Transfer Assistance: Independent  Active : Yes  Leisure & Hobbies: spending time with dog, shopping, visiting friends  Additional Comments: Pt denies recent falls       Objective   Vision: Impaired  Vision Exceptions: Wears glasses at all times  Hearing: Within functional limits    Orientation  Overall Orientation Status: Within Functional Limits  Observation/Palpation  Posture: Good  Balance  Sitting Balance: Supervision  Standing Balance: Stand by assistance  Functional Mobility  Functional - Mobility Device: Rolling Walker  Activity: Other  Assist Level: Contact guard assistance  Functional Mobility Comments: CGA>>>SBA with RW in hallway  ADL  Additional Comments: Declines ADL, reports she independently completed bathing/grooming earlier.  Up ad aydee and reports toileting independently  Tone RUE  RUE Tone: Normotonic  Tone LUE  LUE Tone: Normotonic  Coordination  Movements Are Fluid And Coordinated: Yes     Bed mobility  Supine to Sit: Stand by assistance  Scooting: Stand by assistance  Transfers  Sit to stand: Stand by assistance  Stand to sit: Stand by assistance     Cognition  Overall Cognitive Status: WFL  Perception  Overall Perceptual Status: WFL     Sensation  Overall Sensation Status: WFL        LUE AROM (degrees)  LUE AROM : WFL  RUE AROM (degrees)  RUE AROM : WFL                      Plan   Plan  Times per week: 3-5  Times per day: Daily  Current Treatment Recommendations: Strengthening, Endurance Training, Balance Training, Functional Mobility Training, Safety Education & Training, Home Management Training, Equipment Evaluation, Education, & procurement, Self-Care / ADL, Patient/Caregiver Education & Training    G-Code     OutComes Score                                                  AM-PAC Score        AM-[facility] Inpatient Daily Activity Raw Score: 22 (11/03/21 1359)  AM-PAC Inpatient ADL T-Scale Score : 47.1 (11/03/21 1359)  ADL Inpatient CMS 0-100% Score: 25.8 (11/03/21 1359)  ADL Inpatient CMS G-Code Modifier : Kirby Davis (11/03/21 2838)    Goals  Short term goals  Time Frame for Short term goals: discharge  Short term goal 1: UB ADL mod I  Short term goal 2: LB ADL mod I  Short term goal 3: Fxl transfers mod I  Short term goal 4: Fxl mob mod I  Short term goal 5:  Toileting mod I  Long term goals  Time Frame for Long term goals : LTG=STG       Therapy Time   Individual Concurrent Group Co-treatment   Time In 1216         Time Out 1246         Minutes 30            Timed Code Treatment Minutes:  15 minutes    Total Treatment Minutes:  30 minutes    Ronni Campos OTR/L [id number]    Ora Jeffrey OT

## 2021-11-03 NOTE — PLAN OF CARE
Problem: Falls - Risk of:  Goal: Will remain free from falls  Description: Will remain free from falls  Outcome: Ongoing  Goal: Absence of physical injury  Description: Absence of physical injury  Outcome: Ongoing     Problem: Pain:  Goal: Pain level will decrease  Description: Pain level will decrease  Outcome: Ongoing  Goal: Control of acute pain  Description: Control of acute pain  Outcome: Ongoing     Problem: Nausea/Vomiting:  Goal: Absence of nausea/vomiting  Description: Absence of nausea/vomiting  Outcome: Ongoing     Problem: GI  Goal: No bowel complications  Outcome: Ongoing  Goal: Bowel movement at least every other day  Outcome: Ongoing     Problem: Cardiac:  Goal: Hemodynamic stability will improve  Description: Hemodynamic stability will improve  Outcome: Ongoing  Goal: Complications related to the disease process, condition or treatment will be avoided or minimized  Description: Complications related to the disease process, condition or treatment will be avoided or minimized  Outcome: Ongoing  Goal: Cerebral tissue perfusion will improve  Description: Cerebral tissue perfusion will improve  Outcome: Ongoing

## 2021-11-03 NOTE — PROGRESS NOTES
Advance Care Planning   Ambulatory ACP Specialist Patient Outreach    Date:  11/3/2021  ACP Specialist:  Kyleigh Gardiner    Outreach call to patient in follow-up to ACP Specialist referral from: Chilo Frazier MD    [x] PCP  [] Provider   [] Ambulatory Care Management [] Other for Reason:    [x] Advance Directive Assistance  [] Code Status Discussion  [] Complete Portable DNR Order  [] Discuss Goals of Care  [] Complete POST/MOST  [] Early ACP Decision-Making  [] Other    Date Referral Received:9/1/21    Today's Outreach:  [] First   [] Second  [x] Third                               Third outreach made by [x]  phone  [] email []   Complete Network [employer]     Intervention:  [x] Spoke with Patient  [] Left VM requesting return call      Outcome: Followed up with Pt for Advance Directives referral. Pt currently admitted to [facility] and asked  to call back in 2 weeks. Next Step:   [] ACP scheduled conversation  [x] Outreach again in two weeks              [] Email / Mail ACP Info Sheets  [] Email / Mail Advance Directive            [] Close Referral. Routing closure to referring provider/staff and to ACP Specialist .      Thank you for this referral.

## 2021-11-03 NOTE — DISCHARGE SUMMARY
[facility] DISCHARGE SUMMARY    Patient Demographics    Patient. Fadumo Ford  Date of Birth. 1955  MRN. [id number]     Primary care provider. Anastacio Arroyo MD  (Tel: [phone])    Admit date: 10/30/2021    Discharge date (blank if same as Note Date): Note Date: 11/3/2021     Reason for Hospitalization. Chief Complaint   Patient presents with    Emesis     arrived via EMS d/t emesis, weakness, incont, difficult swallowing, SpO2 64% RA place on 2L 98%. /69. P79.  with hx of DM; swallowing issue started yesterday and emesis and incont started just after 12 today;         Significant Findings. Active Problems:    Enterocolitis  Resolved Problems:    * No resolved hospital problems. *       Problems and results from this hospitalization that need follow up. 1. Enterocolitis  2. Thoracic aorta atheroma    Significant test results and incidental findings. 1. CTPA, CT abdomen pelvis  IMPRESSION:   1.  CTA CHEST: No acute pulmonary embolism. 2. No acute pulmonary disease. 3. 3 mm mean diameter soft tissue pulmonary nodule right upper lobe almost   certainly reflects a benign, postinfectious/inflammatory nodule.  No   additional evaluation or follow-up indicated.  Dependent on lung cancer risk,   annual CT lung cancer screening should be considered. 4.  Pulmonary sequela typical of that seen with smoking, including COPD.   5. Mild calcific atherosclerosis aorta and coronary arteries. Prominent mural   thrombus noted left side of the descending thoracic aorta. 6. Small hiatal hernia. 7. CT ABDOMEN/PELVIS: Possible mild enterocolitis.  Appearance of   mucosal/wall prominence of the small bowel and colon may simply be related to   the amount of fluid in the small bowel and colon acting as negative contrast.   Probable diarrhea.    8. No other CT evidence of an acute intra-abdominal or intrapelvic process. 9.  No findings to suggest acute appendicitis; no ureter calculus or   hydronephrosis. 10. 2.8 cm infrarenal abdominal aorta aneurysm; imaging every 5 years   indicated for surveillance. *   11. Age indeterminate but chronic appearing slight compression superior   endplate T5. Invasive procedures and treatments. 1. None     Problem-based Hospital Course. Patient is a 79-year-old female who presented to the hospital with vomiting and diarrhea. Symptoms started 1 day prior to her admission. CT abdomen pelvis revealed enterocolitis. Stool guaiac was positive. Her hemoglobin is 14.7. She was hypotensive in the emergency room that responded to fluid resuscitation. She remained tachycardic. Patient was admitted with sepsis secondary to enterocolitis. She was started on broad-spectrum antibiotics and IV fluids. 1. Enterocolitis-patient was started on broad-spectrum antibiotics. Her leukocytosis improved and her tachycardia resolved. Stool studies were unremarkable. Continue levaquin and flagyl. She was seen by GI during her stay and will             complete a course of antibiotics. Patient was tolerated diet and was discharged home in stable condition  2. Thoracic Aorta Mural thrombus-patient was seen by vascular surgery. Mural thrombus appears to be more atheromatous plaque. She will need to take asa 81 mg daily. 3. Acute respiratory failure-patient was initially hypoxic in the emergency room which quickly resolved. There was no evidence of PE, pneumonia, lungs clear. Currently 96% on RA. Off oxygen. Imaging including CT and repeat CXR is             unremarkable. 4. Anemia-I suspect this initial hemoglobin of 14 was concentrated due to dehydration. Hemoglobin remained stable. Has some guiac positive stool but no jamari blood and has enterocolitis. 5. Tachycardia-resolved with hydration  6.  Primary Biliary Cirrhosis-followed by  Renzo Wilder. IP CONSULT TO VASCULAR SURGERY  IP CONSULT TO GI  IP CONSULT TO HOME CARE NEEDS    Physical examination on discharge day. BP (!) 156/72   Pulse 73   Temp 97.8 °F (36.6 °C) (Oral)   Resp 16   Ht 5' 1\" (1.549 m)   Wt 105 lb 8 oz (47.9 kg)   SpO2 (!) 86%   BMI 19.93 kg/m²   General appearance. Alert. Looks comfortable. HEENT. Sclera clear. Moist mucus membranes. Cardiovascular. Regular rate and rhythm, normal S1, S2. No murmur. Respiratory. Not using accessory muscles. Clear to auscultation bilaterally, no wheeze. Gastrointestinal. Abdomen soft, non-tender, not distended, normal bowel sounds  Neurology. Facial symmetry. No speech deficits. Moving all extremities equally. Extremities. No edema in lower extremities. Skin. Warm, dry, normal turgor    Condition at time of discharge stable    Medication instructions provided to patient at discharge.      Medication List      START taking these medications    levoFLOXacin 500 MG tablet  Commonly known as: LEVAQUIN  Take 1 tablet by mouth daily for 7 days     metroNIDAZOLE 500 MG tablet  Commonly known as: FLAGYL  Take 1 tablet by mouth 3 times daily for 7 days        CONTINUE taking these medications    atorvastatin 80 MG tablet  Commonly known as: LIPITOR  TAKE 1 TABLET BY MOUTH DAILY     Breo Ellipta 200-25 MCG/INH Aepb inhaler  Generic drug: Fluticasone furoate-vilanterol  Inhale 1 puff into the lungs daily     calcium carbonate 1250 (500 Ca) MG tablet  Commonly known as: OYSTER SHELL CALCIUM 500 mg     famotidine 20 MG tablet  Commonly known as: PEPCID  Take 1 tablet by mouth nightly     lisinopril 5 MG tablet  Commonly known as: PRINIVIL;ZESTRIL  TAKE 1 TABLET BY MOUTH DAILY     loratadine 10 MG tablet  Commonly known as: CLARITIN  TAKE 1 TABLET BY MOUTH DAILY     MULTIVITAMIN PO     triamcinolone 0.1 % cream  Commonly known as: KENALOG  Apply topically 2 times daily as needed     ursodiol 500 MG tablet  Commonly known as: ACTIGALL vitamin C 500 MG tablet  Commonly known as: ASCORBIC ACID     vitamin D 1000 UNIT Tabs tablet  Commonly known as: CHOLECALCIFEROL     Zenpep [id number] units Cpep  Generic drug: Pancrelipase (Lip-Prot-Amyl)  Take 1 capsule by mouth 5 times daily Before meals and snacks           Where to Get Your Medications      These medications were sent to [address] [place] [id number]    Hours: 24-hours Phone: [phone]   · Breo Ellipta 200-25 MCG/INH Aepb inhaler  · levoFLOXacin 500 MG tablet  · metroNIDAZOLE 500 MG tablet         Discharge recommendations given to patient. Follow Up. PCP  in 1 week   Disposition. home  Activity. activity as tolerated  Diet: ADULT DIET; Regular      Spent 35 minutes in discharge process. Signed:   Madeline Ventura PA-C     11/3/2021 5:08 PM

## 2021-11-03 NOTE — PROGRESS NOTES
Physical Therapy    Facility/Department: [address] ORTHO/NEURO NURSING  Initial Assessment    NAME: Dipak Calderon  : 1955  MRN: [id number]    Date of Service: 11/3/2021    Discharge Recommendations:Dominic Rivera scored a 19/24 on the AM-PAC short mobility form. Current research shows that an AM-PAC score of 18 or greater is typically associated with a discharge to the patient's home setting. Based on the patient's AM-PAC score and their current functional mobility deficits, it is recommended that the patient have 2-3 sessions per week of Physical Therapy at d/c to increase the patient's independence. At this time, this patient demonstrates the endurance and safety to discharge home with (home services) and a follow up treatment frequency of 2-3x/wk. Please see assessment section for further patient specific details. If patient discharges prior to next session this note will serve as a discharge summary. Please see below for the latest assessment towards goals. HOME HEALTH CARE: LEVEL 1 STANDARD    - Initial home health evaluation to occur within 24-48 hours, in patient home   - Therapy to evaluate with goal of regaining prior level of functioning   - Therapy to evaluate if patient has [address] needs for personal care        PT Equipment Recommendations  Equipment Needed: Yes  Mobility Devices: Thamas Joe: Rolling    Assessment   Body structures, Functions, Activity limitations: Decreased functional mobility ; Decreased ADL status; Decreased ROM; Decreased strength;Decreased endurance;Decreased balance; Increased pain  Assessment: Pt presents as below her baseline function and would benefit from skilled PT services to promote safe return to PLOF.   Prognosis: Good  Decision Making: Low Complexity  PT Education: Goals;PT Role;Plan of Care  Patient Education: D/C recommendations--pt verbalized understanding  REQUIRES PT FOLLOW UP: Yes  Activity Tolerance  Activity Tolerance: Patient Tolerated treatment well       Patient Diagnosis(es): The primary encounter diagnosis was Enterocolitis. Diagnoses of Septicemia ([address] Pulmonary nodule, Abdominal aortic aneurysm (AAA) without rupture ([address] Hypokalemia, Hypoxia, and Chronic obstructive pulmonary disease, unspecified COPD type ([address] were also pertinent to this visit. has a past medical history of Controlled type 2 diabetes mellitus without complication, without long-term current use of insulin ([address] Dyslipidemia, Essential hypertension, GERD (gastroesophageal reflux disease), Heart murmur, Hypercholesterolemia, Hyperlipidemia, Hypertension, Lichen planus, PONV (postoperative nausea and vomiting), Primary biliary cholangitis ([address] and Primary biliary cholangitis ([address] has a past surgical history that includes  section; Carpal tunnel release; Endometrial ablation; Tonsillectomy; Curtis tooth extraction; liver biopsy (N/A, ); Endoscopy, colon, diagnostic; Colonoscopy; and Sinus endoscopy (N/A, 3/8/2021). Restrictions  Restrictions/Precautions  Restrictions/Precautions: Fall Risk (High)  Required Braces or Orthoses?: No  Position Activity Restriction  Other position/activity restrictions: Radha Arthur is a 78 y.o. female with past medical history of diabetes, hyperlipidemia, hypertension, documented biliary cholangitis who presents the ED with complaint of nausea and vomiting. Patient arrived by EMS with complaints of nausea and vomiting. Patient states she had difficulty swallowing and sore throat over the past couple of days. Patient denies any drooling, trismus, stridor or respiratory distress. Denies any changes in phonation. Patient states yesterday she had episode of nausea and vomiting. States ever since then she has felt like she has had some weakness, cough and difficulty breathing. Patient denies any abdominal pain. Denies any dysuria, frequency, urgency or hematuria.   Apparently she had some urinary incontinence. Denies any changes in bowel movements. Denies fever chills. Denies sick contacts or recent travel. Patient does not use oxygen at home. EMS arrived and she was 64% on room air. Patient on 2 L nasal cannula oxygen this time with oxygen saturation at 98%. Patient denies any history of gastroparesis or issues with her stomach. States she is a diabetic. Blood sugar 132 in route. Denies any known exposure to COVID-19 in the coronavirus. Patient states she has been vaccinated against COVID-19.   Vision/Hearing  Vision: Impaired  Vision Exceptions: Wears glasses at all times  Hearing: Within functional limits     Subjective  General  Chart Reviewed: Yes  Response To Previous Treatment: Not applicable  Family / Caregiver Present: No  Diagnosis: Enterocolitis  Follows Commands: Within Functional Limits  General Comment  Comments: Pt supine in bed upon arrival.  Subjective  Subjective: Pt agreeable to PT/OT eval.  Pain Screening  Patient Currently in Pain: Denies  Vital Signs  Patient Currently in Pain: Denies       Orientation  Orientation  Overall Orientation Status: Within Normal Limits  Social/Functional History  Social/Functional History  Lives With: Alone  Type of Home: House (Townhouse)  Home Layout: Two level, Laundry in basement, Bed/Bath upstairs, 1/2 bath on main level  Home Access: Stairs to enter without rails  Entrance Stairs - Number of Steps: 1 REY  Bathroom Shower/Tub: Tub/Shower unit  Bathroom Toilet: Standard  Bathroom Equipment: Grab bars in shower, Hand-held shower  Home Equipment:  (N/A)  ADL Assistance: Independent  Homemaking Assistance: Independent  Homemaking Responsibilities: Yes  Ambulation Assistance: Independent  Transfer Assistance: Independent  Active : Yes  Leisure & Hobbies: spending time with dog, shopping, visiting friends  Additional Comments: Pt denies recent falls  Cognition        Objective     Observation/Palpation  Posture: Good    AROM RLE (degrees)  RLE AROM: WFL  AROM LLE (degrees)  LLE AROM : WFL  Strength RLE  Strength RLE: WFL  Strength LLE  Strength LLE: WFL  Tone RLE  RLE Tone: Normotonic  Tone LLE  LLE Tone: Normotonic  Motor Control  Gross Motor?: WFL  Sensation  Overall Sensation Status: WFL  Bed mobility  Supine to Sit: Stand by assistance  Scooting: Stand by assistance  Transfers  Sit to Stand: Stand by assistance  Stand to sit: Stand by assistance  Ambulation  Ambulation?: Yes  Ambulation 1  Surface: level tile  Device: No Device;Rolling Walker  Assistance: Contact guard assistance;Stand by assistance  Quality of Gait: Pt ambulates with decreased step length, slow saiya, mild increased staggering, no LOB. PT providing pt with RW, pt demonstrating improved step length and decreased staggering, though holding back end of walker off of floor. Pt cued to allow back legs of walker rest on floor. Distance: ~120 ft  Comments: Pt reports feeling \"gelatin-like\" when walking without AD. Use of walker demonstrates improved gait pattern and improved confidence from patient  Stairs/Curb  Stairs?: No     Balance  Posture: Good  Sitting - Static: Good  Sitting - Dynamic: Good  Standing - Static: Fair;+  Standing - Dynamic: Fair;+        Plan   Plan  Times per week: 3-5x  Times per day: Daily  Current Treatment Recommendations: Strengthening, ROM, Balance Training, Functional Mobility Training, Transfer Training, Endurance Training, Gait Training, Stair training, Safety Education & Training, Patient/Caregiver Education & Training, Equipment Evaluation, Education, & procurement  Safety Devices  Type of devices:  All fall risk precautions in place, Call light within reach, Chair alarm in place, Gait belt, Patient at risk for falls, Left in chair, Nurse notified  Restraints  Initially in place: No    G-Code       OutComes Score                                                  AM-PAC Score  AM-PAC Inpatient Mobility Raw Score : 19 (11/03/21 3576)  AM-PAC Inpatient T-Scale Score : 45.44 (11/03/21 1346)  Mobility Inpatient CMS 0-100% Score: 41.77 (11/03/21 1346)  Mobility Inpatient CMS G-Code Modifier : CK (11/03/21 1346)          Goals  Short term goals  Time Frame for Short term goals:  To be met prior to discharge  Short term goal 1: Pt will complete bed mobility with mod I  Short term goal 2: Pt will complete sit to/from stand with mod I  Short term goal 3: Pt will ambulate 150 ft with LRAD and mod I  Short term goal 4: Pt will navigate up/down 8 steps with HR and supervision       Therapy Time   Individual Concurrent Group Co-treatment   Time In 1212         Time Out 1240         Minutes 28         Timed Code Treatment Minutes: Ever Tony 1313, [address] [facility], DPT, [id number]  Lisa Smith, PT

## 2021-11-03 NOTE — PLAN OF CARE
Problem: Falls - Risk of:  Goal: Will remain free from falls  Description: Will remain free from falls  11/3/2021 0939 by Ramón Galindo RN  Outcome: Ongoing  11/3/2021 0314 by Ruth Aldrich RN  Outcome: Ongoing  Goal: Absence of physical injury  Description: Absence of physical injury  11/3/2021 0939 by Ramón Galindo RN  Outcome: Ongoing  11/3/2021 0314 by Ruth Aldrich RN  Outcome: Ongoing     Problem: Pain:  Goal: Pain level will decrease  Description: Pain level will decrease  11/3/2021 0939 by Ramón Galindo RN  Outcome: Ongoing  11/3/2021 0314 by Ruth Aldrich RN  Outcome: Ongoing  Goal: Control of acute pain  Description: Control of acute pain  11/3/2021 0939 by Ramón Galindo RN  Outcome: Ongoing  11/3/2021 0314 by Ruth Aldrich RN  Outcome: Ongoing     Problem: Nausea/Vomiting:  Goal: Absence of nausea/vomiting  Description: Absence of nausea/vomiting  11/3/2021 0939 by Ramón Galindo RN  Outcome: Ongoing  11/3/2021 0314 by Ruht Aldrich RN  Outcome: Ongoing     Problem: GI  Goal: No bowel complications  94/7/4289 0939 by Ramón Galindo RN  Outcome: Ongoing  11/3/2021 0314 by Ruth Aldrich RN  Outcome: Ongoing  Goal: Bowel movement at least every other day  11/3/2021 0939 by Ramón Galindo RN  Outcome: Ongoing  11/3/2021 0314 by Ruth Aldrich RN  Outcome: Ongoing     Problem: Cardiac:  Goal: Hemodynamic stability will improve  Description: Hemodynamic stability will improve  11/3/2021 0939 by Ramón Galindo RN  Outcome: Ongoing  11/3/2021 0314 by Ruth Aldrich RN  Outcome: Ongoing  Goal: Complications related to the disease process, condition or treatment will be avoided or minimized  Description: Complications related to the disease process, condition or treatment will be avoided or minimized  11/3/2021 0939 by Ramón Galindo RN  Outcome: Ongoing  11/3/2021 0314 by Ruth Aldrich RN  Outcome: Ongoing  Goal: Cerebral tissue perfusion will improve  Description: Cerebral tissue perfusion will improve  11/3/2021 0939 by Aneta Tee RN  Outcome: Ongoing  11/3/2021 0314 by Tiburcio Barrera RN  Outcome: Ongoing

## 2021-11-03 NOTE — PROGRESS NOTES
Pt discharged home at [phone] via private vehicle with daughter. D/c instructions reviewed.  Pt yunior

## 2021-11-04 ENCOUNTER — CARE COORDINATION (OUTPATIENT)
Dept: CASE MANAGEMENT | Age: 66
End: 2021-11-04

## 2021-11-04 DIAGNOSIS — K52.9 ENTEROCOLITIS: Primary | ICD-10-CM

## 2021-11-04 PROCEDURE — 1111F DSCHRG MED/CURRENT MED MERGE: CPT | Performed by: INTERNAL MEDICINE

## 2021-11-04 NOTE — TELEPHONE ENCOUNTER
[facility] Transitions Initial Follow Up Call    Outreach made within 2 business days of discharge: Yes    Patient: Antoinette Alas Patient : 1955   MRN: [id number]  Reason for Admission: [unfilled]  Discharge Date: [unfilled]       Spoke with: Patient    Discharge department/facility: [facility] Interactive Patient Contact:  Was patient able to fill all prescriptions: Yes  Was patient instructed to bring all medications to the follow-up visit: Yes  Is patient taking all medications as directed in the discharge summary?  Yes  Does patient understand their discharge instructions: Yes  Does patient have questions or concerns that need addressed prior to 7-14 day follow up office visit: no    Scheduled appointment with PCP within 7-14 days    Follow Up  Future Appointments  2022   1:00 PM    Malika Valdez MD [address]

## 2021-11-04 NOTE — CARE COORDINATION
Video Nutrition Encounter     Due to COVID-19 ACTION PLAN, the patient's office visit was converted to a video visit     The patient consents to a video visit in place of an in office visit.      Patient states they are Adal Varner and are currently located at home during the course of our visit.     Referred by Dr. Ramos     Time spent talking with patient during today's call: 40 minutes.     Male with Type 2 DM and BMI over 40 referred by Dr. Ramos     Motivation: Wants to optimize his Diabetes Control. Wants to lose weight. Wants to improve quality of life.  Wants to increase energy. Wants to avoid medicines.     Occupation:  at [employer] Started this Job just before COVID Pandemic.  8/10/21: Currently working at home much.      Stress:      DM Medicines: none at this time.             Hemoglobin A1C (%)   Date Value   03/22/2021 7.9 (H)          Cholesterol (mg/dL)   Date Value   03/22/2021 199          HDL (mg/dL)   Date Value   03/22/2021 47          Cholesterol/ HDL Ratio (no units)   Date Value   03/22/2021 4.2              Triglycerides (mg/dL)   Date Value   03/22/2021 358 (H)          LDL (mg/dL)   Date Value   03/22/2021 80         HT: 5'10  WT:  3/24/21: 327 lb 6.1 oz BMI 46.97  8/10/21: no home scale.  8/13/21 : 317 lb 7.4 oz BMI 45.55 at Doctor's apt.  9/7/21:    310.2 lb  At home     Exercise: Has gone down since before the Pandemic. Sometimes goes for walks in park and sometimes goes to store for Grandmother. Never really devoted time for exercise. Thinking about devoting 30-45 min 2 times/week.  Wears an apple watch to measures his steps. He is getting 1446-2503 steps.      Diet:  Yesterday 9/6/21    No breakfast    2 pm  2 mini sandwiches cheese and turkey on mini [place] and light miracle whip  Simply light lemonade    8:30 pm  Beans and rice  Catfish  Garlic bread  water     Congratulated patient on losing 17 pounds since 3/24/21. Congratulated him on  [facility] Transitions Initial Follow Up Call:    (Confirm PCP appointment on next call)    Patient reports that she is very tired and sleeping a lot. Discussed discharge instructions and reviewed medications, 1111F completed. She has the new antibiotics and is taking them as prescribed. She denies abdominal pain and reports loose to normal BM's. She is also taking in plenty of fluids and eating a moderate amount of food. CTN attempted to schedule follow up appointment with PCP for next week per patient, there were no appointments available. CTN will route note to provider requesting that someone from the office reach out to schedule patient. Patient reports that [facility] out to her to schedule visit, she will call them back today. CTN will continue with outreach follow up calls. Call within 2 business days of discharge: Yes    Patient: Chantelle Clemosn Patient : 1955   MRN: [id number]  Reason for Admission: enterocolitis  Discharge Date: 11/3/21 RARS: Readmission Risk Score: 6.4      Last Discharge [facility]       Complaint Diagnosis Description Type Department Provider    10/30/21 Emesis Enterocolitis . .. ED to Hosp-Admission (Discharged) (ADMITTED) Lynne Marie MD; Errol Barrera... Spoke with: Chantelle Fosters      Transitions of Care Initial Call    Was this an external facility discharge? No Discharge Facility:     Challenges to be reviewed by the provider   Additional needs identified to be addressed with provider: No  none             Method of communication with provider : none      Advance Care Planning:   Does patient have an Advance Directive: reviewed and current. Was this a readmission? No  Patient stated reason for admission: enterocolitis  Patients top risk factors for readmission: medical condition-.    Care Transition Nurse (CTN) contacted the patient by telephone to perform post hospital discharge assessment.  Verified name and  getting more sleep, cooking more at home and getting more steps.Praised him for getting more sleep. Praised him for decreasing alcohol intake.Praised him for getting a scale for home.     Plan:      1. Making an effort to exercise more.  2.  Continue minimize alcohol intake.  3.  Continue to work on portion control.     Follow up   Weight Class  Diabetes Class 1 and 2  One on one    Dacia Mehta,RD  [phone]      with patient as identifiers. Provided introduction to self, and explanation of the CTN role. CTN reviewed discharge instructions, medical action plan and red flags with patient who verbalized understanding. Patient given an opportunity to ask questions and does not have any further questions or concerns at this time. Were discharge instructions available to patient? Yes. Reviewed appropriate site of care based on symptoms and resources available to patient including: PCP, Urgent care clinics, Home health and When to call 911. The patient agrees to contact the PCP office for questions related to their healthcare. Medication reconciliation was performed with patient, who verbalizes understanding of administration of home medications. Advised obtaining a 90-day supply of all daily and as-needed medications. Covid Risk Education     Educated patient about risk for severe COVID-19 due to risk factors according to CDC guidelines. CTN reviewed discharge instructions, medical action plan and red flag symptoms with the patient who verbalized understanding. Discussed COVID vaccination status: Yes. Education provided on COVID-19 vaccination as appropriate. Discussed exposure protocols and quarantine with CDC Guidelines. Patient was given an opportunity to verbalize any questions and concerns and agrees to contact CTN or health care provider for questions related to their healthcare. Reviewed and educated patient on any new and changed medications related to discharge diagnosis. Was patient discharged with a pulse oximeter? No     CTN provided contact information. Plan for follow-up call in 5-7 days based on severity of symptoms and risk factors. Plan for next call: symptom management-., self management-. and follow up appointment-.           Non-face-to-face services provided:  Obtained and reviewed discharge summary and/or continuity of care documents    Care Transitions 24 Hour Call    Do you have any ongoing symptoms?: No  Do you have a copy of your discharge instructions?: Yes  Do you have all of your prescriptions and are they filled?: Yes  Have you been contacted by a [address]?: No  Have you scheduled your follow up appointment?: No  Were you discharged with any Home Care or Post Acute Services: Yes  Post Acute Services: [address] [place] [address] you feel like you have everything you need to keep you well at home?: Yes  Care Transitions Interventions         Follow Up  Future Appointments   Date Time Provider Namita Velez   9/2/2022  1:00 PM Juliet Gallagher, RN

## 2021-11-09 ENCOUNTER — OFFICE VISIT (OUTPATIENT)
Dept: INTERNAL MEDICINE CLINIC | Age: 66
End: 2021-11-09
Payer: MEDICARE

## 2021-11-09 VITALS
OXYGEN SATURATION: 98 % | SYSTOLIC BLOOD PRESSURE: 112 MMHG | HEIGHT: 61 IN | DIASTOLIC BLOOD PRESSURE: 58 MMHG | BODY MASS INDEX: 19.45 KG/M2 | WEIGHT: 103 LBS | HEART RATE: 96 BPM

## 2021-11-09 DIAGNOSIS — E44.1 MILD PROTEIN-CALORIE MALNUTRITION (HCC): ICD-10-CM

## 2021-11-09 DIAGNOSIS — M54.50 CHRONIC LOW BACK PAIN, UNSPECIFIED BACK PAIN LATERALITY, UNSPECIFIED WHETHER SCIATICA PRESENT: ICD-10-CM

## 2021-11-09 DIAGNOSIS — I35.0 NONRHEUMATIC AORTIC VALVE STENOSIS: ICD-10-CM

## 2021-11-09 DIAGNOSIS — G89.29 CHRONIC LOW BACK PAIN, UNSPECIFIED BACK PAIN LATERALITY, UNSPECIFIED WHETHER SCIATICA PRESENT: ICD-10-CM

## 2021-11-09 DIAGNOSIS — K52.9 ENTEROCOLITIS: ICD-10-CM

## 2021-11-09 DIAGNOSIS — E11.9 CONTROLLED TYPE 2 DIABETES MELLITUS WITHOUT COMPLICATION, WITHOUT LONG-TERM CURRENT USE OF INSULIN (HCC): ICD-10-CM

## 2021-11-09 DIAGNOSIS — K74.3 PRIMARY BILIARY CHOLANGITIS (HCC): Primary | ICD-10-CM

## 2021-11-09 PROCEDURE — 99495 TRANSJ CARE MGMT MOD F2F 14D: CPT | Performed by: INTERNAL MEDICINE

## 2021-11-09 PROCEDURE — 1111F DSCHRG MED/CURRENT MED MERGE: CPT | Performed by: INTERNAL MEDICINE

## 2021-11-09 NOTE — PATIENT INSTRUCTIONS
Patient Education   Please use tylenol as needed  Continue the exercises     Low Back Pain: Exercises  Introduction  Here are some examples of exercises for you to try. The exercises may be suggested for a condition or for rehabilitation. Start each exercise slowly. Ease off the exercises if you start to have pain. You will be told when to start these exercises and which ones will work best for you. How to do the exercises  Press-up    1. Lie on your stomach, supporting your body with your forearms. 2. Press your elbows down into the floor to raise your upper back. As you do this, relax your stomach muscles and allow your back to arch without using your back muscles. As your press up, do not let your hips or pelvis come off the floor. 3. Hold for 15 to 30 seconds, then relax. 4. Repeat 2 to 4 times. Alternate arm and leg (bird dog) exercise    Do this exercise slowly. Try to keep your body straight at all times, and do not let one hip drop lower than the other. 1. Start on the floor, on your hands and knees. 2. Tighten your belly muscles. 3. Raise one leg off the floor, and hold it straight out behind you. Be careful not to let your hip drop down, because that will twist your trunk. 4. Hold for about 6 seconds, then lower your leg and switch to the other leg. 5. Repeat 8 to 12 times on each leg. 6. Over time, work up to holding for 10 to 30 seconds each time. 7. If you feel stable and secure with your leg raised, try raising the opposite arm straight out in front of you at the same time. Knee-to-chest exercise    1. Lie on your back with your knees bent and your feet flat on the floor. 2. Bring one knee to your chest, keeping the other foot flat on the floor (or keeping the other leg straight, whichever feels better on your lower back). 3. Keep your lower back pressed to the floor. Hold for at least 15 to 30 seconds. 4. Relax, and lower the knee to the starting position.   5. Repeat with the other leg. Repeat 2 to 4 times with each leg. 6. To get more stretch, put your other leg flat on the floor while pulling your knee to your chest.  Curl-ups    1. Lie on the floor on your back with your knees bent at a 90-degree angle. Your feet should be flat on the floor, about 12 inches from your buttocks. 2. Cross your arms over your chest. If this bothers your neck, try putting your hands behind your neck (not your head), with your elbows spread apart. 3. Slowly tighten your belly muscles and raise your shoulder blades off the floor. 4. Keep your head in line with your body, and do not press your chin to your chest.  5. Hold this position for 1 or 2 seconds, then slowly lower yourself back down to the floor. 6. Repeat 8 to 12 times. Pelvic tilt exercise    1. Lie on your back with your knees bent. 2. \"Brace\" your stomach. This means to tighten your muscles by pulling in and imagining your belly button moving toward your spine. You should feel like your back is pressing to the floor and your hips and pelvis are rocking back. 3. Hold for about 6 seconds while you breathe smoothly. 4. Repeat 8 to 12 times. Heel dig bridging    1. Lie on your back with both knees bent and your ankles bent so that only your heels are digging into the floor. Your knees should be bent about 90 degrees. 2. Then push your heels into the floor, squeeze your buttocks, and lift your hips off the floor until your shoulders, hips, and knees are all in a straight line. 3. Hold for about 6 seconds as you continue to breathe normally, and then slowly lower your hips back down to the floor and rest for up to 10 seconds. 4. Do 8 to 12 repetitions. Hamstring stretch in doorway    1. Lie on your back in a doorway, with one leg through the open door. 2. Slide your leg up the wall to straighten your knee. You should feel a gentle stretch down the back of your leg. 3. Hold the stretch for at least 15 to 30 seconds.  Do not arch your back, point your toes, or bend either knee. Keep one heel touching the floor and the other heel touching the wall. 4. Repeat with your other leg. 5. Do 2 to 4 times for each leg. Hip flexor stretch    1. Kneel on the floor with one knee bent and one leg behind you. Place your forward knee over your foot. Keep your other knee touching the floor. 2. Slowly push your hips forward until you feel a stretch in the upper thigh of your rear leg. 3. Hold the stretch for at least 15 to 30 seconds. Repeat with your other leg. 4. Do 2 to 4 times on each side. Wall sit    1. Stand with your back 10 to 12 inches away from a wall. 2. Lean into the wall until your back is flat against it. 3. Slowly slide down until your knees are slightly bent, pressing your lower back into the wall. 4. Hold for about 6 seconds, then slide back up the wall. 5. Repeat 8 to 12 times. Follow-up care is a key part of your treatment and safety. Be sure to make and go to all appointments, and call your doctor if you are having problems. It's also a good idea to know your test results and keep a list of the medicines you take. Where can you learn more? Go to https://[employer]. org and sign in to your [employer] account. Enter H400 in the Sensorly box to learn more about \"Low Back Pain: Exercises. \"     If you do not have an account, please click on the \"Sign Up Now\" link. Current as of: July 1, 2021               Content Version: 13.0  © 2006-2021 Healthwise, Incorporated. Care instructions adapted under license by [facility] ([facility]). If you have questions about a medical condition or this instruction, always ask your healthcare professional. [place] [address] any warranty or liability for your use of this information.

## 2021-11-09 NOTE — PROGRESS NOTES
Post-Discharge Transitional Care Management Services or Hospital Follow Up      Jason Gomez   YOB: 1955    Date of Office Visit:  11/9/2021  Date of Hospital Admission: 10/30/21  Date of Hospital Discharge: 11/3/21  Readmission Risk Score(high >=14%.  Medium >=10%):Readmission Risk Score: 6.4      Care management risk score Rising risk (score 2-5) and Complex Care (Scores >=6): 3     Non face to face  following discharge, date last encounter closed (first attempt may have been earlier): 11/4/2021 11:45 AM 11/4/2021 11:45 AM    Call initiated 2 business days of discharge: Yes     Patient Active Problem List   Diagnosis    Tobacco use disorder    HTN, goal below 130/80    Dyslipidemia    Lichen planus    Hypercholesteremia    Essential hypertension    Primary biliary cholangitis ([address]    Controlled type 2 diabetes mellitus without complication, without long-term current use of insulin ([address]    Nonrheumatic aortic valve stenosis    Enterocolitis       Allergies   Allergen Reactions    Penicillins Other (See Comments)     Doesn't know allergy reaction    Sulfa Antibiotics Other (See Comments)     n/a    Sulfasalazine Hives     n/a       Medications listed as ordered at the time of discharge from hospital        Medications marked \"taking\" at this time  Outpatient Medications Marked as Taking for the 11/9/21 encounter (Office Visit) with Alejo Roe MD   Medication Sig Dispense Refill    Fluticasone furoate-vilanterol (BREO ELLIPTA) 200-25 MCG/INH AEPB inhaler Inhale 1 puff into the lungs daily 60 each 3    metroNIDAZOLE (FLAGYL) 500 MG tablet Take 1 tablet by mouth 3 times daily for 7 days 21 tablet 0    levoFLOXacin (LEVAQUIN) 500 MG tablet Take 1 tablet by mouth daily for 7 days 7 tablet 0    triamcinolone (KENALOG) 0.1 % cream Apply topically 2 times daily as needed 453.6 g 1    Pancrelipase, Lip-Prot-Amyl, (ZENPEP) 37904-266166 units CPEP Take 1 capsule by mouth 5 times daily Before meals and snacks 120 capsule 1    atorvastatin (LIPITOR) 80 MG tablet TAKE 1 TABLET BY MOUTH DAILY 90 tablet 0    lisinopril (PRINIVIL;ZESTRIL) 5 MG tablet TAKE 1 TABLET BY MOUTH DAILY 90 tablet 1    loratadine (CLARITIN) 10 MG tablet TAKE 1 TABLET BY MOUTH DAILY 90 tablet 1    famotidine (PEPCID) 20 MG tablet Take 1 tablet by mouth nightly 90 tablet 0    ursodiol (ACTIGALL) 500 MG tablet 2 times daily       calcium carbonate (OYSTER SHELL CALCIUM 500 MG) 1250 (500 Ca) MG tablet Take 1 tablet by mouth daily      vitamin D (CHOLECALCIFEROL) 1000 UNIT TABS tablet Take 1,000 Units by mouth daily      Multiple Vitamins-Minerals (MULTIVITAMIN PO) Take 1 capsule by mouth daily.  Ascorbic Acid (VITAMIN C) 500 MG tablet Take 500 mg by mouth daily. Medications patient taking as of now reconciled against medications ordered at time of hospital discharge: Yes    Chief Complaint   Patient presents with    Follow-Up from Hospital     vomiting and diarrhea        HPI    Inpatient course: Discharge summary reviewed- see chart. 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 Interval history/Current status: stable with poor appetite    Review of Systems   Constitutional: Positive for activity change, appetite change and fatigue. HENT: Negative. Eyes: Negative. Respiratory: Negative. Cardiovascular: Negative. Negative for leg swelling. Endocrine: Negative. Genitourinary: Negative. Musculoskeletal: Negative. Allergic/Immunologic: Negative. Neurological: Negative. Hematological: Negative. Psychiatric/Behavioral: Negative. Vitals:    11/09/21 1656   BP: (!) 112/58   Pulse: 96   SpO2: 98%   Weight: 103 lb (46.7 kg)   Height: 5' 1\" (1.549 m)     Body mass index is 19.46 kg/m².    Wt Readings from Last 3 Encounters:   11/09/21 103 lb (46.7 kg)   11/01/21 105 lb 8 oz (47.9 kg) 09/01/21 102 lb (46.3 kg)     BP Readings from Last 3 Encounters:   11/09/21 (!) 112/58   11/03/21 (!) 156/72   09/01/21 (!) 150/62       Physical Exam  Vitals and nursing note reviewed. Constitutional:       General: She is awake. She is not in acute distress. Appearance: She is well-developed. She is not toxic-appearing or diaphoretic. HENT:      Head: Normocephalic and atraumatic. Right Ear: Tympanic membrane normal. There is no impacted cerumen. Left Ear: Tympanic membrane normal. There is no impacted cerumen. Nose: Nose normal. No congestion or rhinorrhea. Mouth/Throat:      Mouth: Mucous membranes are moist.      Pharynx: Oropharynx is clear. Posterior oropharyngeal erythema present. Eyes:      Extraocular Movements: Extraocular movements intact. Conjunctiva/sclera: Conjunctivae normal.      Pupils: Pupils are equal, round, and reactive to light. Cardiovascular:      Rate and Rhythm: Normal rate and regular rhythm. Chest Wall: PMI is not displaced. Pulses: Normal pulses. Heart sounds: Normal heart sounds. No systolic murmur is present. No diastolic murmur is present. Pulmonary:      Effort: Pulmonary effort is normal.   Musculoskeletal:      Cervical back: Normal range of motion and neck supple. Right lower leg: No edema. Left lower leg: No edema. Neurological:      Mental Status: She is alert and oriented to person, place, and time. Psychiatric:         Behavior: Behavior normal.         Thought Content: Thought content normal.         Judgment: Judgment normal.             Assessment/Plan:  1. Primary biliary cholangitis (HCC)  - CA DISCHARGE MEDS RECONCILED W/ CURRENT OUTPATIENT MED LIST  - CA DISCHARGE MEDS RECONCILED W/ CURRENT OUTPATIENT MED LIST  - Misc. Devices MISC; 1 each by Does not apply route daily Ensure 1 bottle daily  Dispense: 30 each;  Refill: 5  - Handicap Placard MISC; by Does not apply route Controlled type 2 diabetes mellitus, Dyslipidemia, Essential hypertension, GERD (gastroesophageal reflux disease), Heart murmur, Hypercholesterolemia, Hyperlipidemia, Hypertension, Lichen planus, PONV  Primary biliary cholangitis ([facility] with weakness-permanent  Dispense: 1 each; Refill: 0    2. Enterocolitis  - hospitalization was due to this infection  - stool pattern greatly  - follow up with GI scheduled  - HI DISCHARGE MEDS RECONCILED W/ CURRENT OUTPATIENT MED LIST  - HI DISCHARGE MEDS RECONCILED W/ CURRENT OUTPATIENT MED LIST  - Handicap Placard MISC; by Does not apply route Controlled type 2 diabetes mellitus, Dyslipidemia, Essential hypertension, GERD (gastroesophageal reflux disease), Heart murmur, Hypercholesterolemia, Hyperlipidemia, Hypertension, Lichen planus, PONV  Primary biliary cholangitis ([facility] with weakness-permanent  Dispense: 1 each; Refill: 0    3. Nonrheumatic aortic valve stenosis  - followed by cardiology  - HI DISCHARGE MEDS RECONCILED W/ CURRENT OUTPATIENT MED LIST  - HI DISCHARGE MEDS RECONCILED W/ CURRENT OUTPATIENT MED LIST    4. Controlled type 2 diabetes mellitus without complication, without long-term current use of insulin ([facility]  -   Lab Results   Component Value Date    LABA1C 5.7 09/01/2021     Lab Results   Component Value Date    .9 09/01/2021     - HI DISCHARGE MEDS RECONCILED W/ CURRENT OUTPATIENT MED LIST  - HI DISCHARGE MEDS RECONCILED W/ CURRENT OUTPATIENT MED LIST    5. Chronic low back pain, unspecified back pain laterality, unspecified whether sciatica present  - discyssed importance of core exercises  - HI DISCHARGE MEDS RECONCILED W/ CURRENT OUTPATIENT MED LIST  - HI DISCHARGE MEDS RECONCILED W/ CURRENT OUTPATIENT MED LIST    6. Mild protein-calorie malnutrition ([facility]  -  Patient with weight loss  - Misc. Devices MISC; 1 each by Does not apply route daily Ensure 1 bottle daily  Dispense: 30 each;  Refill: 5  - Handicap Placard MISC; by Does not apply route Controlled type 2 diabetes

## 2021-11-10 ENCOUNTER — CARE COORDINATION (OUTPATIENT)
Dept: CASE MANAGEMENT | Age: 66
End: 2021-11-10

## 2021-11-10 NOTE — CARE COORDINATION
[facility] Transitions Follow Up Call    11/10/2021    Patient: Roberta Dakin  Patient : 1955   MRN: [id number]  Reason for Admission: Emesis  Discharge Date: 11/3/21 RARS: Readmission Risk Score: 6.4         Spoke with: Kaylah Smith Transitions Follow Up Call    Needs to be reviewed by the provider   Additional needs identified to be addressed with provider: No  [facility] care-[facility]             Method of communication with provider : [facility]      Care Transition Nurse (CTN) contacted the patient by telephone to follow up after admission on 10/30/2021. Verified name and  with patient as identifiers. Addressed changes since last contact: F/U Appt with PCP  Discussed follow-up appointments. If no appointment was previously scheduled, appointment scheduling offered: Yes. Is follow up appointment scheduled within 7 days of discharge? Yes. Advance Care Planning:   Does patient have an Advance Directive Not on file  CTN reviewed discharge instructions, medical action plan and red flags with patient and discussed any barriers to care and/or understanding of plan of care after discharge. Discussed appropriate site of care based on symptoms and resources available to patient including: PCP, Specialist, Urgent care clinics, Home health, When to call 911 and [email or web address]. The patient agrees to contact the PCP office for questions related to their healthcare. Patients top risk factors for readmission: ineffective coping  Interventions to address risk factors: Obtained and reviewed discharge summary and/or continuity of care documents      Non-[facility] follow up appointment(s):     CTN provided contact information for future needs. Plan for follow-up call in 5-7 days based on severity of symptoms and risk factors. Plan for next call: symptom management-Fatigue       his [facility] spoke with pt and pt stated that she is doing better. Patient denied any worsening symptoms.  Denied fever, chills, N/V and any difficulty breathing at this time. Denied difficulty with urination, BMs or appetite. Denied chest pain, SOB. Pt had f/u with PCP on yesterday and it went well. Pt received handicap sticker but no new med changes. Denied any needs or concerns but stated that she is just \"tired. \" Advised pt to immediately report any worsening symptoms to the PCP. Patient verbalized understanding and agreed. Eddie Smith LPN, [facility]  PH: [phone]              Care Transitions Subsequent and Final Call    Schedule Follow Up Appointment with PCP: Completed  Subsequent and Final Calls  Do you have any ongoing symptoms?: Yes  Onset of Patient-reported symptoms: Other  Patient-reported symptoms: Fatigue  Interventions for patient-reported symptoms: Other  Have your medications changed?: No  Do you have any questions related to your medications?: No  Do you currently have any active services?: Yes  Are you currently active with any services?: Home Health  Identified Barriers: None  Care Transitions Interventions   Home Care Waiver: Completed     Other Interventions:            Follow Up  Future Appointments   Date Time Provider Namita Velez   12/7/2021  5:00 PM MD JUDY Guzman [facility] [place] - [place]   9/2/2022  1:00 PM Maris Preciado MD F [address] [place] [address]       Eddie Smith LPN

## 2021-11-17 ENCOUNTER — CARE COORDINATION (OUTPATIENT)
Dept: CASE MANAGEMENT | Age: 66
End: 2021-11-17

## 2021-11-17 ENCOUNTER — CLINICAL DOCUMENTATION (OUTPATIENT)
Dept: SPIRITUAL SERVICES | Age: 66
End: 2021-11-17

## 2021-11-17 NOTE — CARE COORDINATION
[facility] Transitions Follow Up Call    2021    Patient: Niranjan Farley  Patient : 1955   MRN: [id number]  Reason for Admission: Emesis  Discharge Date: 11/3/21 RARS: Readmission Risk Score: 6.4         Spoke with: Kaylah Smith Transitions Follow Up Call    Needs to be reviewed by the provider   Additional needs identified to be addressed with provider: No  Critical access hospital care-[facility]             Method of communication with provider : [facility]      Care Transition Nurse (CTN) contacted the patient by telephone to follow up after admission on 10/30/2021. Verified name and  with patient as identifiers. Addressed changes since last contact: medications-Ensure and F/U appt. Discussed follow-up appointments. If no appointment was previously scheduled, appointment scheduling offered: Yes. Is follow up appointment scheduled within 7 days of discharge? Yes. Advance Care Planning:   Does patient have an Advance Directive: Not on file  CTN reviewed discharge instructions, medical action plan and red flags with patient and discussed any barriers to care and/or understanding of plan of care after discharge. Discussed appropriate site of care based on symptoms and resources available to patient including: PCP, Specialist, Urgent care clinics, Home health, When to call 911 and [address]. The patient agrees to contact the PCP office for questions related to their healthcare. Patients top risk factors for readmission: ineffective coping  Interventions to address risk factors: Obtained and reviewed discharge summary and/or continuity of care documents      Non-[facility] follow up appointment(s):     CTN provided contact information for future needs. Plan for follow-up call in 5-7 days based on severity of symptoms and risk factors. Plan for next call: symptom management-Back pain?       his [facility] spoke with pt and pt stated that she was doing well. Patient denied any worsening symptoms. Denied fever, chills, N/V and any difficulty breathing at this time. Denied difficulty with urination, BMs or appetite. Pt stated that she does have some manageable discomfort related to her sciatica pain in her back that she is managing with Tylenol. Advised to try a heating pad as well and pt agreed. Pt's f/u went well and Ensure was added to her daily med regimen. Denied any needs or concerns at this time. Advised pt to immediately report any worsening symptoms to the PCP. Patient verbalized understanding and agreed. Cici Carrera LPN, [facility]  PH: [phone]            Care Transitions Subsequent and Final Call    Schedule Follow Up Appointment with PCP: Completed  Subsequent and Final Calls  Do you have any ongoing symptoms?: Yes  Onset of Patient-reported symptoms: Other  Patient-reported symptoms: Pain  Interventions for patient-reported symptoms: Other  Have your medications changed?: Yes  Patient Reports: Ensure added to regimen  Do you have any questions related to your medications?: No  Do you currently have any active services?: Yes  Are you currently active with any services?: Home Health  Do you have any needs or concerns that I can assist you with?: No  Identified Barriers: None  Care Transitions Interventions   Home Care Waiver: Completed     Other Interventions:            Follow Up  Future Appointments   Date Time Provider Namita Velez   12/7/2021  5:00 PM MD JUDY Gallagher [facility] [place] - [place]   9/2/2022  1:00 PM Monika Ureña MD F [address] [place] [address]       Cici Carrera LPN

## 2021-11-17 NOTE — PROGRESS NOTES
Advance Care Planning   Ambulatory ACP Specialist Patient Outreach    Date:  11/17/2021  ACP Specialist:  Chris Dickson    Outreach call to patient in follow-up to ACP Specialist referral from: Feli Jerez MD    [x] PCP  [] Provider   [] Ambulatory Care Management [] Other for Reason:    [x] Advance Directive Assistance  [] Code Status Discussion  [] Complete Portable DNR Order  [] Discuss Goals of Care  [] Complete POST/MOST  [] Early ACP Decision-Making  [] Other    Date Referral Received:9/1/21    Today's Outreach:  [] First   [] Second  [x] Fourth                               Third outreach made by [x]  phone  [] email []   [employer]     Intervention:  [x] Spoke with Patient  [] Left VM requesting return call      Outcome:Pt wanting to read over Advance Directives forms with her daughter. Stated she will call Spiritual Care when ready. Next Step:   [] ACP scheduled conversation  [] Outreach again in one week               [] Email / Mail ACP Info Sheets  [] Email / Mail Advance Directive            [x] Close Referral. Routing closure to referring provider/staff and to ACP Specialist .      Thank you for this referral.

## 2021-11-20 DIAGNOSIS — E78.5 DYSLIPIDEMIA: ICD-10-CM

## 2021-11-22 RX ORDER — ATORVASTATIN CALCIUM 80 MG/1
80 TABLET, FILM COATED ORAL DAILY
Qty: 90 TABLET | Refills: 0 | Status: SHIPPED | OUTPATIENT
Start: 2021-11-22 | End: 2022-02-15

## 2021-11-23 ASSESSMENT — ENCOUNTER SYMPTOMS
ALLERGIC/IMMUNOLOGIC NEGATIVE: 1
EYES NEGATIVE: 1
RESPIRATORY NEGATIVE: 1

## 2021-11-24 ENCOUNTER — CARE COORDINATION (OUTPATIENT)
Dept: CASE MANAGEMENT | Age: 66
End: 2021-11-24

## 2021-12-01 ENCOUNTER — CARE COORDINATION (OUTPATIENT)
Dept: CASE MANAGEMENT | Age: 66
End: 2021-12-01

## 2021-12-01 NOTE — CARE COORDINATION
[facility] Transitions Follow Up Call    2021    Patient: Jay Murphy  Patient : 1955   MRN: [id number]  Reason for Admission: enterocolitis  Discharge Date: 11/3/21 RARS: Readmission Risk Score: 6.4         Spoke with: [address] Transitions Subsequent and Final Call    Subsequent and Final Calls  Do you have any ongoing symptoms?: No  Have your medications changed?: No  Do you have any questions related to your medications?: No  Do you currently have any active services?: Yes  Are you currently active with any services?: Home Health  Do you have any needs or concerns that I can assist you with?: No  Identified Barriers: None  Care Transitions Interventions   Home Care Waiver: Completed     Other Interventions: Follow Up: Final outreach call:  Patient is doing very well, [facility] visiting nurse is coming today, [facility] will discharge patient this week. She will follow up with GI MD tomorrow and PCP 21. She is off of her walker and using a cane for stability. She denies any questions or concerns at this time. CTN will resolve episode and remain available.   Future Appointments   Date Time Provider Namita Velez   2021  5:00 PM MD JUDY Mitchell [place] - [place]   2022  1:00 PM Ayush Schultz MD [address] [place] [address] Tito Garcia RN

## 2021-12-06 RX ORDER — FAMOTIDINE 20 MG/1
TABLET, FILM COATED ORAL
Qty: 60 TABLET | Refills: 1 | Status: SHIPPED | OUTPATIENT
Start: 2021-12-06 | End: 2021-12-06

## 2021-12-06 RX ORDER — FAMOTIDINE 20 MG/1
TABLET, FILM COATED ORAL
Qty: 180 TABLET | Refills: 0 | Status: SHIPPED | OUTPATIENT
Start: 2021-12-06 | End: 2021-12-07 | Stop reason: SDUPTHER

## 2021-12-07 ENCOUNTER — OFFICE VISIT (OUTPATIENT)
Dept: INTERNAL MEDICINE CLINIC | Age: 66
End: 2021-12-07
Payer: MEDICARE

## 2021-12-07 VITALS
SYSTOLIC BLOOD PRESSURE: 120 MMHG | HEIGHT: 61 IN | OXYGEN SATURATION: 98 % | DIASTOLIC BLOOD PRESSURE: 60 MMHG | WEIGHT: 108 LBS | HEART RATE: 68 BPM | TEMPERATURE: 96.9 F | BODY MASS INDEX: 20.39 KG/M2

## 2021-12-07 DIAGNOSIS — K74.3 PRIMARY BILIARY CHOLANGITIS (HCC): ICD-10-CM

## 2021-12-07 DIAGNOSIS — Z23 FLU VACCINE NEED: ICD-10-CM

## 2021-12-07 DIAGNOSIS — E44.1 MILD PROTEIN-CALORIE MALNUTRITION (HCC): ICD-10-CM

## 2021-12-07 DIAGNOSIS — K21.9 GASTROESOPHAGEAL REFLUX DISEASE WITHOUT ESOPHAGITIS: ICD-10-CM

## 2021-12-07 DIAGNOSIS — S22.000A COMPRESSION FRACTURE OF BODY OF THORACIC VERTEBRA (HCC): Primary | ICD-10-CM

## 2021-12-07 DIAGNOSIS — K52.9 ENTEROCOLITIS: ICD-10-CM

## 2021-12-07 PROCEDURE — 1036F TOBACCO NON-USER: CPT | Performed by: INTERNAL MEDICINE

## 2021-12-07 PROCEDURE — G8399 PT W/DXA RESULTS DOCUMENT: HCPCS | Performed by: INTERNAL MEDICINE

## 2021-12-07 PROCEDURE — G0008 ADMIN INFLUENZA VIRUS VAC: HCPCS | Performed by: INTERNAL MEDICINE

## 2021-12-07 PROCEDURE — 4040F PNEUMOC VAC/ADMIN/RCVD: CPT | Performed by: INTERNAL MEDICINE

## 2021-12-07 PROCEDURE — 99214 OFFICE O/P EST MOD 30 MIN: CPT | Performed by: INTERNAL MEDICINE

## 2021-12-07 PROCEDURE — 1123F ACP DISCUSS/DSCN MKR DOCD: CPT | Performed by: INTERNAL MEDICINE

## 2021-12-07 PROCEDURE — 1090F PRES/ABSN URINE INCON ASSESS: CPT | Performed by: INTERNAL MEDICINE

## 2021-12-07 PROCEDURE — G8420 CALC BMI NORM PARAMETERS: HCPCS | Performed by: INTERNAL MEDICINE

## 2021-12-07 PROCEDURE — 90694 VACC AIIV4 NO PRSRV 0.5ML IM: CPT | Performed by: INTERNAL MEDICINE

## 2021-12-07 PROCEDURE — G8427 DOCREV CUR MEDS BY ELIG CLIN: HCPCS | Performed by: INTERNAL MEDICINE

## 2021-12-07 PROCEDURE — G8484 FLU IMMUNIZE NO ADMIN: HCPCS | Performed by: INTERNAL MEDICINE

## 2021-12-07 PROCEDURE — 3017F COLORECTAL CA SCREEN DOC REV: CPT | Performed by: INTERNAL MEDICINE

## 2021-12-07 RX ORDER — FAMOTIDINE 20 MG/1
20 TABLET, FILM COATED ORAL 2 TIMES DAILY PRN
Qty: 180 TABLET | Refills: 0 | Status: SHIPPED | OUTPATIENT
Start: 2021-12-07 | End: 2022-03-21

## 2021-12-07 RX ORDER — INFANT FORMULA, IRON/DHA/ARA 2.07G/1
1 LIQUID (ML) ORAL DAILY
Qty: 90 EACH | Refills: 3 | Status: SHIPPED | OUTPATIENT
Start: 2021-12-07 | End: 2022-05-17

## 2021-12-07 ASSESSMENT — PATIENT HEALTH QUESTIONNAIRE - PHQ9
SUM OF ALL RESPONSES TO PHQ QUESTIONS 1-9: 0
SUM OF ALL RESPONSES TO PHQ QUESTIONS 1-9: 0
2. FEELING DOWN, DEPRESSED OR HOPELESS: 0
SUM OF ALL RESPONSES TO PHQ QUESTIONS 1-9: 0
SUM OF ALL RESPONSES TO PHQ9 QUESTIONS 1 & 2: 0
1. LITTLE INTEREST OR PLEASURE IN DOING THINGS: 0

## 2021-12-07 NOTE — PROGRESS NOTES
Vaccine Information Sheet, \"Influenza - Inactivated\"  given to Chantelle Akci, or parent/legal guardian of  Chantelle Kaci and verbalized understanding. Patient responses:    Have you ever had a reaction to a flu vaccine? No  Do you have any current illness? No  Have you ever had Guillian Rocky Face Syndrome? No  Do you have a serious allergy to any of the follow: Neomycin, Polymyxin, Thimerosal, eggs or egg products? No    Flu vaccine given per order. Please see immunization tab. Risks and benefits explained. Current VIS given.       Immunizations Administered     Name Date Dose Route    Influenza, Quadv, adjuvanted, 65 yrs +, IM, PF (Fluad) 12/7/2021 0.5 mL Intramuscular    Site: Deltoid- Left    Lot: [id number]    NDC: 02292-939-54

## 2021-12-07 NOTE — PROGRESS NOTES
factors include NSAIDs and caffeine use. She has tried smoking reduction and a histamine-2 antagonist for the symptoms. The treatment provided moderate relief. Past procedures include an abdominal ultrasound and an EGD. Past procedures do not include esophageal manometry, esophageal pH monitoring, H. pylori antibody titer or a UGI. Patient with poor appetite. Patient with significant pain in back and noted fracture on CT scan.     Past Medical History:   Diagnosis Date    Controlled type 2 diabetes mellitus without complication, without long-term current use of insulin ([address] 2019    no meds, low A1C    Dyslipidemia 2017    Essential hypertension 2018    GERD (gastroesophageal reflux disease)     Heart murmur     very early stages    Hypercholesterolemia 2018    Hyperlipidemia     Hypertension     Lichen planus     external, mainly arms, legs    PONV (postoperative nausea and vomiting)     usually the next day, vomits once    Primary biliary cholangitis ([address] 2019    Primary biliary cholangitis ([address] 2019     Past Surgical History:   Procedure Laterality Date    CARPAL TUNNEL RELEASE       SECTION      COLONOSCOPY      ENDOMETRIAL ABLATION      ENDOSCOPY, COLON, DIAGNOSTIC      LIVER BIOPSY N/A     SINUS ENDOSCOPY N/A 3/8/2021    NASAL ENDOSCOPY; BIOPSY OF NASOPHARYNGEAL LESION performed by Pb Hael DO at [address] WISDOM TOOTH EXTRACTION       Family History   Problem Relation Age of Onset    Cancer Mother         Lung Cancer    Stroke Mother     Diabetes Father     High Cholesterol Father     Hypertension Father     Heart Failure Father     Substance Abuse Sister      Social History     Socioeconomic History    Marital status: Single     Spouse name: Not on file    Number of children: Not on file    Years of education: Not on file    Highest education level: Not on file   Occupational History    Occupation:    Tobacco Use    Smoking status: Former Smoker     Packs/day: 0.75     Years: 6.00     Pack years: 4.50     Types: Cigarettes     Quit date: 2019     Years since quittin.8    Smokeless tobacco: Never Used    Tobacco comment: \"last week\" 10/30/21   Vaping Use    Vaping Use: Never used    Passive vaping exposure: Yes   Substance and Sexual Activity    Alcohol use: Yes     Alcohol/week: 2.0 standard drinks     Types: 2 Shots of liquor per week     Comment: occasional / 2 per month    Drug use: No    Sexual activity: Never   Other Topics Concern    Not on file   Social History Narrative    Not on file     Social Determinants of Health     Financial Resource Strain: Low Risk     Difficulty of Paying Living Expenses: Not hard at all   Food Insecurity: No Food Insecurity    Worried About Running Out of Food in the Last Year: Never true    [address] in the Last Year: Never true   Transportation Needs:     Lack of Transportation (Medical): Not on file    Lack of Transportation (Non-Medical):  Not on file   Physical Activity:     Days of Exercise per Week: Not on file    Minutes of Exercise per Session: Not on file   Stress:     Feeling of Stress : Not on file   Social Connections:     Frequency of Communication with Friends and Family: Not on file    Frequency of Social Gatherings with Friends and Family: Not on file    Attends [employer] Services: Not on file    Active Member of [address] [place] [address] or Organizations: Not on file    Attends Club or Organization Meetings: Not on file    Marital Status: Not on file   Intimate Partner Violence:     Fear of Current or Ex-Partner: Not on file    Emotionally Abused: Not on file    Physically Abused: Not on file    Sexually Abused: Not on file   Housing Stability:     Unable to Pay for Housing in the Last Year: Not on file    Number of [place] in the Last Year: Not on file    Unstable Housing in the Last Year: Not on file         Review of Systems   Constitutional: Negative for fatigue. HENT: Negative for hoarse voice and sore throat. Respiratory: Negative for cough, choking and wheezing. Cardiovascular: Negative for chest pain. Gastrointestinal: Positive for abdominal pain, heartburn and nausea. Negative for dysphagia and melena. Musculoskeletal: Negative for muscle weakness. @DOS@    Allergies   Allergen Reactions    Penicillins Other (See Comments)     Doesn't know allergy reaction    Sulfa Antibiotics Other (See Comments)     n/a    Sulfasalazine Hives     n/a       Current Outpatient Medications   Medication Sig Dispense Refill    famotidine (PEPCID) 20 MG tablet TAKE 1 TABLET BY MOUTH TWICE DAILY AS NEEDED FOR GERD 180 tablet 0    atorvastatin (LIPITOR) 80 MG tablet TAKE 1 TABLET BY MOUTH DAILY 90 tablet 0    Misc.  Devices MISC 1 each by Does not apply route daily Ensure 1 bottle daily 30 each 5    Handicap Placard MISC by Does not apply route Controlled type 2 diabetes mellitus, Dyslipidemia, Essential hypertension, GERD (gastroesophageal reflux disease), Heart murmur, Hypercholesterolemia, Hyperlipidemia, Hypertension, Lichen planus, PONV  Primary biliary cholangitis (HCC) with weakness-permanent 1 each 0    Fluticasone furoate-vilanterol (BREO ELLIPTA) 200-25 MCG/INH AEPB inhaler Inhale 1 puff into the lungs daily 60 each 3    triamcinolone (KENALOG) 0.1 % cream Apply topically 2 times daily as needed 453.6 g 1    lisinopril (PRINIVIL;ZESTRIL) 5 MG tablet TAKE 1 TABLET BY MOUTH DAILY 90 tablet 1    loratadine (CLARITIN) 10 MG tablet TAKE 1 TABLET BY MOUTH DAILY 90 tablet 1    ursodiol (ACTIGALL) 500 MG tablet 2 times daily       calcium carbonate (OYSTER SHELL CALCIUM 500 MG) 1250 (500 Ca) MG tablet Take 1 tablet by mouth daily      vitamin D (CHOLECALCIFEROL) 1000 UNIT TABS tablet Take 1,000 Units by mouth daily      Multiple Vitamins-Minerals (MULTIVITAMIN PO) Take 1 capsule by mouth

## 2021-12-13 ENCOUNTER — PATIENT MESSAGE (OUTPATIENT)
Dept: INTERNAL MEDICINE CLINIC | Age: 66
End: 2021-12-13

## 2021-12-13 DIAGNOSIS — K52.9 ENTEROCOLITIS: ICD-10-CM

## 2021-12-13 DIAGNOSIS — K74.3 PRIMARY BILIARY CHOLANGITIS (HCC): ICD-10-CM

## 2021-12-13 DIAGNOSIS — E44.1 MILD PROTEIN-CALORIE MALNUTRITION (HCC): ICD-10-CM

## 2021-12-20 ENCOUNTER — HOSPITAL ENCOUNTER (OUTPATIENT)
Dept: MRI IMAGING | Age: 66
Discharge: HOME | End: 2021-12-20
Payer: MEDICARE

## 2021-12-20 DIAGNOSIS — S22.000A COMPRESSION FRACTURE OF BODY OF THORACIC VERTEBRA (HCC): ICD-10-CM

## 2021-12-20 PROCEDURE — 72146 MRI CHEST SPINE W/O DYE: CPT

## 2022-01-11 ENCOUNTER — OFFICE VISIT (OUTPATIENT)
Dept: INTERNAL MEDICINE CLINIC | Age: 67
End: 2022-01-11
Payer: MEDICARE

## 2022-01-11 VITALS
HEART RATE: 67 BPM | DIASTOLIC BLOOD PRESSURE: 64 MMHG | TEMPERATURE: 97 F | WEIGHT: 114.8 LBS | HEIGHT: 61 IN | OXYGEN SATURATION: 99 % | SYSTOLIC BLOOD PRESSURE: 136 MMHG | BODY MASS INDEX: 21.67 KG/M2

## 2022-01-11 DIAGNOSIS — J44.9 CHRONIC OBSTRUCTIVE PULMONARY DISEASE, UNSPECIFIED COPD TYPE (HCC): ICD-10-CM

## 2022-01-11 DIAGNOSIS — S22.050A COMPRESSION FRACTURE OF T5 VERTEBRA, INITIAL ENCOUNTER (HCC): ICD-10-CM

## 2022-01-11 DIAGNOSIS — E11.9 CONTROLLED TYPE 2 DIABETES MELLITUS WITHOUT COMPLICATION, WITHOUT LONG-TERM CURRENT USE OF INSULIN (HCC): Primary | ICD-10-CM

## 2022-01-11 DIAGNOSIS — S22.070A COMPRESSION FRACTURE OF T9 VERTEBRA, INITIAL ENCOUNTER (HCC): ICD-10-CM

## 2022-01-11 DIAGNOSIS — I71.40 ABDOMINAL AORTIC ANEURYSM (AAA) WITHOUT RUPTURE: ICD-10-CM

## 2022-01-11 DIAGNOSIS — E44.1 MILD PROTEIN-CALORIE MALNUTRITION (HCC): ICD-10-CM

## 2022-01-11 DIAGNOSIS — K74.3 PRIMARY BILIARY CHOLANGITIS (HCC): ICD-10-CM

## 2022-01-11 PROCEDURE — G8427 DOCREV CUR MEDS BY ELIG CLIN: HCPCS | Performed by: INTERNAL MEDICINE

## 2022-01-11 PROCEDURE — 1036F TOBACCO NON-USER: CPT | Performed by: INTERNAL MEDICINE

## 2022-01-11 PROCEDURE — 1123F ACP DISCUSS/DSCN MKR DOCD: CPT | Performed by: INTERNAL MEDICINE

## 2022-01-11 PROCEDURE — 1090F PRES/ABSN URINE INCON ASSESS: CPT | Performed by: INTERNAL MEDICINE

## 2022-01-11 PROCEDURE — 2022F DILAT RTA XM EVC RTNOPTHY: CPT | Performed by: INTERNAL MEDICINE

## 2022-01-11 PROCEDURE — 4040F PNEUMOC VAC/ADMIN/RCVD: CPT | Performed by: INTERNAL MEDICINE

## 2022-01-11 PROCEDURE — 3023F SPIROM DOC REV: CPT | Performed by: INTERNAL MEDICINE

## 2022-01-11 PROCEDURE — 3017F COLORECTAL CA SCREEN DOC REV: CPT | Performed by: INTERNAL MEDICINE

## 2022-01-11 PROCEDURE — 3046F HEMOGLOBIN A1C LEVEL >9.0%: CPT | Performed by: INTERNAL MEDICINE

## 2022-01-11 PROCEDURE — G8420 CALC BMI NORM PARAMETERS: HCPCS | Performed by: INTERNAL MEDICINE

## 2022-01-11 PROCEDURE — 99213 OFFICE O/P EST LOW 20 MIN: CPT | Performed by: INTERNAL MEDICINE

## 2022-01-11 PROCEDURE — G8399 PT W/DXA RESULTS DOCUMENT: HCPCS | Performed by: INTERNAL MEDICINE

## 2022-01-11 PROCEDURE — G8484 FLU IMMUNIZE NO ADMIN: HCPCS | Performed by: INTERNAL MEDICINE

## 2022-01-11 ASSESSMENT — PATIENT HEALTH QUESTIONNAIRE - PHQ9
SUM OF ALL RESPONSES TO PHQ QUESTIONS 1-9: 0
SUM OF ALL RESPONSES TO PHQ9 QUESTIONS 1 & 2: 0
1. LITTLE INTEREST OR PLEASURE IN DOING THINGS: 0
2. FEELING DOWN, DEPRESSED OR HOPELESS: 0
SUM OF ALL RESPONSES TO PHQ QUESTIONS 1-9: 0

## 2022-01-11 NOTE — PROGRESS NOTES
German Lester (:  1955) is a 78 y.o. female,Established patient, here for evaluation of the following chief complaint(s):  Results (MRI)         ASSESSMENT/PLAN:  1. Controlled type 2 diabetes mellitus without complication, without long-term current use of insulin ([facility]  -  Diabetes is well controlled advised adequate intake   i  2. Compression fracture of T9 vertebra, initial encounter ([facility]  -     Noemi Funes MD, Orthopedic Surgery, [facility]  3. Compression fracture of T5 vertebra, initial encounter ([facility]  -     Noemi Funes MD, Orthopedic Surgery, [facility]  4. Mild protein-calorie malnutrition ([facility]  -  Using supplement regularly  5. Chronic obstructive pulmonary disease, unspecified COPD type ([facility] - stable , no recent hospitalizations, good control with BREO  6. Abdominal aortic aneurysm (AAA) without rupture ([facility]  -follow closely  7. Primary biliary cholangitis (HCC)  - has regular follow up with GI, will gain input on pain control      Subjective   SUBJECTIVE/OBJECTIVE:  HPI   Presents in f/u MRI. Patient with significant pain. Pain affects ADL's. No incontinence    Review of Systems   Constitutional: Positive for fatigue. Genitourinary: Negative for enuresis and urgency. Musculoskeletal: Positive for arthralgias, back pain and myalgias. Neurological: Negative for weakness. Hematological: Negative. Psychiatric/Behavioral: Negative. All other systems reviewed and are negative.            @DOS@    Allergies   Allergen Reactions    Penicillins Other (See Comments)     Doesn't know allergy reaction    Sulfa Antibiotics Other (See Comments)     n/a    Sulfasalazine Hives     n/a       Current Outpatient Medications   Medication Sig Dispense Refill    BREO ELLIPTA 200-25 MCG/INH AEPB inhaler       Handicap Placard MISC by Does not apply route DMI, Dyslipidemia, Essential hypertension, GERD (gastroesophageal reflux disease), Heart murmur, Hypercholesterolemia,  Hypertension, Lichen planus, PONV  Primary biliary cholangitis (HCC) with weakness-duration - permanent or 99 months 1 each 0    famotidine (PEPCID) 20 MG tablet Take 1 tablet by mouth 2 times daily as needed (stomach pain) 180 tablet 0    Nutritional Supplements (GLUCERNA WEIGHT LOSS SHAKE) LIQD Take 1 Units by mouth daily 90 each 3    atorvastatin (LIPITOR) 80 MG tablet TAKE 1 TABLET BY MOUTH DAILY 90 tablet 0    Misc. Devices MISC 1 each by Does not apply route daily Ensure 1 bottle daily 30 each 5    triamcinolone (KENALOG) 0.1 % cream Apply topically 2 times daily as needed 453.6 g 1    lisinopril (PRINIVIL;ZESTRIL) 5 MG tablet TAKE 1 TABLET BY MOUTH DAILY 90 tablet 1    loratadine (CLARITIN) 10 MG tablet TAKE 1 TABLET BY MOUTH DAILY 90 tablet 1    ursodiol (ACTIGALL) 500 MG tablet 2 times daily       calcium carbonate (OYSTER SHELL CALCIUM 500 MG) 1250 (500 Ca) MG tablet Take 1 tablet by mouth daily      vitamin D (CHOLECALCIFEROL) 1000 UNIT TABS tablet Take 1,000 Units by mouth daily      Multiple Vitamins-Minerals (MULTIVITAMIN PO) Take 1 capsule by mouth daily.  Ascorbic Acid (VITAMIN C) 500 MG tablet Take 500 mg by mouth daily. No current facility-administered medications for this visit. Vitals:    01/11/22 1651   BP: 136/64   Site: Left Upper Arm   Position: Sitting   Pulse: 67   Temp: 97 °F (36.1 °C)   TempSrc: Temporal   SpO2: 99%   Weight: 114 lb 12.8 oz (52.1 kg)   Height: 5' 1\" (1.549 m)     Body mass index is 21.69 kg/m². Wt Readings from Last 3 Encounters:   01/11/22 114 lb 12.8 oz (52.1 kg)   12/07/21 108 lb (49 kg)   11/09/21 103 lb (46.7 kg)     BP Readings from Last 3 Encounters:   01/11/22 136/64   12/07/21 120/60   11/09/21 (!) 112/58       Objective   Physical Exam  Vitals and nursing note reviewed. Constitutional:       General: She is not in acute distress. Appearance: She is well-developed and underweight.    Cardiovascular: Rate and Rhythm: Normal rate and regular rhythm. Pulses: Normal pulses. Heart sounds: Normal heart sounds. Pulmonary:      Effort: Pulmonary effort is normal.   Musculoskeletal:      Cervical back: Normal range of motion and neck supple. Thoracic back: Tenderness and bony tenderness present. Decreased range of motion. Lumbar back: Tenderness and bony tenderness present. Skin:     Capillary Refill: Capillary refill takes less than 2 seconds. Neurological:      Mental Status: She is alert and oriented to person, place, and time. Psychiatric:         Behavior: Behavior normal.         Thought Content: Thought content normal.         Judgment: Judgment normal.            On this date 1/11/2022 I have spent 30 minutes reviewing previous notes, test results and face to face with the patient discussing the diagnosis and importance of compliance with the treatment plan as well as documenting on the day of the visit. An electronic signature was used to authenticate this note.     --Briseyda Cano MD

## 2022-01-14 ENCOUNTER — TELEPHONE (OUTPATIENT)
Dept: INTERNAL MEDICINE CLINIC | Age: 67
End: 2022-01-14

## 2022-01-14 NOTE — TELEPHONE ENCOUNTER
Patient calling to see if Dr Valeri Myers has reached out to her GI specialist Dr Ginette Nugent to discuss what pain medication she can take.

## 2022-01-20 ENCOUNTER — PATIENT MESSAGE (OUTPATIENT)
Dept: INTERNAL MEDICINE CLINIC | Age: 67
End: 2022-01-20

## 2022-01-20 DIAGNOSIS — S22.050A COMPRESSION FRACTURE OF T5 VERTEBRA, INITIAL ENCOUNTER (HCC): ICD-10-CM

## 2022-01-20 DIAGNOSIS — S22.070A COMPRESSION FRACTURE OF T9 VERTEBRA, INITIAL ENCOUNTER (HCC): Primary | ICD-10-CM

## 2022-01-20 NOTE — TELEPHONE ENCOUNTER
From: Carlos Morales  To: Dr. Deidre Shields: 1/20/2022 11:46 AM EST  Subject: Pain medications    At January 11 visit, you stated you were reaching out to Dr Emma Hernandez in reference to pain meds for my back that werent harmful to my liver. Tylenol barely takes the edge off. Please advise asap. Thank you.

## 2022-01-21 RX ORDER — HYDROCODONE BITARTRATE AND ACETAMINOPHEN 5; 325 MG/1; MG/1
1 TABLET ORAL EVERY 8 HOURS PRN
Qty: 42 TABLET | Refills: 0 | Status: SHIPPED | OUTPATIENT
Start: 2022-01-21 | End: 2022-02-04

## 2022-01-27 DIAGNOSIS — I10 HTN, GOAL BELOW 130/80: ICD-10-CM

## 2022-01-27 NOTE — TELEPHONE ENCOUNTER
Recent Visits  Date Type Provider Dept   01/11/22 Office Visit Kelsea Sandra MD [facility]   12/07/21 Office Visit Kelsea Sandra MD [facility]   11/09/21 Office Visit Kelsea Sandra MD [facility]   09/01/21 Office Visit Kelsea Sandra MD [facility]   04/28/21 Office Visit Kelsea Sandra MD [facility]   03/04/21 Office Visit Monte Rinne, APRN (Old) [facility]   02/23/21 Office Visit Kelsea Sandra MD [facility]   02/19/21 Office Visit Kelsea Sandra MD [facility]   10/21/20 Office Visit Kelsea Sandra MD [facility]   Showing recent visits within past 540 days with a meds authorizing provider and meeting all other requirements  Future Appointments  Date Type Provider Dept   05/17/22 Appointment Kelsea Sandra MD [facility]   Showing future appointments within next 150 days with a meds authorizing provider and meeting all other requirements     1/11/2022

## 2022-01-28 RX ORDER — LISINOPRIL 5 MG/1
5 TABLET ORAL DAILY
Qty: 90 TABLET | Refills: 1 | Status: SHIPPED | OUTPATIENT
Start: 2022-01-28 | End: 2022-05-17 | Stop reason: SDUPTHER

## 2022-01-31 ENCOUNTER — OFFICE VISIT (OUTPATIENT)
Dept: ORTHOPEDIC SURGERY | Age: 67
End: 2022-01-31
Payer: MEDICARE

## 2022-01-31 VITALS — BODY MASS INDEX: 22.47 KG/M2 | WEIGHT: 119 LBS | HEIGHT: 61 IN

## 2022-01-31 DIAGNOSIS — S22.070A CLOSED WEDGE COMPRESSION FRACTURE OF T9 VERTEBRA, INITIAL ENCOUNTER (HCC): Primary | ICD-10-CM

## 2022-01-31 DIAGNOSIS — S22.000A COMPRESSION FX, THORACIC SPINE, CLOSED, INITIAL ENCOUNTER (HCC): ICD-10-CM

## 2022-01-31 PROCEDURE — G8484 FLU IMMUNIZE NO ADMIN: HCPCS | Performed by: ORTHOPAEDIC SURGERY

## 2022-01-31 PROCEDURE — 1090F PRES/ABSN URINE INCON ASSESS: CPT | Performed by: ORTHOPAEDIC SURGERY

## 2022-01-31 PROCEDURE — G8427 DOCREV CUR MEDS BY ELIG CLIN: HCPCS | Performed by: ORTHOPAEDIC SURGERY

## 2022-01-31 PROCEDURE — G8420 CALC BMI NORM PARAMETERS: HCPCS | Performed by: ORTHOPAEDIC SURGERY

## 2022-01-31 PROCEDURE — 99203 OFFICE O/P NEW LOW 30 MIN: CPT | Performed by: ORTHOPAEDIC SURGERY

## 2022-01-31 NOTE — PROGRESS NOTES
New Patient: LUMBAR SPINE    Referring Provider:  Maximiliano Anne    CHIEF COMPLAINT:    Chief Complaint   Patient presents with    New Patient     Thoracic Spine h/o compression fx t5 and t9       HISTORY OF PRESENT ILLNESS:     Ms. Leighann Michele is a pleasant 78 y.o. female here for consultation regarding her thoracic compression fractures. She states her pain began about 3 months ago after a severe vomiting episode. Her pain has steadily persisted since then. She rates her back pain 4/10. She describes the pain as aching and sharp at times. Pain is localized to her mid to lower thoracic spine at her bra line. She denies upper or lower extremity radicular pain or numbness. She notes mild fatigue and weakness in her legs after recent illness, but states this has improved with physical therapy. Pain is worse with activity and improved some with sitting. The pain occasionally disrupts her sleep. She uses a cane intermittently.      Current/Past Treatment:   · Physical Therapy: No  · Chiropractic:  No   · Injection:  No   · Medications: Norco     Past Medical History:   Past Medical History:   Diagnosis Date    Controlled type 2 diabetes mellitus without complication, without long-term current use of insulin ([address] 2019    no meds, low A1C    Dyslipidemia 2017    Essential hypertension 2018    GERD (gastroesophageal reflux disease)     Heart murmur     very early stages    Hypercholesterolemia 2018    Hyperlipidemia     Hypertension     Lichen planus     external, mainly arms, legs    PONV (postoperative nausea and vomiting)     usually the next day, vomits once    Primary biliary cholangitis ([address] 2019    Primary biliary cholangitis ([address] 2019        Past Surgical History:     Past Surgical History:   Procedure Laterality Date    CARPAL TUNNEL RELEASE       SECTION      COLONOSCOPY      COLONOSCOPY  2022    ENDOMETRIAL ABLATION      ENDOSCOPY, COLON, DIAGNOSTIC      LIVER BIOPSY N/A 2020    SINUS ENDOSCOPY N/A 3/8/2021    NASAL ENDOSCOPY; BIOPSY OF NASOPHARYNGEAL LESION performed by Waylon Najera DO at [address] [place] [address] EXTRACTION         Current Medications:     Current Outpatient Medications:     lisinopril (PRINIVIL;ZESTRIL) 5 MG tablet, TAKE 1 TABLET BY MOUTH DAILY, Disp: 90 tablet, Rfl: 1    HYDROcodone-acetaminophen (NORCO) 5-325 MG per tablet, Take 1 tablet by mouth every 8 hours as needed for Pain (use spraingly) for up to 14 days. Take lowest dose possible to manage pain, Disp: 42 tablet, Rfl: 0    BREO ELLIPTA 200-25 MCG/INH AEPB inhaler, , Disp: , Rfl:     Handicap Placard MISC, by Does not apply route DMI, Dyslipidemia, Essential hypertension, GERD (gastroesophageal reflux disease), Heart murmur, Hypercholesterolemia,  Hypertension, Lichen planus, PONV  Primary biliary cholangitis (HCC) with weakness-duration - permanent or 99 months, Disp: 1 each, Rfl: 0    famotidine (PEPCID) 20 MG tablet, Take 1 tablet by mouth 2 times daily as needed (stomach pain), Disp: 180 tablet, Rfl: 0    Nutritional Supplements (GLUCERNA WEIGHT LOSS SHAKE) LIQD, Take 1 Units by mouth daily, Disp: 90 each, Rfl: 3    atorvastatin (LIPITOR) 80 MG tablet, TAKE 1 TABLET BY MOUTH DAILY, Disp: 90 tablet, Rfl: 0    Misc.  Devices MISC, 1 each by Does not apply route daily Ensure 1 bottle daily, Disp: 30 each, Rfl: 5    triamcinolone (KENALOG) 0.1 % cream, Apply topically 2 times daily as needed, Disp: 453.6 g, Rfl: 1    loratadine (CLARITIN) 10 MG tablet, TAKE 1 TABLET BY MOUTH DAILY, Disp: 90 tablet, Rfl: 1    ursodiol (ACTIGALL) 500 MG tablet, 2 times daily , Disp: , Rfl:     calcium carbonate (OYSTER SHELL CALCIUM 500 MG) 1250 (500 Ca) MG tablet, Take 1 tablet by mouth daily, Disp: , Rfl:     vitamin D (CHOLECALCIFEROL) 1000 UNIT TABS tablet, Take 1,000 Units by mouth daily, Disp: , Rfl:     Multiple Vitamins-Minerals (MULTIVITAMIN PO), Take 1 capsule by mouth daily. , Disp: , Rfl:     Ascorbic Acid (VITAMIN C) 500 MG tablet, Take 500 mg by mouth daily. , Disp: , Rfl:     Allergies:  Penicillins, Sulfa antibiotics, and Sulfasalazine    Social History:    reports that she quit smoking about 2 years ago. Her smoking use included cigarettes. She has a 4.50 pack-year smoking history. She has never used smokeless tobacco. She reports current alcohol use of about 2.0 standard drinks of alcohol per week. She reports that she does not use drugs. Family History:   Family History   Problem Relation Age of Onset    Cancer Mother         Lung Cancer    Stroke Mother     Diabetes Father     High Cholesterol Father     Hypertension Father     Heart Failure Father     Substance Abuse Sister        REVIEW OF SYSTEMS: Full ROS noted & scanned   CONSTITUTIONAL: Denies unexplained weight loss, fevers, chills or fatigue  NEUROLOGICAL: Denies unsteady gait or progressive weakness  MUSCULOSKELETAL: Denies joint swelling or redness  PSYCHOLOGICAL: Denies anxiety, depression   SKIN: Denies skin changes, delayed healing, rash, itching   HEMATOLOGIC: Denies easy bleeding or bruising  ENDOCRINE: Denies excessive thirst, urination, heat/cold  RESPIRATORY: Denies current dyspnea, cough  GI: Denies nausea, vomiting, diarrhea   : Denies bowel or bladder issues      PHYSICAL EXAM:    Vitals: Height 5' 1\" (1.549 m), weight 119 lb (54 kg), not currently breastfeeding. GENERAL EXAM:  · General Apparence: Patient is adequately groomed with no evidence of malnutrition. · Orientation: The patient is oriented to time, place and person. · Mood & Affect:The patient's mood and affect are appropriate. · Vascular: Examination reveals no swelling tenderness in upper or lower extremities. Good capillary refill.   · Lymphatic: The lymphatic examination bilaterally reveals all areas to be without enlargement or induration  · Sensation: Sensation is intact without deficit  · Coordination/Balance: Good coordination. Tandem walking normal.     LUMBAR/SACRAL EXAMINATION:  · Inspection: Local inspection shows no step-off or bruising. Lumbar alignment is normal.  Sagittal and Coronal balance is neutral.      · Palpation:   No evidence of tenderness at the midline. No tenderness bilaterally at the paraspinal or trochanters. There is no step-off or paraspinal spasm. · Range of Motion: Lumbar flexion, extension and rotation are mildly limited due to pain. · Strength:   Strength testing is 5/5 in all muscle groups tested. · Special Tests:   Straight leg raise and crossed SLR negative. Leg length and pelvis level. · Skin: There are no rashes, ulcerations or lesions. · Reflexes: Reflexes are symmetrically 2+ at the patellar and ankle tendons. Clonus absent bilaterally at the feet. · Gait & station: normal, patient ambulates without assistance    · Additional Examinations:   · RIGHT LOWER EXTREMITY: Inspection/examination of the right lower extremity does not show any tenderness, deformity or injury. Range of motion is unremarkable. There is no gross instability. There are no rashes, ulcerations or lesions. Strength and tone are normal.  · LEFT LOWER EXTREMITY:  Inspection/examination of the left lower extremity does not show any tenderness, deformity or injury. Range of motion is unremarkable. There is no gross instability. There are no rashes, ulcerations or lesions. Strength and tone are normal.    Diagnostic Testing:    I reviewed MRI images of her thoracic spine from 12/20/21. They show mild acute superior endplate compression fracture T9. Benign remote T5 compression fracture. AP and lateral xray images of her thoracic spine were obtained in the office today and independently reviewed. They show stable alignment of her thoracic spine without additional collapse to her T9 compression fracture. Impression:   T9 compression fracture    Plan:     We discussed treatment options including observation, physical therapy, a mitchel orthosis, or T9 kyphoplasty. She wishes to try physical therapy. She does not feel she would tolerate a brace due to her lichen planus. She may return in 4 weeks for repeat xrays of her thoracic spine if symptoms are not improving, or sooner if symptoms worsen. Patient examined and note dictated by Fabio Meraz PA-C. Patient also seen and examined by Dr. Jose Philip.

## 2022-02-06 ASSESSMENT — ENCOUNTER SYMPTOMS: BACK PAIN: 1

## 2022-02-10 ENCOUNTER — HOSPITAL ENCOUNTER (OUTPATIENT)
Dept: PHYSICAL THERAPY | Age: 67
Setting detail: THERAPIES SERIES
Discharge: HOME | End: 2022-02-10
Payer: MEDICARE

## 2022-02-10 PROCEDURE — 97530 THERAPEUTIC ACTIVITIES: CPT

## 2022-02-10 PROCEDURE — 97161 PT EVAL LOW COMPLEX 20 MIN: CPT

## 2022-02-10 NOTE — FLOWSHEET NOTE
[address] [facility] Physical Therapy  Phone: [phone]   Fax: [phone]    Physical Therapy Daily Treatment Note  Date:  2/10/2022    Patient Name:  Leone Aase    :  1955  MRN: [id number]  Medical/Treatment Diagnosis Information:  · Diagnosis: S22.000A - Compression fx, thoracic spine, closed. S22.070A - Closed wedge compression fracture of T9 vertebra. ·    Insurance/Certification information:  PT Insurance Information: [facility] Medicare  Physician Information:  Referring Practitioner: Jane Flores MD  Plan of care signed (Y/N): []  Yes [x]  No     Date of Patient follow up with Physician:      Progress Report: []  Yes  [x]  No     Date Range for reporting period:  Beginnin/10/22  Ending:     Progress report due (10 Rx/or 30 days whichever is less): visit #10 or 33 (date)     Recertification due (POC duration/ or 90 days whichever is less): visit #20 or 5/10/22 (date)     Visit # Insurance Allowable Auth required?  Date Range    [place] []  Yes  [x]  No 2/10/22-           Latex Allergy:  [x]NO      []YES  Preferred Language for Healthcare:   [x]English       []other:    Functional Scale:        Date assessed:  EVENS: raw score = /80; dysfunction = %   Not filled out at eval     Pain level:  /10     SUBJECTIVE:  See eval    OBJECTIVE: See eval      RESTRICTIONS/PRECAUTIONS:  DM2, HTN, GERD, carpal tunnel release, early stage heart murur    Exercises/Interventions:     Therapeutic Exercises (74344) Resistance / level Sets/sec Reps Notes   NuStep or UBE       S/L Rotation Stretch  1/10\"  2/10: very limited   Prayer Stretch       Seated T-spine ext stretch  3/20\"  2/10          Wall push-ups  1 10 2/10          TB Scapular               Therapeutic Activities (57530)       Foot taps to step/box  FSU/LSU    To work on stability with cane/gait    Gait Training w/o SPC                            Neuromuscular Re-ed (67335)       Balance: NBOS EO/EC, Tandem Manual Intervention (59771)       Scapular Mobs/Thoracic Extension                                              Modalities: as needed    Pt. Education:  -patient educated on diagnosis, prognosis and expectations for rehab  -all patient questions were answered    Home Exercise Program:      Access Code: Sirisha Jose  URL: [email or web address]  Date: 02/10/2022  Prepared by: Cole Dasilva     Exercises  Sidelying Thoracic Lumbar Rotation - 2 x daily - 7 x weekly - 2-3 sets - 10 reps  Standing Scapular Retraction - 2 x daily - 7 x weekly - 2-3 sets - 10 reps  Seated Thoracic Extension with Hands Behind Neck - 2-3 x daily - 7 x weekly - 1 sets - 10 reps - 10 secs hold  Wall Push Up - 2 x daily - 7 x weekly - 2-3 sets - 10 reps      Therapeutic Exercise and NMR EXR  [x] (59404) Provided verbal/tactile cueing for activities related to strengthening, flexibility, endurance, ROM for improvements in  [x] LE / Lumbar: LE, proximal hip, and core control with self care, mobility, lifting, ambulation. [x] UE / Cervical: cervical, postural, scapular, scapulothoracic and UE control with self care, reaching, carrying, lifting, house/yardwork, driving, computer work. [x] (23904) Provided verbal/tactile cueing for activities related to improving balance, coordination, kinesthetic sense, posture, motor skill, proprioception to assist with   [x] LE / lumbar: LE, proximal hip, and core control in self care, mobility, lifting, ambulation and eccentric single leg control.    [x] UE / cervical: cervical, scapular, scapulothoracic and UE control with self care, reaching, carrying, lifting, house/yardwork, driving, computer work.   [] (93060) Therapist is in constant attendance of 2 or more patients providing skilled therapy interventions, but not providing any significant amount of measurable one-on-one time to either patient, for improvements in  [] LE / lumbar: LE, proximal hip, and core control in self care, mobility, lifting, ambulation and eccentric single leg control. [] UE / cervical: cervical, scapular, scapulothoracic and UE control with self care, reaching, carrying, lifting, house/yardwork, driving, computer work. NMR and Therapeutic Activities:    [x] (87500 or 61011) Provided verbal/tactile cueing for activities related to improving balance, coordination, kinesthetic sense, posture, motor skill, proprioception and motor activation to allow for proper function of   [] LE: / Lumbar core, proximal hip and LE with self care and ADLs  [] UE / Cervical: cervical, postural, scapular, scapulothoracic and UE control with self care, carrying, lifting, driving, computer work.   [] (91680) Gait Re-education- Provided training and instruction to the patient for proper LE, core and proximal hip recruitment and positioning and eccentric body weight control with ambulation re-education including up and down stairs     Home Management Training / Self Care:  [] (52781) Provided self-care/home management training related to activities of daily living and compensatory training, and/or use of adaptive equipment for improvement with: ADLs and compensatory training, meal preparation, safety procedures and instruction in use of adaptive equipment, including bathing, grooming, dressing, personal hygiene, basic household cleaning and chores.      Home Exercise Program:    [x] (19189) Reviewed/Progressed HEP activities related to strengthening, flexibility, endurance, ROM of   [] LE / Lumbar: core, proximal hip and LE for functional self-care, mobility, lifting and ambulation/stair navigation   [x] UE / Cervical: cervical, postural, scapular, scapulothoracic and UE control with self care, reaching, carrying, lifting, house/yardwork, driving, computer work  [] (30411)Reviewed/Progressed HEP activities related to improving balance, coordination, kinesthetic sense, posture, motor skill, proprioception of   [] LE: core, proximal hip and LE for self care, mobility, lifting, and ambulation/stair navigation    [] UE / Cervical: cervical, postural,  scapular, scapulothoracic and UE control with self care, reaching, carrying, lifting, house/yardwork, driving, computer work    Manual Treatments:  PROM / STM / Oscillations-Mobs:  G-I, II, III, IV (PA's, Inf., Post.)  [] (92690) Provided manual therapy to mobilize LE, proximal hip and/or LS spine soft tissue/joints for the purpose of modulating pain, promoting relaxation,  increasing ROM, reducing/eliminating soft tissue swelling/inflammation/restriction, improving soft tissue extensibility and allowing for proper ROM for normal function with   [] LE / lumbar: self care, mobility, lifting and ambulation. [] UE / Cervical: self care, reaching, carrying, lifting, house/yardwork, driving, computer work. Modalities:  [] (11286) Vasopneumatic compression: Utilized vasopneumatic compression to decrease edema / swelling for the purpose of improving mobility and quad tone / recruitment which will allow for increased overall function including but not limited to self-care, transfers, ambulation, and ascending / descending stairs. Charges:  Timed Code Treatment Minutes: 20   Total Treatment Minutes: 50     [x] EVAL - LOW (76662)   [] EVAL - MOD (65800)  [] EVAL - HIGH (97936)  [] RE-EVAL (55446)  [] PH(74312) x       [] Ionto  [] NMR (61570) x       [] Vaso  [] Manual (70129) x       [] Ultrasound  [x] TA x  2      [] Mech Traction (19689)  [] Aquatic Therapy x     [] ES (un) (64464):   [] Home Management Training x  [] ES(attended) (57468)   [] Dry Needling 1-2 muscles (71237):  [] Dry Needling 3+ muscles (138581)  [] Group:      [] Other:     GOALS:     Patient stated goal: \"getting back to normal life, doing activities without worrying about it\"  []? Progressing: []? Met: []? Not Met: []? Adjusted     Therapist goals for Patient:   Short Term Goals: To be achieved in: 2 weeks  1. Independent in HEP and progression per patient tolerance, in order to prevent re-injury. []? Progressing: []? Met: []? Not Met: []? Adjusted  2. Patient will have a decrease in pain in thoracic spine to facilitate improvement in movement, function, and ADLs as indicated by Functional Deficits. []? Progressing: []? Met: []? Not Met: []? Adjusted     Long Term Goals: To be achieved in: 6 weeks  1. Patient will demonstrate increased AROM of extension and rotation to WNL of cervical/thoracic spine to allow for proper joint functioning as indicated by patients Functional Deficits. []? Progressing: []? Met: []? Not Met: []? Adjusted  2. Patient will demonstrate an increase in postural awareness and control without verbal cues to allow for proper functional mobility as indicated by patients Functional Deficits. []? Progressing: []? Met: []? Not Met: []? Adjusted  3. Patient will demonstrate an increase in Strength to 5/5 in UE and 4+/5 in hip flexors to allow for proper functional mobility as indicated by patients Functional Deficits. []? Progressing: []? Met: []? Not Met: []? Adjusted  4. Patient will return to functional activities including ascending/descending stairs, personal hygiene, and picking up objects from ground without increased symptoms or restriction. []? Progressing: []? Met: []? Not Met: []? Adjusted  5. Patient will be able to transfer to/from floor to play with dog and complete daily activities without increased pain or restriction. []? Progressing: []? Met: []? Not Met: []? Adjusted          Overall Progression Towards Functional goals/ Treatment Progress Update:  [] Patient is progressing as expected towards functional goals listed. [] Progression is slowed due to complexities/Impairments listed. [] Progression has been slowed due to co-morbidities.   [x] Plan just implemented, too soon to assess goals progression <30days   [] Goals require adjustment due to lack of progress  [] Patient is not progressing as expected and requires additional follow up with physician  [] Other    Persisting Functional Limitations/Impairments:  [x]Sleeping []Sitting               [x]Standing [x]Transfers        [x]Walking []Kneeling               [x]Stairs [x]Squatting / bending   [x]ADLs [x]Reaching  [x]Lifting  [x]Housework  [x]Driving []Job related tasks  []Sports/Recreation []Other:        ASSESSMENT:  See eval  Treatment/Activity Tolerance:  [] Patient able to complete tx [] Patient limited by fatigue  [] Patient limited by pain  [] Patient limited by other medical complications  [] Other:     Prognosis: [x] Good [] Fair  [] Poor    Patient Requires Follow-up: [x] Yes  [] No    Plan for next treatment session:    PLAN: See eval. PT 1-2x / week for 4-6 weeks. [] Continue per plan of care [] Alter current plan (see comments)  [x] Plan of care initiated [] Hold pending MD visit [] Discharge    Electronically signed by: Sara Molina PT PT, PT  Therapist was present, directed the patient's care, made skilled judgement, and was responsible for assessment and treatment of the patient. Roque Mccall, SPT    Note: If patient does not return for scheduled/ recommended follow up visits, this note will serve as a discharge from care along with most recent update on progress.

## 2022-02-10 NOTE — PLAN OF CARE
[address], [address] [facility], [address]  Phone: [phone]   Fax:     [phone]                                                       Physical Therapy Certification    Dear Referring Practitioner: Javad Holt MD,    We had the pleasure of evaluating the following patient for physical therapy services at [address] [place] [address]. A summary of our findings can be found in the initial assessment below. This includes our plan of care. If you have any questions or concerns regarding these findings, please do not hesitate to contact me at the office phone number checked above. Thank you for the referral.       Physician Signature:_______________________________Date:__________________  By signing above (or electronic signature), therapists plan is approved by physician      Patient: Paola Ennis   : 1955   MRN: [id number]  Referring Physician: Referring Practitioner: Javad Holt MD      Evaluation Date: 2/10/2022      Medical Diagnosis Information:  Diagnosis: S22.000A - Compression fx, thoracic spine, closed. S22.070A - Closed wedge compression fracture of T9 vertebra. Treatment Diagnosis: Decreased thoracic and lumbar mobility, decreased gait ability, decreased balance, decreased functional endurance  Insurance information: PT Insurance Information: [facility] Medicare    Precautions/ Contra-indications: GERD, HTN, early stage heart murmur  Latex Allergy:  [x]NO      []YES  Preferred Language for Healthcare:   [x]English       []other:    C-SSRS Triggered by Intake questionnaire (Past 2 wk assessment ):   [x] No, Questionnaire did not trigger screening.   [] Yes, Patient intake triggered C-SSRS Screening      [] C-SSRS Screening completed  [] PCP notified via Epic    SUBJECTIVE: Patient stated complaint: pain started 3 months ago after severe vomiting and has persisted since then.  Pain is aching and sharp home    Bandages/Dressings/Incisions: NA    Dermatomes: Normal Abnormal Comments   Top of head (C1)      Posterior occipital region (C2)      Side of neck (C3)      Top of shoulder (C4)      Lateral deltoid (C5)      Tip of thumb (C6)      Distal middle finger (C7)   Numbness RUE related to carpal tunnel   Distal fifth finger (C8)      Medial forearm (T1)      inguinal area (L1)       anterior mid-thigh (L2)      distal ant thigh/med knee (L3)      medial lower leg and foot (L4)      lateral lower leg and foot (L5)      posterior calf (S1)      medial calcaneus (S2)          Myotomes Normal Abnormal Comments   Neck flexion (C1-C2)      Neck sidebending (C3)      Shoulder elevation (C4)      Shoulder abduction (C5)      Elbow flexion/wrist extension (C6)      Elbow extension/wrist flexion (C7)      Thumb abduction (C8)      Finger abduction (T1)      Hip flexion (L1-L2)      Knee extension (L2-L4)      Dorsiflexion (L4-L5)      Great Toe Ext (L5)      Ankle Eversion (S1-S2)      Ankle PF(S1-S2)            ROM  Comments   Cervical Flexion WNL    Cervical Extension WNL         Lumbar Flexion 90*    Lumbar Extension 10% Pt very limited and stiff, decreased lumbar lordosis     ROM LEFT RIGHT Comments   Cervical Side Bend      Cervical Rotation WNL WNL    Shoulder Flex WNL WNL Scapular tightness B   Shoulder Abd WNL WNL    Shoulder ER C7 C7    Shoulder IR Upper T spine Upper T spine          Lumbar Side Bend 75% and painful WNL    Lumbar Rotation      Hip Flexion      Hip Abd      Hip ER      Hip IR      Hip Extension      Knee Ext WNL WNL    Knee Flex WNL WNL          Hamstring Flex      Piriformis                      Strength LEFT RIGHT Comments   Shoulder flexion 4 4 painful   Shoulder scaption      Shoulder ER      Shoulder IR      Biceps 5 5    Triceps 4- 4-          Knee Flexion/Extension  5 5    Multifidus      Transverse Ab      Hip Flexors (seated) 4 4    Hip Abductors      Hip Extensors 5 5    Hip Internal Rotators      Hip External Rotators          Cervical Joint mobility:    [x]Normal    []Hypo   []Hyper    Thoracic Joint mobility:  Extension, rotation   []Normal    [x]Hypo   []Hyper    Lumbar Joint mobility: extension, Side bending   []Normal    [x]Hypo   []Hyper    Sacral Joint mobility:    [x]Normal    []Hypo   []Hyper                         [x] Patient history, allergies, meds reviewed. Medical chart reviewed. See intake form. Review Of Systems (ROS):  [x]Performed Review of systems (Integumentary, CardioPulmonary, Neurological) by intake and observation. Intake form has been scanned into medical record. Patient has been instructed to contact their primary care physician regarding ROS issues if not already being addressed at this time.       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   []Osteoporosis (M81.8)  []Osteopenia (M85.8)  []Scoliosis       Endocrine conditions   []Hypothyroid (E03.9)  []Hyperthyroid Gastrointestinal conditions   []Constipation (H92.38)   Metabolic conditions   []Morbid obesity (E66.01)  [x]Diabetes type 1(E10.65) or 2 (E11.65)   []Neuropathy (G60.9)     Cardio/Pulmonary conditions   []Asthma (J45)  []Coughing   []COPD (J44.9)  []CHF  []A-fib   Psychological Disorders  []Anxiety (F41.9)  []Depression (F32.9)   []Other:   Developmental Disorders  []Autism (F84.0)  []CP (G80)  []Down Syndrome (Q90.9)  []Developmental delay     Neurological conditions  []Prior Stroke (I69.30)  []Parkinson's (G20)  []Encephalopathy (G93.40)  []MS (G35)  []Post-polio (G14)  []SCI  []TBI  []ALS Other conditions  []Fibromyalgia (M79.7)  []Vertigo  []Syncope  []Kidney Failure  []Cancer      []currently undergoing                treatment  []Pregnancy  []Incontinence   Prior surgeries  []involved limb  []previous spinal surgery  [x] section birth  []hysterectomy  []bowel / bladder surgery  []other relevant surgeries   []Other:              Barriers to/and or personal factors that will affect rehab potential:              [x]Age  []Sex   []Smoker              []Motivation/Lack of Motivation                        [x]Co-Morbidities              []Cognitive Function, education/learning barriers              []Environmental, home barriers              []profession/work barriers  [x]past PT/medical experience  []other:   Justification:      Falls Risk Assessment (30 days):   [x] Falls Risk assessed and no intervention required. [] Falls Risk assessed and Patient requires intervention due to being higher risk   TUG score (>12s at risk):     [] Falls education provided, including         ASSESSMENT: Patient has limited thoracic spine mobility and very tender to palpation around T9. Very limited in thoracic mobility in all directions since injury. Pt reports struggling to ascend/descend stairs to bedroom/bathroom. Pt will benefit from therapy to improve general strength to improve ability to complete ADLs around home and in community.     Functional Impairments:     [x]Noted cervical/thoracic/lumbar/GHJ/proximal hip hypomobility   []Noted cervical/thoracic/lumbosacral and/or generalized hypermobility   [x]Decreased cervical/UE and/or lumbosacral/hip/LE functional ROM   []Noted Headache pain aggravated by neck movements with/without dizziness   []Abnormal reflexes/sensation/myotomal/dermatomal deficits   []Decreased DCF control or ability to hold head up   []Decreased core/proximal hip strength and neuromuscular control    [x]Decreased RC/scapular/core strength and neuromuscular control    [x]Decreased UE and/or LE functional strength   [x]Reduced balance/proprioceptive control    []other:      Functional Activity Limitations (from functional questionnaire and intake)   [x]Reduced ability to tolerate prolonged functional positions   [x]Reduced ability or difficulty with changes of positions or transfers between positions   [x]Reduced ability to maintain good posture and demonstrate good body mechanics with sitting, bending, and lifting   [] Reduced ability or tolerance with driving and/or computer work   [x]Reduced ability to perform lifting, reaching, carrying tasks   []Reduced ability to concentrate   [x]Reduced ability to sleep    [x]Reduced ability to tolerate any impact through UE or spine   [x]Reduced ability to ambulate prolonged functional periods/distances/surfaces   [x]Reduced ability to squat   [x]Reduced ability to forward bend   [x]Reduced ability to ascend/descend stairs   []other:    Participation Restrictions   []Reduced participation in self care activities   [x]Reduced participation in home management activities   [x]Reduced participation in work activities   [x]Reduced participation in social activities. []Reduced participation in sport/recreational activities. Classification/Subgrouping:   []Signs/symptoms consistent with spinal instability/stabilization subgroup. []Signs/symptoms consistent with spinal mobilization/manipulation subgroup, myotomes and dermatomes intact. Meets manipulation criteria.     [x]signs/symptoms consistent with neck pain with mobility deficits     []signs/symptoms consistent with neck pain with movement coordinated impairments    []signs/symptoms consistent with neck pain with radiating pain    []signs/symptoms consistent with neck pain with headaches (cervicogenic)    []Signs/symptoms consistent with nerve root involvement including myotome & dermatome dysfunction   [x]sign/symptoms consistent with spinal facet dysfunction   []signs/symptoms consistent suggesting central cord compression/UMN syndromes   []Signs/symptoms consistent with Lumbar direction specific/centralization subgroup   []Signs/symptoms consistent with Cervical and/or Lumbar traction subgroup   []signs/symptoms consistent with discogenic cervical pain   []signs/symptoms consistent with rib dysfunction   [x]signs/symptoms consistent with postural dysfunction   []Signs/symptoms consistent with lumbar stenosis type dysfunction   []signs/symptoms consistent with shoulder pathology    []signs/symptoms consistent with post-surgical status including decreased ROM, strength and function.    []signs/symptoms consistent with pathology which may benefit from Dry Needling   []signs/symptoms which may limit the use of advanced manual therapy techniques: (Hypertension, recent trauma, intolerance to end range positions, prior TIA, visual issues, UE myotomes loss )         Prognosis/Rehab Potential:      []Excellent   [x]Good    []Fair   []Poor    Tolerance of evaluation/treatment:    []Excellent   [x]Good    []Fair   []Poor    Physical Therapy Evaluation Complexity Justification  [x] A history of present problem with:  [x] no personal factors and/or comorbidities that impact the plan of care;  []1-2 personal factors and/or comorbidities that impact the plan of care  []3 personal factors and/or comorbidities that impact the plan of care  [x] An examination of body systems using standardized tests and measures addressing any of the following: body structures and functions (impairments), activity limitations, and/or participation restrictions;:  [x] a total of 1-2 or more elements   [] a total of 3 or more elements   [] a total of 4 or more elements   [x] A clinical presentation with:  [x] stable and/or uncomplicated characteristics   [] evolving clinical presentation with changing characteristics  [] unstable and unpredictable characteristics;   [x] Clinical decision making of [x] low, [] moderate, [] high complexity using standardized patient assessment instrument and/or measurable assessment of functional outcome. [x] EVAL (LOW) 06959 (typically 20 minutes face-to-face)  [] EVAL (MOD) 64469 (typically 30 minutes face-to-face)  [] EVAL (HIGH) 15637 (typically 45 minutes face-to-face)  [] RE-EVAL     PLAN:   Frequency/Duration:  1-2 days per week for 4-6 Weeks:  Interventions:  [x]  Therapeutic exercise including: strength training, ROM, for cervical spine,scapula, core and Upper extremity, including postural re-education. [x]  NMR activation and proprioception for Deep cervical flexors, periscapular and RC muscles and Core, including postural re-education. [x]  Manual therapy as indicated for C/T spine, ribs, Soft tissue to include: Dry Needling/IASTM, STM, PROM, Gr I-IV mobilizations, manipulation. [x] Modalities as needed that may include: thermal agents, E-stim, Biofeedback, US, iontophoresis as indicated  [x] Patient education on joint protection, postural re-education, activity modification, progression of HEP. HEP instruction: Written HEP instructions provided and reviewed      Access Code: [email or web address]  URL: [email or web address]  Date: 02/10/2022  Prepared by: Bin Larkin    Exercises  Sidelying Thoracic Lumbar Rotation - 2 x daily - 7 x weekly - 2-3 sets - 10 reps  Standing Scapular Retraction - 2 x daily - 7 x weekly - 2-3 sets - 10 reps  Seated Thoracic Extension with Hands Behind Neck - 2-3 x daily - 7 x weekly - 1 sets - 10 reps - 10 secs hold  Wall Push Up - 2 x daily - 7 x weekly - 2-3 sets - 10 reps      GOALS:  Patient stated goal: \"getting back to normal life, doing activities without worrying about it\"  [] Progressing: [] Met: [] Not Met: [] Adjusted    Therapist goals for Patient:   Short Term Goals: To be achieved in: 2 weeks  1. Independent in HEP and progression per patient tolerance, in order to prevent re-injury. [] Progressing: [] Met: [] Not Met: [] Adjusted  2.  Patient will have a decrease in pain in thoracic spine to facilitate improvement in movement, function, and ADLs as indicated by Functional Deficits. [] Progressing: [] Met: [] Not Met: [] Adjusted    Long Term Goals: To be achieved in: 6 weeks  1. Patient will demonstrate increased AROM of extension and rotation to WNL of cervical/thoracic spine to allow for proper joint functioning as indicated by patients Functional Deficits. [] Progressing: [] Met: [] Not Met: [] Adjusted  2. Patient will demonstrate an increase in postural awareness and control without verbal cues to allow for proper functional mobility as indicated by patients Functional Deficits. [] Progressing: [] Met: [] Not Met: [] Adjusted  3. Patient will demonstrate an increase in Strength to 5/5 in UE and 4+/5 in hip flexors to allow for proper functional mobility as indicated by patients Functional Deficits. [] Progressing: [] Met: [] Not Met: [] Adjusted  4. Patient will return to functional activities including ascending/descending stairs, personal hygiene, and picking up objects from ground without increased symptoms or restriction. [] Progressing: [] Met: [] Not Met: [] Adjusted  5. Patient will be able to transfer to/from floor to play with dog and complete daily activities without increased pain or restriction. [] Progressing: [] Met: [] Not Met: [] Adjusted     Electronically signed by:  Jasmyne Whitfield, PT  Therapist was present, directed the patient's care, made skilled judgement, and was responsible for assessment and treatment of the patient.       Elli Faye, SPT

## 2022-02-15 DIAGNOSIS — E78.5 DYSLIPIDEMIA: ICD-10-CM

## 2022-02-15 NOTE — TELEPHONE ENCOUNTER
Requested Prescriptions     Pending Prescriptions Disp Refills    atorvastatin (LIPITOR) 80 MG tablet [Pharmacy Med Name: ATORVASTATIN 80MG TABLETS] 90 tablet 0     Sig: TAKE 1 TABLET BY MOUTH DAILY     Please review the pending medication refill.     Patient was last seen 01/11/2022    Next office visit is 05/17/2022    Last medication refill   atorvastatin (LIPITOR) 80 MG tablet [[id number]]     Order Details  Dose: 80 mg Route: Oral Frequency: DAILY   Dispense Quantity: 90 tablet Refills: 0          Sig: TAKE 1 TABLET BY MOUTH DAILY         Start Date: 11/22/21

## 2022-02-17 ENCOUNTER — PATIENT MESSAGE (OUTPATIENT)
Dept: INTERNAL MEDICINE CLINIC | Age: 67
End: 2022-02-17

## 2022-02-17 RX ORDER — ATORVASTATIN CALCIUM 80 MG/1
80 TABLET, FILM COATED ORAL DAILY
Qty: 90 TABLET | Refills: 1 | Status: SHIPPED | OUTPATIENT
Start: 2022-02-17 | End: 2022-05-17 | Stop reason: SDUPTHER

## 2022-02-17 NOTE — TELEPHONE ENCOUNTER
From: Trev Flores  To: Dr. Bharati Rinaldi: 2/17/2022 11:03 AM EST  Subject: Atorvastatin prescription refill    I requested my Atorvastatin refill through [facility] in [place] on Tuesday, February 15 and received message stating they were contacting your office to refill. As of this morning Im being informed that they are still awaiting your response. Please respond to their request to refill my medication. Thank you!

## 2022-02-23 ENCOUNTER — HOSPITAL ENCOUNTER (OUTPATIENT)
Dept: PHYSICAL THERAPY | Age: 67
Setting detail: THERAPIES SERIES
Discharge: HOME | End: 2022-02-23
Payer: MEDICARE

## 2022-02-23 NOTE — PROGRESS NOTES
Physical Therapy  Cancellation/No-show Note  Patient Name:  Edison Garza  :  1955   Date:  2022  Cancelled visits to date: 1  No-shows to date: 0    Patient status for today's appointment patient:  [x]  Cancelled   []  Rescheduled appointment  []  No-show     Reason given by patient:  []  Patient ill  []  Conflicting appointment  []  No transportation    []  Conflict with work  [x]  No reason given  []  Other:     Comments:      Phone call information:   []  Phone call made today to patient at _ time at number provided:      []  Patient answered, conversation as follows:    []  Patient did not answer, message left as follows:  []  Phone call not made today  [x]  Phone call not needed - pt contacted us to cancel and provided reason for cancellation. Electronically signed by:   Silvestre Gibson, PT , DPT, ATC

## 2022-02-25 ENCOUNTER — HOSPITAL ENCOUNTER (OUTPATIENT)
Dept: PHYSICAL THERAPY | Age: 67
Setting detail: THERAPIES SERIES
Discharge: HOME | End: 2022-02-25
Payer: MEDICARE

## 2022-02-25 PROCEDURE — 97530 THERAPEUTIC ACTIVITIES: CPT

## 2022-02-25 PROCEDURE — 97112 NEUROMUSCULAR REEDUCATION: CPT

## 2022-02-25 PROCEDURE — 97110 THERAPEUTIC EXERCISES: CPT

## 2022-02-25 NOTE — FLOWSHEET NOTE
TB:  -mid row  -LPD   orange   1  1   X 15  X 15 2/25   Wall push-ups  2 10 2/10   Ball on Wall; CW/CCW Green foam ball 1 X 10 ea 2/25   TB diagonal pull apart gold 1 X 15 ea 2/25: struggled w/ coordination w/ LUE scaption                                      Therapeutic Activities (94150)       Foot taps to step/box  FSU  FSU to SLS 6\"  4\"  4\" 1  1  1 X 20 alt  X 10 B  X 10 B To work on stability with cane/gait. 2/25: intermittent UE support on // bars   Gait Training w/o SPC   X 150' 2/25: able to ambulate in clinic w/o SPC, no LOB, Supervision   Sit<>stands w/ GH flex w/ soccer ball  1 X 10  2/25                 Neuromuscular Re-ed (38204)       Balance: airex  -NBOS EO (counting bwd from 100 by 5s)  -tandem   -rocking in stance phase (counting bwd from 100 by 5s)  1 X 1 min ea 2/25. No LOB, intermittent UE support on // bars                                      Manual Intervention (65347)       Scapular Mobs/Thoracic Extension                                              Modalities: as needed    Pt. Education:  -patient educated on diagnosis, prognosis and expectations for rehab  -all patient questions were answered    Home Exercise Program:     Access Code: [email or web address]  URL: [email or web address]  Date: 02/10/2022  Prepared by: Anny Gun     Exercises  Sidelying Thoracic Lumbar Rotation - 2 x daily - 7 x weekly - 2-3 sets - 10 reps  Standing Scapular Retraction - 2 x daily - 7 x weekly - 2-3 sets - 10 reps  Seated Thoracic Extension with Hands Behind Neck - 2-3 x daily - 7 x weekly - 1 sets - 10 reps - 10 secs hold  Wall Push Up - 2 x daily - 7 x weekly - 2-3 sets - 10 reps       Access Code: [email or web address]  URL: [email or web address]  Date: 02/10/2022  Prepared by: Monett Gun     Exercises  Sidelying Thoracic Lumbar Rotation - 2 x daily - 7 x weekly - 2-3 sets - 10 reps  Standing Scapular Retraction - 2 x daily - 7 x weekly - 2-3 sets - 10 reps  Seated Thoracic Extension with Hands Behind Neck - 2-3 x daily - 7 x weekly - 1 sets - 10 reps - 10 secs hold  Wall Push Up - 2 x daily - 7 x weekly - 2-3 sets - 10 reps      Therapeutic Exercise and NMR EXR  [x] (60523) Provided verbal/tactile cueing for activities related to strengthening, flexibility, endurance, ROM for improvements in  [x] LE / Lumbar: LE, proximal hip, and core control with self care, mobility, lifting, ambulation. [x] UE / Cervical: cervical, postural, scapular, scapulothoracic and UE control with self care, reaching, carrying, lifting, house/yardwork, driving, computer work. [x] (59989) Provided verbal/tactile cueing for activities related to improving balance, coordination, kinesthetic sense, posture, motor skill, proprioception to assist with   [x] LE / lumbar: LE, proximal hip, and core control in self care, mobility, lifting, ambulation and eccentric single leg control. [x] UE / cervical: cervical, scapular, scapulothoracic and UE control with self care, reaching, carrying, lifting, house/yardwork, driving, computer work.   [] (40530) Therapist is in constant attendance of 2 or more patients providing skilled therapy interventions, but not providing any significant amount of measurable one-on-one time to either patient, for improvements in  [] LE / lumbar: LE, proximal hip, and core control in self care, mobility, lifting, ambulation and eccentric single leg control. [] UE / cervical: cervical, scapular, scapulothoracic and UE control with self care, reaching, carrying, lifting, house/yardwork, driving, computer work.      NMR and Therapeutic Activities:    [x] (80512 or 11227) Provided verbal/tactile cueing for activities related to improving balance, coordination, kinesthetic sense, posture, motor skill, proprioception and motor activation to allow for proper function of   [x] LE: / Lumbar core, proximal hip and LE with self care and ADLs  [x] UE / Cervical: cervical, postural, scapular, scapulothoracic and UE control with self care, carrying, lifting, driving, computer work.   [] (43416) Gait Re-education- Provided training and instruction to the patient for proper LE, core and proximal hip recruitment and positioning and eccentric body weight control with ambulation re-education including up and down stairs     Home Management Training / Self Care:  [] (91686) Provided self-care/home management training related to activities of daily living and compensatory training, and/or use of adaptive equipment for improvement with: ADLs and compensatory training, meal preparation, safety procedures and instruction in use of adaptive equipment, including bathing, grooming, dressing, personal hygiene, basic household cleaning and chores.      Home Exercise Program:    [] (56983) Reviewed/Progressed HEP activities related to strengthening, flexibility, endurance, ROM of   [] LE / Lumbar: core, proximal hip and LE for functional self-care, mobility, lifting and ambulation/stair navigation   [] UE / Cervical: cervical, postural, scapular, scapulothoracic and UE control with self care, reaching, carrying, lifting, house/yardwork, driving, computer work  [] (47006)Reviewed/Progressed HEP activities related to improving balance, coordination, kinesthetic sense, posture, motor skill, proprioception of   [] LE: core, proximal hip and LE for self care, mobility, lifting, and ambulation/stair navigation    [] UE / Cervical: cervical, postural,  scapular, scapulothoracic and UE control with self care, reaching, carrying, lifting, house/yardwork, driving, computer work    Manual Treatments:  PROM / STM / Oscillations-Mobs:  G-I, II, III, IV (PA's, Inf., Post.)  [] (85912) Provided manual therapy to mobilize LE, proximal hip and/or LS spine soft tissue/joints for the purpose of modulating pain, promoting relaxation,  increasing ROM, reducing/eliminating soft tissue swelling/inflammation/restriction, improving soft tissue extensibility and allowing for proper ROM for normal function with   [] LE / lumbar: self care, mobility, lifting and ambulation. [] UE / Cervical: self care, reaching, carrying, lifting, house/yardwork, driving, computer work. Modalities:  [] (21345) Vasopneumatic compression: Utilized vasopneumatic compression to decrease edema / swelling for the purpose of improving mobility and quad tone / recruitment which will allow for increased overall function including but not limited to self-care, transfers, ambulation, and ascending / descending stairs. Charges:  Timed Code Treatment Minutes: 40   Total Treatment Minutes: 40     [] EVAL - LOW (42013)   [] EVAL - MOD (27959)  [] EVAL - HIGH (92160)  [] RE-EVAL (62957)  [x] EA(85324) x   1    [] Ionto  [x] NMR (01786) x 1      [] Vaso  [] Manual (40806) x       [] Ultrasound  [x] TA x  1      [] Mech Traction (00748)  [] Aquatic Therapy x     [] ES (un) (32223):   [] Home Management Training x  [] ES(attended) (96240)   [] Dry Needling 1-2 muscles (23669):  [] Dry Needling 3+ muscles (362445)  [] Group:      [] Other:     GOALS:     Patient stated goal: \"getting back to normal life, doing activities without worrying about it\"  []? Progressing: []? Met: []? Not Met: []? Adjusted     Therapist goals for Patient:   Short Term Goals: To be achieved in: 2 weeks  1. Independent in HEP and progression per patient tolerance, in order to prevent re-injury. []? Progressing: []? Met: []? Not Met: []? Adjusted  2. Patient will have a decrease in pain in thoracic spine to facilitate improvement in movement, function, and ADLs as indicated by Functional Deficits. []? Progressing: []? Met: []? Not Met: []? Adjusted     Long Term Goals: To be achieved in: 6 weeks  1. Patient will demonstrate increased AROM of extension and rotation to WNL of cervical/thoracic spine to allow for proper joint functioning as indicated by patients Functional Deficits. []? Progressing: []? Met: []? Not Met: []? Adjusted  2. Patient will demonstrate an increase in postural awareness and control without verbal cues to allow for proper functional mobility as indicated by patients Functional Deficits. []? Progressing: []? Met: []? Not Met: []? Adjusted  3. Patient will demonstrate an increase in Strength to 5/5 in UE and 4+/5 in hip flexors to allow for proper functional mobility as indicated by patients Functional Deficits. []? Progressing: []? Met: []? Not Met: []? Adjusted  4. Patient will return to functional activities including ascending/descending stairs, personal hygiene, and picking up objects from ground without increased symptoms or restriction. []? Progressing: []? Met: []? Not Met: []? Adjusted  5. Patient will be able to transfer to/from floor to play with dog and complete daily activities without increased pain or restriction. []? Progressing: []? Met: []? Not Met: []? Adjusted          Overall Progression Towards Functional goals/ Treatment Progress Update:  [] Patient is progressing as expected towards functional goals listed. [] Progression is slowed due to complexities/Impairments listed. [] Progression has been slowed due to co-morbidities. [x] Plan just implemented, too soon to assess goals progression <30days   [] Goals require adjustment due to lack of progress  [] Patient is not progressing as expected and requires additional follow up with physician  [] Other    Persisting Functional Limitations/Impairments:  [x]Sleeping []Sitting               [x]Standing [x]Transfers        [x]Walking []Kneeling               [x]Stairs [x]Squatting / bending   [x]ADLs [x]Reaching  [x]Lifting  [x]Housework  [x]Driving []Job related tasks  []Sports/Recreation []Other:        ASSESSMENT:  Patient tolerated treatment well. Pt fatigues easily w/ more general full body activities. Pt demonstrates increased thoracic kyphosis and rounded shoulders.  Pt will benefit from general conditioning, scapular/posture retraining, dynamic balance, and functional endurance to return to PLOF. Treatment/Activity Tolerance:  [x] Patient able to complete tx [x] Patient limited by fatigue  [x] Patient limited by pain  [] Patient limited by other medical complications  [] Other:     Prognosis: [x] Good [] Fair  [] Poor    Patient Requires Follow-up: [x] Yes  [] No    Plan for next treatment session:    PLAN: See eval. PT 1-2x / week for 4-6 weeks. [x] Continue per plan of care [] Alter current plan (see comments)  [] Plan of care initiated [] Hold pending MD visit [] Discharge    Electronically signed by: Radhames Tyler, PT PT  Therapist was present, directed the patient's care, made skilled judgement, and was responsible for assessment and treatment of the patient. Janet Bass, SPT    Note: If patient does not return for scheduled/ recommended follow up visits, this note will serve as a discharge from care along with most recent update on progress.

## 2022-03-01 ENCOUNTER — HOSPITAL ENCOUNTER (OUTPATIENT)
Dept: PHYSICAL THERAPY | Age: 67
Setting detail: THERAPIES SERIES
Discharge: HOME | End: 2022-03-01
Payer: MEDICARE

## 2022-03-01 PROCEDURE — 97110 THERAPEUTIC EXERCISES: CPT

## 2022-03-01 PROCEDURE — 97530 THERAPEUTIC ACTIVITIES: CPT

## 2022-03-01 NOTE — FLOWSHEET NOTE
[facility]  Phone: [phone]   Fax: [phone]    Physical Therapy Daily Treatment Note  Date:  3/1/2022    Patient Name:  Rosa Zuniga    :  1955  MRN: [id number]  Medical/Treatment Diagnosis Information:  · Diagnosis: S22.000A - Compression fx, thoracic spine, closed. S22.070A - Closed wedge compression fracture of T9 vertebra. Decreased thoracic and lumbar mobility, decreased gait ability, decreased balance, decreased functional endurance  Insurance/Certification information:  PT Insurance Information: [facility] Medicare  Physician Information:  Referring Practitioner: Ernesto Mathias MD  Plan of care signed (Y/N): []  Yes [x]  No     Date of Patient follow up with Physician:      Progress Report: []  Yes  [x]  No     Date Range for reporting period:  Beginnin/10/22  Ending:     Progress report due (10 Rx/or 30 days whichever is less): visit #10 or 3/22/34 (date)     Recertification due (POC duration/ or 90 days whichever is less): visit #20 or 5/10/22 (date)     Visit # Insurance Allowable Auth required? Date Range   3/12 [place] []  Yes  [x]  No 2/10/22-           Latex Allergy:  [x]NO      []YES  Preferred Language for Healthcare:   [x]English       []other:    Functional Scale:        Date assessed:  EVENS: raw score = 6; dysfunction = 12%   3/1/22    Pain level:  2-3/10     SUBJECTIVE:  Back has been feeling good. Pt did not have additional soreness after last visit, but was very fatigued next few days. Pt has been increasing her walking at home and trying to increase activity. OBJECTIVE:   3/1: ambulates in clinic w/ SPC on R. Forward head posture w/ increased kyphosis in T-spine.    , 3/1: ambulates in clinic w/ SPC on R       RESTRICTIONS/PRECAUTIONS:  DM2, HTN, GERD, carpal tunnel release, early stage heart murur    Exercises/Interventions:     Therapeutic Exercises (37128) Resistance / level Sets/sec Reps Notes   NuStep  UBE 1.0  1.0 3:40'  1' fwd, 1' bwd  2/25, 3/1: pt stopped d/t fatigue   S/L Rotation Stretch  1/10\"  2/10: very limited   Prayer Stretch       Seated T-spine ext stretch  3/20\"  2/10   TB:  -mid row  -LPD   [place]     1  1   X 15  X 15 2/25. 3/1: progress TB npv   Wall push-ups  2 12 2/10   Ball on Wall; CW/CCW 2/25   TB diagonal pull apart  Pronated TB Horz ABD Gold  Orange 1 X 15 ea 2/25: struggled w/ coordination w/ LUE scaption   Stairs HS stretch 4\" 1/30\"  3/1                               Therapeutic Activities (94401)       Foot taps to step/box  FSU  FSU to SLS   4\"  4\" 1  1  1   X 15 B  X 10 B To work on stability with cane/gait. 2/25: intermittent UE support on // bars   Gait Training w/o [facility]  2/25: able to ambulate in clinic w/o SPC, no LOB, Supervision   Sit<>stands w/ [address] flex w/ soccer ball  2/25   Filling out EVENS, discussion on progress X 5 min 3/1        Neuromuscular Re-ed (33668)     Balance: airex  -NBOS EO (counting bwd from 100 by 5s)  -tandem   -rocking in stance phase (counting bwd from 100 by 5s)  2/25. No LOB, intermittent UE support on // bars   [place]:  -hip ext w/ slider  2 X 10 B 3/1: cues for upright posture, less UE support to facilitate improved balance/stability on stairs. Manual Intervention (96583)       Scapular Mobs/Thoracic Extension                                              Modalities: as needed    Pt. Education:  -patient educated on diagnosis, prognosis and expectations for rehab  -all patient questions were answered    Home Exercise Program:     Access Code: [email or web address]  URL: [email or web address]  Date: 02/10/2022  Prepared by: Giorgio Richards     Exercises  Sidelying Thoracic Lumbar Rotation - 2 x daily - 7 x weekly - 2-3 sets - 10 reps  Standing Scapular Retraction - 2 x daily - 7 x weekly - 2-3 sets - 10 reps  Seated Thoracic Extension with Hands Behind Neck - 2-3 x daily - 7 x weekly - 1 sets - 10 reps - 10 secs hold  Wall Push Up - 2 PROM / STM / Oscillations-Mobs:  G-I, II, III, IV (PA's, Inf., Post.)  [] (49462) Provided manual therapy to mobilize LE, proximal hip and/or LS spine soft tissue/joints for the purpose of modulating pain, promoting relaxation,  increasing ROM, reducing/eliminating soft tissue swelling/inflammation/restriction, improving soft tissue extensibility and allowing for proper ROM for normal function with   [] LE / lumbar: self care, mobility, lifting and ambulation. [] UE / Cervical: self care, reaching, carrying, lifting, house/yardwork, driving, computer work. Modalities:  [] (12297) Vasopneumatic compression: Utilized vasopneumatic compression to decrease edema / swelling for the purpose of improving mobility and quad tone / recruitment which will allow for increased overall function including but not limited to self-care, transfers, ambulation, and ascending / descending stairs. Charges:  Timed Code Treatment Minutes: 45   Total Treatment Minutes: 45     [] EVAL - LOW (35474)   [] EVAL - MOD (32241)  [] EVAL - HIGH (90918)  [] RE-EVAL (15800)  [x] LA(02603) x   2    [] Ionto  [] NMR (71202) x 1      [] Vaso  [] Manual (14581) x       [] Ultrasound  [x] TA x  1      [] Mech Traction (46660)  [] Aquatic Therapy x     [] ES (un) (76448):   [] Home Management Training x  [] ES(attended) (46640)   [] Dry Needling 1-2 muscles (90313):  [] Dry Needling 3+ muscles (374793)  [] Group:      [] Other:     GOALS:     Patient stated goal: \"getting back to normal life, doing activities without worrying about it\"  []? Progressing: []? Met: []? Not Met: []? Adjusted     Therapist goals for Patient:   Short Term Goals: To be achieved in: 2 weeks  1. Independent in HEP and progression per patient tolerance, in order to prevent re-injury. []? Progressing: []? Met: []? Not Met: []? Adjusted  2.  Patient will have a decrease in pain in thoracic spine to facilitate improvement in movement, function, and ADLs as indicated by Functional Deficits. []? Progressing: []? Met: []? Not Met: []? Adjusted     Long Term Goals: To be achieved in: 6 weeks  1. Patient will demonstrate increased AROM of extension and rotation to WNL of cervical/thoracic spine to allow for proper joint functioning as indicated by patients Functional Deficits. []? Progressing: []? Met: []? Not Met: []? Adjusted  2. Patient will demonstrate an increase in postural awareness and control without verbal cues to allow for proper functional mobility as indicated by patients Functional Deficits. []? Progressing: []? Met: []? Not Met: []? Adjusted  3. Patient will demonstrate an increase in Strength to 5/5 in UE and 4+/5 in hip flexors to allow for proper functional mobility as indicated by patients Functional Deficits. []? Progressing: []? Met: []? Not Met: []? Adjusted  4. Patient will return to functional activities including ascending/descending stairs, personal hygiene, and picking up objects from ground without increased symptoms or restriction. []? Progressing: []? Met: []? Not Met: []? Adjusted  5. Patient will be able to transfer to/from floor to play with dog and complete daily activities without increased pain or restriction. []? Progressing: []? Met: []? Not Met: []? Adjusted          Overall Progression Towards Functional goals/ Treatment Progress Update:  [] Patient is progressing as expected towards functional goals listed. [] Progression is slowed due to complexities/Impairments listed. [] Progression has been slowed due to co-morbidities.   [x] Plan just implemented, too soon to assess goals progression <30days   [] Goals require adjustment due to lack of progress  [] Patient is not progressing as expected and requires additional follow up with physician  [] Other    Persisting Functional Limitations/Impairments:  [x]Sleeping []Sitting               [x]Standing [x]Transfers        [x]Walking []Kneeling               [x]Stairs [x]Squatting / bending   [x]ADLs [x]Reaching  [x]Lifting  [x]Housework  [x]Driving []Job related tasks  []Sports/Recreation []Other:        ASSESSMENT:  Patient tolerated treatment well. Pt fatigues easily w/ more general full body activities and requires standing rest breaks. Pt requires mod/max tactile cuing for improved technique. Pt has increased thoracic kyphosis and rounded shoulders, with limited mobility of shoulders and thorax. Pt will benefit from general conditioning, scapular/posture retraining, dynamic balance, and functional endurance to return to PLOF. Treatment/Activity Tolerance:  [x] Patient able to complete tx  [x] Patient limited by fatigue  [x] Patient limited by pain  [] Patient limited by other medical complications  [] Other:     Prognosis: [x] Good [] Fair  [] Poor    Patient Requires Follow-up: [x] Yes  [] No    Plan for next treatment session:    PLAN: See eval. PT 1-2x / week for 4-6 weeks. [x] Continue per plan of care [] Alter current plan (see comments)  [] Plan of care initiated [] Hold pending MD visit [] Discharge    Electronically signed by: China Lemus PT PT  Therapist was present, directed the patient's care, made skilled judgement, and was responsible for assessment and treatment of the patient. Ze Amaral, SPT    Note: If patient does not return for scheduled/ recommended follow up visits, this note will serve as a discharge from care along with most recent update on progress.

## 2022-03-08 ENCOUNTER — HOSPITAL ENCOUNTER (OUTPATIENT)
Dept: PHYSICAL THERAPY | Age: 67
Setting detail: THERAPIES SERIES
Discharge: HOME | End: 2022-03-08
Payer: MEDICARE

## 2022-03-11 ENCOUNTER — PATIENT MESSAGE (OUTPATIENT)
Dept: INTERNAL MEDICINE CLINIC | Age: 67
End: 2022-03-11

## 2022-03-11 NOTE — TELEPHONE ENCOUNTER
Please advise if refill is okay  Requested Prescriptions     Pending Prescriptions Disp Refills    BREO ELLIPTA 200-25 MCG/INH AEPB inhaler       Please review the pending medication refill.     Patient was last seen 01/11/2022    Next office visit is 05/17/2022

## 2022-03-11 NOTE — TELEPHONE ENCOUNTER
From: Topher Velez  To: Dr. Freddie Gregorio: 3/11/2022 10:27 AM EST  Subject: Prescription refill    I requested a refill for [employer] [employer] through [facility] in [place] on Monday, March 7 and as of this morning the pharmacy is stating they have not received approval to refill. I have 3 days left on it and would like to be assured that your office will respond to [place] as soon as it can be done please. Please feel free to contact me if there are any issues or questions   Thank you and have a good day!

## 2022-03-15 ENCOUNTER — HOSPITAL ENCOUNTER (OUTPATIENT)
Dept: PHYSICAL THERAPY | Age: 67
Setting detail: THERAPIES SERIES
Discharge: HOME | End: 2022-03-15
Payer: MEDICARE

## 2022-03-15 PROCEDURE — 97112 NEUROMUSCULAR REEDUCATION: CPT

## 2022-03-15 PROCEDURE — 97110 THERAPEUTIC EXERCISES: CPT

## 2022-03-15 NOTE — FLOWSHEET NOTE
[address] [facility] Physical Therapy  Phone: [phone]   Fax: [phone]    Physical Therapy Daily Treatment Note  Date:  3/15/2022    Patient Name:  Paola Ennis    :  1955  MRN: [id number]  Medical/Treatment Diagnosis Information:  · Diagnosis: S22.000A - Compression fx, thoracic spine, closed. S22.070A - Closed wedge compression fracture of T9 vertebra. Decreased thoracic and lumbar mobility, decreased gait ability, decreased balance, decreased functional endurance  Insurance/Certification information:  PT Insurance Information: [facility] Medicare  Physician Information:  Referring Practitioner: Javad Holt MD  Plan of care signed (Y/N): []  Yes [x]  No     Date of Patient follow up with Physician:      Progress Report: []  Yes  [x]  No     Date Range for reporting period:  Beginnin/10/22  Ending:     Progress report due (10 Rx/or 30 days whichever is less): visit #10 or  (date)     Recertification due (POC duration/ or 90 days whichever is less): visit #20 or 5/10/22 (date)     Visit # Insurance Allowable Auth required? Date Range    [place] []  Yes  [x]  No 2/10/22-           Latex Allergy:  [x]NO      []YES  Preferred Language for Healthcare:   [x]English       []other:    Functional Scale:        Date assessed:  EVENS: raw score = 6; dysfunction = 12%   3/1/22    Pain level:  0.5/10     SUBJECTIVE:   Pt did not sleep well last night. Pt reports she has been struggling with balance lately. OBJECTIVE:   3/1: ambulates in clinic w/ SPC on R. Forward head posture w/ increased kyphosis in T-spine.    , 3/1: ambulates in clinic w/ SPC on R       RESTRICTIONS/PRECAUTIONS:  DM2, HTN, GERD, carpal tunnel release, early stage heart murur    Exercises/Interventions:     Therapeutic Exercises (15428) Resistance / level Sets/sec Reps Notes   NuStep  UBE 1.0   1' fwd, 1' bwd  , 3/1: pt stopped d/t fatigue   S/L Rotation Stretch   2/10: very limited Prayer Stretch      Seated T-spine ext stretch   2/10   TB:  -mid row    -LPD   Orange (on red SB)     2    1   X 10    X 15 2/25. 3/1: progress TB npv   Wall push-ups  2 12 2/10   Ball on Wall; CW/CCW 2/25   TB diagonal pull apart  Pronated TB Horz ABD 2/25: struggled w/ coordination w/ LUE scaption   Stairs HS stretch  3/1   SLS foot rolling soccer ball (A/P)  1  X 10 B 3/15: intermittent UE support                        Therapeutic Activities (52805)       Foot taps to step/box  FSU  FSU to SLS     6\"   1     X 15 B To work on stability with cane/gait. 2/25: intermittent UE support on // bars  3/15: billed under NMR this date. Gait Training w/o [address]  2/25: able to ambulate in clinic w/o SPC, no LOB, Supervision   Sit<>stands w/ [facility] flex w/ soccer ball  2/25   Filling out EVENS, discussion on progress  3/1   Sit<>stand from chair w/ arms. AirEx under feet  1 X 15 3/15: stand using UE, sit no support. Pt has dec control ecc and has to use UE to stand d/t R knee pain/weakness. SBA, R knee CGA block                  Neuromuscular Re-ed (54982)     Balance: airex  -NBOS EO (counting bwd from 100 by 5s, naming animal)  -tandem   -rocking in stance phase (counting bwd from 100 by 5s)  -NBOS   11/1minX 1 min ea2/25. No LOB, intermittent UE support on // bars  3/15: emphasis on posture. Intermittent support on bar   [place]:  -hip ext w/ slider  2 X 10 B 3/1: cues for upright posture, less UE support to facilitate improved balance/stability on stairs. Manual Intervention (88271)       Scapular Mobs/Thoracic Extension                                              Modalities: as needed    Pt. Education:  -patient educated on diagnosis, prognosis and expectations for rehab  -all patient questions were answered  -education on ADLs: vacuuming w/ better body mechanics    Home Exercise Program:     Access Code: [email or web address]  URL: [email or web address]  Date: 02/10/2022  Prepared by: Jacqueline Webb Kirk     Exercises  Sidelying Thoracic Lumbar Rotation - 2 x daily - 7 x weekly - 2-3 sets - 10 reps  Standing Scapular Retraction - 2 x daily - 7 x weekly - 2-3 sets - 10 reps  Seated Thoracic Extension with Hands Behind Neck - 2-3 x daily - 7 x weekly - 1 sets - 10 reps - 10 secs hold  Wall Push Up - 2 x daily - 7 x weekly - 2-3 sets - 10 reps       Access Code: [email or web address]  URL: [email or web address]  Date: 02/10/2022  Prepared by: Manjit Grant     Exercises  Sidelying Thoracic Lumbar Rotation - 2 x daily - 7 x weekly - 2-3 sets - 10 reps  Standing Scapular Retraction - 2 x daily - 7 x weekly - 2-3 sets - 10 reps  Seated Thoracic Extension with Hands Behind Neck - 2-3 x daily - 7 x weekly - 1 sets - 10 reps - 10 secs hold  Wall Push Up - 2 x daily - 7 x weekly - 2-3 sets - 10 reps      Therapeutic Exercise and NMR EXR  [x] (23946) Provided verbal/tactile cueing for activities related to strengthening, flexibility, endurance, ROM for improvements in  [x] LE / Lumbar: LE, proximal hip, and core control with self care, mobility, lifting, ambulation. [x] UE / Cervical: cervical, postural, scapular, scapulothoracic and UE control with self care, reaching, carrying, lifting, house/yardwork, driving, computer work. [x] (73794) Provided verbal/tactile cueing for activities related to improving balance, coordination, kinesthetic sense, posture, motor skill, proprioception to assist with   [x] LE / lumbar: LE, proximal hip, and core control in self care, mobility, lifting, ambulation and eccentric single leg control.    [x] UE / cervical: cervical, scapular, scapulothoracic and UE control with self care, reaching, carrying, lifting, house/yardwork, driving, computer work.   [] (31334) Therapist is in constant attendance of 2 or more patients providing skilled therapy interventions, but not providing any significant amount of measurable one-on-one time to either patient, for improvements in  [] LE / lumbar: LE, proximal hip, and core control in self care, mobility, lifting, ambulation and eccentric single leg control. [] UE / cervical: cervical, scapular, scapulothoracic and UE control with self care, reaching, carrying, lifting, house/yardwork, driving, computer work. NMR and Therapeutic Activities:    [x] (66341 or 46091) Provided verbal/tactile cueing for activities related to improving balance, coordination, kinesthetic sense, posture, motor skill, proprioception and motor activation to allow for proper function of   [x] LE: / Lumbar core, proximal hip and LE with self care and ADLs  [x] UE / Cervical: cervical, postural, scapular, scapulothoracic and UE control with self care, carrying, lifting, driving, computer work.   [] (15532) Gait Re-education- Provided training and instruction to the patient for proper LE, core and proximal hip recruitment and positioning and eccentric body weight control with ambulation re-education including up and down stairs     Home Management Training / Self Care:  [] (95241) Provided self-care/home management training related to activities of daily living and compensatory training, and/or use of adaptive equipment for improvement with: ADLs and compensatory training, meal preparation, safety procedures and instruction in use of adaptive equipment, including bathing, grooming, dressing, personal hygiene, basic household cleaning and chores.      Home Exercise Program:    [] (55143) Reviewed/Progressed HEP activities related to strengthening, flexibility, endurance, ROM of   [] LE / Lumbar: core, proximal hip and LE for functional self-care, mobility, lifting and ambulation/stair navigation   [] UE / Cervical: cervical, postural, scapular, scapulothoracic and UE control with self care, reaching, carrying, lifting, house/yardwork, driving, computer work  [] (47857)Reviewed/Progressed HEP activities related to improving balance, coordination, kinesthetic sense, posture, motor skill, proprioception of   [] LE: core, proximal hip and LE for self care, mobility, lifting, and ambulation/stair navigation    [] UE / Cervical: cervical, postural,  scapular, scapulothoracic and UE control with self care, reaching, carrying, lifting, house/yardwork, driving, computer work    Manual Treatments:  PROM / STM / Oscillations-Mobs:  G-I, II, III, IV (PA's, Inf., Post.)  [] (19728) Provided manual therapy to mobilize LE, proximal hip and/or LS spine soft tissue/joints for the purpose of modulating pain, promoting relaxation,  increasing ROM, reducing/eliminating soft tissue swelling/inflammation/restriction, improving soft tissue extensibility and allowing for proper ROM for normal function with   [] LE / lumbar: self care, mobility, lifting and ambulation. [] UE / Cervical: self care, reaching, carrying, lifting, house/yardwork, driving, computer work. Modalities:  [] (79851) Vasopneumatic compression: Utilized vasopneumatic compression to decrease edema / swelling for the purpose of improving mobility and quad tone / recruitment which will allow for increased overall function including but not limited to self-care, transfers, ambulation, and ascending / descending stairs. Charges:  Timed Code Treatment Minutes: 41   Total Treatment Minutes: 41     [] EVAL - LOW (61012)   [] EVAL - MOD (06743)  [] EVAL - HIGH (24054)  [] RE-EVAL (52885)  [x] FN(91201) x   2    [] Ionto  [x] NMR (45026) x 1      [] Vaso  [] Manual (56109) x       [] Ultrasound  [] TA x  1      [] Mech Traction (55090)  [] Aquatic Therapy x     [] ES (un) (49208):   [] Home Management Training x  [] ES(attended) (01766)   [] Dry Needling 1-2 muscles (31117):  [] Dry Needling 3+ muscles (516942)  [] Group:      [] Other:     GOALS:     Patient stated goal: \"getting back to normal life, doing activities without worrying about it\"  []? Progressing: []? Met: []? Not Met: []?  Adjusted     Therapist goals for Patient:   Short Term Goals: To be achieved in: 2 weeks  1. Independent in HEP and progression per patient tolerance, in order to prevent re-injury. []? Progressing: []? Met: []? Not Met: []? Adjusted  2. Patient will have a decrease in pain in thoracic spine to facilitate improvement in movement, function, and ADLs as indicated by Functional Deficits. []? Progressing: []? Met: []? Not Met: []? Adjusted     Long Term Goals: To be achieved in: 6 weeks  1. Patient will demonstrate increased AROM of extension and rotation to WNL of cervical/thoracic spine to allow for proper joint functioning as indicated by patients Functional Deficits. []? Progressing: []? Met: []? Not Met: []? Adjusted  2. Patient will demonstrate an increase in postural awareness and control without verbal cues to allow for proper functional mobility as indicated by patients Functional Deficits. []? Progressing: []? Met: []? Not Met: []? Adjusted  3. Patient will demonstrate an increase in Strength to 5/5 in UE and 4+/5 in hip flexors to allow for proper functional mobility as indicated by patients Functional Deficits. []? Progressing: []? Met: []? Not Met: []? Adjusted  4. Patient will return to functional activities including ascending/descending stairs, personal hygiene, and picking up objects from ground without increased symptoms or restriction. []? Progressing: []? Met: []? Not Met: []? Adjusted  5. Patient will be able to transfer to/from floor to play with dog and complete daily activities without increased pain or restriction. []? Progressing: []? Met: []? Not Met: []? Adjusted          Overall Progression Towards Functional goals/ Treatment Progress Update:  [] Patient is progressing as expected towards functional goals listed. [] Progression is slowed due to complexities/Impairments listed. [] Progression has been slowed due to co-morbidities.   [x] Plan just implemented, too soon to assess goals progression <30days   [] Goals require adjustment due to lack of progress  [] Patient is not progressing as expected and requires additional follow up with physician  [] Other    Persisting Functional Limitations/Impairments:  [x]Sleeping []Sitting               [x]Standing [x]Transfers        [x]Walking []Kneeling               [x]Stairs [x]Squatting / bending   [x]ADLs [x]Reaching  [x]Lifting  [x]Housework  [x]Driving []Job related tasks  []Sports/Recreation []Other:        ASSESSMENT:  Patient tolerated treatment well. Pt able to complete and progress to more full body activities w/ less fatigue. Pt requires mod/max tactile cuing for improved technique. Pt has increased thoracic kyphosis and rounded shoulders, with limited mobility of shoulders and thorax. Pt demo'd better balance this date on AirEx w/ increased cognitive tasks w/o increased UE support or LOB. Pt will benefit from general conditioning, scapular/posture retraining, dynamic balance, and functional endurance to return to PLOF. Treatment/Activity Tolerance:  [x] Patient able to complete tx  [x] Patient limited by fatigue  [x] Patient limited by pain  [] Patient limited by other medical complications  [] Other:     Prognosis: [x] Good [] Fair  [] Poor    Patient Requires Follow-up: [x] Yes  [] No    Plan for next treatment session:    PLAN: See eval. PT 1-2x / week for 4-6 weeks. [x] Continue per plan of care [] Alter current plan (see comments)  [] Plan of care initiated [] Hold pending MD visit [] Discharge    Electronically signed by: Lisa Esteban, PT PT  Therapist was present, directed the patient's care, made skilled judgement, and was responsible for assessment and treatment of the patient. Gisell Holloway, SPT    Note: If patient does not return for scheduled/ recommended follow up visits, this note will serve as a discharge from care along with most recent update on progress.

## 2022-03-20 DIAGNOSIS — K21.9 GASTROESOPHAGEAL REFLUX DISEASE WITHOUT ESOPHAGITIS: ICD-10-CM

## 2022-03-21 RX ORDER — FAMOTIDINE 20 MG/1
TABLET, FILM COATED ORAL
Qty: 180 TABLET | Refills: 0 | Status: SHIPPED | OUTPATIENT
Start: 2022-03-21 | End: 2022-05-17 | Stop reason: SDUPTHER

## 2022-03-22 ENCOUNTER — HOSPITAL ENCOUNTER (OUTPATIENT)
Dept: PHYSICAL THERAPY | Age: 67
Setting detail: THERAPIES SERIES
Discharge: HOME | End: 2022-03-22
Payer: MEDICARE

## 2022-03-22 PROCEDURE — 97110 THERAPEUTIC EXERCISES: CPT

## 2022-03-22 PROCEDURE — 97112 NEUROMUSCULAR REEDUCATION: CPT

## 2022-03-22 NOTE — FLOWSHEET NOTE
[facility]  Phone: [phone]   Fax: [phone]    Physical Therapy Daily Treatment Note  Date:  3/22/2022    Patient Name:  Leone Aase    :  1955  MRN: [id number]  Medical/Treatment Diagnosis Information:  · Diagnosis: S22.000A - Compression fx, thoracic spine, closed. S22.070A - Closed wedge compression fracture of T9 vertebra. Decreased thoracic and lumbar mobility, decreased gait ability, decreased balance, decreased functional endurance  Insurance/Certification information:  PT Insurance Information: [facility] Medicare  Physician Information:  Referring Practitioner: Jane Flores MD  Plan of care signed (Y/N): []  Yes [x]  No     Date of Patient follow up with Physician:      Progress Report: []  Yes  [x]  No     Date Range for reporting period:  Beginnin/10/22  Ending:     Progress report due (10 Rx/or 30 days whichever is less): visit #10 or 65 (date)     Recertification due (POC duration/ or 90 days whichever is less): visit #20 or 5/10/22 (date)     Visit # Insurance Allowable Auth required? Date Range    [place] []  Yes  [x]  No 2/10/22-           Latex Allergy:  [x]NO      []YES  Preferred Language for Healthcare:   [x]English       []other:    Functional Scale:        Date assessed:  VEENS: raw score = 6; dysfunction = 12%   3/1/22    Pain level:  0.5/10     SUBJECTIVE:  Patient reports that she was sore after last treatment session. States that still balance is still the biggest problem. OBJECTIVE:   3/1: ambulates in clinic w/ SPC on R. Forward head posture w/ increased kyphosis in T-spine.    , 3/1: ambulates in clinic w/ SPC on R       RESTRICTIONS/PRECAUTIONS:  DM2, HTN, GERD, carpal tunnel release, early stage heart murur    Exercises/Interventions:     Therapeutic Exercises (04060) Resistance / level Sets/sec Reps Notes   NuStep  UBE 1.0   1.5' fwd, 1'.5 bwd  , 3/1: pt stopped d/t fatigue   S/L Rotation Stretch   2/10: very limited   Prayer Stretch      Seated T-spine ext stretch   2/10   TB:  -mid row    -LPD   Orange (on red SB)     1    1   X 15    X 15 2/25. 3/1: progress TB npv   Wall push-ups  1 15 2/10   Ball on Wall; CW/CCW 2/25   TB diagonal pull apart  Pronated TB Horz ABD 2/25: struggled w/ coordination w/ LUE scaption   Stairs HS stretch  3/1   SLS foot rolling soccer ball (A/P)  3/15: intermittent UE support                        Therapeutic Activities (03012)       Foot taps to step/box  FSU  FSU to SLS     6\"   1     X 15 B To work on stability with cane/gait. 2/25: intermittent UE support on // bars  3/15: billed under NMR this date. Gait Training w/o [facility]  2/25: able to ambulate in clinic w/o SPC, no LOB, Supervision   Sit<>stands w/ [address] flex w/ soccer ball  2/25   Filling out EVENS, discussion on progress  3/1   Sit<>stand from chair w/ arms.  -modified to holding weighted ball   1 X 15 3/22 : Modified     3/15: stand using UE, sit no support. Pt has dec control ecc and has to use UE to stand d/t R knee pain/weakness. SBA, R knee CGA block                  Neuromuscular Re-ed (25421)     Balance: airex  -NBOS EO (counting bwd from 100 by 5s, naming animal)  -tandem   Trunk rotation  -rocking in stance phase (counting bwd from 100 by 5s)  -NBOS   -  12/30'X 1 min ea        5B2/25. No LOB, intermittent UE support on // bars  3/15: emphasis on posture. Intermittent support on bar   Ennis:  -hip ext w/ slider   opp hand opp knee hand tap fwd/bwd   2x laps fwd/bwd3/1: cues for upright posture, less UE support to facilitate improved balance/stability on stairs. Manual Intervention (24313)       Scapular Mobs/Thoracic Extension                                              Modalities: as needed    Pt.  Education:  -patient educated on diagnosis, prognosis and expectations for rehab  -all patient questions were answered  -education on ADLs: vacuuming w/ better body mechanics    Home Exercise Program:     Access Code: [email or web address]  URL: [email or web address]  Date: 02/10/2022  Prepared by: Andi Allegra     Exercises  Sidelying Thoracic Lumbar Rotation - 2 x daily - 7 x weekly - 2-3 sets - 10 reps  Standing Scapular Retraction - 2 x daily - 7 x weekly - 2-3 sets - 10 reps  Seated Thoracic Extension with Hands Behind Neck - 2-3 x daily - 7 x weekly - 1 sets - 10 reps - 10 secs hold  Wall Push Up - 2 x daily - 7 x weekly - 2-3 sets - 10 reps       Access Code: [email or web address]  URL: [email or web address]  Date: 02/10/2022  Prepared by: Andi Allegra     Exercises  Sidelying Thoracic Lumbar Rotation - 2 x daily - 7 x weekly - 2-3 sets - 10 reps  Standing Scapular Retraction - 2 x daily - 7 x weekly - 2-3 sets - 10 reps  Seated Thoracic Extension with Hands Behind Neck - 2-3 x daily - 7 x weekly - 1 sets - 10 reps - 10 secs hold  Wall Push Up - 2 x daily - 7 x weekly - 2-3 sets - 10 reps      Therapeutic Exercise and NMR EXR  [x] (90869) Provided verbal/tactile cueing for activities related to strengthening, flexibility, endurance, ROM for improvements in  [x] LE / Lumbar: LE, proximal hip, and core control with self care, mobility, lifting, ambulation. [x] UE / Cervical: cervical, postural, scapular, scapulothoracic and UE control with self care, reaching, carrying, lifting, house/yardwork, driving, computer work. [x] (79877) Provided verbal/tactile cueing for activities related to improving balance, coordination, kinesthetic sense, posture, motor skill, proprioception to assist with   [x] LE / lumbar: LE, proximal hip, and core control in self care, mobility, lifting, ambulation and eccentric single leg control.    [x] UE / cervical: cervical, scapular, scapulothoracic and UE control with self care, reaching, carrying, lifting, house/yardwork, driving, computer work.   [] (95438) Therapist is in constant attendance of 2 or more patients providing skilled therapy interventions, but not providing any significant amount of measurable one-on-one time to either patient, for improvements in  [] LE / lumbar: LE, proximal hip, and core control in self care, mobility, lifting, ambulation and eccentric single leg control. [] UE / cervical: cervical, scapular, scapulothoracic and UE control with self care, reaching, carrying, lifting, house/yardwork, driving, computer work. NMR and Therapeutic Activities:    [x] (31990 or 33492) Provided verbal/tactile cueing for activities related to improving balance, coordination, kinesthetic sense, posture, motor skill, proprioception and motor activation to allow for proper function of   [x] LE: / Lumbar core, proximal hip and LE with self care and ADLs  [x] UE / Cervical: cervical, postural, scapular, scapulothoracic and UE control with self care, carrying, lifting, driving, computer work.   [] (25049) Gait Re-education- Provided training and instruction to the patient for proper LE, core and proximal hip recruitment and positioning and eccentric body weight control with ambulation re-education including up and down stairs     Home Management Training / Self Care:  [] (32236) Provided self-care/home management training related to activities of daily living and compensatory training, and/or use of adaptive equipment for improvement with: ADLs and compensatory training, meal preparation, safety procedures and instruction in use of adaptive equipment, including bathing, grooming, dressing, personal hygiene, basic household cleaning and chores.      Home Exercise Program:    [] (32967) Reviewed/Progressed HEP activities related to strengthening, flexibility, endurance, ROM of   [] LE / Lumbar: core, proximal hip and LE for functional self-care, mobility, lifting and ambulation/stair navigation   [] UE / Cervical: cervical, postural, scapular, scapulothoracic and UE control with self care, reaching, carrying, lifting, house/yardwork, driving, computer work  [] (85869)Reviewed/Progressed HEP activities related to improving balance, coordination, kinesthetic sense, posture, motor skill, proprioception of   [] LE: core, proximal hip and LE for self care, mobility, lifting, and ambulation/stair navigation    [] UE / Cervical: cervical, postural,  scapular, scapulothoracic and UE control with self care, reaching, carrying, lifting, house/yardwork, driving, computer work    Manual Treatments:  PROM / STM / Oscillations-Mobs:  G-I, II, III, IV (PA's, Inf., Post.)  [] ([id number]) Provided manual therapy to mobilize LE, proximal hip and/or LS spine soft tissue/joints for the purpose of modulating pain, promoting relaxation,  increasing ROM, reducing/eliminating soft tissue swelling/inflammation/restriction, improving soft tissue extensibility and allowing for proper ROM for normal function with   [] LE / lumbar: self care, mobility, lifting and ambulation. [] UE / Cervical: self care, reaching, carrying, lifting, house/yardwork, driving, computer work. Modalities:  [] (95274) Vasopneumatic compression: Utilized vasopneumatic compression to decrease edema / swelling for the purpose of improving mobility and quad tone / recruitment which will allow for increased overall function including but not limited to self-care, transfers, ambulation, and ascending / descending stairs.        Charges:  Timed Code Treatment Minutes: 33   Total Treatment Minutes: 33     [] EVAL - LOW (71878)   [] EVAL - MOD (99623)  [] EVAL - HIGH (74108)  [] RE-EVAL (51700)  [x] IT(80203) x   1    [] Ionto  [x] NMR (58218) x 1      [] Vaso  [] Manual (17196) x       [] Ultrasound  [] TA x        [] Mech Traction (99140)  [] Aquatic Therapy x     [] ES (un) (22069):   [] Home Management Training x  [] ES(attended) (62054)   [] Dry Needling 1-2 muscles (08205):  [] Dry Needling 3+ muscles (669699)  [] Group:      [] Other:     GOALS:     Patient stated goal: \"getting back to normal life, doing activities without worrying about it\"  []? Progressing: []? Met: []? Not Met: []? Adjusted     Therapist goals for Patient:   Short Term Goals: To be achieved in: 2 weeks  1. Independent in HEP and progression per patient tolerance, in order to prevent re-injury. []? Progressing: []? Met: []? Not Met: []? Adjusted  2. Patient will have a decrease in pain in thoracic spine to facilitate improvement in movement, function, and ADLs as indicated by Functional Deficits. []? Progressing: []? Met: []? Not Met: []? Adjusted     Long Term Goals: To be achieved in: 6 weeks  1. Patient will demonstrate increased AROM of extension and rotation to WNL of cervical/thoracic spine to allow for proper joint functioning as indicated by patients Functional Deficits. []? Progressing: []? Met: []? Not Met: []? Adjusted  2. Patient will demonstrate an increase in postural awareness and control without verbal cues to allow for proper functional mobility as indicated by patients Functional Deficits. []? Progressing: []? Met: []? Not Met: []? Adjusted  3. Patient will demonstrate an increase in Strength to 5/5 in UE and 4+/5 in hip flexors to allow for proper functional mobility as indicated by patients Functional Deficits. []? Progressing: []? Met: []? Not Met: []? Adjusted  4. Patient will return to functional activities including ascending/descending stairs, personal hygiene, and picking up objects from ground without increased symptoms or restriction. []? Progressing: []? Met: []? Not Met: []? Adjusted  5. Patient will be able to transfer to/from floor to play with dog and complete daily activities without increased pain or restriction. []? Progressing: []? Met: []? Not Met: []? Adjusted          Overall Progression Towards Functional goals/ Treatment Progress Update:  [] Patient is progressing as expected towards functional goals listed. [] Progression is slowed due to complexities/Impairments listed.   [] Progression has been slowed due to co-morbidities. [x] Plan just implemented, too soon to assess goals progression <30days   [] Goals require adjustment due to lack of progress  [] Patient is not progressing as expected and requires additional follow up with physician  [] Other    Persisting Functional Limitations/Impairments:  [x]Sleeping []Sitting               [x]Standing [x]Transfers        [x]Walking []Kneeling               [x]Stairs [x]Squatting / bending   [x]ADLs [x]Reaching  [x]Lifting  [x]Housework  [x]Driving []Job related tasks  []Sports/Recreation []Other:        ASSESSMENT:  Treatment today mildly modified due to soreness from last session. Sets were reduced with mid- back/periscapular strengthening. Added opp hand opp knee balance activity fwd /bwd to challenge stability. She performed tandem stance on airex with minimal lateral sway and ankle strategy . Patient will reassessed to determine continuing skilled PT service for advance balance training and core strengthening. Patient tolerated treatment well. Pt able to complete and progress to more full body activities w/ less fatigue. Pt requires mod/max tactile cuing for improved technique. Pt has increased thoracic kyphosis and rounded shoulders, with limited mobility of shoulders and thorax. Pt demo'd better balance this date on [employer] w/ increased cognitive tasks w/o increased UE support or LOB. Pt will benefit from general conditioning, scapular/posture retraining, dynamic balance, and functional endurance to return to PLOF. Treatment/Activity Tolerance:  [x] Patient able to complete tx  [x] Patient limited by fatigue  [x] Patient limited by pain  [] Patient limited by other medical complications  [] Other:     Prognosis: [x] Good [] Fair  [] Poor    Patient Requires Follow-up: [x] Yes  [] No    Plan for next treatment session:    PLAN: See eval. PT 1-2x / week for 4-6 weeks.    [x] Continue per plan of care [] Alter current plan (see comments)  [] Plan of care initiated [] Hold pending MD visit [] Discharge    Electronically signed by: Juanpablo Mario PT PT  Therapist was present, directed the patient's care, made skilled judgement, and was responsible for assessment and treatment of the patient. Brissa Deleon, SPT    Note: If patient does not return for scheduled/ recommended follow up visits, this note will serve as a discharge from care along with most recent update on progress.

## 2022-03-29 ENCOUNTER — HOSPITAL ENCOUNTER (OUTPATIENT)
Dept: PHYSICAL THERAPY | Age: 67
Setting detail: THERAPIES SERIES
Discharge: HOME | End: 2022-03-29
Payer: MEDICARE

## 2022-03-29 PROCEDURE — 97530 THERAPEUTIC ACTIVITIES: CPT

## 2022-03-29 PROCEDURE — 97110 THERAPEUTIC EXERCISES: CPT

## 2022-03-29 NOTE — FLOWSHEET NOTE
[facility]  Phone: [phone]   Fax: [phone]   Physical Therapy Discharge Summary    Dear Sherly Bland MD,    We had the pleasure of treating the following patient for physical therapy services at [facility]. A summary of our findings can be found in the discharge summary below. If you have any questions or concerns regarding these findings, please do not hesitate to contact me at the office phone number checked above. Thank you for the referral.     Physician Signature:________________________________Date:__________________  By signing above (or electronic signature), therapists plan is approved by physician      Functional Outcome:   Owsestry Disability Total Scores: 5         Overall Response to Treatment:   []Patient is responding well to treatment and improvement is noted with regards  to goals   [x]Patient should continue to improve in reasonable time if they continue HEP   []Patient has plateaued and is no longer responding to skilled PT intervention    []Patient is getting worse and would benefit from return to referring MD   []Patient unable to adhere to initial POC   []Other:   Date range of Visits: 2/10/22-3/29/22  Total Visits: 6    Recommendation:    [x] Discharge to [facility]. Follow up with PT or MD PRN. Physical Therapy Daily Treatment Note  Date:  3/29/2022    Patient Name:  Leighann Michele    :  1955  MRN: [id number]  Medical/Treatment Diagnosis Information:  · Diagnosis: S22.000A - Compression fx, thoracic spine, closed. S22.070A - Closed wedge compression fracture of T9 vertebra.   Decreased thoracic and lumbar mobility, decreased gait ability, decreased balance, decreased functional endurance  Insurance/Certification information:  PT Insurance Information: [facility] Medicare  Physician Information:  Referring Practitioner: Sherly Bland MD  Plan of care signed (Y/N): [] Yes [x]  No     Date of Patient follow up with Physician:       Progress Report: []  Yes  [x]  No     Date Range for reporting period:  Beginnin/10/22  Ending:     Progress report due (10 Rx/or 30 days whichever is less): visit #10 or 12 (date)     Recertification due (POC duration/ or 90 days whichever is less): visit #20 or 5/10/22 (date)     Visit # Insurance Allowable Auth required? Date Range    [place] []  Yes  [x]  No 2/10/22-           Latex Allergy:  [x]NO      []YES  Preferred Language for Healthcare:   [x]English       []other:    Functional Scale:        Date assessed:  EVENS: raw score = 6; dysfunction = 12%   3/1/22  EVENS: raw score = 5; dysfunction = 10%   3/29/22    Pain level:  4-5/10 L shoulder    SUBJECTIVE:  Pt has been having more pain in L shoulder since weather has been colder. Will be going back to PCP in a few weeks for f/u. No pain or complaints for rest of body.         OBJECTIVE:   3/29/22: bold denotes change     ROM   Comments   Cervical Flexion WNL     Cervical Extension WNL             Lumbar Flexion 100*     Lumbar Extension WNL No pain      ROM LEFT RIGHT Comments   Cervical Side Bend         Cervical Rotation WNL WNL     Shoulder Flex WNL WNL Scapular tightness B   Shoulder Abd WNL WNL     Shoulder ER T2 T4 Increased time, pain w/ LUE only    Shoulder IR  T10 - regression d/t inc pain today Upper T spine (T6)               Lumbar Side Bend 75% No pain WNL     Lumbar Rotation         Hip Flexion         Hip Abd         Hip ER         Hip IR         Hip Extension         Knee Ext WNL WNL     Knee Flex WNL WNL               Hamstring Flex         Piriformis                                   Strength LEFT RIGHT Comments   Shoulder flexion 4 4 No pain   Shoulder scaption         Shoulder ER         Shoulder IR         Biceps 5 5     Triceps 4 4              Knee Flexion/Extension  5 5     Multifidus         Transverse Ab         Hip Flexors (seated) 4 4     Hip Abductors       Hip Extensors 5 5     Hip Internal Rotators         Hip External Rotators               3/29: ambulates without cane into clinic without any increased gait deviations. Reports being able to walk outside across street without cane. 3/1: ambulates in clinic w/ SPC on R. Forward head posture w/ increased kyphosis in T-spine. 2/25, 3/1: ambulates in clinic w/ SPC on R       RESTRICTIONS/PRECAUTIONS:  DM2, HTN, GERD, carpal tunnel release, early stage heart murur    Exercises/Interventions:     Therapeutic Exercises (82330) Resistance / level Sets/sec Reps Notes   NuStep  UBE  2/25, 3/1: pt stopped d/t fatigue   Pulleys  X 5 min    S/L Rotation Stretch   2/10: very limited   Prayer Stretch      Seated T-spine ext stretch   2/10   TB:  -mid row    -LPD 2/25. 3/1: progress TB npv   Wall push-ups  2/10   Ball on Wall; CW/CCW 2/25   TB diagonal pull apart  Pronated TB Horz ABD 2/25: struggled w/ coordination w/ LUE scaption   Stairs HS stretch  3/1   SLS foot rolling soccer ball (A/P)  3/15: intermittent UE support                        Therapeutic Activities (76883)       Foot taps to step/box  FSU  FSU to SLS To work on stability with cane/gait. 2/25: intermittent UE support on // bars  3/15: billed under NMR this date. Gait Training w/o [facility]  2/25: able to ambulate in clinic w/o SPC, no LOB, Supervision   Sit<>stands w/ [address] flex w/ soccer ball  2/25   Filling out EVENS, discussion on progress, measurements for D/C X 15 min 3/1, 3/29   Sit<>stand from chair w/ arms.  -modified to holding weighted ball   1 X 10 3/22 : Modified     3/15: stand using UE, sit no support. Pt has dec control ecc and has to use UE to stand d/t R knee pain/weakness.  SBA, R knee CGA block   HEP Update/Education X 15 min 1 X 10 ea 3/29             Neuromuscular Re-ed (82368)     Balance: airex  -NBOS EO (counting bwd from 100 by 5s, naming animal)  -tandem   Trunk rotation  -rocking in stance phase (counting bwd from 100 by 5s)  -NBOS   - 2/25. No LOB, intermittent UE support on // bars  3/15: emphasis on posture. Intermittent support on bar   Suring:  -hip ext w/ slider   opp hand opp knee hand tap fwd/bwd   3/1: cues for upright posture, less UE support to facilitate improved balance/stability on stairs. Manual Intervention (99739)       Scapular Mobs/Thoracic Extension                                              Modalities: as needed    Pt. Education:  -patient educated on diagnosis, prognosis and expectations for rehab  -all patient questions were answered  -education on ADLs: vacuuming w/ better body mechanics    Home Exercise Program:     Access Code: [email or web address]  URL: [email or web address]  Date: 02/10/2022  Prepared by: Hallie Pereira     Exercises  Sidelying Thoracic Lumbar Rotation - 2 x daily - 7 x weekly - 2-3 sets - 10 reps  Standing Scapular Retraction - 2 x daily - 7 x weekly - 2-3 sets - 10 reps  Seated Thoracic Extension with Hands Behind Neck - 2-3 x daily - 7 x weekly - 1 sets - 10 reps - 10 secs hold  Wall Push Up - 2 x daily - 7 x weekly - 2-3 sets - 10 reps       Access Code: [email or web address]  URL: [email or web address]  Date: 02/10/2022  Prepared by: Hallie Epreira     Exercises  Sidelying Thoracic Lumbar Rotation - 2 x daily - 7 x weekly - 2-3 sets - 10 reps  Standing Scapular Retraction - 2 x daily - 7 x weekly - 2-3 sets - 10 reps  Seated Thoracic Extension with Hands Behind Neck - 2-3 x daily - 7 x weekly - 1 sets - 10 reps - 10 secs hold  Wall Push Up - 2 x daily - 7 x weekly - 2-3 sets - 10 reps    Access Code: [email or web address]  URL: [email or web address]  Date: 03/29/2022  Prepared by: Hallie Pereira    Exercises  Supine Bridge - 1-2 x daily - 7 x weekly - 2-3 sets - 10 reps  Sit to Stand - 1-2 x daily - 7 x weekly - 2-3 sets - 10 reps  Seated Reach Forward, Up, and To Sides - 2 x daily - 7 x weekly - 2-3 sets - 10 reps  Standing Bicep Curls Supinated with Dumbbells - 2 x daily - 7 x weekly - 2-3 sets - 10 reps      Therapeutic Exercise and NMR EXR  [x] (79004) Provided verbal/tactile cueing for activities related to strengthening, flexibility, endurance, ROM for improvements in  [x] LE / Lumbar: LE, proximal hip, and core control with self care, mobility, lifting, ambulation. [x] UE / Cervical: cervical, postural, scapular, scapulothoracic and UE control with self care, reaching, carrying, lifting, house/yardwork, driving, computer work. [x] (13299) Provided verbal/tactile cueing for activities related to improving balance, coordination, kinesthetic sense, posture, motor skill, proprioception to assist with   [x] LE / lumbar: LE, proximal hip, and core control in self care, mobility, lifting, ambulation and eccentric single leg control. [x] UE / cervical: cervical, scapular, scapulothoracic and UE control with self care, reaching, carrying, lifting, house/yardwork, driving, computer work.   [] (64373) Therapist is in constant attendance of 2 or more patients providing skilled therapy interventions, but not providing any significant amount of measurable one-on-one time to either patient, for improvements in  [] LE / lumbar: LE, proximal hip, and core control in self care, mobility, lifting, ambulation and eccentric single leg control. [] UE / cervical: cervical, scapular, scapulothoracic and UE control with self care, reaching, carrying, lifting, house/yardwork, driving, computer work.      NMR and Therapeutic Activities:    [x] (08634 or 86399) Provided verbal/tactile cueing for activities related to improving balance, coordination, kinesthetic sense, posture, motor skill, proprioception and motor activation to allow for proper function of   [x] LE: / Lumbar core, proximal hip and LE with self care and ADLs  [x] UE / Cervical: cervical, postural, scapular, scapulothoracic and UE control with self care, carrying, lifting, driving, computer work.   [] (93755) Gait Re-education- Provided training and instruction to the patient for proper LE, core and proximal hip recruitment and positioning and eccentric body weight control with ambulation re-education including up and down stairs     Home Management Training / Self Care:  [] (63003) Provided self-care/home management training related to activities of daily living and compensatory training, and/or use of adaptive equipment for improvement with: ADLs and compensatory training, meal preparation, safety procedures and instruction in use of adaptive equipment, including bathing, grooming, dressing, personal hygiene, basic household cleaning and chores. Home Exercise Program:    [] (25228) Reviewed/Progressed HEP activities related to strengthening, flexibility, endurance, ROM of   [] LE / Lumbar: core, proximal hip and LE for functional self-care, mobility, lifting and ambulation/stair navigation   [] UE / Cervical: cervical, postural, scapular, scapulothoracic and UE control with self care, reaching, carrying, lifting, house/yardwork, driving, computer work  [] (75187)Reviewed/Progressed HEP activities related to improving balance, coordination, kinesthetic sense, posture, motor skill, proprioception of   [] LE: core, proximal hip and LE for self care, mobility, lifting, and ambulation/stair navigation    [] UE / Cervical: cervical, postural,  scapular, scapulothoracic and UE control with self care, reaching, carrying, lifting, house/yardwork, driving, computer work    Manual Treatments:  PROM / STM / Oscillations-Mobs:  G-I, II, III, IV (PA's, Inf., Post.)  [] (44224) Provided manual therapy to mobilize LE, proximal hip and/or LS spine soft tissue/joints for the purpose of modulating pain, promoting relaxation,  increasing ROM, reducing/eliminating soft tissue swelling/inflammation/restriction, improving soft tissue extensibility and allowing for proper ROM for normal function with   [] LE / lumbar: self care, mobility, lifting and ambulation. [] UE / Cervical: self care, reaching, carrying, lifting, house/yardwork, driving, computer work. Modalities:  [] (83393) Vasopneumatic compression: Utilized vasopneumatic compression to decrease edema / swelling for the purpose of improving mobility and quad tone / recruitment which will allow for increased overall function including but not limited to self-care, transfers, ambulation, and ascending / descending stairs. Charges:  Timed Code Treatment Minutes: 39   Total Treatment Minutes: 39     [] EVAL - LOW (46120)   [] EVAL - MOD (88119)  [] EVAL - HIGH (71596)  [] RE-EVAL (63089)  [x] [place](13954) x   1    [] Ionto  [] NMR (24268) x 1      [] Vaso  [] Manual (44747) x       [] Ultrasound  [x] TA x  2      [] Mech Traction (54489)  [] Aquatic Therapy x     [] ES (un) (10153):   [] Home Management Training x  [] ES(attended) (53297)   [] Dry Needling 1-2 muscles (18842):  [] Dry Needling 3+ muscles (375993)  [] Group:      [] Other:     GOALS:     Patient stated goal: \"getting back to normal life, doing activities without worrying about it\"  []? Progressing: [x]? Met: []? Not Met: []? Adjusted     Therapist goals for Patient:   Short Term Goals: To be achieved in: 2 weeks  1. Independent in HEP and progression per patient tolerance, in order to prevent re-injury. []? Progressing: [x]? Met: []? Not Met: []? Adjusted  2. Patient will have a decrease in pain in thoracic spine to facilitate improvement in movement, function, and ADLs as indicated by Functional Deficits. []? Progressing: [x]? Met: []? Not Met: []? Adjusted     Long Term Goals: To be achieved in: 6 weeks  1. Patient will demonstrate increased AROM of extension and rotation to WNL of cervical/thoracic spine to allow for proper joint functioning as indicated by patients Functional Deficits. []? Progressing: [x]? Met: []? Not Met: []? Adjusted  2.  Patient will demonstrate an increase in postural awareness and control without verbal cues to allow for proper functional mobility as indicated by patients Functional Deficits. []? Progressing: [x]? Met: []? Not Met: []? Adjusted  3. Patient will demonstrate an increase in Strength to 5/5 in UE and 4+/5 in hip flexors to allow for proper functional mobility as indicated by patients Functional Deficits. []? Progressing: []? Met: [x]? Not Met: []? Adjusted  4. Patient will return to functional activities including ascending/descending stairs, personal hygiene, and picking up objects from ground without increased symptoms or restriction. []? Progressing: [x]? Met: []? Not Met: []? Adjusted   5. Patient will be able to transfer to/from floor to play with dog and complete daily activities without increased pain or restriction. []? Progressing: [x]? Met: []? Not Met: []? Adjusted          Overall Progression Towards Functional goals/ Treatment Progress Update:  [x] Patient is progressing as expected towards functional goals listed. [] Progression is slowed due to complexities/Impairments listed. [] Progression has been slowed due to co-morbidities. [] Plan just implemented, too soon to assess goals progression <30days   [] Goals require adjustment due to lack of progress  [] Patient is not progressing as expected and requires additional follow up with physician  [] Other    Persisting Functional Limitations/Impairments:  [x]Sleeping []Sitting               [x]Standing [x]Transfers        [x]Walking []Kneeling               [x]Stairs [x]Squatting / bending   [x]ADLs [x]Reaching  [x]Lifting  [x]Housework  [x]Driving []Job related tasks  []Sports/Recreation []Other:        ASSESSMENT:  Patient seen for 6 skilled therapy visits and was able to meet 7/8 goals. Pt should continue to progress towards final strength goal w/ updated HEP. Pt's reports her only limitations are intermittent L shoulder pain she relates to arthritis and difficulties reaching into taller washing machines to complete laundry.  Pt has progressed in overall mobility without pain, increased strength, and activity tolerance/endurance. Pt safe to D/C w/ HEP. Treatment/Activity Tolerance:  [x] Patient able to complete tx  [x] Patient limited by fatigue  [x] Patient limited by pain  [] Patient limited by other medical complications  [] Other:     Prognosis: [x] Good [] Fair  [] Poor    Patient Requires Follow-up: [x] Yes  [] No    Plan for next treatment session:    PLAN: See eval. PT 1-2x / week for 4-6 weeks. [] Continue per plan of care [] Alter current plan (see comments)  [] Plan of care initiated [] Hold pending MD visit [x] Discharge    Electronically signed by: Sid Piedra, PT PT  Therapist was present, directed the patient's care, made skilled judgement, and was responsible for assessment and treatment of the patient. Chema Mittal, SPT    Note: If patient does not return for scheduled/ recommended follow up visits, this note will serve as a discharge from care along with most recent update on progress.

## 2022-05-16 PROBLEM — R06.02 SOB (SHORTNESS OF BREATH): Status: ACTIVE | Noted: 2022-05-16

## 2022-05-16 NOTE — PROGRESS NOTES
[facility]  H+P  Consult  OP Visit  FU Visit   CC HX HPI   GEN  Doing well. No new concerns. AS  Ø CP, SOB. HTN  Ambulatory BP in good range. Ø HA/dizziness. CHOL  Last lipid reviewed. On max dose/tolerated statin. MED  Compliant with CV meds. Ø reported SA. HISTORY/ALLERGY/ROS   MEDHx  has a past medical history of Controlled type 2 diabetes mellitus without complication, without long-term current use of insulin ([address] Dyslipidemia, Essential hypertension, GERD (gastroesophageal reflux disease), Heart murmur, Hypercholesterolemia, Hyperlipidemia, Hypertension, Lichen planus, PONV (postoperative nausea and vomiting), Primary biliary cholangitis ([address] and Primary biliary cholangitis ([facility] [address] SURGHx  has a past surgical history that includes  section; Carpal tunnel release; Endometrial ablation; Tonsillectomy; [place] tooth extraction; liver biopsy (N/A, ); Endoscopy, colon, diagnostic; Colonoscopy; Sinus endoscopy (N/A, 3/8/2021); and Colonoscopy (2022). SOCHx  reports that she quit smoking about 3 years ago. Her smoking use included cigarettes. She has a 4.50 pack-year smoking history. She has never used smokeless tobacco. She reports current alcohol use of about 2.0 standard drinks of alcohol per week. She reports that she does not use drugs. FAMHx family history includes Cancer in her mother; Diabetes in her father; Heart Failure in her father; High Cholesterol in her father; Hypertension in her father; Stroke in her mother; Substance Abuse in her sister.    ALLERG Penicillins, Sulfa antibiotics, and Sulfasalazine   ROS Full ROS obtained and negative except as mentioned in HPI   MEDICATIONS   Current Outpatient Medications   Medication Sig Dispense Refill    famotidine (PEPCID) 20 MG tablet TAKE 1 TABLET BY MOUTH EVERY NIGHT 180 tablet 0    atorvastatin (LIPITOR) 80 MG tablet TAKE 1 TABLET BY MOUTH DAILY 90 tablet 1    lisinopril (PRINIVIL;ZESTRIL) 5 MG tablet TAKE 1 TABLET BY MOUTH DAILY 90 tablet 1    Handicap Placard MISC by Does not apply route DMI, Dyslipidemia, Essential hypertension, GERD (gastroesophageal reflux disease), Heart murmur, Hypercholesterolemia,  Hypertension, Lichen planus, PONV  Primary biliary cholangitis (HCC) with weakness-duration - permanent or 99 months 1 each 0    Nutritional Supplements (GLUCERNA WEIGHT LOSS SHAKE) LIQD Take 1 Units by mouth daily 90 each 3    Misc. Devices MISC 1 each by Does not apply route daily Ensure 1 bottle daily 30 each 5    triamcinolone (KENALOG) 0.1 % cream Apply topically 2 times daily as needed 453.6 g 1    loratadine (CLARITIN) 10 MG tablet TAKE 1 TABLET BY MOUTH DAILY 90 tablet 1    ursodiol (ACTIGALL) 500 MG tablet 2 times daily       calcium carbonate (OYSTER SHELL CALCIUM 500 MG) 1250 (500 Ca) MG tablet Take 1 tablet by mouth daily      vitamin D (CHOLECALCIFEROL) 1000 UNIT TABS tablet Take 1,000 Units by mouth daily      Multiple Vitamins-Minerals (MULTIVITAMIN PO) Take 1 capsule by mouth daily.  Ascorbic Acid (VITAMIN C) 500 MG tablet Take 500 mg by mouth daily. No current facility-administered medications for this visit.       PHYSICAL EXAM   Vitals Vitals:    05/19/22 1028   BP: 134/60   Pulse: 105   SpO2: 97%      Gen Alert, coop, no distress Heart  Rrr, 2/6   Head NC, AT, no abnorm Abd  Soft, NT, +BS, no mass, no OM   Eyes PER, conj/corn clear Ext  Ext nl, AT, no C/C/E   Nose Nares nl, no drain, NT Pulse 2+ and symmetric   Throat Lips, mucosa, tongue nl Skin Col/text/turg nl, no vis rash/les   Neck S/S, TM, NT, no bruit/JVD Psych Nl mood and affect   Lung CTA-B, unlabored, no DTP Lymph   No cervical or axillary LA   Ch wall NT, no deform Neuro  Nl gross M/S exam      CODING   SCI (65951) - AS, HTN, CHOL, TOB  30-39 minutes preparing to see pt including review hx, tests, consults, perf exam, , educating pt, fam, caregiver, ordering meds/tests/procedures, referring and comm with pcps and other consultants, documenting info in EMR, interpreting results and communicating to fam and coordination of pt care. ASSESSMENT AND PLAN     *AS    Date EF Detail   Sx     No concerning   DATA   Most recent TTE, C reviewed personally   TTE 3/21  5/22 70% Mod AS MG 19   C   None   MPI 5/21 72% Normal perfusion   Plan     Echo in 1 year for AS   *HTN  Status Controlled, vitals and available ambulatory monitoring logs reviewed personally  Plan Counseled on diet/salt/exercise/weight, continue meds at doses above  *CHOL  Status  controlled with last LDL of 52 (goal <70) and HDL of 37 (3/21), labs reviewed personally  Plan Counseled on diet/exercise/weight, continue high-intensity statin, lipid/liver surveillance per PCP  *TOB  Status Not smoking currently, available PFTs reviewed personally  Plan Continued avoidance of 1st and 2nd hand smoke, counseled on risks/cessation  *COMPLIANCE  Status Compliant  Plan Discussed importance of compliance with meds/diet/salt/exercise; avoid tob/alc/drugs; patient verbalized understanding  *FOLLOWUP  12 months with echo    [address] Shonna Butler, helio scribing for and in the presence of Todd Quiles MD.   SignedShonna 05/16/22 2:52 PM   Provider Bette Albert is working as a scribe for and in the presence of me (Todd Quiles MD). Working as a scribe, Shonna Tomas may have prepopulated components of this note with my historical  intellectual property under my direct supervision. Any additions to this intellectual property were performed in my presence and at my direction.   Furthermore, the content and accuracy of this note have been reviewed by me Todd Quiles MD).  5/19/2022 10:45 AM

## 2022-05-17 ENCOUNTER — OFFICE VISIT (OUTPATIENT)
Dept: INTERNAL MEDICINE CLINIC | Age: 67
End: 2022-05-17
Payer: MEDICARE

## 2022-05-17 VITALS
TEMPERATURE: 97 F | WEIGHT: 119.4 LBS | HEIGHT: 61 IN | SYSTOLIC BLOOD PRESSURE: 132 MMHG | OXYGEN SATURATION: 98 % | DIASTOLIC BLOOD PRESSURE: 70 MMHG | BODY MASS INDEX: 22.54 KG/M2 | HEART RATE: 120 BPM

## 2022-05-17 DIAGNOSIS — H10.13 ALLERGIC CONJUNCTIVITIS AND RHINITIS, BILATERAL: ICD-10-CM

## 2022-05-17 DIAGNOSIS — J30.9 ALLERGIC CONJUNCTIVITIS AND RHINITIS, BILATERAL: ICD-10-CM

## 2022-05-17 DIAGNOSIS — K21.9 GASTROESOPHAGEAL REFLUX DISEASE WITHOUT ESOPHAGITIS: ICD-10-CM

## 2022-05-17 DIAGNOSIS — I10 HTN, GOAL BELOW 130/80: ICD-10-CM

## 2022-05-17 DIAGNOSIS — E44.1 MILD PROTEIN-CALORIE MALNUTRITION (HCC): ICD-10-CM

## 2022-05-17 DIAGNOSIS — M75.02 ADHESIVE CAPSULITIS OF LEFT SHOULDER: ICD-10-CM

## 2022-05-17 DIAGNOSIS — J34.89 RHINORRHEA: ICD-10-CM

## 2022-05-17 DIAGNOSIS — K52.9 ENTEROCOLITIS: ICD-10-CM

## 2022-05-17 DIAGNOSIS — J30.1 SEASONAL ALLERGIC RHINITIS DUE TO POLLEN: Primary | ICD-10-CM

## 2022-05-17 DIAGNOSIS — K74.3 PRIMARY BILIARY CHOLANGITIS (HCC): ICD-10-CM

## 2022-05-17 DIAGNOSIS — E78.5 DYSLIPIDEMIA: ICD-10-CM

## 2022-05-17 PROCEDURE — 1123F ACP DISCUSS/DSCN MKR DOCD: CPT | Performed by: INTERNAL MEDICINE

## 2022-05-17 PROCEDURE — G8399 PT W/DXA RESULTS DOCUMENT: HCPCS | Performed by: INTERNAL MEDICINE

## 2022-05-17 PROCEDURE — 1036F TOBACCO NON-USER: CPT | Performed by: INTERNAL MEDICINE

## 2022-05-17 PROCEDURE — 3017F COLORECTAL CA SCREEN DOC REV: CPT | Performed by: INTERNAL MEDICINE

## 2022-05-17 PROCEDURE — 99214 OFFICE O/P EST MOD 30 MIN: CPT | Performed by: INTERNAL MEDICINE

## 2022-05-17 PROCEDURE — G8420 CALC BMI NORM PARAMETERS: HCPCS | Performed by: INTERNAL MEDICINE

## 2022-05-17 PROCEDURE — 1090F PRES/ABSN URINE INCON ASSESS: CPT | Performed by: INTERNAL MEDICINE

## 2022-05-17 PROCEDURE — G8427 DOCREV CUR MEDS BY ELIG CLIN: HCPCS | Performed by: INTERNAL MEDICINE

## 2022-05-17 RX ORDER — LORATADINE 10 MG/1
10 TABLET ORAL DAILY
Qty: 90 TABLET | Refills: 1 | Status: SHIPPED | OUTPATIENT
Start: 2022-05-17 | End: 2022-09-02 | Stop reason: SDUPTHER

## 2022-05-17 RX ORDER — FLUTICASONE PROPIONATE 50 MCG
1 SPRAY, SUSPENSION (ML) NASAL DAILY
Qty: 32 G | Refills: 1 | Status: SHIPPED | OUTPATIENT
Start: 2022-05-17 | End: 2022-09-02 | Stop reason: SDUPTHER

## 2022-05-17 RX ORDER — KETOTIFEN FUMARATE 0.35 MG/ML
1 SOLUTION/ DROPS OPHTHALMIC 2 TIMES DAILY
Qty: 10 ML | Refills: 5 | Status: SHIPPED | OUTPATIENT
Start: 2022-05-17 | End: 2022-05-27

## 2022-05-17 RX ORDER — FAMOTIDINE 20 MG/1
20 TABLET, FILM COATED ORAL NIGHTLY PRN
Qty: 180 TABLET | Refills: 0 | Status: SHIPPED | OUTPATIENT
Start: 2022-05-17 | End: 2022-09-02

## 2022-05-17 RX ORDER — ATORVASTATIN CALCIUM 80 MG/1
80 TABLET, FILM COATED ORAL DAILY
Qty: 90 TABLET | Refills: 1 | Status: SHIPPED | OUTPATIENT
Start: 2022-05-17 | End: 2022-09-02 | Stop reason: SDUPTHER

## 2022-05-17 RX ORDER — LISINOPRIL 5 MG/1
5 TABLET ORAL DAILY
Qty: 90 TABLET | Refills: 1 | Status: SHIPPED | OUTPATIENT
Start: 2022-05-17 | End: 2022-09-02 | Stop reason: SDUPTHER

## 2022-05-17 ASSESSMENT — PATIENT HEALTH QUESTIONNAIRE - PHQ9
2. FEELING DOWN, DEPRESSED OR HOPELESS: 0
SUM OF ALL RESPONSES TO PHQ QUESTIONS 1-9: 0
SUM OF ALL RESPONSES TO PHQ9 QUESTIONS 1 & 2: 0
SUM OF ALL RESPONSES TO PHQ QUESTIONS 1-9: 0
SUM OF ALL RESPONSES TO PHQ QUESTIONS 1-9: 0
1. LITTLE INTEREST OR PLEASURE IN DOING THINGS: 0
SUM OF ALL RESPONSES TO PHQ QUESTIONS 1-9: 0

## 2022-05-17 ASSESSMENT — ENCOUNTER SYMPTOMS: BACK PAIN: 1

## 2022-05-17 NOTE — PROGRESS NOTES
Ref physical  Dennis Quinones (:  1955) is a 75 y.o. female,Established patient, here for evaluation of the following chief complaint(s):  Back Pain (follow up on back pain, no longer has to use cane )        Assessment/Plan:  Hossein Monte was seen today for back pain. Diagnoses and all orders for this visit:    Seasonal allergic rhinitis due to pollen- using claritin - needs additional help  -     fluticasone (FLONASE) 50 MCG/ACT nasal spray; 1 spray by Each Nostril route daily    Primary biliary cholangitis ([facility]- considering  A drug study  -     Handicap Placard MISC; by Does not apply route DMI, Dyslipidemia, Essential hypertension, GERD (gastroesophageal reflux disease), Heart murmur, Hypercholesterolemia,  Hypertension, Lichen planus, PONV  Primary biliary cholangitis ([facility] with weakness-duration - permanent or 99 months  -     Misc. Devices MISC; 1 each by Does not apply route daily Ensure 1 bottle daily  -     loratadine (CLARITIN) 10 MG tablet; Take 1 tablet by mouth daily        Mild protein-calorie malnutrition ([facility]- continue eating  -     Handicap Placard MISC; by Does not apply route DMI, Dyslipidemia, Essential hypertension, GERD (gastroesophageal reflux disease), Heart murmur, Hypercholesterolemia,  Hypertension, Lichen planus, PONV  Primary biliary cholangitis ([facility] with weakness-duration - permanent or 99 months  -     Misc. Devices MISC; 1 each by Does not apply route daily Ensure 1 bottle daily    Rhinorrhea  -     loratadine (CLARITIN) 10 MG tablet; Take 1 tablet by mouth daily    HTN, goal below 130/80-we will continue current medications  -     lisinopril (PRINIVIL;ZESTRIL) 5 MG tablet; Take 1 tablet by mouth daily    Gastroesophageal reflux disease without esophagitis-reflux is persistent but symptoms are improved with Pepcid  -     famotidine (PEPCID) 20 MG tablet;  Take 1 tablet by mouth nightly as needed (stomach pain)    Dyslipidemia-well controlled following labs annually patient is tolerating Lipitor with no signs of myalgia  -     atorvastatin (LIPITOR) 80 MG tablet; Take 1 tablet by mouth daily    Assessment/Plan:  Toma Camarillo was seen today for back pain. Diagnoses and all orders for this visit:    Seasonal allergic rhinitis due to pollen  -     fluticasone (FLONASE) 50 MCG/ACT nasal spray; 1 spray by Each Nostril route daily    Primary biliary cholangitis ([address]  -     Handicap Placard MISC; by Does not apply route DMI, Dyslipidemia, Essential hypertension, GERD (gastroesophageal reflux disease), Heart murmur, Hypercholesterolemia,  Hypertension, Lichen planus, PONV  Primary biliary cholangitis ([address] with weakness-duration - permanent or 99 months  -     Misc. Devices MISC; 1 each by Does not apply route daily Ensure 1 bottle daily  -     loratadine (CLARITIN) 10 MG tablet; Take 1 tablet by mouth daily    Enterocolitis  -     Handicap Placard MISC; by Does not apply route DMI, Dyslipidemia, Essential hypertension, GERD (gastroesophageal reflux disease), Heart murmur, Hypercholesterolemia,  Hypertension, Lichen planus, PONV  Primary biliary cholangitis (H[facility]) with weakness-duration - permanent or 99 months    Mild protein-calorie malnutrition ([address]  -     Handicap Placard MISC; by Does not apply route DMI, Dyslipidemia, Essential hypertension, GERD (gastroesophageal reflux disease), Heart murmur, Hypercholesterolemia,  Hypertension, Lichen planus, PONV  Primary biliary cholangitis ([address] with weakness-duration - permanent or 99 months  -     Misc. Devices MISC; 1 each by Does not apply route daily Ensure 1 bottle daily    Rhinorrhea  -     loratadine (CLARITIN) 10 MG tablet; Take 1 tablet by mouth daily    HTN, goal below 130/80  -     lisinopril (PRINIVIL;ZESTRIL) 5 MG tablet; Take 1 tablet by mouth daily    Gastroesophageal reflux disease without esophagitis  -     famotidine (PEPCID) 20 MG tablet;  Take 1 tablet by mouth nightly as needed (stomach pain)    Dyslipidemia  -     atorvastatin (LIPITOR) 80 MG tablet; Take 1 tablet by mouth daily    Adhesive capsulitis of left shoulder  -     [facility]  -     Ambulatory referral to Orthopedic Surgery    Other orders  -     ketotifen (ZADITOR) 0.025 % ophthalmic solution; Place 1 drop into both eyes 2 times daily for 10 days      No follow-ups on file. Subjective   SUBJECTIVE/OBJECTIVE:  Back Pain  Pertinent negatives include no weakness. Presents in f/u MRI. Patient with significant pain. Pain affects ADL's. No incontinence    Review of Systems   Constitutional: Positive for fatigue. Genitourinary: Negative for enuresis and urgency. Musculoskeletal: Positive for arthralgias, back pain and myalgias. Neurological: Negative for weakness. Hematological: Negative. Psychiatric/Behavioral: Negative. All other systems reviewed and are negative. Allergies   Allergen Reactions    Penicillins Other (See Comments)     Doesn't know allergy reaction    Sulfa Antibiotics Other (See Comments)     n/a    Sulfasalazine Hives     n/a       Current Outpatient Medications   Medication Sig Dispense Refill    famotidine (PEPCID) 20 MG tablet TAKE 1 TABLET BY MOUTH EVERY NIGHT 180 tablet 0    atorvastatin (LIPITOR) 80 MG tablet TAKE 1 TABLET BY MOUTH DAILY 90 tablet 1    lisinopril (PRINIVIL;ZESTRIL) 5 MG tablet TAKE 1 TABLET BY MOUTH DAILY 90 tablet 1    triamcinolone (KENALOG) 0.1 % cream Apply topically 2 times daily as needed 453.6 g 1    loratadine (CLARITIN) 10 MG tablet TAKE 1 TABLET BY MOUTH DAILY 90 tablet 1    ursodiol (ACTIGALL) 500 MG tablet 2 times daily       calcium carbonate (OYSTER SHELL CALCIUM 500 MG) 1250 (500 Ca) MG tablet Take 1 tablet by mouth daily      vitamin D (CHOLECALCIFEROL) 1000 UNIT TABS tablet Take 1,000 Units by mouth daily      Multiple Vitamins-Minerals (MULTIVITAMIN PO) Take 1 capsule by mouth daily.       Ascorbic Acid (VITAMIN C) 500 MG tablet Take 500 mg by mouth daily. No current facility-administered medications for this visit. Vitals:    05/17/22 1537   BP: 132/70   Site: Left Upper Arm   Position: Sitting   Pulse: 120   Temp: 97 °F (36.1 °C)   TempSrc: Temporal   SpO2: 98%   Weight: 119 lb 6.4 oz (54.2 kg)   Height: 5' 1\" (1.549 m)     Body mass index is 22.56 kg/m². Wt Readings from Last 3 Encounters:   05/17/22 119 lb 6.4 oz (54.2 kg)   01/31/22 119 lb (54 kg)   01/11/22 114 lb 12.8 oz (52.1 kg)     BP Readings from Last 3 Encounters:   05/17/22 132/70   01/11/22 136/64   12/07/21 120/60       Objective   Physical Exam  Vitals and nursing note reviewed. Constitutional:       General: She is not in acute distress. Appearance: She is well-developed and underweight. Cardiovascular:      Rate and Rhythm: Normal rate and regular rhythm. Pulses: Normal pulses. Heart sounds: Normal heart sounds. Pulmonary:      Effort: Pulmonary effort is normal.   Musculoskeletal:      Cervical back: Normal range of motion and neck supple. Thoracic back: Tenderness and bony tenderness present. Decreased range of motion. Lumbar back: Tenderness and bony tenderness present. Skin:     Capillary Refill: Capillary refill takes less than 2 seconds. Neurological:      Mental Status: She is alert and oriented to person, place, and time. Psychiatric:         Behavior: Behavior normal.         Thought Content: Thought content normal.         Judgment: Judgment normal.            On this date 1/11/2022 I have spent 30 minutes reviewing previous notes, test results and face to face with the patient discussing the diagnosis and importance of compliance with the treatment plan as well as documenting on the day of the visit. An electronic signature was used to authenticate this note.     --Tracee Mcknight MD

## 2022-05-19 ENCOUNTER — HOSPITAL ENCOUNTER (OUTPATIENT)
Dept: NON INVASIVE DIAGNOSTICS | Age: 67
Discharge: HOME | End: 2022-05-19
Payer: MEDICARE

## 2022-05-19 ENCOUNTER — OFFICE VISIT (OUTPATIENT)
Dept: CARDIOLOGY CLINIC | Age: 67
End: 2022-05-19
Payer: MEDICARE

## 2022-05-19 VITALS
WEIGHT: 119 LBS | BODY MASS INDEX: 22.47 KG/M2 | OXYGEN SATURATION: 97 % | DIASTOLIC BLOOD PRESSURE: 60 MMHG | HEIGHT: 61 IN | HEART RATE: 105 BPM | SYSTOLIC BLOOD PRESSURE: 134 MMHG

## 2022-05-19 DIAGNOSIS — I35.0 NONRHEUMATIC AORTIC VALVE STENOSIS: Primary | ICD-10-CM

## 2022-05-19 DIAGNOSIS — I35.0 NONRHEUMATIC AORTIC VALVE STENOSIS: ICD-10-CM

## 2022-05-19 DIAGNOSIS — E78.5 DYSLIPIDEMIA: ICD-10-CM

## 2022-05-19 DIAGNOSIS — R06.02 SOB (SHORTNESS OF BREATH): ICD-10-CM

## 2022-05-19 DIAGNOSIS — E78.00 HYPERCHOLESTEREMIA: ICD-10-CM

## 2022-05-19 DIAGNOSIS — I10 ESSENTIAL HYPERTENSION: ICD-10-CM

## 2022-05-19 LAB
LV EF: 70 %
LVEF MODALITY: NORMAL

## 2022-05-19 PROCEDURE — G8420 CALC BMI NORM PARAMETERS: HCPCS | Performed by: INTERNAL MEDICINE

## 2022-05-19 PROCEDURE — 93306 TTE W/DOPPLER COMPLETE: CPT

## 2022-05-19 PROCEDURE — G8427 DOCREV CUR MEDS BY ELIG CLIN: HCPCS | Performed by: INTERNAL MEDICINE

## 2022-05-19 PROCEDURE — 1036F TOBACCO NON-USER: CPT | Performed by: INTERNAL MEDICINE

## 2022-05-19 PROCEDURE — 1090F PRES/ABSN URINE INCON ASSESS: CPT | Performed by: INTERNAL MEDICINE

## 2022-05-19 PROCEDURE — 3017F COLORECTAL CA SCREEN DOC REV: CPT | Performed by: INTERNAL MEDICINE

## 2022-05-19 PROCEDURE — 1123F ACP DISCUSS/DSCN MKR DOCD: CPT | Performed by: INTERNAL MEDICINE

## 2022-05-19 PROCEDURE — 99214 OFFICE O/P EST MOD 30 MIN: CPT | Performed by: INTERNAL MEDICINE

## 2022-05-19 PROCEDURE — 4040F PNEUMOC VAC/ADMIN/RCVD: CPT | Performed by: INTERNAL MEDICINE

## 2022-05-19 PROCEDURE — G8399 PT W/DXA RESULTS DOCUMENT: HCPCS | Performed by: INTERNAL MEDICINE

## 2022-05-19 RX ORDER — ATORVASTATIN CALCIUM 80 MG/1
80 TABLET, FILM COATED ORAL DAILY
Qty: 90 TABLET | Refills: 1 | OUTPATIENT
Start: 2022-05-19

## 2022-05-19 NOTE — TELEPHONE ENCOUNTER
Requested Prescriptions     Pending Prescriptions Disp Refills    atorvastatin (LIPITOR) 80 MG tablet [Pharmacy Med Name: ATORVASTATIN 80MG TABLETS] 90 tablet 1     Sig: TAKE 1 TABLET BY MOUTH DAILY     Recent Visits  Date Type Provider Dept   05/17/22 Office Visit Melanie Robert MD [facility]   01/11/22 Office Visit Melanie Robert MD [facility]   12/07/21 Office Visit Melanie Robert MD [facility]   11/09/21 Office Visit Melanie Robert MD [facility]   09/01/21 Office Visit Melanie Robert MD [facility]   04/28/21 Office Visit Melanie Robert MD [facility]   03/04/21 Office Visit LINNEA Noble (Old) [facility]   02/23/21 Office Visit Melanie Robert MD [facility]   02/19/21 Office Visit Melanie Robert MD [facility]   Showing recent visits within past 540 days with a meds authorizing provider and meeting all other requirements  Future Appointments  Date Type Provider Dept   09/02/22 Appointment Melanie Robert MD [facility]   Showing future appointments within next 150 days with a meds authorizing provider and meeting all other requirements       LAST APPOINTMENT  5/17/2022     atorvastatin (LIPITOR) 80 MG tablet [[id number]]     Order Details  Dose: 80 mg Route: Oral Frequency: DAILY   Dispense Quantity: 90 tablet Refills: 1          Sig: Take 1 tablet by mouth daily         Start Date: 05/17/22

## 2022-05-21 SDOH — HEALTH STABILITY: PHYSICAL HEALTH: ON AVERAGE, HOW MANY DAYS PER WEEK DO YOU ENGAGE IN MODERATE TO STRENUOUS EXERCISE (LIKE A BRISK WALK)?: 3 DAYS

## 2022-05-21 SDOH — HEALTH STABILITY: PHYSICAL HEALTH: ON AVERAGE, HOW MANY MINUTES DO YOU ENGAGE IN EXERCISE AT THIS LEVEL?: 10 MIN

## 2022-05-21 ASSESSMENT — SOCIAL DETERMINANTS OF HEALTH (SDOH)

## 2022-05-23 NOTE — TELEPHONE ENCOUNTER
Requested Prescriptions     Pending Prescriptions Disp Refills    BREO ELLIPTA 200-25 MCG/INH AEPB inhaler [Pharmacy Med Name: Edna Vargas 200-25MCG ORAL INH(30)] 60 each      Sig: INHALE 1 PUFF INTO THE LUNGS DAILY     Recent Visits  Date Type Provider Dept   05/17/22 Office Visit Radha Felton MD [facility]   01/11/22 Office Visit Radha Felton MD [facility]   12/07/21 Office Visit Radha Felton MD [facility]   11/09/21 Office Visit Radha Felton MD [facility]   09/01/21 Office Visit Radha Felton MD [facility]   04/28/21 Office Visit Radha Felton MD [facility]   03/04/21 Office Visit LINNEA Mendoza (Old) [facility]   02/23/21 Office Visit Radha Felton MD [facility]   02/19/21 Office Visit Rahda Felton MD [facility]   Showing recent visits within past 540 days with a meds authorizing provider and meeting all other requirements  Future Appointments  Date Type Provider Dept   09/02/22 Appointment Radha Felton MD [facility]   Showing future appointments within next 150 days with a meds authorizing provider and meeting all other requirements       LAST APPOINTMENT  5/17/2022

## 2022-05-26 ENCOUNTER — PATIENT MESSAGE (OUTPATIENT)
Dept: INTERNAL MEDICINE CLINIC | Age: 67
End: 2022-05-26

## 2022-05-26 DIAGNOSIS — K21.9 GASTROESOPHAGEAL REFLUX DISEASE WITHOUT ESOPHAGITIS: Primary | ICD-10-CM

## 2022-05-27 NOTE — TELEPHONE ENCOUNTER
From: Sanjuana Sotelo  To: Dr. Cazares Devoid: 5/26/2022 5:15 PM EDT  Subject: Famotodine prescription     At 5/17 appt I forgot to talk about above. Dr Britney Isabel told me in January to increase dosage to 2/day and I did. Needless to say I also didnt request a revised prescription and have now worked through the prescription of record. Over the weekend I bought Pepcid over the counter and it is just not working the same which is sort of miserable. Im requesting a new prescription for Famotodine 20 mg tablets with dosage of 2 per day be called in/sent to [facility] in [place] at your earliest convenience. If there are questions please dont hesitate to contact me.    Thank you!!

## 2022-05-30 SDOH — HEALTH STABILITY: PHYSICAL HEALTH: ON AVERAGE, HOW MANY MINUTES DO YOU ENGAGE IN EXERCISE AT THIS LEVEL?: 10 MIN

## 2022-05-30 SDOH — HEALTH STABILITY: PHYSICAL HEALTH: ON AVERAGE, HOW MANY DAYS PER WEEK DO YOU ENGAGE IN MODERATE TO STRENUOUS EXERCISE (LIKE A BRISK WALK)?: 3 DAYS

## 2022-05-30 ASSESSMENT — SOCIAL DETERMINANTS OF HEALTH (SDOH)

## 2022-05-31 RX ORDER — FAMOTIDINE 20 MG/1
20 TABLET, FILM COATED ORAL 2 TIMES DAILY
Qty: 180 TABLET | Refills: 1 | Status: SHIPPED | OUTPATIENT
Start: 2022-05-31 | End: 2022-09-02 | Stop reason: SDUPTHER

## 2022-06-02 ENCOUNTER — OFFICE VISIT (OUTPATIENT)
Dept: ORTHOPEDIC SURGERY | Age: 67
End: 2022-06-02

## 2022-06-02 VITALS — HEIGHT: 61 IN | BODY MASS INDEX: 22.47 KG/M2 | WEIGHT: 119 LBS

## 2022-06-02 DIAGNOSIS — M25.512 LEFT SHOULDER PAIN, UNSPECIFIED CHRONICITY: Primary | ICD-10-CM

## 2022-06-02 PROCEDURE — 1090F PRES/ABSN URINE INCON ASSESS: CPT | Performed by: ORTHOPAEDIC SURGERY

## 2022-06-02 PROCEDURE — G8399 PT W/DXA RESULTS DOCUMENT: HCPCS | Performed by: ORTHOPAEDIC SURGERY

## 2022-06-02 PROCEDURE — 1123F ACP DISCUSS/DSCN MKR DOCD: CPT | Performed by: ORTHOPAEDIC SURGERY

## 2022-06-02 PROCEDURE — 99214 OFFICE O/P EST MOD 30 MIN: CPT | Performed by: ORTHOPAEDIC SURGERY

## 2022-06-02 PROCEDURE — 3017F COLORECTAL CA SCREEN DOC REV: CPT | Performed by: ORTHOPAEDIC SURGERY

## 2022-06-02 PROCEDURE — 1036F TOBACCO NON-USER: CPT | Performed by: ORTHOPAEDIC SURGERY

## 2022-06-02 PROCEDURE — G8420 CALC BMI NORM PARAMETERS: HCPCS | Performed by: ORTHOPAEDIC SURGERY

## 2022-06-02 PROCEDURE — G8427 DOCREV CUR MEDS BY ELIG CLIN: HCPCS | Performed by: ORTHOPAEDIC SURGERY

## 2022-06-02 RX ORDER — METHYLPREDNISOLONE 4 MG/1
TABLET ORAL
Qty: 1 KIT | Refills: 0 | Status: SHIPPED | OUTPATIENT
Start: 2022-06-02 | End: 2022-06-09 | Stop reason: ALTCHOICE

## 2022-06-02 NOTE — PROGRESS NOTES
2022     Reason for visit:  Left shoulder pain    History of Present Illness: The patient is a 69-year-old female who presents for evaluation of her left shoulder. She presents as a referral from Dr. Jaspreet Sykes. She reports pain for the past 2 months. No traumatic injury associate the onset of her symptoms. She locates the pain diffusely about the shoulder including anterior, deep, and upper lateral arm. Pain is made worse with overactivity, reaching, and lifting. She also reports decreased range of motion.     Medical History:  Past Medical History:   Diagnosis Date    Controlled type 2 diabetes mellitus without complication, without long-term current use of insulin ([address] 2019    no meds, low A1C    Dyslipidemia 2017    Essential hypertension 2018    GERD (gastroesophageal reflux disease)     Heart murmur     very early stages    Hypercholesterolemia 2018    Hyperlipidemia     Hypertension     Lichen planus     external, mainly arms, legs    PONV (postoperative nausea and vomiting)     usually the next day, vomits once    Primary biliary cholangitis ([address] 2019    Primary biliary cholangitis ([address] 2019      Past Surgical History:   Procedure Laterality Date    CARPAL TUNNEL RELEASE       SECTION      COLONOSCOPY      COLONOSCOPY  2022    ENDOMETRIAL ABLATION      ENDOSCOPY, COLON, DIAGNOSTIC      LIVER BIOPSY N/A     SINUS ENDOSCOPY N/A 3/8/2021    NASAL ENDOSCOPY; BIOPSY OF NASOPHARYNGEAL LESION performed by Singh Hargrove DO at [address] WISDOM TOOTH EXTRACTION        Family History   Problem Relation Age of Onset    Cancer Mother         Lung Cancer    Stroke Mother     Diabetes Father     High Cholesterol Father     Hypertension Father     Heart Failure Father     Substance Abuse Sister       Social History     Socioeconomic History    Marital status: Single     Spouse name: Not on file    Number of children: Not on file    Years of education: Not on file    Highest education level: Not on file   Occupational History    Occupation:    Tobacco Use    Smoking status: Former Smoker     Packs/day: 0.75     Years: 6.00     Pack years: 4.50     Types: Cigarettes     Quit date: 03/2019     Years since quitting: 3.2    Smokeless tobacco: Never Used    Tobacco comment: \"last week\" 10/30/21   Vaping Use    Vaping Use: Never used    Passive vaping exposure: Yes   Substance and Sexual Activity    Alcohol use: Yes     Alcohol/week: 2.0 standard drinks     Types: 2 Shots of liquor per week     Comment: occasional / 2 per month    Drug use: No    Sexual activity: Never   Other Topics Concern    Not on file   Social History Narrative    Not on file     Social Determinants of Health     Financial Resource Strain: Low Risk     Difficulty of Paying Living Expenses: Not hard at all   Food Insecurity: No Food Insecurity    Worried About Running Out of Food in the Last Year: Never true    [address] in the Last Year: Never true   Transportation Needs:     Lack of Transportation (Medical): Not on file    Lack of Transportation (Non-Medical):  Not on file   Physical Activity: Insufficiently Active    Days of Exercise per Week: 3 days    Minutes of Exercise per Session: 10 min   Stress:     Feeling of Stress : Not on file   Social Connections:     Frequency of Communication with Friends and Family: Not on file    Frequency of Social Gatherings with Friends and Family: Not on file    Attends Taoism Services: Not on file    Active Member of Clubs or Organizations: Not on file    Attends Club or Organization Meetings: Not on file    Marital Status: Not on file   Intimate Partner Violence: Not At Risk    Fear of Current or Ex-Partner: No    Emotionally Abused: No    Physically Abused: No    Sexually Abused: No   Housing Stability:     Unable to Pay for Housing in the Last Year: Not on file    Number of Places Lived in the Last Year: Not on file    Unstable Housing in the Last Year: Not on file      Current Outpatient Medications on File Prior to Visit   Medication Sig Dispense Refill    famotidine (PEPCID) 20 MG tablet Take 1 tablet by mouth 2 times daily 180 tablet 1    BREO ELLIPTA 200-25 MCG/INH AEPB inhaler INHALE 1 PUFF INTO THE LUNGS DAILY 1 each 3    Handicap Placard MISC by Does not apply route DMI, Dyslipidemia, Essential hypertension, GERD (gastroesophageal reflux disease), Heart murmur, Hypercholesterolemia,  Hypertension, Lichen planus, PONV  Primary biliary cholangitis ([facility] with weakness-duration - permanent or 99 months 1 each 0    Misc. Devices MISC 1 each by Does not apply route daily Ensure 1 bottle daily 30 each 5    loratadine (CLARITIN) 10 MG tablet Take 1 tablet by mouth daily 90 tablet 1    lisinopril (PRINIVIL;ZESTRIL) 5 MG tablet Take 1 tablet by mouth daily 90 tablet 1    famotidine (PEPCID) 20 MG tablet Take 1 tablet by mouth nightly as needed (stomach pain) 180 tablet 0    atorvastatin (LIPITOR) 80 MG tablet Take 1 tablet by mouth daily 90 tablet 1    fluticasone (FLONASE) 50 MCG/ACT nasal spray 1 spray by Each Nostril route daily 32 g 1    triamcinolone (KENALOG) 0.1 % cream Apply topically 2 times daily as needed 453.6 g 1    ursodiol (ACTIGALL) 500 MG tablet 2 times daily       calcium carbonate (OYSTER SHELL CALCIUM 500 MG) 1250 (500 Ca) MG tablet Take 1 tablet by mouth daily      vitamin D (CHOLECALCIFEROL) 1000 UNIT TABS tablet Take 1,000 Units by mouth daily      Multiple Vitamins-Minerals (MULTIVITAMIN PO) Take 1 capsule by mouth daily.  Ascorbic Acid (VITAMIN C) 500 MG tablet Take 500 mg by mouth daily. No current facility-administered medications on file prior to visit.       Allergies   Allergen Reactions    Penicillins Other (See Comments)     Doesn't know allergy reaction    Sulfa Antibiotics Other (See Comments) n/a    Sulfasalazine Hives     n/a        Review of Systems:  Constitutional: Patient is adequately groomed with no evidence of malnutrition  Mental Status: The patient is oriented to time, place and person. The patient's mood and affect are appropriate. Lymphatic: The lymphatic examination bilaterally reveals all areas to be without enlargement or induration. Vascular: Examination reveals no swelling or calf tenderness. Peripheral pulses are palpable and 2+. Neurological: The patient has good coordination. There is no weakness or sensory deficit. Skin:  Head/Neck: inspection reveals no rashes, ulcerations or lesions. Trunk: inspection reveals no rashes, ulcerations or lesions. Objective:  Ht 5' 1\" (1.549 m)   Wt 119 lb (54 kg)   BMI 22.48 kg/m²      Physical Exam:  The patient is well-appearing and in no apparent distress  Neg Spurling's test  Examination of the left shoulder  There is no swelling, ecchymosis, or gross deformity  There is no evidence of muscle atrophy  + May test, + Neers test  neg bicipital groove tenderness, neg AC joint tenderness  Range of motion reveals 90 degrees of forward flexion, 90 degrees of abduction, 30 degrees of external rotation, internal rotation to lumbar spine  4+/5 strength with resisted abduction, 4+/5 strength with resisted external rotation, 5/5 strength with resisted internal rotation  Intact motor and sensory function throughout the median/radial/ulnar/PIN/AIN distributions  Palpable radial pulse, brisk cap refill, 2+ symmetric reflexes    Imagin view x-rays of the left shoulder were obtained the office today on 2022. There is no fracture or dislocation. Very subtle inferior humeral head osteophyte is present indicative of early glenohumeral degenerative disease. Degenerative changes of acromioclavicular joint present      Assessment:  Shoulder pain.   Suspect adhesive capsulitis with possible component of early degenerative joint disease of the glenohumeral joint    Plan:  I discussed with the patient the diagnosis and treatment options. We discussed operative and nonoperative management. At this point I do recommend nonoperative management. Nonoperative treatment options include activity modification, anti-inflammatory medications, physical therapy, and injections. At this point I do recommend starting a Medrol Dosepak followed by seeing one of my partners for an ultrasound-guided glenohumeral cortisone injection. We also gave instructions on home exercise regiment. She will return to see me in 6 weeks for repeat evaluation. Greater than 45 minutes were spent with this encounter. Time spent included evaluating the patient's chart prior to arrival.  Evaluating the patient in the office including history, physical examination, imaging reviewing, and counseling on next steps. Lastly, time was spent discussing orders with my staff as well as providing documentation in the chart. Afia Doe MD            Orthopaedic Surgery Sports Medicine and [facility] and [facility]            Team Physician [facility] ([place]      Disclaimer: This note was dictated with voice recognition software. Though review and correction are routine, we apologize for any errors.

## 2022-06-02 NOTE — Clinical Note
Dear Dr. Margret Smyth,    Thank you very much for the referral and allowing me to participate in this patient's care. Please see the attached note for full details of the visit.     With kind regards,  Kalin Mann MD

## 2022-06-08 ENCOUNTER — OFFICE VISIT (OUTPATIENT)
Dept: ORTHOPEDIC SURGERY | Age: 67
End: 2022-06-08

## 2022-06-08 VITALS — BODY MASS INDEX: 22.48 KG/M2 | WEIGHT: 119.05 LBS | HEIGHT: 61 IN

## 2022-06-08 DIAGNOSIS — M25.512 LEFT SHOULDER PAIN, UNSPECIFIED CHRONICITY: ICD-10-CM

## 2022-06-08 DIAGNOSIS — M19.012 OSTEOARTHRITIS OF GLENOHUMERAL JOINT, LEFT: ICD-10-CM

## 2022-06-08 PROCEDURE — 1090F PRES/ABSN URINE INCON ASSESS: CPT | Performed by: INTERNAL MEDICINE

## 2022-06-08 PROCEDURE — G8427 DOCREV CUR MEDS BY ELIG CLIN: HCPCS | Performed by: INTERNAL MEDICINE

## 2022-06-08 PROCEDURE — 1036F TOBACCO NON-USER: CPT | Performed by: INTERNAL MEDICINE

## 2022-06-08 PROCEDURE — G8420 CALC BMI NORM PARAMETERS: HCPCS | Performed by: INTERNAL MEDICINE

## 2022-06-08 PROCEDURE — 20611 DRAIN/INJ JOINT/BURSA W/US: CPT | Performed by: INTERNAL MEDICINE

## 2022-06-08 PROCEDURE — 99202 OFFICE O/P NEW SF 15 MIN: CPT | Performed by: INTERNAL MEDICINE

## 2022-06-08 PROCEDURE — 1123F ACP DISCUSS/DSCN MKR DOCD: CPT | Performed by: INTERNAL MEDICINE

## 2022-06-08 PROCEDURE — 3017F COLORECTAL CA SCREEN DOC REV: CPT | Performed by: INTERNAL MEDICINE

## 2022-06-08 PROCEDURE — G8399 PT W/DXA RESULTS DOCUMENT: HCPCS | Performed by: INTERNAL MEDICINE

## 2022-06-08 RX ORDER — ROPIVACAINE HYDROCHLORIDE 5 MG/ML
4 INJECTION, SOLUTION EPIDURAL; INFILTRATION; PERINEURAL ONCE
Status: COMPLETED | OUTPATIENT
Start: 2022-06-08 | End: 2022-06-08

## 2022-06-08 RX ORDER — METHYLPREDNISOLONE ACETATE 40 MG/ML
40 INJECTION, SUSPENSION INTRA-ARTICULAR; INTRALESIONAL; INTRAMUSCULAR; SOFT TISSUE ONCE
Status: COMPLETED | OUTPATIENT
Start: 2022-06-08 | End: 2022-06-08

## 2022-06-08 RX ORDER — LIDOCAINE HYDROCHLORIDE 10 MG/ML
4 INJECTION, SOLUTION INFILTRATION; PERINEURAL ONCE
Status: COMPLETED | OUTPATIENT
Start: 2022-06-08 | End: 2022-06-08

## 2022-06-08 RX ADMIN — METHYLPREDNISOLONE ACETATE 40 MG: 40 INJECTION, SUSPENSION INTRA-ARTICULAR; INTRALESIONAL; INTRAMUSCULAR; SOFT TISSUE at 12:28

## 2022-06-08 RX ADMIN — ROPIVACAINE HYDROCHLORIDE 4 ML: 5 INJECTION, SOLUTION EPIDURAL; INFILTRATION; PERINEURAL at 12:29

## 2022-06-08 RX ADMIN — LIDOCAINE HYDROCHLORIDE 4 ML: 10 INJECTION, SOLUTION INFILTRATION; PERINEURAL at 12:27

## 2022-06-08 NOTE — LETTER
[address]  [address] [facility] [address]  Phone: [phone]  Fax: [phone]           Tj Franco MD      June 9, 2022     Patient: Liliana Wade   MR Number: [id number]   YOB: 1955   Date of Visit: 6/8/2022       Dear Dr. Malina Petersen:    Thank you for referring Gely Zimmerman to me for evaluation/treatment. Below are the relevant portions of my assessment and plan of care. Impression: . Encounter Diagnoses   Name Primary?  Osteoarthritis of glenohumeral joint, left     Left shoulder pain, unspecified chronicity               Plan:       Postinjection protocol  Clinical follow-up with Dr. Afia Doe as scheduled    The nature of the finding, probable diagnosis and likely treatment was thoroughly discussed with the patient. The options, risks, complications, alternative treatment as well as some of the differential diagnosis was discussed. The patient was thoroughly informed and all questions were answered. the patient indicated understanding and satisfaction with the discussion. Orders:        Orders Placed This Encounter   Procedures    US GUIDED NEEDLE PLACEMENT     Standing Status:   Future     Number of Occurrences:   1     Standing Expiration Date:   6/8/2023     Order Specific Question:   Reason for exam:     Answer:   CSI    IA ARTHROCENTESIS ASPIR&/INJ MAJOR JT/BURSA W/O US           Disclaimer: \"This note was dictated with voice recognition software. Though review and correction are routine, we apologize for any errors. \"           If you have questions, please do not hesitate to call me. I look forward to following Dominic along with you.     Sincerely,        Tj Franco MD    CC providers:  Afia Doe MD  [address] [place] [address]  [facility]

## 2022-06-09 NOTE — PROGRESS NOTES
Chief Complaint:   Chief Complaint   Patient presents with    Shoulder Pain     left, pain past 2 months 3/10, up to 7/10 with attempting OH reach, unable to lift arm above 90 degrees, ref by Dr. Ankush Vaughn for CSI          History of Present Illness:       Patient is a 75 y.o. female presents with the above complaint. She is seen in consultation at request of Dr. Eddy Prader for consideration of ultrasound-guided intra-articular steroid injection working diagnosis of glenohumeral joint osteoarthritis. The symptoms began 2 monthsago and started without an injury. The patient describes a aching pain that does not radiate. The symptoms are constant  and are show no change since the onset. The symptoms do not show a typical rotator cuff provacative pattern. The pain is diffuse about the shoulder. There are not associated mechanical symptoms localizing to the shoulder. The patient denies symptoms of instability about the shoulder. Treatment to date has included Incomplete course of Medrol Dosepak related to side effects. Pain levels 7/10 maximum. The symptoms do not correlate with head and neck movement. The patient denies new onset weakness of the upper extremity. There is no history or autoimmune disease, inflammatory arthropathy or crystal arthropathy.            Past Medical History:        Past Medical History:   Diagnosis Date    Controlled type 2 diabetes mellitus without complication, without long-term current use of insulin ([address] 12/16/2019    no meds, low A1C    Dyslipidemia 7/5/2017    Essential hypertension 5/16/2018    GERD (gastroesophageal reflux disease)     Heart murmur     very early stages    Hypercholesterolemia 5/16/2018    Hyperlipidemia     Hypertension     Lichen planus     external, mainly arms, legs    PONV (postoperative nausea and vomiting)     usually the next day, vomits once    Primary biliary cholangitis ([address] 12/16/2019    Primary biliary cholangitis ([facility] 2019         Past Surgical History:   Procedure Laterality Date    CARPAL TUNNEL RELEASE       SECTION      COLONOSCOPY      COLONOSCOPY  2022    ENDOMETRIAL ABLATION      ENDOSCOPY, COLON, DIAGNOSTIC      LIVER BIOPSY N/A     SINUS ENDOSCOPY N/A 3/8/2021    NASAL ENDOSCOPY; BIOPSY OF NASOPHARYNGEAL LESION performed by Zaire Ayoub DO at [address] [place] [address] EXTRACTION           Present Medications:         Current Outpatient Medications   Medication Sig Dispense Refill    famotidine (PEPCID) 20 MG tablet Take 1 tablet by mouth 2 times daily 180 tablet 1    BREO ELLIPTA 200-25 MCG/INH AEPB inhaler INHALE 1 PUFF INTO THE LUNGS DAILY 1 each 3    Handicap Placard MISC by Does not apply route DMI, Dyslipidemia, Essential hypertension, GERD (gastroesophageal reflux disease), Heart murmur, Hypercholesterolemia,  Hypertension, Lichen planus, PONV  Primary biliary cholangitis ([facility] with weakness-duration - permanent or 99 months 1 each 0    Misc.  Devices MISC 1 each by Does not apply route daily Ensure 1 bottle daily 30 each 5    loratadine (CLARITIN) 10 MG tablet Take 1 tablet by mouth daily 90 tablet 1    lisinopril (PRINIVIL;ZESTRIL) 5 MG tablet Take 1 tablet by mouth daily 90 tablet 1    famotidine (PEPCID) 20 MG tablet Take 1 tablet by mouth nightly as needed (stomach pain) 180 tablet 0    atorvastatin (LIPITOR) 80 MG tablet Take 1 tablet by mouth daily 90 tablet 1    fluticasone (FLONASE) 50 MCG/ACT nasal spray 1 spray by Each Nostril route daily 32 g 1    triamcinolone (KENALOG) 0.1 % cream Apply topically 2 times daily as needed 453.6 g 1    ursodiol (ACTIGALL) 500 MG tablet 2 times daily       calcium carbonate (OYSTER SHELL CALCIUM 500 MG) 1250 (500 Ca) MG tablet Take 1 tablet by mouth daily      vitamin D (CHOLECALCIFEROL) 1000 UNIT TABS tablet Take 1,000 Units by mouth daily      Multiple Vitamins-Minerals (MULTIVITAMIN PO) Take 1 capsule by mouth daily.  Ascorbic Acid (VITAMIN C) 500 MG tablet Take 500 mg by mouth daily. No current facility-administered medications for this visit. Allergies: Allergies   Allergen Reactions    Penicillins Other (See Comments)     Doesn't know allergy reaction    Sulfa Antibiotics Other (See Comments)     n/a    Sulfasalazine Hives     n/a        Social History:         Social History     Socioeconomic History    Marital status: Single     Spouse name: Not on file    Number of children: Not on file    Years of education: Not on file    Highest education level: Not on file   Occupational History    Occupation:    Tobacco Use    Smoking status: Former Smoker     Packs/day: 0.75     Years: 6.00     Pack years: 4.50     Types: Cigarettes     Quit date: 03/2019     Years since quitting: 3.2    Smokeless tobacco: Never Used    Tobacco comment: \"last week\" 10/30/21   Vaping Use    Vaping Use: Never used    Passive vaping exposure: Yes   Substance and Sexual Activity    Alcohol use: Yes     Alcohol/week: 2.0 standard drinks     Types: 2 Shots of liquor per week     Comment: occasional / 2 per month    Drug use: No    Sexual activity: Never   Other Topics Concern    Not on file   Social History Narrative    Not on file     Social Determinants of Health     Financial Resource Strain: Low Risk     Difficulty of Paying Living Expenses: Not hard at all   Food Insecurity: No Food Insecurity    Worried About Running Out of Food in the Last Year: Never true    [address] in the Last Year: Never true   Transportation Needs:     Lack of Transportation (Medical): Not on file    Lack of Transportation (Non-Medical):  Not on file   Physical Activity: Insufficiently Active    Days of Exercise per Week: 3 days    Minutes of Exercise per Session: 10 min   Stress:     Feeling of Stress : Not on file   Social Connections:     Frequency of Communication with Friends and Family: Not on file    Frequency of Social Gatherings with Friends and Family: Not on file    Attends Christianity Services: Not on file    Active Member of Clubs or Organizations: Not on file    Attends Club or Organization Meetings: Not on file    Marital Status: Not on file   Intimate Partner Violence: Not At Risk    Fear of Current or Ex-Partner: No    Emotionally Abused: No    Physically Abused: No    Sexually Abused: No   Housing Stability:     Unable to Pay for Housing in the Last Year: Not on file    Number of [place] in the Last Year: Not on file    Unstable Housing in the Last Year: Not on file        Review of Symptoms:    Pertinent items are noted in HPI   10 point review of systems negative except as mentioned in HPI        Vital Signs: There were no vitals filed for this visit. General Exam:     Constitutional: Patient is adequately groomed with no evidence of malnutrition    Lymphatics: no lymphadenopathy of the cervical or axillary regions or upper extremity      Physical Exam: left shoulder      Primary Exam:    Inspection: No deformity atrophy appreciable effusion      Palpation: Tenderness over the posterior joint line      Range of Motion: Forward elevation 120 degrees active      Special Tests: Negative drop arm      Skin: There are no rashes, ulcerations or lesions. Gait: Nonantalgic    Neurovascular - non focal and intact       Additional Comments:        Additional Examinations:                    Office Imaging Results/Procedures PerformedToday:          Office Procedures:     Orders Placed This Encounter   Procedures    US GUIDED NEEDLE PLACEMENT     Standing Status:   Future     Number of Occurrences:   1     Standing Expiration Date:   6/8/2023     Order Specific Question:   Reason for exam:     Answer:   CSI    ND ARTHROCENTESIS ASPIR&/INJ MAJOR JT/BURSA W/O US     Patient placed in     Lateral decubitus position.   Ultrasound placed on Shoulder preset function image optimization obtained. The linear transducer was placed transversely over the posterior glenohumeral joint. After sterile preparation and use of topical anesthetic, a 25-gauge needle was advanced intramuscularly from posterior to anterior using longitudinal technique. A total of 3 cc of Lidocaine was injected. The needle was then advancedguidance the needle was advanced into the posterior joint recess just distal to the labrum. 1 cc of Depo-Medrol and 3 cc of 0.5 ropivacaine was injected and the injectate was visualized to flow within the joint recess. Image stored. Technically successful injection    Positive anesthetic response and increase in active range of motion forward elevation      Other Outside Imaging and Testing Personally Reviewed:    XR SHOULDER LEFT (MIN 2 VIEWS)    Result Date: 6/2/2022  Radiology exam is complete. No Radiologist dictation. Please follow up with ordering provider. US GUIDED NEEDLE PLACEMENT    Result Date: 6/8/2022  Radiology result is complete; follow up with provider / physician office for radiology results              Assessment   Impression: . Encounter Diagnoses   Name Primary?  Osteoarthritis of glenohumeral joint, left     Left shoulder pain, unspecified chronicity               Plan:       Postinjection protocol  Clinical follow-up with Dr. Afia Doe as scheduled    The nature of the finding, probable diagnosis and likely treatment was thoroughly discussed with the patient. The options, risks, complications, alternative treatment as well as some of the differential diagnosis was discussed. The patient was thoroughly informed and all questions were answered. the patient indicated understanding and satisfaction with the discussion.       Orders:        Orders Placed This Encounter   Procedures    US GUIDED NEEDLE PLACEMENT     Standing Status:   Future     Number of Occurrences:   1     Standing Expiration Date:   6/8/2023 Order Specific Question:   Reason for exam:     Answer:   CSI    OK ARTHROCENTESIS ASPIR&/INJ MAJOR JT/BURSA W/O US           Disclaimer: \"This note was dictated with voice recognition software. Though review and correction are routine, we apologize for any errors. \"

## 2022-06-10 ENCOUNTER — PATIENT MESSAGE (OUTPATIENT)
Dept: INTERNAL MEDICINE CLINIC | Age: 67
End: 2022-06-10

## 2022-06-10 ENCOUNTER — TELEPHONE (OUTPATIENT)
Dept: INTERNAL MEDICINE CLINIC | Age: 67
End: 2022-06-10

## 2022-06-10 ENCOUNTER — TELEPHONE (OUTPATIENT)
Dept: ORTHOPEDIC SURGERY | Age: 67
End: 2022-06-10

## 2022-06-10 NOTE — TELEPHONE ENCOUNTER
Hello,   After talking with this Pt I realized this med was prescribed by Dr. Randy Winchester.  Not sure if you want to reach out to her    Thanks  Marcellus Canada

## 2022-06-10 NOTE — TELEPHONE ENCOUNTER
Spoke with pt. Once she stopped taking the prednisone her heart rate did go back to normal. I encourage to reach out to her PCP about the white coating in her mouth. I also noticed that Dr. Reggie Oleary is the one who prescribed  The prednisone. I let Pt know that I would sned this message to Dr. Reggie Oleary.  Pt understood

## 2022-06-10 NOTE — TELEPHONE ENCOUNTER
Patient called stating she received an injection on 6/8 from her orthopedics DrLiane and she is having a reaction to it. Please see encounter from today.

## 2022-06-13 NOTE — TELEPHONE ENCOUNTER
Medrol dose pack was prescribed by Dr. Ifrah White. The Depo-Medrol you may see in the chart is part of the injection that was given to pt at her visit with us, as referred by Dr. Ifrah White.

## 2022-06-13 NOTE — TELEPHONE ENCOUNTER
From: Gino David  To: Dr. Ramana Lopez  Sent: 6/10/2022 2:25 PM EDT  Subject: Possible Roshan Tejinder to Orthopedic 6/2/22, Dr Ifrah White. Referred to Dr Praveen Murray, prescribed Prednisone which I stopped due to heart racing. 6/8/22 appt for ultra sound injection. Last night, 6/9/22, swelling came back with pain causing rough night. During night I noticed tongue and roof of mouth felt bumpy. I had daughter take attached picture of could possibly be thrush. Please advise asap and if meds warranted please request.   Your prompt response would greatly appreciated. Thank you!

## 2022-06-15 NOTE — TELEPHONE ENCOUNTER
obin,      This may be a side effect of your medications. Please call your GI physician. The picture is not helpful. I can see you this afternoon if you can make it in to the office. I will have Eulalio call you.     Graciela Steward MD

## 2022-06-15 NOTE — TELEPHONE ENCOUNTER
Called and spoke with patient. Patient stated she can not come in due to not having a car.  Patient stated she is feeling better, she believes it was just a reaction to the injection she had in her shoulder but the reaction cleared up later on saturday

## 2022-07-19 ENCOUNTER — OFFICE VISIT (OUTPATIENT)
Dept: ORTHOPEDIC SURGERY | Age: 67
End: 2022-07-19
Payer: MEDICARE

## 2022-07-19 VITALS — HEIGHT: 60 IN | BODY MASS INDEX: 23.36 KG/M2 | WEIGHT: 119 LBS

## 2022-07-19 DIAGNOSIS — M25.512 LEFT SHOULDER PAIN, UNSPECIFIED CHRONICITY: Primary | ICD-10-CM

## 2022-07-19 PROCEDURE — 1036F TOBACCO NON-USER: CPT | Performed by: ORTHOPAEDIC SURGERY

## 2022-07-19 PROCEDURE — 1090F PRES/ABSN URINE INCON ASSESS: CPT | Performed by: ORTHOPAEDIC SURGERY

## 2022-07-19 PROCEDURE — 1123F ACP DISCUSS/DSCN MKR DOCD: CPT | Performed by: ORTHOPAEDIC SURGERY

## 2022-07-19 PROCEDURE — 99213 OFFICE O/P EST LOW 20 MIN: CPT | Performed by: ORTHOPAEDIC SURGERY

## 2022-07-19 PROCEDURE — G8420 CALC BMI NORM PARAMETERS: HCPCS | Performed by: ORTHOPAEDIC SURGERY

## 2022-07-19 PROCEDURE — 3017F COLORECTAL CA SCREEN DOC REV: CPT | Performed by: ORTHOPAEDIC SURGERY

## 2022-07-19 PROCEDURE — G8399 PT W/DXA RESULTS DOCUMENT: HCPCS | Performed by: ORTHOPAEDIC SURGERY

## 2022-07-19 PROCEDURE — G8427 DOCREV CUR MEDS BY ELIG CLIN: HCPCS | Performed by: ORTHOPAEDIC SURGERY

## 2022-07-21 ENCOUNTER — PATIENT MESSAGE (OUTPATIENT)
Dept: INTERNAL MEDICINE CLINIC | Age: 67
End: 2022-07-21

## 2022-07-21 DIAGNOSIS — I10 HTN, GOAL BELOW 130/80: ICD-10-CM

## 2022-07-21 RX ORDER — LISINOPRIL 5 MG/1
5 TABLET ORAL DAILY
Qty: 90 TABLET | Refills: 1 | OUTPATIENT
Start: 2022-07-21

## 2022-07-21 NOTE — TELEPHONE ENCOUNTER
From: Suly Avitia  To: Dr. Mcclure Safe: 7/21/2022 10:05 AM EDT  Subject: Prescription refill request    Per Domenic in [place], I was informed that my refill request for Lisinopril, 5 mg, 9 day supply was not approved. This most likely is insurance approval. Please contact Domenic by Monday, July 25 which the 90 day erasto per last refill so I can refill on that date. I have enough thru July 28. Thank you.

## 2022-07-21 NOTE — TELEPHONE ENCOUNTER
lisinopril (PRINIVIL;ZESTRIL) 5 MG tablet [[id number]]     Order Details  Dose: 5 mg Route: Oral Frequency: DAILY   Dispense Quantity: 90 tablet Refills: 1          Sig: Take 1 tablet by mouth daily         Start Date: 05/17/22 End Date: --   Written Date: 05/17/22 Expiration Date: 05/17/23       Diagnosis Association: HTN, goal below 130/80 (I10)   Original Order:  lisinopril (PRINIVIL;ZESTRIL) 5 MG tablet [[id number]]     Providers    Authorizing Provider: Ayush Schultz MD NPI: [id number]   Ordering User:  Sharona Mitchell [address] [place] [address] [phone] [facility] [phone]   [address] [place] [id number]   Phone:  [phone]  Fax:  [phone]   E-Prescribing Status: Receipt confirmed by pharmacy (5/17/2022  4:01 PM EDT)      [facility] and informed patient she should speak with the pharmacy.  She is not due for refills until September

## 2022-07-21 NOTE — TELEPHONE ENCOUNTER
Requested Prescriptions     Pending Prescriptions Disp Refills    lisinopril (PRINIVIL;ZESTRIL) 5 MG tablet [Pharmacy Med Name: LISINOPRIL 5MG TABLETS] 90 tablet 1     Sig: TAKE 1 TABLET BY MOUTH DAILY     Recent Visits  Date Type Provider Dept   05/17/22 Office Visit Emmie Spangler MD [facility]   01/11/22 Office Visit Emmie Spangler MD [facility]   12/07/21 Office Visit Emmie Spangler MD [facility]   11/09/21 Office Visit Emmie Spangler MD [facility]   09/01/21 Office Visit Emmie Spangler MD [facility]   04/28/21 Office Visit Emmie Spangler MD [facility]   03/04/21 Office Visit LINNEA [facility]   02/23/21 Office Visit Emmie Spangler MD [facility]   02/19/21 Office Visit Emmie Spangler MD [facility]   Showing recent visits within past 540 days with a meds authorizing provider and meeting all other requirements  Future Appointments  Date Type Provider Dept   09/02/22 Appointment Emmie Spangler MD [facility]   Showing future appointments within next 150 days with a meds authorizing provider and meeting all other requirements       LAST APPOINTMENT  5/17/2022   lisinopril (PRINIVIL;ZESTRIL) 5 MG tablet [[id number]]     Order Details  Dose: 5 mg Route: Oral Frequency: DAILY   Dispense Quantity: 90 tablet Refills: 1          Sig: Take 1 tablet by mouth daily         Start Date: 05/17/22 End Date: --   Written Date: 05/17/22 Expiration Date: 05/17/23       Diagnosis Association: HTN, goal below 130/80 (I10)   Original Order:  lisinopril (PRINIVIL;ZESTRIL) 5 MG tablet [[id number]]     Providers    Authorizing Provider: Emmie Spangler MD NPI: [id number]   Ordering User:  Sharona Knapp [address] [place] [address], [address] [place] [phone] - F [phone]   [facility], [address] [place] [address] Drive Extension   Phone:  [phone]  Fax:  [phone]

## 2022-07-21 NOTE — PROGRESS NOTES
2022     Reason for visit:  Left shoulder pain    History of Present Illness: The patient is a 68-year-old female who presents for evaluation of her left shoulder. She presents as a referral from Dr. Jose Ortiz. She reports pain for the past 2 months. No traumatic injury associate the onset of her symptoms. She locates the pain diffusely about the shoulder including anterior, deep, and upper lateral arm. Pain is made worse with overactivity, reaching, and lifting. She also reports decreased range of motion. She has had an ultrasound-guided cortisone injection to the glenohumeral joint. She does report 75% improvement. She is happy with her progress. Objective:  Ht 5' (1.524 m)   Wt 119 lb (54 kg)   BMI 23.24 kg/m²      Physical Exam:  The patient is well-appearing and in no apparent distress  Neg Spurling's test  Examination of the left shoulder  There is no swelling, ecchymosis, or gross deformity  There is no evidence of muscle atrophy  + May test, + Neers test  neg bicipital groove tenderness, neg AC joint tenderness  Range of motion reveals 130 degrees of forward flexion, 130 degrees of abduction, 50 degrees of external rotation, internal rotation to lumbar spine  4+/5 strength with resisted abduction, 4+/5 strength with resisted external rotation, 5/5 strength with resisted internal rotation  Intact motor and sensory function throughout the median/radial/ulnar/PIN/AIN distributions  Palpable radial pulse, brisk cap refill, 2+ symmetric reflexes    Imagin view x-rays of the left shoulder were obtained the office today on 2022. There is no fracture or dislocation. Very subtle inferior humeral head osteophyte is present indicative of early glenohumeral degenerative disease. Degenerative changes of acromioclavicular joint present      Assessment:  Shoulder pain.   Suspect adhesive capsulitis with possible component of early degenerative joint disease of the glenohumeral joint    Plan: It is reassuring the patient is doing better. At this point the patient will return to see me as needed. Greater than 20 minutes were spent with this encounter. Time spent included evaluating the patient's chart prior to arrival.  Evaluating the patient in the office including history, physical examination, imaging reviewing, and counseling on next steps. Lastly, time was spent discussing orders with my staff as well as providing documentation in the chart. Sharri Pool MD            Orthopaedic Surgery Sports Medicine and [address] and [facility]            Team Physician [facility]      Disclaimer: This note was dictated with voice recognition software. Though review and correction are routine, we apologize for any errors.

## 2022-08-26 SDOH — HEALTH STABILITY: PHYSICAL HEALTH: ON AVERAGE, HOW MANY MINUTES DO YOU ENGAGE IN EXERCISE AT THIS LEVEL?: 20 MIN

## 2022-08-26 SDOH — HEALTH STABILITY: PHYSICAL HEALTH: ON AVERAGE, HOW MANY DAYS PER WEEK DO YOU ENGAGE IN MODERATE TO STRENUOUS EXERCISE (LIKE A BRISK WALK)?: 4 DAYS

## 2022-08-26 ASSESSMENT — PATIENT HEALTH QUESTIONNAIRE - PHQ9
SUM OF ALL RESPONSES TO PHQ QUESTIONS 1-9: 0
SUM OF ALL RESPONSES TO PHQ9 QUESTIONS 1 & 2: 0
SUM OF ALL RESPONSES TO PHQ QUESTIONS 1-9: 0
2. FEELING DOWN, DEPRESSED OR HOPELESS: 0
SUM OF ALL RESPONSES TO PHQ QUESTIONS 1-9: 0
SUM OF ALL RESPONSES TO PHQ QUESTIONS 1-9: 0
1. LITTLE INTEREST OR PLEASURE IN DOING THINGS: 0

## 2022-08-26 ASSESSMENT — LIFESTYLE VARIABLES
HOW OFTEN DO YOU HAVE A DRINK CONTAINING ALCOHOL: 2
HOW OFTEN DO YOU HAVE A DRINK CONTAINING ALCOHOL: MONTHLY OR LESS
HOW MANY STANDARD DRINKS CONTAINING ALCOHOL DO YOU HAVE ON A TYPICAL DAY: 1 OR 2
HOW MANY STANDARD DRINKS CONTAINING ALCOHOL DO YOU HAVE ON A TYPICAL DAY: 1
HOW OFTEN DO YOU HAVE SIX OR MORE DRINKS ON ONE OCCASION: 1

## 2022-09-02 ENCOUNTER — OFFICE VISIT (OUTPATIENT)
Dept: INTERNAL MEDICINE CLINIC | Age: 67
End: 2022-09-02
Payer: MEDICARE

## 2022-09-02 VITALS
SYSTOLIC BLOOD PRESSURE: 128 MMHG | OXYGEN SATURATION: 97 % | TEMPERATURE: 97.5 F | RESPIRATION RATE: 16 BRPM | DIASTOLIC BLOOD PRESSURE: 74 MMHG | BODY MASS INDEX: 23.64 KG/M2 | WEIGHT: 125.2 LBS | HEART RATE: 77 BPM | HEIGHT: 61 IN

## 2022-09-02 DIAGNOSIS — I10 HTN, GOAL BELOW 130/80: ICD-10-CM

## 2022-09-02 DIAGNOSIS — M80.00XS OSTEOPOROSIS WITH CURRENT PATHOLOGICAL FRACTURE, UNSPECIFIED OSTEOPOROSIS TYPE, SEQUELA: ICD-10-CM

## 2022-09-02 DIAGNOSIS — R21 RASH AND NONSPECIFIC SKIN ERUPTION: ICD-10-CM

## 2022-09-02 DIAGNOSIS — K74.3 PRIMARY BILIARY CHOLANGITIS (HCC): ICD-10-CM

## 2022-09-02 DIAGNOSIS — E78.5 DYSLIPIDEMIA: ICD-10-CM

## 2022-09-02 DIAGNOSIS — Z86.2 HISTORY OF ANEMIA: ICD-10-CM

## 2022-09-02 DIAGNOSIS — J34.89 RHINORRHEA: ICD-10-CM

## 2022-09-02 DIAGNOSIS — E11.9 CONTROLLED TYPE 2 DIABETES MELLITUS WITHOUT COMPLICATION, WITHOUT LONG-TERM CURRENT USE OF INSULIN (HCC): ICD-10-CM

## 2022-09-02 DIAGNOSIS — K21.9 GASTROESOPHAGEAL REFLUX DISEASE WITHOUT ESOPHAGITIS: ICD-10-CM

## 2022-09-02 DIAGNOSIS — M80.08XA AGE-RELATED OSTEOPOROSIS WITH CURRENT PATHOLOGICAL FRACTURE, VERTEBRA(E), INITIAL ENCOUNTER FOR FRACTURE (HCC): ICD-10-CM

## 2022-09-02 DIAGNOSIS — Z23 NEED FOR PROPHYLACTIC VACCINATION AGAINST STREPTOCOCCUS PNEUMONIAE (PNEUMOCOCCUS): ICD-10-CM

## 2022-09-02 DIAGNOSIS — Z23 NEED FOR PROPHYLACTIC VACCINATION AND INOCULATION AGAINST VARICELLA: ICD-10-CM

## 2022-09-02 DIAGNOSIS — Z00.00 MEDICARE ANNUAL WELLNESS VISIT, SUBSEQUENT: Primary | ICD-10-CM

## 2022-09-02 DIAGNOSIS — J30.1 SEASONAL ALLERGIC RHINITIS DUE TO POLLEN: ICD-10-CM

## 2022-09-02 DIAGNOSIS — H10.13 ALLERGIC CONJUNCTIVITIS AND RHINITIS, BILATERAL: ICD-10-CM

## 2022-09-02 DIAGNOSIS — J30.9 ALLERGIC CONJUNCTIVITIS AND RHINITIS, BILATERAL: ICD-10-CM

## 2022-09-02 LAB
A/G RATIO: 1.2 (ref 1.1–2.2)
ALBUMIN SERPL-MCNC: 3.9 G/DL (ref 3.4–5)
ALP BLD-CCNC: 240 U/L (ref 40–129)
ALT SERPL-CCNC: 12 U/L (ref 10–40)
ANION GAP SERPL CALCULATED.3IONS-SCNC: 11 MMOL/L (ref 3–16)
AST SERPL-CCNC: 20 U/L (ref 15–37)
BASOPHILS ABSOLUTE: 0 K/UL (ref 0–0.2)
BASOPHILS RELATIVE PERCENT: 0.5 %
BILIRUB SERPL-MCNC: 0.4 MG/DL (ref 0–1)
BUN BLDV-MCNC: 9 MG/DL (ref 7–20)
CALCIUM SERPL-MCNC: 9.5 MG/DL (ref 8.3–10.6)
CHLORIDE BLD-SCNC: 107 MMOL/L (ref 99–110)
CHOLESTEROL, TOTAL: 123 MG/DL (ref 0–199)
CO2: 26 MMOL/L (ref 21–32)
CREAT SERPL-MCNC: <0.5 MG/DL (ref 0.6–1.2)
CREATININE URINE: 118.5 MG/DL (ref 28–259)
EOSINOPHILS ABSOLUTE: 0.2 K/UL (ref 0–0.6)
EOSINOPHILS RELATIVE PERCENT: 2 %
GFR AFRICAN AMERICAN: >60
GFR NON-AFRICAN AMERICAN: >60
GLUCOSE BLD-MCNC: 75 MG/DL (ref 70–99)
HCT VFR BLD CALC: 45 % (ref 36–48)
HDLC SERPL-MCNC: 58 MG/DL (ref 40–60)
HEMOGLOBIN: 15 G/DL (ref 12–16)
LDL CHOLESTEROL CALCULATED: 52 MG/DL
LYMPHOCYTES ABSOLUTE: 2.4 K/UL (ref 1–5.1)
LYMPHOCYTES RELATIVE PERCENT: 25.7 %
MCH RBC QN AUTO: 29.8 PG (ref 26–34)
MCHC RBC AUTO-ENTMCNC: 33.4 G/DL (ref 31–36)
MCV RBC AUTO: 89.4 FL (ref 80–100)
MICROALBUMIN UR-MCNC: 2.9 MG/DL
MICROALBUMIN/CREAT UR-RTO: 24.5 MG/G (ref 0–30)
MONOCYTES ABSOLUTE: 1.1 K/UL (ref 0–1.3)
MONOCYTES RELATIVE PERCENT: 11.5 %
NEUTROPHILS ABSOLUTE: 5.6 K/UL (ref 1.7–7.7)
NEUTROPHILS RELATIVE PERCENT: 60.3 %
PARATHYROID HORMONE INTACT: 37.8 PG/ML (ref 14–72)
PDW BLD-RTO: 14.4 % (ref 12.4–15.4)
PHOSPHORUS: 3.9 MG/DL (ref 2.5–4.9)
PLATELET # BLD: 203 K/UL (ref 135–450)
PMV BLD AUTO: 10.7 FL (ref 5–10.5)
POTASSIUM SERPL-SCNC: 4.3 MMOL/L (ref 3.5–5.1)
RBC # BLD: 5.04 M/UL (ref 4–5.2)
SODIUM BLD-SCNC: 144 MMOL/L (ref 136–145)
TOTAL PROTEIN: 7.1 G/DL (ref 6.4–8.2)
TRIGL SERPL-MCNC: 67 MG/DL (ref 0–150)
VITAMIN D 25-HYDROXY: 54.2 NG/ML
VLDLC SERPL CALC-MCNC: 13 MG/DL
WBC # BLD: 9.3 K/UL (ref 4–11)

## 2022-09-02 PROCEDURE — G0439 PPPS, SUBSEQ VISIT: HCPCS | Performed by: INTERNAL MEDICINE

## 2022-09-02 PROCEDURE — 3044F HG A1C LEVEL LT 7.0%: CPT | Performed by: INTERNAL MEDICINE

## 2022-09-02 PROCEDURE — 90677 PCV20 VACCINE IM: CPT | Performed by: INTERNAL MEDICINE

## 2022-09-02 PROCEDURE — 3017F COLORECTAL CA SCREEN DOC REV: CPT | Performed by: INTERNAL MEDICINE

## 2022-09-02 PROCEDURE — 1123F ACP DISCUSS/DSCN MKR DOCD: CPT | Performed by: INTERNAL MEDICINE

## 2022-09-02 PROCEDURE — G0009 ADMIN PNEUMOCOCCAL VACCINE: HCPCS | Performed by: INTERNAL MEDICINE

## 2022-09-02 RX ORDER — LISINOPRIL 5 MG/1
5 TABLET ORAL DAILY
Qty: 90 TABLET | Refills: 1 | Status: SHIPPED | OUTPATIENT
Start: 2022-09-02

## 2022-09-02 RX ORDER — FLUTICASONE PROPIONATE 50 MCG
1 SPRAY, SUSPENSION (ML) NASAL DAILY
Qty: 32 G | Refills: 1 | Status: SHIPPED | OUTPATIENT
Start: 2022-09-02

## 2022-09-02 RX ORDER — LORATADINE 10 MG/1
10 TABLET ORAL DAILY
Qty: 90 TABLET | Refills: 1 | Status: SHIPPED | OUTPATIENT
Start: 2022-09-02

## 2022-09-02 RX ORDER — TRIAMCINOLONE ACETONIDE 1 MG/G
CREAM TOPICAL
Qty: 453.6 G | Refills: 1 | Status: SHIPPED | OUTPATIENT
Start: 2022-09-02

## 2022-09-02 RX ORDER — ATORVASTATIN CALCIUM 80 MG/1
80 TABLET, FILM COATED ORAL DAILY
Qty: 90 TABLET | Refills: 1 | Status: SHIPPED | OUTPATIENT
Start: 2022-09-02

## 2022-09-02 RX ORDER — FAMOTIDINE 20 MG/1
20 TABLET, FILM COATED ORAL 2 TIMES DAILY
Qty: 180 TABLET | Refills: 1 | Status: SHIPPED | OUTPATIENT
Start: 2022-09-02

## 2022-09-02 SDOH — ECONOMIC STABILITY: FOOD INSECURITY: WITHIN THE PAST 12 MONTHS, THE FOOD YOU BOUGHT JUST DIDN'T LAST AND YOU DIDN'T HAVE MONEY TO GET MORE.: NEVER TRUE

## 2022-09-02 SDOH — ECONOMIC STABILITY: FOOD INSECURITY: WITHIN THE PAST 12 MONTHS, YOU WORRIED THAT YOUR FOOD WOULD RUN OUT BEFORE YOU GOT MONEY TO BUY MORE.: NEVER TRUE

## 2022-09-02 ASSESSMENT — SOCIAL DETERMINANTS OF HEALTH (SDOH): HOW HARD IS IT FOR YOU TO PAY FOR THE VERY BASICS LIKE FOOD, HOUSING, MEDICAL CARE, AND HEATING?: NOT HARD AT ALL

## 2022-09-02 NOTE — PROGRESS NOTES
Advance Care Planning   Advanced Care Planning: Discussed the patients choices for care and treatment in case of a health event that adversely affects decision-making abilities. Also discussed the patients long-term treatment options. Reviewed with the patient the appropriate state-specific advance directive documents. Reviewed the process of designating a competent adult as an Agent (or -in-fact) that could take make health care decisions for the patient if incompetent. Patient was asked to complete the declaration forms, either acknowledge the forms by a public notary or an eligible witness and provide a signed copy to the practice office. Time spent (minutes): 5     Medicare Annual Wellness Visit    Twilla Neither is here for Medicare AWV (Annual AWV)    Assessment & Plan   Medicare annual wellness visit, subsequent  HTN, goal below 130/80  -     lisinopril (PRINIVIL;ZESTRIL) 5 MG tablet; Take 1 tablet by mouth daily, Disp-90 tablet, R-1Normal  -     Microalbumin / Creatinine Urine Ratio; Future  Primary biliary cholangitis (HCC)  -     loratadine (CLARITIN) 10 MG tablet; Take 1 tablet by mouth daily, Disp-90 tablet, R-1Normal  -     CBC with Auto Differential; Future  Rhinorrhea  -     loratadine (CLARITIN) 10 MG tablet; Take 1 tablet by mouth daily, Disp-90 tablet, R-1Normal  -     Comprehensive Metabolic Panel; Future  Allergic conjunctivitis and rhinitis, bilateral  -     loratadine (CLARITIN) 10 MG tablet; Take 1 tablet by mouth daily, Disp-90 tablet, R-1Normal  Rash and nonspecific skin eruption  -     triamcinolone (KENALOG) 0.1 % cream; Apply topically 2 times daily as needed, Disp-453.6 g, R-1, Normal  Gastroesophageal reflux disease without esophagitis  -     famotidine (PEPCID) 20 MG tablet; Take 1 tablet by mouth 2 times daily, Disp-180 tablet, R-1Normal  Dyslipidemia  -     atorvastatin (LIPITOR) 80 MG tablet;  Take 1 tablet by mouth daily, Disp-90 tablet, R-1Normal  -     Lipid Panel; Future  Seasonal allergic rhinitis due to pollen  -     fluticasone (FLONASE) 50 MCG/ACT nasal spray; 1 spray by Each Nostril route daily, Disp-32 g, R-1Normal  Need for prophylactic vaccination against Streptococcus pneumoniae (pneumococcus)  -     Pneumococcal Conjugate PCV20, PF (Prevnar 20)  Need for prophylactic vaccination and inoculation against varicella  -     zoster recombinant adjuvanted vaccine (SHINGRIX) 50 MCG/0.5ML SUSR injection; Inject 0.5 mLs into the muscle once for 1 dose, Disp-0.5 mL, R-0Print  Controlled type 2 diabetes mellitus without complication, without long-term current use of insulin (HCC)  -     fluticasone (FLONASE) 50 MCG/ACT nasal spray; 1 spray by Each Nostril route daily, Disp-32 g, R-1Normal  -     Comprehensive Metabolic Panel; Future  -     Lipid Panel; Future  -     Hemoglobin A1C; Future  -      DIABETES FOOT EXAM  -     Microalbumin / Creatinine Urine Ratio; Future  -     CBC with Auto Differential; Future  -      DIABETES FOOT EXAM  History of anemia  -     CBC with Auto Differential; Future    Recommendations for Preventive Services Due: see orders and patient instructions/AVS.  Recommended screening schedule for the next 5-10 years is provided to the patient in written form: see Patient Instructions/AVS.     Return for Medicare Annual Wellness Visit in 1 year. Subjective   The following acute and/or chronic problems were also addressed today:  Treatment Adherence:   Medication compliance:  compliant most of the time  Diet compliance:  compliant most of the time  Weight trend: stable  Current exercise: no regular exercise  Barriers: none    Diabetes Mellitus Type 2: Current symptoms/problems include none. Home blood sugar records: patient does not test  Any episodes of hypoglycemia? no  Eye exam current (within one year): no  Tobacco history: She  reports that she quit smoking about 3 years ago. Her smoking use included cigarettes.  She has a 4.50 pack-year smoking history. She has never used smokeless tobacco.   Daily Aspirin? No    Hypertension:  Home blood pressure monitoring: No.  She is adherent to a low sodium diet. Patient denies shortness of breath, headache, lightheadedness, blurred vision, and peripheral edema. Antihypertensive medication side effects: no medication side effects noted. Use of agents associated with hypertension: none. Hyperlipidemia:  No new myalgias or GI upset on atorvastatin (Lipitor). Lab Results   Component Value Date    LABA1C 5.7 09/01/2021    LABA1C 5.8 02/23/2021    LABA1C 6.6 10/21/2020     Lab Results   Component Value Date    LABMICR YES 10/30/2021    CREATININE <0.5 (L) 11/03/2021     Lab Results   Component Value Date    ALT 22 10/30/2021    AST 38 (H) 10/30/2021     Lab Results   Component Value Date    CHOL 105 03/23/2021    TRIG 81 03/23/2021    HDL 37 (L) 03/23/2021    LDLCALC 52 03/23/2021            Patient's complete Health Risk Assessment and screening values have been reviewed and are found in Flowsheets. The following problems were reviewed today and where indicated follow up appointments were made and/or referrals ordered.     Positive Risk Factor Screenings with Interventions:             General Health and ACP:  General  In general, how would you say your health is?: Good  In the past 7 days, have you experienced any of the following: New or Increased Pain, New or Increased Fatigue, Loneliness, Social Isolation, Stress or Anger?: No  Do you get the social and emotional support that you need?: Yes  Do you have a Living Will?: (!) No    Advance Directives       Power of  Living Will ACP-Advance Directive ACP-Power of     Not on File Not on File Not on File Not on File          General Health Risk Interventions:  No Living Will: Advance Care Planning addressed with patient today              Objective   Vitals:    09/02/22 1303   BP: 128/74   Pulse: 77   Resp: 16   Temp: 97.5 °F (36.4 °C) TempSrc: Temporal   SpO2: 97%   Weight: 125 lb 3.2 oz (56.8 kg)   Height: 5' 0.5\" (1.537 m)      Body mass index is 24.05 kg/m². Physical Exam  Vitals and nursing note reviewed. Constitutional:       General: She is not in acute distress. Appearance: Normal appearance. She is well-developed and normal weight. She is not ill-appearing. HENT:      Head: Normocephalic and atraumatic. Right Ear: Tympanic membrane, ear canal and external ear normal.      Left Ear: Tympanic membrane, ear canal and external ear normal.      Nose: Nose normal. No congestion or rhinorrhea. Mouth/Throat:      Mouth: Mucous membranes are moist.   Eyes:      Extraocular Movements: Extraocular movements intact. Conjunctiva/sclera: Conjunctivae normal.      Pupils: Pupils are equal, round, and reactive to light. Cardiovascular:      Rate and Rhythm: Normal rate and regular rhythm. Pulses: Normal pulses. Heart sounds: Normal heart sounds. No murmur heard. Pulmonary:      Effort: Pulmonary effort is normal. No respiratory distress. Breath sounds: Normal breath sounds. Abdominal:      General: Abdomen is flat. Bowel sounds are normal. There is no distension. Palpations: Abdomen is soft. Musculoskeletal:         General: Normal range of motion. Cervical back: Normal range of motion. Skin:     General: Skin is warm. Capillary Refill: Capillary refill takes less than 2 seconds. Comments: Deformities: No  Rashes:  No  Callous:  No  Edema:  No  Injury:  No  Sensory Deficit:  No  Nails: normal         Neurological:      General: No focal deficit present. Mental Status: She is alert and oriented to person, place, and time. Mental status is at baseline. Psychiatric:         Mood and Affect: Mood normal.         Behavior: Behavior normal.         Thought Content:  Thought content normal.         Judgment: Judgment normal.         Allergies   Allergen Reactions    Penicillins Other (See Comments)     Doesn't know allergy reaction    Sulfa Antibiotics Other (See Comments)     n/a    Sulfasalazine Hives     n/a     Prior to Visit Medications    Medication Sig Taking? Authorizing Provider   zoster recombinant adjuvanted vaccine (SHINGRIX) 50 MCG/0.5ML SUSR injection Inject 0.5 mLs into the muscle once for 1 dose Yes Kaylie Chandler MD   famotidine (PEPCID) 20 MG tablet Take 1 tablet by mouth 2 times daily Yes Kaylie Chandler MD   loratadine (CLARITIN) 10 MG tablet Take 1 tablet by mouth daily Yes Kaylie Chandler MD   lisinopril (PRINIVIL;ZESTRIL) 5 MG tablet Take 1 tablet by mouth daily Yes Kaylie Chandler MD   atorvastatin (LIPITOR) 80 MG tablet Take 1 tablet by mouth daily Yes Kaylie Chandler MD   fluticasone (FLONASE) 50 MCG/ACT nasal spray 1 spray by Each Nostril route daily Yes Kaylie Chandler MD   triamcinolone (KENALOG) 0.1 % cream Apply topically 2 times daily as needed Yes Kaylie Chandler MD   ursodiol (ACTIGALL) 500 MG tablet 2 times daily  Yes Historical Provider, MD   calcium carbonate (OYSTER SHELL CALCIUM 500 MG) 1250 (500 Ca) MG tablet Take 1 tablet by mouth daily Yes Historical Provider, MD   vitamin D (CHOLECALCIFEROL) 1000 UNIT TABS tablet Take 1,000 Units by mouth daily Yes Historical Provider, MD   Multiple Vitamins-Minerals (MULTIVITAMIN PO) Take 1 capsule by mouth daily. Yes Historical Provider, MD   Ascorbic Acid (VITAMIN C) 500 MG tablet Take 500 mg by mouth daily. Yes Historical Provider, MD   Handicap Placard MISC by Does not apply route DMI, Dyslipidemia, Essential hypertension, GERD (gastroesophageal reflux disease), Heart murmur, Hypercholesterolemia,  Hypertension, Lichen planus, PONV  Primary biliary cholangitis ([facility] [address] with weakness-duration - permanent or 99 months  Kaylie Chandler MD   Misc.  Devices MISC 1 each by Does not apply route daily Ensure 1 bottle daily  Kaylie Chandler MD famotidine (PEPCID) 20 MG tablet Take 1 tablet by mouth nightly as needed (stomach pain)  Lin Locke MD       [facility] (Including outside providers/suppliers regularly involved in providing care):   Patient Care Team:  Lin Locke MD as PCP - General (Pediatrics)  Lin Locke MD as PCP - [facility] Empaneled Provider     Reviewed and updated this visit:  Tobacco  Allergies  Meds  Problems  Med Hx  Surg Hx  Soc Hx  Fam Hx

## 2022-09-02 NOTE — PATIENT INSTRUCTIONS
Advance Directives: Care Instructions  Overview  An advance directive is a legal way to state your wishes at the end of your life. It tells your family and your doctor what to do if you can't say what youwant. There are two main types of advance directives. You can change them any timeyour wishes change. Living will. This form tells your family and your doctor your wishes about life support and other treatment. The form is also called a declaration. Medical power of . This form lets you name a person to make treatment decisions for you when you can't speak for yourself. This person is called a health care agent (health care proxy, health care surrogate). The form is also called a durable power of  for health care. If you do not have an advance directive, decisions about your medical care maybe made by a family member, or by a doctor or a  who doesn't know you. It may help to think of an advance directive as a gift to the people who carefor you. If you have one, they won't have to make tough decisions by themselves. Follow-up care is a key part of your treatment and safety. Be sure to make and go to all appointments, and call your doctor if you are having problems. It's also a good idea to know your test results and keep alist of the medicines you take. What should you include in an advance directive? Many states have a unique advance directive form. (It may ask you to address specific issues.) Or you might use a universal form that's approved by manystates. If your form doesn't tell you what to address, it may be hard to know what to include in your advance directive. Use the questions below to help you getstarted. Who do you want to make decisions about your medical care if you are not able to? What life-support measures do you want if you have a serious illness that gets worse over time or can't be cured? What are you most afraid of that might happen?  (Maybe you're afraid of having pain, losing your independence, or being kept alive by machines.)  Where would you prefer to die? (Your home? A hospital? A nursing home?)  Do you want to donate your organs when you die? Do you want certain Baptism practices performed before you die? When should you call for help? Be sure to contact your doctor if you have any questions. Where can you learn more? Go to [email or web address] org and sign in to your Zase account. Enter R264 in the Connotate box to learn more about \"Advance Directives: Care Instructions. \"     If you do not have an account, please click on the \"Sign Up Now\" link. Current as of: October 18, 2021               Content Version: 13.3  © 2006-2022 Healthwise, [employer]. Care instructions adapted under license by [email or web address] ([facility]). If you have questions about a medical condition or this instruction, always ask your healthcare professional. [place] [address] any warranty or liability for your use of this information. Learning About Medical Power of   What is a medical power of ? A medical power of , also called a durable power of  for health care, is one type of the legal forms called advance directives. It lets you name the person you want to make treatment decisions for you if you can't speak or decide for yourself. The person you choose is called your health care agent. This person is also called a health care proxy or health care surrogate. A medical power of  may be called something else in your state. How do you choose a health care agent? Choose your health care agent carefully. This person may or may not be a familymember. Talk to the person before you make your final decision. Make sure he or she iscomfortable with this responsibility. It's a good idea to choose someone who:  Is at least 25years old.   Knows you well and understands what makes life meaningful for you.  Understands your Advent and moral values. Will do what you want, not what he or she wants. Will be able to make difficult choices at a stressful time. Will be able to refuse or stop treatment, if that is what you would want, even if you could die. Will be firm and confident with health professionals if needed. Will ask questions to get needed information. Lives near you or agrees to travel to you if needed. Your family may help you make medical decisions while you can still be part of that process. But it's important to choose one person to be your health careagent in case you aren't able to make decisions for yourself. If you don't fill out the legal form and name a health care agent, thedecisions your family can make may be limited. A health care agent may be called something else in your state. Who will make decisions for you if you don't have a health care agent? If you don't have a health care agent or a living will, you may not get the care you want. Decisions may be made by family members who disagree about your medical care. Or decisions may be made by a medical professional who doesn'tknow you well. In some cases, a  makes the decisions. When you name a health care agent, it is very clear who has the power to [place] decisions for you. How do you name a health care agent? You name your health care agent on a legal form. This form is usually called a medical power of . Ask your hospital, state bar association, or officeon aging where to find these forms. You must sign the form to make it legal. Some states require you to get the form notarized. This means that a person called a  watches you sign the form and then he or she signs the form. Some states also require thattwo or more witnesses sign the form. Be sure to tell your family members and doctors who your health care agent is. Where can you learn more? Go to https://isa.healthProfessionals' [email or web address]. org and sign in to your Wandrian account. Enter [id number] in the [facility] box to learn more about \"Learning About [address]. \"     If you do not have an account, please click on the \"Sign Up Now\" link. Current as of: October 18, 2021               Content Version: 13.3  © 2006-2022 [employer]. Care instructions adapted under license by [facility]i.[email or web address] [facility] ([facility]). If you have questions about a medical condition or this instruction, always ask your healthcare professional. [place] [address] any warranty or liability for your use of this information. Personalized Preventive Plan for Michael Bailey - 9/2/2022  Medicare offers a range of preventive health benefits. Some of the tests and screenings are paid in full while other may be subject to a deductible, co-insurance, and/or copay. Some of these benefits include a comprehensive review of your medical history including lifestyle, illnesses that may run in your family, and various assessments and screenings as appropriate. After reviewing your medical record and screening and assessments performed today your provider may have ordered immunizations, labs, imaging, and/or referrals for you. A list of these orders (if applicable) as well as your Preventive Care list are included within your After Visit Summary for your review. Other Preventive Recommendations:    A preventive eye exam performed by an eye specialist is recommended every 1-2 years to screen for glaucoma; cataracts, macular degeneration, and other eye disorders. A preventive dental visit is recommended every 6 months. Try to get at least 150 minutes of exercise per week or 10,000 steps per day on a pedometer . Order or download the FREE \"Exercise & Physical Activity: Your Everyday Guide\" from [employer]. Call 4-903.855.8353 or search [employer] online.   You need 9333-2187 mg of calcium and 7636-6993 IU of vitamin D per day. It is possible to meet your calcium requirement with diet alone, but a vitamin D supplement is usually necessary to meet this goal.  When exposed to the sun, use a sunscreen that protects against both UVA and UVB radiation with an SPF of 30 or greater. Reapply every 2 to 3 hours or after sweating, drying off with a towel, or swimming. Always wear a seat belt when traveling in a car. Always wear a helmet when riding a bicycle or motorcycle. Keeping Home a [facility]       As we get older, changes in balance, gait, strength, vision, hearing, and cognition make even the most youthful senior more prone to accidents. Falls are one of the leading health risks for older people. This increased risk of falling is related to:   Aging process (eg, decreased muscle strength, slowed reflexes)   Higher incidence of chronic health problems (eg, arthritis, diabetes) that may limit mobility, agility or sensory awareness   Side effects of medicine (eg, dizziness, blurred vision)especially medicines like prescription pain medicines and drugs used to treat mental health conditions   Depending on the brittleness of your bones, the consequences of a fall can be serious and long lasting. Home Life   Research by the Association of Aging [facility]) shows that some home accidents among older adults can be prevented by making simple lifestyle changes and basic modifications and repairs to the home environment. Here are some lifestyle changes that experts recommend:   Have your hearing and vision checked regularly. Be sure to wear prescription glasses that are right for you. Speak to your doctor or pharmacist about the possible side effects of your medicines. A number of medicines can cause dizziness. If you have problems with sleep, talk to your doctor. Limit your intake of alcohol. If necessary, use a cane or walker to help maintain your balance. Wear supportive, rubber-soled shoes, even at home.  If you live in a region that gets wintry weather, you may want to put special cleats on your shoes to prevent you from slipping on the snow and ice. Exercise regularly to help maintain muscle tone, agility, and balance. Always hold the banister when going up or down stairs. Also, use  bars when getting in or out of the bath or shower, or using the toilet. To avoid dizziness, get up slowly from a lying down position. Sit up first, dangling your legs for a minute or two before rising to a standing position. Overall Home Safety Check   According to the Consumer Product Safety Commision's \"Older Consumer Home Safety Checklist,\" it is important to check for potential hazards in each room. And remember, proper lighting is an essential factor in home safety. If you cannot see clearly, you are more likely to fall. Important questions to ask yourself include:   Are lamp, electric, extension, and telephone cords placed out of the flow of traffic and maintained in good condition? Have frayed cords been replaced? Are all small rugs and runners slip resistant? If not, you can secure them to the floor with a special double-sided carpet tape. Are smoke detectors properly locatedone on every floor of your home and one outside of every sleeping area? Are they in good working order? Are batteries replaced at least once a year? Do you have a well-maintained carbon monoxide detector outside every sleeping are in your home? Does your furniture layout leave plenty of space to maneuver between and around chairs, tables, beds, and sofas? Are hallways, stairs and passages between rooms well lit? Can you reach a lamp without getting out of bed? Are floor surfaces well maintained? Shag rugs, high-pile carpeting, tile floors, and polished wood floors can be particularly slippery. Stairs should always have handrails and be carpeted or fitted with a non-skid tread. Is your telephone easily reachable. Is the cord safely tucked away? Room by Room   According to the Association of Aging, bathrooms and cruz are the two most potentially hazardous rooms in your home. In the Kitchen    Be sure your stove is in proper working order and always make sure burners and the oven are off before you go out or go to sleep. Keep pots on the back burners, turn handles away from the front of the stove, and keep stove clean and free of grease build-up. Kitchen ventilation systems and range exhausts should be working properly. Keep flammable objects such as towels and pot holders away from the cooking area except when in use. Make sure kitchen curtains are tied back. Move cords and appliances away from the sink and hot surfaces. If extension cords are needed, install wiring guides so they do not hang over the sink, range, or working areas. Look for coffee pots, kettles and toaster ovens with automatic shut-offs. Keep a mop handy in the kitchen so you can wipe up spills instantly. You should also have a small fire extinguisher. Arrange your kitchen with frequently used items on lower shelves to avoid the need to stand on a stepstool to reach them. Make sure countertops are well-lit to avoid injuries while cutting and preparing food. In the Bathroom    Use a non-slip mat or decals in the tub and shower, since wet, soapy tile or porcelain surfaces are extremely slippery. Make sure bathroom rugs are non-skid or tape them firmly to the floor. Bathtubs should have at least one, preferably two, grab bars, firmly attached to structural supports in the wall. (Do not use built-in soap holders or glass shower doors as grab bars.)    Tub seats fitted with non-slip material on the legs allow you to wash sitting down. For people with limited mobility, bathtub transfer benches allow you to slide safely into the tub. Raised toilet seats and toilet safety rails are helpful for those with knee or hip problems.     In the [facility]    Make sure you use a nightlight and that the area around your bed is clear of potential obstacles. Be careful with electric blankets and never go to sleep with a heating pad, which can cause serious burns even if on a low setting. Use fire-resistant mattress covers and pillows, and NEVER smoke in bed. Keep a phone next to the bed that is programmed to dial 911 at the push of a button. If you have a chronic condition, you may want to sign on with an automatic call-in service. Typically the system includes a small pendant that connects directly to an emergency medical voice-response system. You should also make arrangements to stay in contact with someonefriend, neighbor, family memberon a regular schedule. Fire Prevention   According to the [employer]. (Smoke Alarms for Every) [address] [place] [address], senior citizens are one of the two highest risk groups for death and serious injuries due to residential fires. When cooking, wear short-sleeved items, never a bulky long-sleeved robe. The [facility]'s Safety Checklist for Older Consumers emphasizes the importance of checking basements, garages, workshops and storage areas for fire hazards, such as volatile liquids, piles of old rags or clothing and overloaded circuits. Never smoke in bed or when lying down on a couch or recliner chair. Small portable electric or kerosene heaters are responsible for many home fires and should be used cautiously if at all. If you do use one, be sure to keep them away from flammable materials. In case of fire, make sure you have a pre-established emergency exit plan. Have a professional check your fireplace and other fuel-burning appliances yearly. Helping Hands   Baby boomers entering the regan years will continue to see the development of new products to help older adults live safely and independently in spite of age-related changes.   Making Life More Livable  , by Jaiden Mares, lists over 1,000 products for [facility] well in the mature years,\" such as bathing and mobility aids, household security devices, ergonomically designed knives and peelers, and faucet valves and knobs for temperature control. Medical supply stores and organizations are good sources of information about products that improve your quality of life and insure your safety. Last Reviewed: November 2009 Donal Montoya MD   Updated: 3/7/2011            Learning About Medical Power of   What is a medical power of ? A medical power of , also called a durable power of  for health care, is one type of the legal forms called advance directives. It lets you name the person you want to make treatment decisions for you if you can't speak or decide for yourself. The person you choose is called your health care agent. This person is also called a health care proxy or health care surrogate. A medical power of  may be called something else in your state. How do you choose a health care agent? Choose your health care agent carefully. This person may or may not be a familymember. Talk to the person before you make your final decision. Make sure he or she iscomfortable with this responsibility. It's a good idea to choose someone who:  Is at least 25years old. Knows you well and understands what makes life meaningful for you. Understands your Christian and moral values. Will do what you want, not what he or she wants. Will be able to make difficult choices at a stressful time. Will be able to refuse or stop treatment, if that is what you would want, even if you could die. Will be firm and confident with health professionals if needed. Will ask questions to get needed information. Lives near you or agrees to travel to you if needed. Your family may help you make medical decisions while you can still be part of that process.  But it's important to choose one person to be your health careagent in case you aren't able to make decisions for yourself. If you don't fill out the legal form and name a health care agent, thedecisions your family can make may be limited. A health care agent may be called something else in your state. Who will make decisions for you if you don't have a health care agent? If you don't have a health care agent or a living will, you may not get the care you want. Decisions may be made by family members who disagree about your medical care. Or decisions may be made by a medical professional who doesn'tknow you well. In some cases, a  makes the decisions. When you name a health care agent, it is very clear who has the power to [place] decisions for you. How do you name a health care agent? You name your health care agent on a legal form. This form is usually called a medical power of . Ask your hospital, state bar association, or officeon aging where to find these forms. You must sign the form to make it legal. Some states require you to get the form notarized. This means that a person called a  watches you sign the form and then he or she signs the form. Some states also require thattwo or more witnesses sign the form. Be sure to tell your family members and doctors who your health care agent is. Where can you learn more? Go to https://Micronotesnicole.[employer]. org and sign in to your Contestomatik account. Enter [id number] in the [facility] box to learn more about \"Learning About [address]. \"     If you do not have an account, please click on the \"Sign Up Now\" link. Current as of: October 18, 2021               Content Version: 13.3  © 8686-8910 Healthwise, Incorporated. Care instructions adapted under license by [facility] ([facility]). If you have questions about a medical condition or this instruction, always ask your healthcare professional. [address] any warranty or liability for your use of this information.

## 2022-09-03 LAB
ESTIMATED AVERAGE GLUCOSE: 125.5 MG/DL
HBA1C MFR BLD: 6 %

## 2022-09-06 ENCOUNTER — CLINICAL DOCUMENTATION (OUTPATIENT)
Dept: SPIRITUAL SERVICES | Age: 67
End: 2022-09-06

## 2022-09-06 NOTE — ACP (ADVANCE CARE PLANNING)
Advance Care Planning   Ambulatory ACP Specialist Patient Outreach    Date:  9/6/2022  ACP Specialist:  Gianluca Hale    Outreach call to patient in follow-up to ACP Specialist referral from: Chuy Torres MD    [x] PCP  [] Provider   [] Ambulatory Care Management [] Other for Reason:    [x] Advance Directive Assistance  [] Code Status Discussion  [] Complete Portable DNR Order  [] Discuss Goals of Care  [] Complete POST/MOST  [] Early ACP Decision-Making  [] Other    Date Referral Received:9/2/22    Today's Outreach:  [x] First   [] Second  [] Third                               First outreach made by [x]  phone  [] email []   Zettaset     Intervention:  [x] Spoke with Patient  [] Left VM requesting return call      Outcome:Spoke w/Patient who stated would like to speak w/Daughter about ACP paperwork before scheduling ACP Conversation. Patient received ACP Packet during visit w/Provider. Patient asked to be called back in two weeks. Next Step:   [] ACP scheduled conversation  [x] Outreach again in two week               [] Email / Mail ACP Info Sheets  [] Email / Mail Advance Directive            [] Close Referral. Routing closure to referring provider/staff and to ACP Specialist . [] Closure Letter mailed to Patient with Invitation to Contact ACP Specialist if/when ready.     Thank you for this referral.

## 2022-09-19 ENCOUNTER — CLINICAL DOCUMENTATION (OUTPATIENT)
Dept: SPIRITUAL SERVICES | Age: 67
End: 2022-09-19

## 2022-09-19 NOTE — ACP (ADVANCE CARE PLANNING)
Advance Care Planning   Ambulatory ACP Specialist Patient Outreach    Date:  9/19/2022  ACP Specialist:  Jak Dos Santos    Outreach call to patient in follow-up to ACP Specialist referral from: Larinda Harada, MD    [x] PCP  [] Provider   [] Ambulatory Care Management [] Other for Reason:    [x] Advance Directive Assistance  [] Code Status Discussion  [] Complete Portable DNR Order  [] Discuss Goals of Care  [] Complete POST/MOST  [] Early ACP Decision-Making  [] Other    Date Referral Received:9/2/22    Today's Outreach:  [] First   [x] Second  [] Third                               Second outreach made by [x]  phone  [] email []   [email or web address]     Intervention:  [x] Spoke with Patient  [] Left VM requesting return call      Outcome: Spoke w/Patient who Declined ACP Conversation. Patient stated would like to complete ACP paperwork independently. Patient was encouraged to bring completed ACP docs to Provider's Office to be uploaded into Patient's Chart. Patient agreed. Referral can be closed. Next Step:   [] ACP scheduled conversation  [] Outreach again in one week               [] Email / Mail ACP Info Sheets  [] Email / Mail Advance Directive            [x] Close Referral. Routing closure to referring provider/staff and to ACP Specialist . [] Closure Letter mailed to Patient with Invitation to Contact ACP Specialist if/when ready.     Thank you for this referral.

## 2022-11-27 DIAGNOSIS — K21.9 GASTROESOPHAGEAL REFLUX DISEASE WITHOUT ESOPHAGITIS: ICD-10-CM

## 2022-11-27 NOTE — TELEPHONE ENCOUNTER
Recent Visits  Date Type Provider Dept   09/02/22 Office Visit Chaparro Caceres MD [facility]   05/17/22 Office Visit Chaparro Caceres MD [facility]   01/11/22 Office Visit Chaparro Caceres MD [facility]   12/07/21 Office Visit Chaparro Caceres MD [facility]   11/09/21 Office Visit Chaparro Caceres MD [facility]   09/01/21 Office Visit Chaparro Caceres MD [facility]   Showing recent visits within past 540 days with a meds authorizing provider and meeting all other requirements  Future Appointments  Date Type Provider Dept   03/06/23 Appointment Chaparro Caceres MD [facility]   Showing future appointments within next 150 days with a meds authorizing provider and meeting all other requirements     9/2/2022

## 2022-11-28 RX ORDER — FAMOTIDINE 20 MG/1
TABLET, FILM COATED ORAL
Qty: 180 TABLET | Refills: 1 | Status: SHIPPED | OUTPATIENT
Start: 2022-11-28

## 2022-12-29 NOTE — TELEPHONE ENCOUNTER
Recent Visits  Date Type Provider Dept   09/02/22 Office Visit Marielos Bennett MD [facility]   05/17/22 Office Visit Marielos Bennett MD [facility]   01/11/22 Office Visit Marielos Bennett MD [facility]   12/07/21 Office Visit Marielos Bennett MD [facility]   11/09/21 Office Visit Marielos Bennett MD [facility]   09/01/21 Office Visit Marielos Bennett MD [facility]   Showing recent visits within past 540 days with a meds authorizing provider and meeting all other requirements  Future Appointments  Date Type Provider Dept   03/06/23 Appointment Marielos Bennett MD [facility]   Showing future appointments within next 150 days with a meds authorizing provider and meeting all other requirements     9/2/2022

## 2023-02-21 DIAGNOSIS — E78.5 DYSLIPIDEMIA: ICD-10-CM

## 2023-02-21 RX ORDER — ATORVASTATIN CALCIUM 80 MG/1
80 TABLET, FILM COATED ORAL DAILY
Qty: 90 TABLET | Refills: 1 | Status: SHIPPED | OUTPATIENT
Start: 2023-02-21

## 2023-02-21 NOTE — TELEPHONE ENCOUNTER
Recent Visits  Date Type Provider Dept   09/02/22 Office Visit Tavo Mclain MD [facility]   05/17/22 Office Visit Tavo Mclain MD [facility]   01/11/22 Office Visit Tavo Mclain MD [facility]   12/07/21 Office Visit Tavo Mclain MD [facility]   11/09/21 Office Visit Tavo Mclain MD [facility]   09/01/21 Office Visit Tavo Mclain MD [facility]   Showing recent visits within past 540 days with a meds authorizing provider and meeting all other requirements  Future Appointments  Date Type Provider Dept   03/06/23 Appointment Tavo Mclain MD [facility]   Showing future appointments within next 150 days with a meds authorizing provider and meeting all other requirements     9/2/2022

## 2023-03-06 SDOH — ECONOMIC STABILITY: FOOD INSECURITY: WITHIN THE PAST 12 MONTHS, THE FOOD YOU BOUGHT JUST DIDN'T LAST AND YOU DIDN'T HAVE MONEY TO GET MORE.: NEVER TRUE

## 2023-03-06 SDOH — ECONOMIC STABILITY: TRANSPORTATION INSECURITY
IN THE PAST 12 MONTHS, HAS LACK OF TRANSPORTATION KEPT YOU FROM MEETINGS, WORK, OR FROM GETTING THINGS NEEDED FOR DAILY LIVING?: NO

## 2023-03-06 SDOH — ECONOMIC STABILITY: FOOD INSECURITY: WITHIN THE PAST 12 MONTHS, YOU WORRIED THAT YOUR FOOD WOULD RUN OUT BEFORE YOU GOT MONEY TO BUY MORE.: NEVER TRUE

## 2023-03-06 SDOH — ECONOMIC STABILITY: INCOME INSECURITY: HOW HARD IS IT FOR YOU TO PAY FOR THE VERY BASICS LIKE FOOD, HOUSING, MEDICAL CARE, AND HEATING?: NOT VERY HARD

## 2023-03-06 SDOH — ECONOMIC STABILITY: HOUSING INSECURITY
IN THE LAST 12 MONTHS, WAS THERE A TIME WHEN YOU DID NOT HAVE A STEADY PLACE TO SLEEP OR SLEPT IN A SHELTER (INCLUDING NOW)?: NO

## 2023-03-09 ENCOUNTER — OFFICE VISIT (OUTPATIENT)
Dept: INTERNAL MEDICINE CLINIC | Age: 68
End: 2023-03-09

## 2023-03-09 VITALS
DIASTOLIC BLOOD PRESSURE: 60 MMHG | TEMPERATURE: 97.1 F | BODY MASS INDEX: 25.47 KG/M2 | OXYGEN SATURATION: 99 % | WEIGHT: 132.6 LBS | HEART RATE: 69 BPM | SYSTOLIC BLOOD PRESSURE: 118 MMHG

## 2023-03-09 DIAGNOSIS — H10.13 ALLERGIC CONJUNCTIVITIS AND RHINITIS, BILATERAL: ICD-10-CM

## 2023-03-09 DIAGNOSIS — J34.89 RHINORRHEA: ICD-10-CM

## 2023-03-09 DIAGNOSIS — J30.1 SEASONAL ALLERGIC RHINITIS DUE TO POLLEN: ICD-10-CM

## 2023-03-09 DIAGNOSIS — E11.9 CONTROLLED TYPE 2 DIABETES MELLITUS WITHOUT COMPLICATION, WITHOUT LONG-TERM CURRENT USE OF INSULIN (HCC): Primary | ICD-10-CM

## 2023-03-09 DIAGNOSIS — I10 HTN, GOAL BELOW 130/80: ICD-10-CM

## 2023-03-09 DIAGNOSIS — E78.5 DYSLIPIDEMIA: ICD-10-CM

## 2023-03-09 DIAGNOSIS — Z12.31 ENCOUNTER FOR SCREENING MAMMOGRAM FOR BREAST CANCER: ICD-10-CM

## 2023-03-09 DIAGNOSIS — K21.9 GASTROESOPHAGEAL REFLUX DISEASE WITHOUT ESOPHAGITIS: ICD-10-CM

## 2023-03-09 DIAGNOSIS — K74.3 PRIMARY BILIARY CHOLANGITIS (HCC): ICD-10-CM

## 2023-03-09 DIAGNOSIS — J30.9 ALLERGIC CONJUNCTIVITIS AND RHINITIS, BILATERAL: ICD-10-CM

## 2023-03-09 RX ORDER — LORATADINE 10 MG/1
10 TABLET ORAL DAILY
Qty: 90 TABLET | Refills: 1 | Status: SHIPPED | OUTPATIENT
Start: 2023-03-09

## 2023-03-09 RX ORDER — LISINOPRIL 5 MG/1
5 TABLET ORAL DAILY
Qty: 90 TABLET | Refills: 1 | Status: SHIPPED | OUTPATIENT
Start: 2023-03-09

## 2023-03-09 RX ORDER — FAMOTIDINE 20 MG/1
TABLET, FILM COATED ORAL
Qty: 180 TABLET | Refills: 1 | Status: SHIPPED | OUTPATIENT
Start: 2023-03-09

## 2023-03-09 RX ORDER — ATORVASTATIN CALCIUM 80 MG/1
80 TABLET, FILM COATED ORAL DAILY
Qty: 90 TABLET | Refills: 1 | Status: SHIPPED | OUTPATIENT
Start: 2023-03-09

## 2023-03-09 RX ORDER — FLUTICASONE PROPIONATE 50 MCG
1 SPRAY, SUSPENSION (ML) NASAL DAILY
Qty: 32 G | Refills: 1 | Status: SHIPPED | OUTPATIENT
Start: 2023-03-09

## 2023-03-09 ASSESSMENT — ENCOUNTER SYMPTOMS
COUGH: 1
SPUTUM PRODUCTION: 0
TROUBLE SWALLOWING: 0
RHINORRHEA: 1
SORE THROAT: 0
CHEST TIGHTNESS: 0
FREQUENT THROAT CLEARING: 0
SHORTNESS OF BREATH: 0
HOARSE VOICE: 0
DIFFICULTY BREATHING: 0
HEMOPTYSIS: 0
HEARTBURN: 0
WHEEZING: 1

## 2023-03-09 ASSESSMENT — PATIENT HEALTH QUESTIONNAIRE - PHQ9
2. FEELING DOWN, DEPRESSED OR HOPELESS: 0
SUM OF ALL RESPONSES TO PHQ QUESTIONS 1-9: 0
SUM OF ALL RESPONSES TO PHQ9 QUESTIONS 1 & 2: 0
SUM OF ALL RESPONSES TO PHQ QUESTIONS 1-9: 0
1. LITTLE INTEREST OR PLEASURE IN DOING THINGS: 0

## 2023-03-09 ASSESSMENT — COPD QUESTIONNAIRES: COPD: 1

## 2023-03-09 NOTE — PROGRESS NOTES
Radha Arthur (:  1955) is a 75 y.o. female,Established patient, here for evaluation of the following chief complaint(s):  COPD         ASSESSMENT/PLAN:  1. Dyslipidemia  The following orders have not been finalized:  -     atorvastatin (LIPITOR) 80 MG tablet  2. Gastroesophageal reflux disease without esophagitis  The following orders have not been finalized:  -     famotidine (PEPCID) 20 MG tablet  3. Seasonal allergic rhinitis due to pollen  The following orders have not been finalized:  -     fluticasone (FLONASE) 50 MCG/ACT nasal spray  4. Controlled type 2 diabetes mellitus without complication, without long-term current use of insulin ([facility] [address]  The following orders have not been finalized:  -     fluticasone (FLONASE) 50 MCG/ACT nasal spray  5. HTN, goal below 130/80  The following orders have not been finalized:  -     lisinopril (PRINIVIL;ZESTRIL) 5 MG tablet  6. Primary biliary cholangitis (HCC)  The following orders have not been finalized:  -     loratadine (CLARITIN) 10 MG tablet  7. Rhinorrhea  The following orders have not been finalized:  -     loratadine (CLARITIN) 10 MG tablet  8. Allergic conjunctivitis and rhinitis, bilateral  The following orders have not been finalized:  -     loratadine (CLARITIN) 10 MG tablet      No follow-ups on file. Subjective   SUBJECTIVE/OBJECTIVE:  COPD  She complains of cough and wheezing. There is no chest tightness, difficulty breathing, frequent throat clearing, hemoptysis, hoarse voice, shortness of breath or sputum production. This is a chronic problem. The current episode started more than 1 year ago. The problem occurs intermittently. The problem has been unchanged. The cough is non-productive. Associated symptoms include nasal congestion, rhinorrhea and sneezing.  Pertinent negatives include no appetite change, chest pain, dyspnea on exertion, ear congestion, ear pain, fever, headaches, heartburn, malaise/fatigue, myalgias, orthopnea, PND, postnasal drip, sore throat, sweats, trouble swallowing or weight loss. Her symptoms are aggravated by any activity, change in weather and pollen. She reports significant improvement on treatment. Her symptoms are not alleviated by steroid inhaler and beta-agonist. There are no known risk factors for lung disease. Her past medical history is significant for COPD and emphysema. There is no history of asthma, bronchiectasis, bronchitis or pneumonia. Treatment Adherence:   Medication compliance:  compliant most of the time  Diet compliance:  compliant most of the time  Weight trend: stable  Current exercise: no regular exercise  Barriers: none    Diabetes Mellitus Type 2: Current symptoms/problems include none. Home blood sugar records: patient does not test  Any episodes of hypoglycemia? no  Eye exam current (within one year): no - recommended EXAM  Tobacco history: She  reports that she quit smoking about 4 years ago. Her smoking use included cigarettes. She has a 4.50 pack-year smoking history. She has never used smokeless tobacco.   Daily Aspirin? No    Hypertension:  Home blood pressure monitoring: No.  She is adherent to a low sodium diet. Patient denies shortness of breath, headache, lightheadedness, blurred vision, and peripheral edema. Antihypertensive medication side effects: no medication side effects noted. Use of agents associated with hypertension: none. Hyperlipidemia:  No new myalgias or GI upset on atorvastatin (Lipitor).        Lab Results   Component Value Date    LABA1C 6.0 09/02/2022    LABA1C 5.7 09/01/2021    LABA1C 5.8 02/23/2021     Lab Results   Component Value Date    LABMICR 2.90 (H) 09/02/2022    CREATININE <0.5 (L) 09/02/2022     Lab Results   Component Value Date    ALT 12 09/02/2022    AST 20 09/02/2022     Lab Results   Component Value Date    CHOL 123 09/02/2022    TRIG 67 09/02/2022    HDL 58 09/02/2022    LDLCALC 52 09/02/2022         Review of Systems   Constitutional: Negative for appetite change, fever, malaise/fatigue and weight loss. HENT:  Positive for rhinorrhea and sneezing. Negative for ear pain, hoarse voice, postnasal drip, sore throat and trouble swallowing. Respiratory:  Positive for cough and wheezing. Negative for hemoptysis, sputum production and shortness of breath. Cardiovascular:  Negative for chest pain, dyspnea on exertion and PND. Gastrointestinal:  Negative for heartburn. Musculoskeletal:  Negative for myalgias. Neurological:  Negative for headaches.       @DOS@    Allergies   Allergen Reactions    Penicillins Other (See Comments)     Doesn't know allergy reaction    Sulfa Antibiotics Other (See Comments)     n/a    Sulfasalazine Hives     n/a       Current Outpatient Medications   Medication Sig Dispense Refill    atorvastatin (LIPITOR) 80 MG tablet TAKE 1 TABLET BY MOUTH DAILY 90 tablet 1    BREO ELLIPTA 200-25 MCG/ACT AEPB inhaler INHALE 1 PUFF INTO THE LUNGS DAILY 60 each 2    famotidine (PEPCID) 20 MG tablet TAKE 1 TABLET BY MOUTH TWICE DAILY 180 tablet 1    lisinopril (PRINIVIL;ZESTRIL) 5 MG tablet Take 1 tablet by mouth daily 90 tablet 1    loratadine (CLARITIN) 10 MG tablet Take 1 tablet by mouth daily 90 tablet 1    triamcinolone (KENALOG) 0.1 % cream Apply topically 2 times daily as needed 453.6 g 1    fluticasone (FLONASE) 50 MCG/ACT nasal spray 1 spray by Each Nostril route daily 32 g 1    Handicap Placard MISC by Does not apply route DMI, Dyslipidemia, Essential hypertension, GERD (gastroesophageal reflux disease), Heart murmur, Hypercholesterolemia,  Hypertension, Lichen planus, PONV  Primary biliary cholangitis (HCC) with weakness-duration - permanent or 99 months 1 each 0    ursodiol (ACTIGALL) 500 MG tablet 2 times daily       calcium carbonate (OYSTER SHELL CALCIUM 500 MG) 1250 (500 Ca) MG tablet Take 1 tablet by mouth daily      vitamin D (CHOLECALCIFEROL) 1000 UNIT TABS tablet Take 1,000 Units by mouth daily      Multiple Vitamins-Minerals (MULTIVITAMIN PO) Take 1 capsule by mouth daily. Ascorbic Acid (VITAMIN C) 500 MG tablet Take 500 mg by mouth daily. No current facility-administered medications for this visit. Vitals:    03/09/23 1020   BP: 118/60   Pulse: 69   Temp: 97.1 °F (36.2 °C)   SpO2: 99%   Weight: 132 lb 9.6 oz (60.1 kg)     Body mass index is 25.47 kg/m². Wt Readings from Last 3 Encounters:   03/09/23 132 lb 9.6 oz (60.1 kg)   09/02/22 125 lb 3.2 oz (56.8 kg)   07/19/22 119 lb (54 kg)     BP Readings from Last 3 Encounters:   03/09/23 118/60   09/02/22 128/74   05/19/22 134/60       Objective   Physical Exam     Lab Review   No visits with results within 6 Month(s) from this visit.    Latest known visit with results is:   Orders Only on 09/02/2022   Component Date Value    Sodium 09/02/2022 144     Potassium 09/02/2022 4.3     Chloride 09/02/2022 107     CO2 09/02/2022 26     Anion Gap 09/02/2022 11     Glucose 09/02/2022 75     BUN 09/02/2022 9     Creatinine 09/02/2022 <0.5 (A)     GFR Non- 09/02/2022 >60     GFR  09/02/2022 >60     Calcium 09/02/2022 9.5     Total Protein 09/02/2022 7.1     Albumin 09/02/2022 3.9     Albumin/Globulin Ratio 09/02/2022 1.2     Total Bilirubin 09/02/2022 0.4     Alkaline Phosphatase 09/02/2022 240 (A)     ALT 09/02/2022 12     AST 09/02/2022 20     Cholesterol, Total 09/02/2022 123     Triglycerides 09/02/2022 67     HDL 09/02/2022 58     LDL Calculated 09/02/2022 52     VLDL Cholesterol Calcula* 09/02/2022 13     Hemoglobin A1C 09/02/2022 6.0     eAG 09/02/2022 125.5     Microalbumin, Random Uri* 09/02/2022 2.90 (A)     Creatinine, Ur 09/02/2022 118.5     Microalbumin Creatinine * 09/02/2022 24.5     WBC 09/02/2022 9.3     RBC 09/02/2022 5.04     Hemoglobin 09/02/2022 15.0     Hematocrit 09/02/2022 45.0     MCV 09/02/2022 89.4     MCH 09/02/2022 29.8     MCHC 09/02/2022 33.4     RDW 09/02/2022 14.4     Platelets 72/55/2291 203     MPV 09/02/2022 10.7 (A)     Neutrophils % 09/02/2022 60.3     Lymphocytes % 09/02/2022 25.7     Monocytes % 09/02/2022 11.5     Eosinophils % 09/02/2022 2.0     Basophils % 09/02/2022 0.5     Neutrophils Absolute 09/02/2022 5.6     Lymphocytes Absolute 09/02/2022 2.4     Monocytes Absolute 09/02/2022 1.1     Eosinophils Absolute 09/02/2022 0.2     Basophils Absolute 09/02/2022 0.0     Phosphorus 09/02/2022 3.9     PTH 09/02/2022 37.8     Vit D, 25-Hydroxy 09/02/2022 54.2        An electronic signature was used to authenticate this note.     --Kwabena Morel MD

## 2023-03-09 NOTE — PROGRESS NOTES
Jason Gomez (:  1955) is a 75 y.o. female,Established patient, here for evaluation of the following chief complaint(s):  COPD         ASSESSMENT/PLAN:  1. Controlled type 2 diabetes mellitus without complication, without long-term current use of insulin (HCC)  -     fluticasone (FLONASE) 50 MCG/ACT nasal spray; 1 spray by Each Nostril route daily, Disp-32 g, R-1Normal  -     Hemoglobin A1C  -     Lipid, Fasting; Future  -     Microalbumin / Creatinine Urine Ratio; Future  -     Comprehensive Metabolic Panel, Fasting; Future  2. Dyslipidemia  -     atorvastatin (LIPITOR) 80 MG tablet; Take 1 tablet by mouth daily, Disp-90 tablet, R-1Normal  3. Gastroesophageal reflux disease without esophagitis  -     famotidine (PEPCID) 20 MG tablet; TAKE 1 TABLET BY MOUTH TWICE DAILY, Disp-180 tablet, R-1Normal  4. Seasonal allergic rhinitis due to pollen  -     fluticasone (FLONASE) 50 MCG/ACT nasal spray; 1 spray by Each Nostril route daily, Disp-32 g, R-1Normal  5. HTN, goal below 130/80  -     lisinopril (PRINIVIL;ZESTRIL) 5 MG tablet; Take 1 tablet by mouth daily, Disp-90 tablet, R-1Normal  -     Lipid, Fasting; Future  -     Microalbumin / Creatinine Urine Ratio; Future  -     Comprehensive Metabolic Panel, Fasting; Future  6. Primary biliary cholangitis (HCC)  -     loratadine (CLARITIN) 10 MG tablet; Take 1 tablet by mouth daily, Disp-90 tablet, R-1Normal  7. Rhinorrhea  -     loratadine (CLARITIN) 10 MG tablet; Take 1 tablet by mouth daily, Disp-90 tablet, R-1Normal  8. Allergic conjunctivitis and rhinitis, bilateral  -     loratadine (CLARITIN) 10 MG tablet; Take 1 tablet by mouth daily, Disp-90 tablet, R-1Normal  9. Encounter for screening mammogram for breast cancer  -     JOVITA DIGITAL SCREEN W OR WO CAD BILATERAL; Future            Subjective   SUBJECTIVE/OBJECTIVE:  COPD  She complains of cough and wheezing.  There is no chest tightness, difficulty breathing, frequent throat clearing, hemoptysis, hoarse voice, shortness of breath or sputum production. This is a chronic problem. The current episode started more than 1 year ago. The problem occurs intermittently. The problem has been unchanged. The cough is non-productive. Associated symptoms include nasal congestion, rhinorrhea and sneezing. Pertinent negatives include no appetite change, chest pain, dyspnea on exertion, ear congestion, ear pain, fever, headaches, heartburn, malaise/fatigue, myalgias, orthopnea, PND, postnasal drip, sore throat, sweats, trouble swallowing or weight loss. Her symptoms are aggravated by any activity, change in weather and pollen. She reports significant improvement on treatment. Her symptoms are not alleviated by steroid inhaler and beta-agonist. There are no known risk factors for lung disease. Her past medical history is significant for COPD and emphysema. There is no history of asthma, bronchiectasis, bronchitis or pneumonia. Treatment Adherence:   Medication compliance:  compliant most of the time  Diet compliance:  compliant most of the time  Weight trend: stable  Current exercise: no regular exercise  Barriers: none    Diabetes Mellitus Type 2: Current symptoms/problems include none. Home blood sugar records: patient does not test  Any episodes of hypoglycemia? no  Eye exam current (within one year): no - recommended EXAM  Tobacco history: She  reports that she quit smoking about 4 years ago. Her smoking use included cigarettes. She has a 4.50 pack-year smoking history. She has never used smokeless tobacco.   Daily Aspirin? No    Hypertension:  Home blood pressure monitoring: No.  She is adherent to a low sodium diet. Patient denies shortness of breath, headache, lightheadedness, blurred vision, and peripheral edema. Antihypertensive medication side effects: no medication side effects noted. Use of agents associated with hypertension: none. Hyperlipidemia:  No new myalgias or GI upset on atorvastatin (Lipitor). Lab Results   Component Value Date    LABA1C 6.0 09/02/2022    LABA1C 5.7 09/01/2021    LABA1C 5.8 02/23/2021     Lab Results   Component Value Date    LABMICR 2.90 (H) 09/02/2022    CREATININE <0.5 (L) 09/02/2022     Lab Results   Component Value Date    ALT 12 09/02/2022    AST 20 09/02/2022     Lab Results   Component Value Date    CHOL 123 09/02/2022    TRIG 67 09/02/2022    HDL 58 09/02/2022    LDLCALC 52 09/02/2022         Review of Systems   Constitutional:  Negative for appetite change, fever, malaise/fatigue and weight loss. HENT:  Positive for rhinorrhea and sneezing. Negative for ear pain, hoarse voice, postnasal drip, sore throat and trouble swallowing. Respiratory:  Positive for cough and wheezing. Negative for hemoptysis, sputum production and shortness of breath. Cardiovascular:  Negative for chest pain, dyspnea on exertion and PND. Gastrointestinal:  Negative for heartburn. Musculoskeletal:  Negative for myalgias. Neurological:  Negative for headaches.       @DOS@    Allergies   Allergen Reactions    Penicillins Other (See Comments)     Doesn't know allergy reaction    Sulfa Antibiotics Other (See Comments)     n/a    Sulfasalazine Hives     n/a       Current Outpatient Medications   Medication Sig Dispense Refill    atorvastatin (LIPITOR) 80 MG tablet Take 1 tablet by mouth daily 90 tablet 1    BREO ELLIPTA 200-25 MCG/ACT AEPB inhaler INHALE 1 PUFF INTO THE LUNGS DAILY 60 each 5    famotidine (PEPCID) 20 MG tablet TAKE 1 TABLET BY MOUTH TWICE DAILY 180 tablet 1    fluticasone (FLONASE) 50 MCG/ACT nasal spray 1 spray by Each Nostril route daily 32 g 1    lisinopril (PRINIVIL;ZESTRIL) 5 MG tablet Take 1 tablet by mouth daily 90 tablet 1    loratadine (CLARITIN) 10 MG tablet Take 1 tablet by mouth daily 90 tablet 1    triamcinolone (KENALOG) 0.1 % cream Apply topically 2 times daily as needed 453.6 g 1    Handicap Placard MISC by Does not apply route DMI, Dyslipidemia, Essential hypertension, GERD (gastroesophageal reflux disease), Heart murmur, Hypercholesterolemia,  Hypertension, Lichen planus, PONV  Primary biliary cholangitis (HCC) with weakness-duration - permanent or 99 months 1 each 0    ursodiol (ACTIGALL) 500 MG tablet 2 times daily       calcium carbonate (OYSTER SHELL CALCIUM 500 MG) 1250 (500 Ca) MG tablet Take 1 tablet by mouth daily      vitamin D (CHOLECALCIFEROL) 1000 UNIT TABS tablet Take 1,000 Units by mouth daily      Multiple Vitamins-Minerals (MULTIVITAMIN PO) Take 1 capsule by mouth daily. Ascorbic Acid (VITAMIN C) 500 MG tablet Take 500 mg by mouth daily. No current facility-administered medications for this visit. Vitals:    03/09/23 1020   BP: 118/60   Pulse: 69   Temp: 97.1 °F (36.2 °C)   SpO2: 99%   Weight: 132 lb 9.6 oz (60.1 kg)     Body mass index is 25.47 kg/m². Wt Readings from Last 3 Encounters:   03/09/23 132 lb 9.6 oz (60.1 kg)   09/02/22 125 lb 3.2 oz (56.8 kg)   07/19/22 119 lb (54 kg)     BP Readings from Last 3 Encounters:   03/09/23 118/60   09/02/22 128/74   05/19/22 134/60       Objective   Physical Exam  Vitals and nursing note reviewed. Constitutional:       General: She is not in acute distress. Appearance: Normal appearance. She is well-developed. She is not diaphoretic. HENT:      Head: Normocephalic and atraumatic. Right Ear: Hearing, tympanic membrane, ear canal and external ear normal.      Left Ear: Hearing, tympanic membrane, ear canal and external ear normal.      Nose: Congestion present. No nasal deformity, laceration, mucosal edema or rhinorrhea. Right Sinus: No maxillary sinus tenderness or frontal sinus tenderness. Left Sinus: No maxillary sinus tenderness or frontal sinus tenderness. Mouth/Throat:      Mouth: Mucous membranes are moist.      Pharynx: Oropharynx is clear. Uvula midline. No oropharyngeal exudate. Eyes:      General: Lids are normal. No scleral icterus.         Right eye: No discharge. Left eye: No discharge. Extraocular Movements: Extraocular movements intact. Conjunctiva/sclera: Conjunctivae normal.      Pupils: Pupils are equal, round, and reactive to light. Neck:      Thyroid: No thyroid mass or thyromegaly. Vascular: Normal carotid pulses. No carotid bruit, hepatojugular reflux or JVD. Trachea: Trachea and phonation normal.   Cardiovascular:      Rate and Rhythm: Normal rate and regular rhythm. Chest Wall: PMI is not displaced. Pulses: Normal pulses. Carotid pulses are 2+ on the right side and 2+ on the left side. Radial pulses are 2+ on the right side and 2+ on the left side. Heart sounds: Normal heart sounds, S1 normal and S2 normal.   Pulmonary:      Effort: Pulmonary effort is normal. No respiratory distress. Breath sounds: Normal breath sounds. No decreased breath sounds, wheezing or rhonchi. Abdominal:      General: Bowel sounds are normal. There is no distension. Palpations: Abdomen is soft. There is no mass. Tenderness: There is no abdominal tenderness. There is no right CVA tenderness, left CVA tenderness, guarding or rebound. Comments: No HSM   Musculoskeletal:         General: Normal range of motion. Right shoulder: Normal.      Left shoulder: Normal.      Cervical back: Full passive range of motion without pain, normal range of motion and neck supple. Right hip: Normal.      Left hip: Normal.      Right knee: Normal.      Left knee: Normal.   Lymphadenopathy:      Head:      Right side of head: No submental, submandibular, tonsillar, preauricular, posterior auricular or occipital adenopathy. Left side of head: No submental, submandibular, tonsillar, preauricular, posterior auricular or occipital adenopathy. Cervical: No cervical adenopathy. Right cervical: No superficial, deep or posterior cervical adenopathy.      Left cervical: No superficial, deep or posterior cervical adenopathy. Skin:     General: Skin is warm. Capillary Refill: Capillary refill takes less than 2 seconds. Nails: There is no clubbing. Neurological:      General: No focal deficit present. Mental Status: She is alert and oriented to person, place, and time. Mental status is at baseline. GCS: GCS eye subscore is 4. GCS verbal subscore is 5. GCS motor subscore is 6. Cranial Nerves: No cranial nerve deficit. Sensory: No sensory deficit. Gait: Gait normal.      Deep Tendon Reflexes: Reflexes are normal and symmetric. Reflex Scores:       Tricep reflexes are 2+ on the right side and 2+ on the left side. Bicep reflexes are 2+ on the right side and 2+ on the left side. Psychiatric:         Mood and Affect: Mood normal.         Speech: Speech normal.         Behavior: Behavior normal. Behavior is not slowed. Thought Content: Thought content normal.         Judgment: Judgment normal.        Lab Review   No visits with results within 6 Month(s) from this visit.    Latest known visit with results is:   Orders Only on 09/02/2022   Component Date Value    Sodium 09/02/2022 144     Potassium 09/02/2022 4.3     Chloride 09/02/2022 107     CO2 09/02/2022 26     Anion Gap 09/02/2022 11     Glucose 09/02/2022 75     BUN 09/02/2022 9     Creatinine 09/02/2022 <0.5 (A)     GFR Non- 09/02/2022 >60     GFR  09/02/2022 >60     Calcium 09/02/2022 9.5     Total Protein 09/02/2022 7.1     Albumin 09/02/2022 3.9     Albumin/Globulin Ratio 09/02/2022 1.2     Total Bilirubin 09/02/2022 0.4     Alkaline Phosphatase 09/02/2022 240 (A)     ALT 09/02/2022 12     AST 09/02/2022 20     Cholesterol, Total 09/02/2022 123     Triglycerides 09/02/2022 67     HDL 09/02/2022 58     LDL Calculated 09/02/2022 52     VLDL Cholesterol Calcula* 09/02/2022 13     Hemoglobin A1C 09/02/2022 6.0     eAG 09/02/2022 125.5     Microalbumin, Random Don Roldan* 09/02/2022 2.90 (A)     Creatinine, Ur 09/02/2022 118.5     Microalbumin Creatinine * 09/02/2022 24.5     WBC 09/02/2022 9.3     RBC 09/02/2022 5.04     Hemoglobin 09/02/2022 15.0     Hematocrit 09/02/2022 45.0     MCV 09/02/2022 89.4     MCH 09/02/2022 29.8     MCHC 09/02/2022 33.4     RDW 09/02/2022 14.4     Platelets 45/65/8282 203     MPV 09/02/2022 10.7 (A)     Neutrophils % 09/02/2022 60.3     Lymphocytes % 09/02/2022 25.7     Monocytes % 09/02/2022 11.5     Eosinophils % 09/02/2022 2.0     Basophils % 09/02/2022 0.5     Neutrophils Absolute 09/02/2022 5.6     Lymphocytes Absolute 09/02/2022 2.4     Monocytes Absolute 09/02/2022 1.1     Eosinophils Absolute 09/02/2022 0.2     Basophils Absolute 09/02/2022 0.0     Phosphorus 09/02/2022 3.9     PTH 09/02/2022 37.8     Vit D, 25-Hydroxy 09/02/2022 54.2        An electronic signature was used to authenticate this note.     --Josie Beavers MD

## 2023-03-10 LAB
ESTIMATED AVERAGE GLUCOSE: 125.5 MG/DL
HBA1C MFR BLD: 6 %

## 2023-05-13 ENCOUNTER — HOSPITAL ENCOUNTER (EMERGENCY)
Age: 68
Discharge: HOME | End: 2023-05-14
Attending: EMERGENCY MEDICINE
Payer: MEDICARE

## 2023-05-13 ENCOUNTER — APPOINTMENT (OUTPATIENT)
Dept: CT IMAGING | Age: 68
End: 2023-05-13
Payer: MEDICARE

## 2023-05-13 DIAGNOSIS — R55 SYNCOPE, UNSPECIFIED SYNCOPE TYPE: Primary | ICD-10-CM

## 2023-05-13 DIAGNOSIS — E86.0 DEHYDRATION: ICD-10-CM

## 2023-05-13 LAB
ALBUMIN SERPL-MCNC: 3.3 G/DL (ref 3.4–5)
ALP SERPL-CCNC: 212 U/L (ref 40–129)
ALT SERPL-CCNC: 12 U/L (ref 10–40)
ANION GAP SERPL CALCULATED.3IONS-SCNC: 14 MMOL/L (ref 3–16)
AST SERPL-CCNC: 22 U/L (ref 15–37)
BACTERIA URNS QL MICRO: ABNORMAL /HPF
BASOPHILS # BLD: 0 K/UL (ref 0–0.2)
BASOPHILS NFR BLD: 0 %
BILIRUB DIRECT SERPL-MCNC: <0.2 MG/DL (ref 0–0.3)
BILIRUB INDIRECT SERPL-MCNC: ABNORMAL MG/DL (ref 0–1)
BILIRUB SERPL-MCNC: 0.5 MG/DL (ref 0–1)
BILIRUB UR QL STRIP.AUTO: NEGATIVE
BUN SERPL-MCNC: 9 MG/DL (ref 7–20)
CALCIUM SERPL-MCNC: 9.3 MG/DL (ref 8.3–10.6)
CHLORIDE SERPL-SCNC: 105 MMOL/L (ref 99–110)
CLARITY UR: ABNORMAL
CO2 SERPL-SCNC: 20 MMOL/L (ref 21–32)
COLOR UR: YELLOW
CREAT SERPL-MCNC: 0.8 MG/DL (ref 0.6–1.2)
DEPRECATED RDW RBC AUTO: 14.3 % (ref 12.4–15.4)
EOSINOPHIL # BLD: 0 K/UL (ref 0–0.6)
EOSINOPHIL NFR BLD: 0 %
EPI CELLS #/AREA URNS AUTO: 3 /HPF (ref 0–5)
GFR SERPLBLD CREATININE-BSD FMLA CKD-EPI: >60 ML/MIN/{1.73_M2}
GLUCOSE SERPL-MCNC: 118 MG/DL (ref 70–99)
GLUCOSE UR STRIP.AUTO-MCNC: NEGATIVE MG/DL
HCT VFR BLD AUTO: 43.5 % (ref 36–48)
HGB BLD-MCNC: 13.6 G/DL (ref 12–16)
HGB UR QL STRIP.AUTO: ABNORMAL
HYALINE CASTS #/AREA URNS AUTO: 74 /LPF (ref 0–8)
INR PPP: 1.02 (ref 0.84–1.16)
KETONES UR STRIP.AUTO-MCNC: ABNORMAL MG/DL
LEUKOCYTE ESTERASE UR QL STRIP.AUTO: ABNORMAL
LIPASE SERPL-CCNC: 20 U/L (ref 13–60)
LYMPHOCYTES # BLD: 3.4 K/UL (ref 1–5.1)
LYMPHOCYTES NFR BLD: 23 %
MCH RBC QN AUTO: 27.7 PG (ref 26–34)
MCHC RBC AUTO-ENTMCNC: 31.3 G/DL (ref 31–36)
MCV RBC AUTO: 88.3 FL (ref 80–100)
MONOCYTES # BLD: 1.8 K/UL (ref 0–1.3)
MONOCYTES NFR BLD: 12 %
NEUTROPHILS # BLD: 9.6 K/UL (ref 1.7–7.7)
NEUTROPHILS NFR BLD: 63 %
NEUTS BAND NFR BLD MANUAL: 2 % (ref 0–7)
NITRITE UR QL STRIP.AUTO: NEGATIVE
PATH INTERP BLD-IMP: YES
PH UR STRIP.AUTO: 7.5 [PH] (ref 5–8)
PLATELET # BLD AUTO: 238 K/UL (ref 135–450)
PLATELET BLD QL SMEAR: ADEQUATE
PMV BLD AUTO: 9.9 FL (ref 5–10.5)
POTASSIUM SERPL-SCNC: 4 MMOL/L (ref 3.5–5.1)
PROT SERPL-MCNC: 7 G/DL (ref 6.4–8.2)
PROT UR STRIP.AUTO-MCNC: 300 MG/DL
PROTHROMBIN TIME: 13.4 SEC (ref 11.5–14.8)
RBC # BLD AUTO: 4.92 M/UL (ref 4–5.2)
RBC CLUMPS #/AREA URNS AUTO: 13 /HPF (ref 0–4)
RBC MORPH BLD: NORMAL
SLIDE REVIEW: ABNORMAL
SODIUM SERPL-SCNC: 139 MMOL/L (ref 136–145)
SP GR UR STRIP.AUTO: 1.01 (ref 1–1.03)
TROPONIN, HIGH SENSITIVITY: 12 NG/L (ref 0–14)
TROPONIN, HIGH SENSITIVITY: 12 NG/L (ref 0–14)
UA COMPLETE W REFLEX CULTURE PNL UR: YES
UA DIPSTICK W REFLEX MICRO PNL UR: YES
URN SPEC COLLECT METH UR: ABNORMAL
UROBILINOGEN UR STRIP-ACNC: 1 E.U./DL
WBC # BLD AUTO: 14.7 K/UL (ref 4–11)
WBC #/AREA URNS AUTO: 33 /HPF (ref 0–5)

## 2023-05-13 PROCEDURE — 74177 CT ABD & PELVIS W/CONTRAST: CPT

## 2023-05-13 PROCEDURE — 6360000004 HC RX CONTRAST MEDICATION: Performed by: EMERGENCY MEDICINE

## 2023-05-13 PROCEDURE — 96361 HYDRATE IV INFUSION ADD-ON: CPT

## 2023-05-13 PROCEDURE — 80048 BASIC METABOLIC PNL TOTAL CA: CPT

## 2023-05-13 PROCEDURE — 99285 EMERGENCY DEPT VISIT HI MDM: CPT

## 2023-05-13 PROCEDURE — 81001 URINALYSIS AUTO W/SCOPE: CPT

## 2023-05-13 PROCEDURE — 84484 ASSAY OF TROPONIN QUANT: CPT

## 2023-05-13 PROCEDURE — 6360000002 HC RX W HCPCS: Performed by: EMERGENCY MEDICINE

## 2023-05-13 PROCEDURE — 83690 ASSAY OF LIPASE: CPT

## 2023-05-13 PROCEDURE — 85610 PROTHROMBIN TIME: CPT

## 2023-05-13 PROCEDURE — 96365 THER/PROPH/DIAG IV INF INIT: CPT

## 2023-05-13 PROCEDURE — 80076 HEPATIC FUNCTION PANEL: CPT

## 2023-05-13 PROCEDURE — 2580000003 HC RX 258: Performed by: EMERGENCY MEDICINE

## 2023-05-13 PROCEDURE — 87086 URINE CULTURE/COLONY COUNT: CPT

## 2023-05-13 PROCEDURE — 85025 COMPLETE CBC W/AUTO DIFF WBC: CPT

## 2023-05-13 PROCEDURE — 93005 ELECTROCARDIOGRAM TRACING: CPT | Performed by: EMERGENCY MEDICINE

## 2023-05-13 RX ORDER — 0.9 % SODIUM CHLORIDE 0.9 %
1000 INTRAVENOUS SOLUTION INTRAVENOUS ONCE
Status: COMPLETED | OUTPATIENT
Start: 2023-05-13 | End: 2023-05-13

## 2023-05-13 RX ORDER — CEFUROXIME AXETIL 250 MG/1
250 TABLET ORAL 2 TIMES DAILY
Qty: 14 TABLET | Refills: 0 | Status: SHIPPED | OUTPATIENT
Start: 2023-05-13 | End: 2023-05-20

## 2023-05-13 RX ADMIN — SODIUM CHLORIDE 1000 ML: 9 INJECTION, SOLUTION INTRAVENOUS at 20:35

## 2023-05-13 RX ADMIN — SODIUM CHLORIDE 1000 ML: 9 INJECTION, SOLUTION INTRAVENOUS at 21:54

## 2023-05-13 RX ADMIN — IOPAMIDOL 75 ML: 755 INJECTION, SOLUTION INTRAVENOUS at 21:28

## 2023-05-13 RX ADMIN — CEFTRIAXONE SODIUM 1000 MG: 1 INJECTION, POWDER, FOR SOLUTION INTRAMUSCULAR; INTRAVENOUS at 23:28

## 2023-05-13 ASSESSMENT — PAIN - FUNCTIONAL ASSESSMENT: PAIN_FUNCTIONAL_ASSESSMENT: NONE - DENIES PAIN

## 2023-05-14 VITALS
WEIGHT: 132 LBS | RESPIRATION RATE: 21 BRPM | TEMPERATURE: 97.9 F | OXYGEN SATURATION: 97 % | SYSTOLIC BLOOD PRESSURE: 125 MMHG | BODY MASS INDEX: 25.36 KG/M2 | DIASTOLIC BLOOD PRESSURE: 57 MMHG | HEART RATE: 86 BPM

## 2023-05-14 LAB
EKG ATRIAL RATE: 73 BPM
EKG DIAGNOSIS: NORMAL
EKG P AXIS: 66 DEGREES
EKG P-R INTERVAL: 154 MS
EKG Q-T INTERVAL: 386 MS
EKG QRS DURATION: 84 MS
EKG QTC CALCULATION (BAZETT): 425 MS
EKG R AXIS: 49 DEGREES
EKG T AXIS: 45 DEGREES
EKG VENTRICULAR RATE: 73 BPM

## 2023-05-14 PROCEDURE — 93010 ELECTROCARDIOGRAM REPORT: CPT | Performed by: INTERNAL MEDICINE

## 2023-05-15 ENCOUNTER — CARE COORDINATION (OUTPATIENT)
Dept: CARE COORDINATION | Age: 68
End: 2023-05-15

## 2023-05-15 LAB
BACTERIA UR CULT: NORMAL
PATH INTERP BLD-IMP: NORMAL

## 2023-05-15 NOTE — CARE COORDINATION
/A/mbulatory Care Coordination  ED Follow up Call    RN-CC outreached patient for CC ED f/u. Introduced self and role of care coordinator. Patient reports she is doing well. Stayed at her daughters Saturday night, didn't sleep well. Still catching up on sleep. Denies s/sx related to UTI. Filled Ceftin yesterday, taking as prescribed. RN-CC advised patient to take antibiotic until gone. Hydration encouraged. Reviewed s/sx of UTI as well as prevention, pt vu.     RN-CC advised on scheduling ED follow up but patient doesn't feel that is necessary at this time. She was advised to f/u with her pcp for new or worsening sx, pt vu. She is scheduled for an AWV on 9/6/23. Patient denies questions, concerns or need for additional follow up from RN-CC. Reason for ED visit:  [facility] FF 5/13; syncope, +UTI  Status:     improved    Did you call your PCP prior to going to the ED? No      Did you receive a discharge instructions from the Emergency Room? Yes  Review of Instructions:     Understands what to report/when to return?:  Yes   Understands discharge instructions?:  Yes   Following discharge instructions?:  Yes   If not why? Are there any new complaints of pain? No  New Pain Meds? No    Constipation prophylaxis needed? No    If you have a wound is the dressing clean, dry, and intact? N/A  Understands wound care regimen? N/A    Are there any other complaints/concerns that you wish to tell your provider? No    FU appts/Provider:    Future Appointments   Date Time Provider Namita Velez   6/15/2023  9:30 AM ECHO ROOM 1 [facility]   6/15/2023 10:30 AM Elsa Fritz MD FF Cardio [facility]   9/6/2023 10:00 AM Jose Billings MD [facility] [place] [facility]           New Medications?:   Yes, Ceftin      Medication Reconciliation by phone - Yes  Understands Medications? Yes  Taking Medications? Yes  Can you swallow your pills? Yes    Any further needs in the home i.e. Equipment?

## 2023-05-15 NOTE — CARE COORDINATION
Patient evaluated in [facility] ED 5/13 for syncope. RN-CC attempted to outreach patient for cc ED follow up. No answer; HIPPA compliant message left through  requesting call return. RN-CC will follow up at later date & time.

## 2023-05-17 NOTE — TELEPHONE ENCOUNTER
Pt has a CT scan scheduled for tomorrow. She was told Dr Denis Kaur also ordered oral and rectal contrast.   Pt does not want rectal contrast and does not understand why that would be ordered. Pt would like this corrected b/f her appt tomorrow. You have been prescribed miralax (polyethylene glycol) for treatment of your CONSTIPATION  Start 1 capful daily  Take this dose x 3 days  If your symptoms are improved, great! Continue this dose daily  If you have diarrhea (stools are loose, associated with accidents, or urgency) with this dose, cut down to 1/2 capful daily  Continue to titrate down until you find the dose for you  This might be 1 tbsp daily  Wait 3 days before making a change  If you have continued constipation with 1 capful daily, you may need a higher dose  Start with 1 5 capfuls daily and titrate upward  Eg might need 1 capful twice daily  There is no maximum dose, whatever works for you  Again, wait 3 days before making a change        Scheduled date of EGD/colonoscopy (as of today): 06/13/23  Physician performing EGD/colonoscopy: dr Kathryn Gallo  Location of EGD/colonoscopy:  [facility]  Desired bowel prep reviewed with patient:  clenpiq - voucher  Instructions reviewed with patient by: anne-marie  Clearances:   diabetic and on IRON

## 2023-08-02 ENCOUNTER — HOSPITAL ENCOUNTER (OUTPATIENT)
Dept: WOMENS IMAGING | Age: 68
Discharge: HOME | End: 2023-08-02
Attending: INTERNAL MEDICINE
Payer: MEDICARE

## 2023-08-02 VITALS — WEIGHT: 137 LBS | BODY MASS INDEX: 26.9 KG/M2 | HEIGHT: 60 IN

## 2023-08-02 DIAGNOSIS — Z12.31 ENCOUNTER FOR SCREENING MAMMOGRAM FOR BREAST CANCER: ICD-10-CM

## 2023-08-02 PROCEDURE — 77063 BREAST TOMOSYNTHESIS BI: CPT

## 2023-08-21 DIAGNOSIS — E78.5 DYSLIPIDEMIA: ICD-10-CM

## 2023-08-21 RX ORDER — ATORVASTATIN CALCIUM 80 MG/1
80 TABLET, FILM COATED ORAL DAILY
Qty: 90 TABLET | Refills: 0 | Status: SHIPPED | OUTPATIENT
Start: 2023-08-21

## 2023-08-21 NOTE — TELEPHONE ENCOUNTER
Recent Visits  Date Type Provider Dept   03/09/23 Office Visit Lizbeth Hernández MD [facility]   09/02/22 Office Visit Lizbeth Hernández MD [facility]   05/17/22 Office Visit Lizbeth Hernández MD [facility]   Showing recent visits within past 540 days with a meds authorizing provider and meeting all other requirements  Future Appointments  Date Type Provider Dept   09/06/23 Appointment Lizbeth Hernández MD [facility]   Showing future appointments within next 150 days with a meds authorizing provider and meeting all other requirements     3/9/2023

## 2023-08-30 SDOH — HEALTH STABILITY: PHYSICAL HEALTH: ON AVERAGE, HOW MANY DAYS PER WEEK DO YOU ENGAGE IN MODERATE TO STRENUOUS EXERCISE (LIKE A BRISK WALK)?: 2 DAYS

## 2023-08-30 SDOH — HEALTH STABILITY: PHYSICAL HEALTH: ON AVERAGE, HOW MANY MINUTES DO YOU ENGAGE IN EXERCISE AT THIS LEVEL?: 20 MIN

## 2023-08-30 ASSESSMENT — LIFESTYLE VARIABLES
HOW OFTEN DO YOU HAVE A DRINK CONTAINING ALCOHOL: 2
HOW MANY STANDARD DRINKS CONTAINING ALCOHOL DO YOU HAVE ON A TYPICAL DAY: 1
HOW OFTEN DO YOU HAVE A DRINK CONTAINING ALCOHOL: MONTHLY OR LESS
HOW OFTEN DO YOU HAVE SIX OR MORE DRINKS ON ONE OCCASION: 1
HOW MANY STANDARD DRINKS CONTAINING ALCOHOL DO YOU HAVE ON A TYPICAL DAY: 1 OR 2

## 2023-08-30 ASSESSMENT — PATIENT HEALTH QUESTIONNAIRE - PHQ9
SUM OF ALL RESPONSES TO PHQ9 QUESTIONS 1 & 2: 0
2. FEELING DOWN, DEPRESSED OR HOPELESS: 0
SUM OF ALL RESPONSES TO PHQ QUESTIONS 1-9: 0
1. LITTLE INTEREST OR PLEASURE IN DOING THINGS: 0

## 2023-09-06 ENCOUNTER — OFFICE VISIT (OUTPATIENT)
Dept: INTERNAL MEDICINE CLINIC | Age: 68
End: 2023-09-06
Payer: MEDICARE

## 2023-09-06 VITALS
WEIGHT: 137 LBS | DIASTOLIC BLOOD PRESSURE: 50 MMHG | HEART RATE: 74 BPM | BODY MASS INDEX: 26.9 KG/M2 | HEIGHT: 60 IN | SYSTOLIC BLOOD PRESSURE: 122 MMHG | OXYGEN SATURATION: 95 %

## 2023-09-06 DIAGNOSIS — Z00.00 MEDICARE ANNUAL WELLNESS VISIT, SUBSEQUENT: Primary | ICD-10-CM

## 2023-09-06 DIAGNOSIS — I71.40 ABDOMINAL AORTIC ANEURYSM (AAA) WITHOUT RUPTURE, UNSPECIFIED PART (HCC): ICD-10-CM

## 2023-09-06 DIAGNOSIS — E78.5 DYSLIPIDEMIA: ICD-10-CM

## 2023-09-06 DIAGNOSIS — J44.9 CHRONIC OBSTRUCTIVE PULMONARY DISEASE, UNSPECIFIED COPD TYPE (HCC): ICD-10-CM

## 2023-09-06 DIAGNOSIS — I10 HTN, GOAL BELOW 130/80: ICD-10-CM

## 2023-09-06 DIAGNOSIS — J30.1 SEASONAL ALLERGIC RHINITIS DUE TO POLLEN: ICD-10-CM

## 2023-09-06 DIAGNOSIS — E11.9 CONTROLLED TYPE 2 DIABETES MELLITUS WITHOUT COMPLICATION, WITHOUT LONG-TERM CURRENT USE OF INSULIN (HCC): ICD-10-CM

## 2023-09-06 DIAGNOSIS — E44.1 MILD PROTEIN-CALORIE MALNUTRITION (HCC): ICD-10-CM

## 2023-09-06 DIAGNOSIS — K21.9 GASTROESOPHAGEAL REFLUX DISEASE WITHOUT ESOPHAGITIS: ICD-10-CM

## 2023-09-06 LAB
ALBUMIN SERPL-MCNC: 3.9 G/DL (ref 3.4–5)
ALBUMIN/GLOB SERPL: 1.2 {RATIO} (ref 1.1–2.2)
ALP SERPL-CCNC: 236 U/L (ref 40–129)
ALT SERPL-CCNC: 10 U/L (ref 10–40)
ANION GAP SERPL CALCULATED.3IONS-SCNC: 10 MMOL/L (ref 3–16)
AST SERPL-CCNC: 20 U/L (ref 15–37)
BILIRUB SERPL-MCNC: 0.6 MG/DL (ref 0–1)
BUN SERPL-MCNC: 8 MG/DL (ref 7–20)
CALCIUM SERPL-MCNC: 9.6 MG/DL (ref 8.3–10.6)
CHLORIDE SERPL-SCNC: 105 MMOL/L (ref 99–110)
CHOLEST SERPL-MCNC: 115 MG/DL (ref 0–199)
CO2 SERPL-SCNC: 28 MMOL/L (ref 21–32)
CREAT SERPL-MCNC: 0.5 MG/DL (ref 0.6–1.2)
CREAT UR-MCNC: 234.9 MG/DL (ref 28–259)
GFR SERPLBLD CREATININE-BSD FMLA CKD-EPI: >60 ML/MIN/{1.73_M2}
GLUCOSE P FAST SERPL-MCNC: 90 MG/DL (ref 70–99)
HDLC SERPL-MCNC: 51 MG/DL (ref 40–60)
LDL CHOLESTEROL CALCULATED: 49 MG/DL
MICROALBUMIN UR DL<=1MG/L-MCNC: 4.8 MG/DL
MICROALBUMIN/CREAT UR: 20.4 MG/G (ref 0–30)
POTASSIUM SERPL-SCNC: 4.5 MMOL/L (ref 3.5–5.1)
PROT SERPL-MCNC: 7.1 G/DL (ref 6.4–8.2)
SODIUM SERPL-SCNC: 143 MMOL/L (ref 136–145)
TRIGL SERPL-MCNC: 74 MG/DL (ref 0–150)
VLDLC SERPL CALC-MCNC: 15 MG/DL

## 2023-09-06 PROCEDURE — 3044F HG A1C LEVEL LT 7.0%: CPT | Performed by: INTERNAL MEDICINE

## 2023-09-06 PROCEDURE — 1123F ACP DISCUSS/DSCN MKR DOCD: CPT | Performed by: INTERNAL MEDICINE

## 2023-09-06 PROCEDURE — 3078F DIAST BP <80 MM HG: CPT | Performed by: INTERNAL MEDICINE

## 2023-09-06 PROCEDURE — 3074F SYST BP LT 130 MM HG: CPT | Performed by: INTERNAL MEDICINE

## 2023-09-06 PROCEDURE — G0439 PPPS, SUBSEQ VISIT: HCPCS | Performed by: INTERNAL MEDICINE

## 2023-09-06 RX ORDER — FAMOTIDINE 20 MG/1
TABLET, FILM COATED ORAL
Qty: 180 TABLET | Refills: 1 | Status: SHIPPED | OUTPATIENT
Start: 2023-09-06

## 2023-09-06 RX ORDER — FLUTICASONE PROPIONATE 50 MCG
1 SPRAY, SUSPENSION (ML) NASAL DAILY
Qty: 32 G | Refills: 5 | Status: SHIPPED | OUTPATIENT
Start: 2023-09-06

## 2023-09-06 RX ORDER — LISINOPRIL 5 MG/1
5 TABLET ORAL DAILY
Qty: 90 TABLET | Refills: 1 | Status: SHIPPED | OUTPATIENT
Start: 2023-09-06

## 2023-09-06 RX ORDER — URSODIOL 500 MG/1
TABLET, FILM COATED ORAL 2 TIMES DAILY
Qty: 90 TABLET | Status: CANCELLED | OUTPATIENT
Start: 2023-09-06

## 2023-09-06 RX ORDER — FLUTICASONE FUROATE AND VILANTEROL TRIFENATATE 200; 25 UG/1; UG/1
POWDER RESPIRATORY (INHALATION)
Qty: 60 EACH | Refills: 5 | Status: SHIPPED | OUTPATIENT
Start: 2023-09-06

## 2023-09-06 RX ORDER — ATORVASTATIN CALCIUM 80 MG/1
80 TABLET, FILM COATED ORAL DAILY
Qty: 90 TABLET | Refills: 1 | Status: SHIPPED | OUTPATIENT
Start: 2023-09-06

## 2023-09-06 SDOH — ECONOMIC STABILITY: FOOD INSECURITY: WITHIN THE PAST 12 MONTHS, YOU WORRIED THAT YOUR FOOD WOULD RUN OUT BEFORE YOU GOT MONEY TO BUY MORE.: NEVER TRUE

## 2023-09-06 SDOH — ECONOMIC STABILITY: FOOD INSECURITY: WITHIN THE PAST 12 MONTHS, THE FOOD YOU BOUGHT JUST DIDN'T LAST AND YOU DIDN'T HAVE MONEY TO GET MORE.: NEVER TRUE

## 2023-09-06 SDOH — ECONOMIC STABILITY: INCOME INSECURITY: IN THE LAST 12 MONTHS, WAS THERE A TIME WHEN YOU WERE NOT ABLE TO PAY THE MORTGAGE OR RENT ON TIME?: NO

## 2023-09-06 SDOH — ECONOMIC STABILITY: TRANSPORTATION INSECURITY
IN THE PAST 12 MONTHS, HAS THE LACK OF TRANSPORTATION KEPT YOU FROM MEDICAL APPOINTMENTS OR FROM GETTING MEDICATIONS?: NO

## 2023-09-06 SDOH — ECONOMIC STABILITY: HOUSING INSECURITY: IN THE LAST 12 MONTHS, HOW MANY PLACES HAVE YOU LIVED?: 1

## 2023-09-06 ASSESSMENT — SOCIAL DETERMINANTS OF HEALTH (SDOH)
HOW OFTEN DO YOU ATTEND CHURCH OR RELIGIOUS SERVICES?: NEVER
HOW HARD IS IT FOR YOU TO PAY FOR THE VERY BASICS LIKE FOOD, HOUSING, MEDICAL CARE, AND HEATING?: NOT HARD AT ALL
HOW OFTEN DO YOU ATTENT MEETINGS OF THE CLUB OR ORGANIZATION YOU BELONG TO?: NEVER
HOW OFTEN DO YOU GET TOGETHER WITH FRIENDS OR RELATIVES?: MORE THAN THREE TIMES A WEEK
WITHIN THE LAST YEAR, HAVE TO BEEN RAPED OR FORCED TO HAVE ANY KIND OF SEXUAL ACTIVITY BY YOUR PARTNER OR EX-PARTNER?: NO
IN A TYPICAL WEEK, HOW MANY TIMES DO YOU TALK ON THE PHONE WITH FAMILY, FRIENDS, OR NEIGHBORS?: MORE THAN THREE TIMES A WEEK
DO YOU BELONG TO ANY CLUBS OR ORGANIZATIONS SUCH AS CHURCH GROUPS UNIONS, FRATERNAL OR ATHLETIC GROUPS, OR SCHOOL GROUPS?: YES
DO YOU BELONG TO ANY CLUBS OR ORGANIZATIONS SUCH AS CHURCH GROUPS UNIONS, FRATERNAL OR ATHLETIC GROUPS, OR SCHOOL GROUPS?: NO
WITHIN THE LAST YEAR, HAVE YOU BEEN AFRAID OF YOUR PARTNER OR EX-PARTNER?: NO
WITHIN THE LAST YEAR, HAVE YOU BEEN HUMILIATED OR EMOTIONALLY ABUSED IN OTHER WAYS BY YOUR PARTNER OR EX-PARTNER?: NO
HOW OFTEN DO YOU ATTEND CHURCH OR RELIGIOUS SERVICES?: NEVER
WITHIN THE LAST YEAR, HAVE YOU BEEN KICKED, HIT, SLAPPED, OR OTHERWISE PHYSICALLY HURT BY YOUR PARTNER OR EX-PARTNER?: NO
HOW OFTEN DO YOU ATTENT MEETINGS OF THE CLUB OR ORGANIZATION YOU BELONG TO?: MORE THAN 4 TIMES PER YEAR
HOW OFTEN DO YOU GET TOGETHER WITH FRIENDS OR RELATIVES?: MORE THAN THREE TIMES A WEEK
IN A TYPICAL WEEK, HOW MANY TIMES DO YOU TALK ON THE PHONE WITH FAMILY, FRIENDS, OR NEIGHBORS?: MORE THAN THREE TIMES A WEEK

## 2023-09-06 NOTE — PROGRESS NOTES
MD   calcium carbonate (OYSTER SHELL CALCIUM 500 MG) 1250 (500 Ca) MG tablet Take 1 tablet by mouth daily Yes Historical Provider, MD   vitamin D (CHOLECALCIFEROL) 1000 UNIT TABS tablet Take 1 tablet by mouth daily Yes Historical Provider, MD   Multiple Vitamins-Minerals (MULTIVITAMIN PO) Take 1 capsule by mouth daily.  Yes Historical Provider, MD   Ascorbic Acid (VITAMIN C) 500 MG tablet Take 1 tablet by mouth daily Yes Historical Provider, MD       CareTeam (Including outside providers/suppliers regularly involved in providing care):   Patient Care Team:  Virgen Hahn MD as PCP - General (Pediatrics)  Virgen Hahn MD as PCP - Empaneled Provider     Reviewed and updated this visit:  Allergies  Meds  Problems  Med Hx  Surg Hx  Soc Hx  Fam Hx

## 2023-09-21 RX ORDER — FLUTICASONE FUROATE AND VILANTEROL 200; 25 UG/1; UG/1
1 POWDER RESPIRATORY (INHALATION) DAILY
Qty: 30 EACH | Refills: 2 | Status: CANCELLED | OUTPATIENT
Start: 2023-09-21

## 2023-09-22 RX ORDER — FLUTICASONE FUROATE AND VILANTEROL 100; 25 UG/1; UG/1
1 POWDER RESPIRATORY (INHALATION) DAILY
Qty: 1 EACH | Refills: 0 | Status: SHIPPED | OUTPATIENT
Start: 2023-09-22

## 2023-10-05 DIAGNOSIS — H10.13 ALLERGIC CONJUNCTIVITIS AND RHINITIS, BILATERAL: ICD-10-CM

## 2023-10-05 DIAGNOSIS — J34.89 RHINORRHEA: ICD-10-CM

## 2023-10-05 DIAGNOSIS — J30.9 ALLERGIC CONJUNCTIVITIS AND RHINITIS, BILATERAL: ICD-10-CM

## 2023-10-05 DIAGNOSIS — K74.3 PRIMARY BILIARY CHOLANGITIS (HCC): ICD-10-CM

## 2023-10-05 RX ORDER — LORATADINE 10 MG/1
10 TABLET ORAL DAILY
Qty: 90 TABLET | Refills: 1 | Status: SHIPPED | OUTPATIENT
Start: 2023-10-05

## 2023-10-05 NOTE — TELEPHONE ENCOUNTER
Recent Visits  Date Type Provider Dept   09/06/23 Office Visit Luis Manuel Yadav MD [facility]   03/09/23 Office Visit Luis Manuel Yadav MD [facility]   09/02/22 Office Visit Luis Manuel Yadav MD [facility]   05/17/22 Office Visit Luis Manuel Yadav MD [facility]   Showing recent visits within past 540 days with a meds authorizing provider and meeting all other requirements  Future Appointments  No visits were found meeting these conditions.   Showing future appointments within next 150 days with a meds authorizing provider and meeting all other requirements     9/6/2023

## 2023-11-10 RX ORDER — FLUTICASONE FUROATE AND VILANTEROL TRIFENATATE 100; 25 UG/1; UG/1
1 POWDER RESPIRATORY (INHALATION) DAILY
Qty: 60 EACH | Refills: 1 | Status: SHIPPED | OUTPATIENT
Start: 2023-11-10

## 2023-11-10 NOTE — TELEPHONE ENCOUNTER
Recent Visits  Date Type Provider Dept   09/06/23 Office Visit Heron England MD [facility]   03/09/23 Office Visit Heron England MD [facility]   09/02/22 Office Visit Heron England MD [facility]   Showing recent visits within past 540 days with a meds authorizing provider and meeting all other requirements  Future Appointments  Date Type Provider Dept   03/06/24 Appointment Heron England MD [facility]   Showing future appointments within next 150 days with a meds authorizing provider and meeting all other requirements     9/6/2023

## 2023-12-07 ENCOUNTER — TELEPHONE (OUTPATIENT)
Dept: CARDIOLOGY CLINIC | Age: 68
End: 2023-12-07

## 2024-03-06 ENCOUNTER — OFFICE VISIT (OUTPATIENT)
Dept: INTERNAL MEDICINE CLINIC | Age: 69
End: 2024-03-06
Payer: MEDICARE

## 2024-03-06 VITALS
DIASTOLIC BLOOD PRESSURE: 72 MMHG | WEIGHT: 139 LBS | HEIGHT: 60 IN | OXYGEN SATURATION: 98 % | SYSTOLIC BLOOD PRESSURE: 122 MMHG | BODY MASS INDEX: 27.29 KG/M2 | TEMPERATURE: 97.9 F | RESPIRATION RATE: 18 BRPM | HEART RATE: 99 BPM

## 2024-03-06 DIAGNOSIS — E11.9 CONTROLLED TYPE 2 DIABETES MELLITUS WITHOUT COMPLICATION, WITHOUT LONG-TERM CURRENT USE OF INSULIN (HCC): ICD-10-CM

## 2024-03-06 DIAGNOSIS — M80.00XS OSTEOPOROSIS WITH CURRENT PATHOLOGICAL FRACTURE, UNSPECIFIED OSTEOPOROSIS TYPE, SEQUELA: ICD-10-CM

## 2024-03-06 DIAGNOSIS — J30.1 NON-SEASONAL ALLERGIC RHINITIS DUE TO POLLEN: ICD-10-CM

## 2024-03-06 DIAGNOSIS — I71.40 ABDOMINAL AORTIC ANEURYSM (AAA) WITHOUT RUPTURE, UNSPECIFIED PART (HCC): ICD-10-CM

## 2024-03-06 DIAGNOSIS — Z29.11 NEED FOR RSV IMMUNIZATION: ICD-10-CM

## 2024-03-06 DIAGNOSIS — L84 TYPE 2 DIABETES MELLITUS WITH PRESSURE CALLUS (HCC): ICD-10-CM

## 2024-03-06 DIAGNOSIS — R09.82 POST-NASAL DRAINAGE: ICD-10-CM

## 2024-03-06 DIAGNOSIS — E11.628 TYPE 2 DIABETES MELLITUS WITH PRESSURE CALLUS (HCC): ICD-10-CM

## 2024-03-06 DIAGNOSIS — K74.3 PRIMARY BILIARY CHOLANGITIS (HCC): Primary | ICD-10-CM

## 2024-03-06 DIAGNOSIS — J44.9 CHRONIC OBSTRUCTIVE PULMONARY DISEASE, UNSPECIFIED COPD TYPE (HCC): ICD-10-CM

## 2024-03-06 PROCEDURE — G2211 COMPLEX E/M VISIT ADD ON: HCPCS | Performed by: INTERNAL MEDICINE

## 2024-03-06 PROCEDURE — 99214 OFFICE O/P EST MOD 30 MIN: CPT | Performed by: INTERNAL MEDICINE

## 2024-03-06 PROCEDURE — 3074F SYST BP LT 130 MM HG: CPT | Performed by: INTERNAL MEDICINE

## 2024-03-06 PROCEDURE — 3078F DIAST BP <80 MM HG: CPT | Performed by: INTERNAL MEDICINE

## 2024-03-06 PROCEDURE — 1123F ACP DISCUSS/DSCN MKR DOCD: CPT | Performed by: INTERNAL MEDICINE

## 2024-03-06 RX ORDER — AZELASTINE 1 MG/ML
1 SPRAY, METERED NASAL 2 TIMES DAILY
Qty: 60 ML | Refills: 2 | Status: SHIPPED | OUTPATIENT
Start: 2024-03-06

## 2024-03-06 RX ORDER — MONTELUKAST SODIUM 10 MG/1
10 TABLET ORAL NIGHTLY
Qty: 90 TABLET | OUTPATIENT
Start: 2024-03-06

## 2024-03-06 RX ORDER — MONTELUKAST SODIUM 10 MG/1
10 TABLET ORAL NIGHTLY
Qty: 30 TABLET | Refills: 5 | Status: SHIPPED | OUTPATIENT
Start: 2024-03-06

## 2024-03-06 ASSESSMENT — PATIENT HEALTH QUESTIONNAIRE - PHQ9
SUM OF ALL RESPONSES TO PHQ QUESTIONS 1-9: 0
SUM OF ALL RESPONSES TO PHQ QUESTIONS 1-9: 0
2. FEELING DOWN, DEPRESSED OR HOPELESS: 0
SUM OF ALL RESPONSES TO PHQ QUESTIONS 1-9: 0
SUM OF ALL RESPONSES TO PHQ9 QUESTIONS 1 & 2: 0
SUM OF ALL RESPONSES TO PHQ QUESTIONS 1-9: 0
1. LITTLE INTEREST OR PLEASURE IN DOING THINGS: 0

## 2024-03-06 ASSESSMENT — ENCOUNTER SYMPTOMS
SORE THROAT: 0
HOARSE VOICE: 0
SHORTNESS OF BREATH: 0
HEARTBURN: 0
CHEST TIGHTNESS: 0
WHEEZING: 1
TROUBLE SWALLOWING: 0
HEMOPTYSIS: 0
SPUTUM PRODUCTION: 0
COUGH: 1
DIFFICULTY BREATHING: 0
RHINORRHEA: 1
FREQUENT THROAT CLEARING: 0

## 2024-03-06 ASSESSMENT — ANXIETY QUESTIONNAIRES
2. NOT BEING ABLE TO STOP OR CONTROL WORRYING: 0
GAD7 TOTAL SCORE: 0
7. FEELING AFRAID AS IF SOMETHING AWFUL MIGHT HAPPEN: 0
6. BECOMING EASILY ANNOYED OR IRRITABLE: 0
4. TROUBLE RELAXING: 0
IF YOU CHECKED OFF ANY PROBLEMS ON THIS QUESTIONNAIRE, HOW DIFFICULT HAVE THESE PROBLEMS MADE IT FOR YOU TO DO YOUR WORK, TAKE CARE OF THINGS AT HOME, OR GET ALONG WITH OTHER PEOPLE: NOT DIFFICULT AT ALL
5. BEING SO RESTLESS THAT IT IS HARD TO SIT STILL: 0
3. WORRYING TOO MUCH ABOUT DIFFERENT THINGS: 0
1. FEELING NERVOUS, ANXIOUS, OR ON EDGE: 0

## 2024-03-06 ASSESSMENT — COPD QUESTIONNAIRES: COPD: 1

## 2024-03-06 NOTE — PROGRESS NOTES
occipital adenopathy.      Cervical: No cervical adenopathy.      Right cervical: No superficial, deep or posterior cervical adenopathy.     Left cervical: No superficial, deep or posterior cervical adenopathy.   Skin:     General: Skin is warm.      Capillary Refill: Capillary refill takes less than 2 seconds.      Nails: There is no clubbing.      Comments: Deformities: Yes  Rashes:  Yes  Callous:  Yes  Edema:  No  Injury:  No  Sensory Deficit:  No  Nails: abnormal  Multiple calluses left 5th with painful callus       Neurological:      General: No focal deficit present.      Mental Status: She is alert and oriented to person, place, and time. Mental status is at baseline.      GCS: GCS eye subscore is 4. GCS verbal subscore is 5. GCS motor subscore is 6.      Cranial Nerves: No cranial nerve deficit.      Sensory: No sensory deficit.      Gait: Gait normal.      Deep Tendon Reflexes: Reflexes are normal and symmetric.      Reflex Scores:       Tricep reflexes are 2+ on the right side and 2+ on the left side.       Bicep reflexes are 2+ on the right side and 2+ on the left side.  Psychiatric:         Mood and Affect: Mood normal.         Speech: Speech normal.         Behavior: Behavior normal. Behavior is not slowed.         Thought Content: Thought content normal.         Judgment: Judgment normal.          Lab Review   No visits with results within 6 Month(s) from this visit.   Latest known visit with results is:   Orders Only on 09/06/2023   Component Date Value    Sodium 09/06/2023 143     Potassium 09/06/2023 4.5     Chloride 09/06/2023 105     CO2 09/06/2023 28     Anion Gap 09/06/2023 10     Glucose, Fasting 09/06/2023 90     BUN 09/06/2023 8     Creatinine 09/06/2023 0.5 (L)     Est, Glom Filt Rate 09/06/2023 >60     Calcium 09/06/2023 9.6     Total Protein 09/06/2023 7.1     Albumin 09/06/2023 3.9     Albumin/Globulin Ratio 09/06/2023 1.2     Total Bilirubin 09/06/2023 0.6     Alkaline Phosphatase

## 2024-03-06 NOTE — TELEPHONE ENCOUNTER
Medication:   Requested Prescriptions     Pending Prescriptions Disp Refills    montelukast (SINGULAIR) 10 MG tablet [Pharmacy Med Name: MONTELUKAST 10MG TABLETS] 90 tablet      Sig: TAKE 1 TABLET BY MOUTH EVERY NIGHT       Last Filled:  3/6/24    Patient Phone Number: [phone] (home)     Last appt: 3/6/2024   Next appt: Visit date not found

## 2024-03-07 DIAGNOSIS — E11.9 CONTROLLED TYPE 2 DIABETES MELLITUS WITHOUT COMPLICATION, WITHOUT LONG-TERM CURRENT USE OF INSULIN (HCC): ICD-10-CM

## 2024-03-07 LAB
ALBUMIN SERPL-MCNC: 3.7 G/DL (ref 3.4–5)
ALBUMIN/GLOB SERPL: 1.2 {RATIO} (ref 1.1–2.2)
ALP SERPL-CCNC: 204 U/L (ref 40–129)
ALT SERPL-CCNC: 11 U/L (ref 10–40)
ANION GAP SERPL CALCULATED.3IONS-SCNC: 10 MMOL/L (ref 3–16)
AST SERPL-CCNC: 19 U/L (ref 15–37)
BILIRUB SERPL-MCNC: 0.4 MG/DL (ref 0–1)
BUN SERPL-MCNC: 11 MG/DL (ref 7–20)
CALCIUM SERPL-MCNC: 9.1 MG/DL (ref 8.3–10.6)
CHLORIDE SERPL-SCNC: 103 MMOL/L (ref 99–110)
CO2 SERPL-SCNC: 27 MMOL/L (ref 21–32)
CREAT SERPL-MCNC: 0.5 MG/DL (ref 0.6–1.2)
GFR SERPLBLD CREATININE-BSD FMLA CKD-EPI: >60 ML/MIN/{1.73_M2}
GLUCOSE P FAST SERPL-MCNC: 89 MG/DL (ref 70–99)
POTASSIUM SERPL-SCNC: 4.2 MMOL/L (ref 3.5–5.1)
PROT SERPL-MCNC: 6.8 G/DL (ref 6.4–8.2)
SODIUM SERPL-SCNC: 140 MMOL/L (ref 136–145)

## 2024-03-08 LAB
EST. AVERAGE GLUCOSE BLD GHB EST-MCNC: 131.2 MG/DL
HBA1C MFR BLD: 6.2 %

## 2024-04-01 DIAGNOSIS — J34.89 RHINORRHEA: ICD-10-CM

## 2024-04-01 DIAGNOSIS — J30.9 ALLERGIC CONJUNCTIVITIS AND RHINITIS, BILATERAL: ICD-10-CM

## 2024-04-01 DIAGNOSIS — K74.3 PRIMARY BILIARY CHOLANGITIS (HCC): ICD-10-CM

## 2024-04-01 DIAGNOSIS — H10.13 ALLERGIC CONJUNCTIVITIS AND RHINITIS, BILATERAL: ICD-10-CM

## 2024-04-03 NOTE — TELEPHONE ENCOUNTER
Recent Visits  Date Type Provider Dept   03/06/24 Office Visit Tavo Mclain MD [facility]   09/06/23 Office Visit Tavo Mclain MD [facility]   03/09/23 Office Visit Tavo Mclain MD [facility]   Showing recent visits within past 540 days with a meds authorizing provider and meeting all other requirements  Future Appointments  No visits were found meeting these conditions.  Showing future appointments within next 150 days with a meds authorizing provider and meeting all other requirements     3/6/2024

## 2024-04-04 DIAGNOSIS — J30.9 ALLERGIC CONJUNCTIVITIS AND RHINITIS, BILATERAL: ICD-10-CM

## 2024-04-04 DIAGNOSIS — H10.13 ALLERGIC CONJUNCTIVITIS AND RHINITIS, BILATERAL: ICD-10-CM

## 2024-04-04 DIAGNOSIS — K74.3 PRIMARY BILIARY CHOLANGITIS (HCC): ICD-10-CM

## 2024-04-04 DIAGNOSIS — J34.89 RHINORRHEA: ICD-10-CM

## 2024-04-04 DIAGNOSIS — I10 HTN, GOAL BELOW 130/80: ICD-10-CM

## 2024-04-04 RX ORDER — LORATADINE 10 MG/1
10 TABLET ORAL DAILY
Qty: 90 TABLET | Refills: 1 | Status: CANCELLED | OUTPATIENT
Start: 2024-04-04

## 2024-04-05 DIAGNOSIS — J34.89 RHINORRHEA: ICD-10-CM

## 2024-04-05 DIAGNOSIS — J30.9 ALLERGIC CONJUNCTIVITIS AND RHINITIS, BILATERAL: ICD-10-CM

## 2024-04-05 DIAGNOSIS — K74.3 PRIMARY BILIARY CHOLANGITIS (HCC): ICD-10-CM

## 2024-04-05 DIAGNOSIS — H10.13 ALLERGIC CONJUNCTIVITIS AND RHINITIS, BILATERAL: ICD-10-CM

## 2024-04-05 NOTE — TELEPHONE ENCOUNTER
Recent Visits  Date Type Provider Dept   03/06/24 Office Visit Tavo Mclain MD [facility]   09/06/23 Office Visit Tavo Mclain MD [facility]   03/09/23 Office Visit Tavo Mclain MD [facility]   Showing recent visits within past 540 days with a meds authorizing provider and meeting all other requirements  Future Appointments  No visits were found meeting these conditions.  Showing future appointments within next 150 days with a meds authorizing provider and meeting all other requirements     3/6/2024

## 2024-04-07 RX ORDER — LORATADINE 10 MG/1
10 TABLET ORAL DAILY
Qty: 90 TABLET | Refills: 1 | Status: SHIPPED | OUTPATIENT
Start: 2024-04-07

## 2024-04-07 RX ORDER — LORATADINE 10 MG/1
10 TABLET ORAL DAILY
Qty: 90 TABLET | Refills: 1 | OUTPATIENT
Start: 2024-04-07

## 2024-04-08 RX ORDER — LISINOPRIL 5 MG/1
5 TABLET ORAL DAILY
Qty: 90 TABLET | Refills: 1 | Status: SHIPPED | OUTPATIENT
Start: 2024-04-08

## 2024-05-14 DIAGNOSIS — E78.5 DYSLIPIDEMIA: ICD-10-CM

## 2024-05-16 NOTE — TELEPHONE ENCOUNTER
Recent Visits  Date Type Provider Dept   03/06/24 Office Visit Tavo Mclain MD [facility]   09/06/23 Office Visit Tavo Mclain MD [facility]   03/09/23 Office Visit Tavo Mclain MD [facility]   Showing recent visits within past 540 days with a meds authorizing provider and meeting all other requirements  Future Appointments  Date Type Provider Dept   09/09/24 Appointment Tavo Mclain MD [facility]   Showing future appointments within next 150 days with a meds authorizing provider and meeting all other requirements     3/6/2024

## 2024-05-20 DIAGNOSIS — E78.5 DYSLIPIDEMIA: ICD-10-CM

## 2024-05-20 DIAGNOSIS — K21.9 GASTROESOPHAGEAL REFLUX DISEASE WITHOUT ESOPHAGITIS: ICD-10-CM

## 2024-05-21 RX ORDER — FAMOTIDINE 20 MG/1
TABLET, FILM COATED ORAL
Qty: 180 TABLET | Refills: 1 | Status: SHIPPED | OUTPATIENT
Start: 2024-05-21

## 2024-05-21 RX ORDER — ATORVASTATIN CALCIUM 80 MG/1
TABLET, FILM COATED ORAL
Qty: 90 TABLET | Refills: 1 | OUTPATIENT
Start: 2024-05-21

## 2024-05-21 RX ORDER — ATORVASTATIN CALCIUM 80 MG/1
80 TABLET, FILM COATED ORAL DAILY
Qty: 90 TABLET | Refills: 1 | Status: SHIPPED | OUTPATIENT
Start: 2024-05-21

## 2024-05-21 NOTE — TELEPHONE ENCOUNTER
Recent Visits  Date Type Provider Dept   03/06/24 Office Visit Tavo Mclain MD [facility]   09/06/23 Office Visit Tavo Mclain MD [facility]   03/09/23 Office Visit Tavo Mclain MD [facility]   Showing recent visits within past 540 days with a meds authorizing provider and meeting all other requirements  Future Appointments  Date Type Provider Dept   09/09/24 Appointment Tavo Mclain MD [facility]   Showing future appointments within next 150 days with a meds authorizing provider and meeting all other requirements     3/6/2024

## 2024-06-06 RX ORDER — FLUTICASONE FUROATE AND VILANTEROL TRIFENATATE 200; 25 UG/1; UG/1
POWDER RESPIRATORY (INHALATION)
Qty: 60 EACH | Refills: 5 | Status: SHIPPED | OUTPATIENT
Start: 2024-06-06

## 2024-06-06 NOTE — TELEPHONE ENCOUNTER
Recent Visits  Date Type Provider Dept   03/06/24 Office Visit Tavo Mclain MD [facility]   09/06/23 Office Visit Tavo Mclain MD [facility]   03/09/23 Office Visit Tavo Mclain MD [facility]   Showing recent visits within past 540 days with a meds authorizing provider and meeting all other requirements  Future Appointments  Date Type Provider Dept   09/09/24 Appointment Tavo Mclain MD [facility]   Showing future appointments within next 150 days with a meds authorizing provider and meeting all other requirements     3/6/2024

## 2024-07-03 NOTE — PROGRESS NOTES
[facility]  H+P  Consult  OP Visit  FU Visit   CC/HPI   CC Followup visit for cardiac conditions detailed in assessment and plan below.   Intervention None   General Doing well.  No new concerns.     Cardiac Sx -CP, -SOB, -dizziness, -syncope, -edema, -orthopnea, -pnd, -fatigue   HISTORY/ALLERGY/ROS   M/S/S/F Hx Reviewed in Epic and updated as appropriate   ALLERG Penicillins, Sulfa antibiotics, and Sulfasalazine   ROS Full ROS obtained and negative except as mentioned in HPI   MEDICATIONS   Cardiac medications reviewed in Epic during visit with patient.   PHYSICAL EXAM   Vitals /72   Pulse 67   Temp 97.7 °F (36.5 °C) (Temporal)   Resp 14   Ht 1.524 m (5')   Wt 63 kg (139 lb)   SpO2 97%   BMI 27.15 kg/m²    Gen Alert, coop, no distress Heart  RRR, 1/6   Lung CTA-B, unlabored, no DTP Extrem Edema -Grade 0 (0mm)      COMPLIANCE   Discussed and counseled on diet, exercise, weight loss, smoking, alcohol, drugs.  All questions answered.   CODING   SCI (85139) - 30-39 mins spent reviewing hx/tests/consults, performing exam, counseling/educating, ordering meds/tests/procedures, referring/communicating w/PCP/consultants, documenting in EMR, interpreting results, communicating medical information and plan with family.   SCRIBE ATTESTATION   Nurse I, Taylor Strauss, RN, am scribing for and in the presence of Warnre Stevenson MD. 7/3/2024 9:22 AM EDT   Doctor Taylor Strauss is working as scribe for and in presence of me and may have prepopulated components of note with my historical intellectual property (IP) under my supervision.  Any additions to this IP performed in my presence and at my direction. Content and accuracy of this note reviewed by me (Warner Stevenson MD).   NEW MEDICATIONS   Pt verbalizes understanding of the need for treatment and education provided at today's visit.  Additional education provided in AVS.   ASSESSMENT AND PLAN   *AS    Date EF Detail   Sx     No concerning   TTE

## 2024-07-10 DIAGNOSIS — I35.0 NONRHEUMATIC AORTIC VALVE STENOSIS: Primary | ICD-10-CM

## 2024-07-11 ENCOUNTER — HOSPITAL ENCOUNTER (OUTPATIENT)
Age: 69
Discharge: HOME | End: 2024-07-13
Payer: MEDICARE

## 2024-07-11 ENCOUNTER — OFFICE VISIT (OUTPATIENT)
Dept: CARDIOLOGY CLINIC | Age: 69
End: 2024-07-11
Payer: MEDICARE

## 2024-07-11 VITALS
BODY MASS INDEX: 27.29 KG/M2 | WEIGHT: 139 LBS | HEART RATE: 67 BPM | TEMPERATURE: 97.7 F | HEIGHT: 60 IN | OXYGEN SATURATION: 97 % | RESPIRATION RATE: 14 BRPM | SYSTOLIC BLOOD PRESSURE: 122 MMHG | DIASTOLIC BLOOD PRESSURE: 72 MMHG

## 2024-07-11 VITALS
DIASTOLIC BLOOD PRESSURE: 72 MMHG | BODY MASS INDEX: 27.29 KG/M2 | WEIGHT: 139 LBS | HEIGHT: 60 IN | SYSTOLIC BLOOD PRESSURE: 122 MMHG

## 2024-07-11 DIAGNOSIS — I35.0 NONRHEUMATIC AORTIC VALVE STENOSIS: ICD-10-CM

## 2024-07-11 DIAGNOSIS — I35.0 AORTIC VALVE STENOSIS, NONRHEUMATIC: Primary | ICD-10-CM

## 2024-07-11 DIAGNOSIS — Z72.0 TOBACCO ABUSE: ICD-10-CM

## 2024-07-11 DIAGNOSIS — E78.00 HYPERCHOLESTEROLEMIA: ICD-10-CM

## 2024-07-11 DIAGNOSIS — I10 ESSENTIAL HYPERTENSION: ICD-10-CM

## 2024-07-11 LAB
ECHO AO ASC DIAM: 2.7 CM
ECHO AO ASCENDING AORTA INDEX: 1.69 CM/M2
ECHO AO ROOT DIAM: 2.6 CM
ECHO AO ROOT INDEX: 1.63 CM/M2
ECHO AR MAX VEL PISA: 3.8 M/S
ECHO AV AREA PEAK VELOCITY: 1.9 CM2
ECHO AV AREA PLAN/BSA: 0.81 CM2/M2
ECHO AV AREA PLAN: 1.3 CM2
ECHO AV AREA VTI: 1.9 CM2
ECHO AV AREA/BSA PEAK VELOCITY: 1.2 CM2/M2
ECHO AV AREA/BSA VTI: 1.2 CM2/M2
ECHO AV CUSP MM: 0.5 CM
ECHO AV EROA PISA: 0.1 CM2
ECHO AV MEAN GRADIENT: 15 MMHG
ECHO AV MEAN VELOCITY: 1.8 M/S
ECHO AV PEAK GRADIENT: 26 MMHG
ECHO AV PEAK VELOCITY: 2.6 M/S
ECHO AV REGURGITANT PHT: 468 MS
ECHO AV VELOCITY RATIO: 0.54
ECHO AV VTI: 61.5 CM
ECHO BSA: 1.63 M2
ECHO EST RA PRESSURE: 3 MMHG
ECHO LA AREA 2C: 25.6 CM2
ECHO LA AREA 4C: 19.5 CM2
ECHO LA DIAMETER INDEX: 2.31 CM/M2
ECHO LA DIAMETER: 3.7 CM
ECHO LA MAJOR AXIS: 5.5 CM
ECHO LA MINOR AXIS: 6.3 CM
ECHO LA TO AORTIC ROOT RATIO: 1.42
ECHO LA VOL BP: 72 ML (ref 22–52)
ECHO LA VOL MOD A2C: 84 ML (ref 22–52)
ECHO LA VOL MOD A4C: 54 ML (ref 22–52)
ECHO LA VOL/BSA BIPLANE: 45 ML/M2 (ref 16–34)
ECHO LA VOLUME INDEX MOD A2C: 53 ML/M2 (ref 16–34)
ECHO LA VOLUME INDEX MOD A4C: 34 ML/M2 (ref 16–34)
ECHO LV E' LATERAL VELOCITY: 7 CM/S
ECHO LV E' SEPTAL VELOCITY: 5 CM/S
ECHO LV EDV A2C: 66 ML
ECHO LV EDV A4C: 65 ML
ECHO LV EDV INDEX A4C: 41 ML/M2
ECHO LV EDV NDEX A2C: 41 ML/M2
ECHO LV EJECTION FRACTION A2C: 59 %
ECHO LV EJECTION FRACTION A4C: 56 %
ECHO LV EJECTION FRACTION BIPLANE: 58 % (ref 55–100)
ECHO LV ESV A2C: 27 ML
ECHO LV ESV A4C: 28 ML
ECHO LV ESV INDEX A2C: 17 ML/M2
ECHO LV ESV INDEX A4C: 18 ML/M2
ECHO LV FRACTIONAL SHORTENING: 36 % (ref 28–44)
ECHO LV INTERNAL DIMENSION DIASTOLE INDEX: 2.63 CM/M2
ECHO LV INTERNAL DIMENSION DIASTOLIC: 4.2 CM (ref 3.9–5.3)
ECHO LV INTERNAL DIMENSION SYSTOLIC INDEX: 1.69 CM/M2
ECHO LV INTERNAL DIMENSION SYSTOLIC: 2.7 CM
ECHO LV IVSD: 0.9 CM (ref 0.6–0.9)
ECHO LV MASS 2D: 118.7 G (ref 67–162)
ECHO LV MASS INDEX 2D: 74.2 G/M2 (ref 43–95)
ECHO LV POSTERIOR WALL DIASTOLIC: 0.9 CM (ref 0.6–0.9)
ECHO LV RELATIVE WALL THICKNESS RATIO: 0.43
ECHO LVOT AREA: 3.5 CM2
ECHO LVOT AV VTI INDEX: 0.54
ECHO LVOT DIAM: 2.1 CM
ECHO LVOT MEAN GRADIENT: 4 MMHG
ECHO LVOT PEAK GRADIENT: 8 MMHG
ECHO LVOT PEAK VELOCITY: 1.4 M/S
ECHO LVOT STROKE VOLUME INDEX: 72.3 ML/M2
ECHO LVOT SV: 115.6 ML
ECHO LVOT VTI: 33.4 CM
ECHO MV A VELOCITY: 1.57 M/S
ECHO MV AREA VTI: 2.4 CM2
ECHO MV E DECELERATION TIME (DT): 306 MS
ECHO MV E VELOCITY: 1.15 M/S
ECHO MV E/A RATIO: 0.73
ECHO MV E/E' LATERAL: 16.43
ECHO MV E/E' RATIO (AVERAGED): 19.71
ECHO MV E/E' SEPTAL: 23
ECHO MV LVOT VTI INDEX: 1.44
ECHO MV MAX VELOCITY: 1.7 M/S
ECHO MV MEAN GRADIENT: 4 MMHG
ECHO MV MEAN VELOCITY: 0.9 M/S
ECHO MV PEAK GRADIENT: 12 MMHG
ECHO MV VTI: 48.2 CM
ECHO RA AREA 4C: 10 CM2
ECHO RA END SYSTOLIC VOLUME APICAL 4 CHAMBER INDEX BSA: 11 ML/M2
ECHO RA VOLUME: 18 ML
ECHO RIGHT VENTRICULAR SYSTOLIC PRESSURE (RVSP): 19 MMHG
ECHO RV BASAL DIMENSION: 2.6 CM
ECHO RV FREE WALL PEAK S': 12 CM/S
ECHO RV LONGITUDINAL DIMENSION: 7 CM
ECHO RV MID DIMENSION: 2.3 CM
ECHO RV TAPSE: 2.2 CM (ref 1.7–?)
ECHO TV REGURGITANT MAX VELOCITY: 2.01 M/S
ECHO TV REGURGITANT PEAK GRADIENT: 16 MMHG

## 2024-07-11 PROCEDURE — 1123F ACP DISCUSS/DSCN MKR DOCD: CPT | Performed by: INTERNAL MEDICINE

## 2024-07-11 PROCEDURE — 93306 TTE W/DOPPLER COMPLETE: CPT

## 2024-07-11 PROCEDURE — 3074F SYST BP LT 130 MM HG: CPT | Performed by: INTERNAL MEDICINE

## 2024-07-11 PROCEDURE — 99214 OFFICE O/P EST MOD 30 MIN: CPT | Performed by: INTERNAL MEDICINE

## 2024-07-11 PROCEDURE — 3078F DIAST BP <80 MM HG: CPT | Performed by: INTERNAL MEDICINE

## 2024-07-31 NOTE — PROGRESS NOTES
Results   Component Value Date    ALT 17 07/15/2020    AST 20 07/15/2020     Lab Results   Component Value Date    CHOL 120 08/21/2019    TRIG 136 08/21/2019    HDL 41 08/21/2019    LDLCALC 52 08/21/2019          Review of Systems   Constitutional: Positive for unexpected weight change. Negative for activity change, appetite change, chills and fever. Gastrointestinal: Negative for abdominal pain, blood in stool, constipation, diarrhea, nausea and vomiting. Psychiatric/Behavioral: Positive for sleep disturbance. The patient is nervous/anxious. All other systems reviewed and are negative. Allergies   Allergen Reactions    Penicillins Other (See Comments)     Doesn't know allergy reaction    Sulfa Antibiotics Other (See Comments)     n/a    Sulfasalazine Hives     n/a       Current Outpatient Medications   Medication Sig Dispense Refill    atorvastatin (LIPITOR) 80 MG tablet TAKE 1 TABLET BY MOUTH DAILY 90 tablet 0    neomycin-polymyxin-dexameth (MAXITROL) 3.5-29022-0.1 ophthalmic suspension Place 1 drop into both eyes      fluticasone (FLONASE) 50 MCG/ACT nasal spray SHAKE LIQUID AND USE 2 SPRAYS IN EACH NOSTRIL TWICE DAILY 48 g 5    lisinopril (PRINIVIL;ZESTRIL) 5 MG tablet Take 1 tablet by mouth daily 90 tablet 1    loratadine (CLARITIN) 10 MG tablet Take 1 tablet by mouth daily 90 tablet 3    triamcinolone (KENALOG) 0.1 % cream Apply topically 2 times daily as needed      calcium carbonate (OYSTER SHELL CALCIUM 500 MG) 1250 (500 Ca) MG tablet Take 1 tablet by mouth daily      vitamin D (CHOLECALCIFEROL) 1000 UNIT TABS tablet Take 1,000 Units by mouth daily      Multiple Vitamins-Minerals (MULTIVITAMIN PO) Take 1 capsule by mouth daily.  Ascorbic Acid (VITAMIN C) 500 MG tablet Take 500 mg by mouth daily.  vitamin E 400 UNIT capsule Take 400 Units by mouth daily.  camphor-menthol (SARNA) 0.5-0.5 % lotion Apply topically as needed.  1 Bottle 4     No current Lipid Panel; Future    Rhinorrhea  -     fluticasone (FLONASE) 50 MCG/ACT nasal spray; SHAKE LIQUID AND USE 2 SPRAYS IN EACH NOSTRIL TWICE DAILY  -     loratadine (CLARITIN) 10 MG tablet; Take 1 tablet by mouth daily    HTN, goal below 130/80  -     lisinopril (PRINIVIL;ZESTRIL) 5 MG tablet; Take 1 tablet by mouth daily    Primary biliary cholangitis (HCC)  -     loratadine (CLARITIN) 10 MG tablet; Take 1 tablet by mouth daily  -     CT ABDOMEN PELVIS W WO CONTRAST Additional Contrast? Oral and Rectal; Future    Needs flu shot  -     INFLUENZA, QUADV, ADJUVANTED, 65 YRS =, IM, PF, PREFILL SYR, 0.5ML (FLUAD)    Weight loss, abnormal  -     CT ABDOMEN PELVIS W WO CONTRAST Additional Contrast? Oral and Rectal; Future  -     AFL - Ellis Jung MD, Gastroenterology, [place]    Other orders  -     triamcinolone (KENALOG) 0.1 % cream; Apply topically 2 times daily as needed      No follow-ups on file.             Dennis Hyman MD 150

## 2024-08-08 ENCOUNTER — HOSPITAL ENCOUNTER (OUTPATIENT)
Age: 69
Discharge: HOME | End: 2024-08-08
Payer: MEDICARE

## 2024-08-08 LAB
ALBUMIN SERPL-MCNC: 3.5 G/DL (ref 3.4–5)
ALBUMIN/GLOB SERPL: 1 {RATIO} (ref 1.1–2.2)
ALP SERPL-CCNC: 197 U/L (ref 40–129)
ALT SERPL-CCNC: 11 U/L (ref 10–40)
ANION GAP SERPL CALCULATED.3IONS-SCNC: 12 MMOL/L (ref 3–16)
AST SERPL-CCNC: 18 U/L (ref 15–37)
BILIRUB SERPL-MCNC: 0.3 MG/DL (ref 0–1)
BUN SERPL-MCNC: 7 MG/DL (ref 7–20)
CALCIUM SERPL-MCNC: 9.1 MG/DL (ref 8.3–10.6)
CHLORIDE SERPL-SCNC: 105 MMOL/L (ref 99–110)
CHOLEST SERPL-MCNC: 102 MG/DL (ref 0–199)
CO2 SERPL-SCNC: 27 MMOL/L (ref 21–32)
CREAT SERPL-MCNC: 0.5 MG/DL (ref 0.6–1.2)
DEPRECATED RDW RBC AUTO: 15.1 % (ref 12.4–15.4)
GFR SERPLBLD CREATININE-BSD FMLA CKD-EPI: >90 ML/MIN/{1.73_M2}
GLUCOSE SERPL-MCNC: 124 MG/DL (ref 70–99)
HCT VFR BLD AUTO: 42 % (ref 36–48)
HDLC SERPL-MCNC: 45 MG/DL (ref 40–60)
HGB BLD-MCNC: 14.4 G/DL (ref 12–16)
LDLC SERPL CALC-MCNC: 38 MG/DL
MAGNESIUM SERPL-MCNC: 2.1 MG/DL (ref 1.8–2.4)
MCH RBC QN AUTO: 29.9 PG (ref 26–34)
MCHC RBC AUTO-ENTMCNC: 34.3 G/DL (ref 31–36)
MCV RBC AUTO: 87.2 FL (ref 80–100)
PLATELET # BLD AUTO: 195 K/UL (ref 135–450)
PMV BLD AUTO: 11.3 FL (ref 5–10.5)
POTASSIUM SERPL-SCNC: 3.8 MMOL/L (ref 3.5–5.1)
PROT SERPL-MCNC: 7.1 G/DL (ref 6.4–8.2)
RBC # BLD AUTO: 4.82 M/UL (ref 4–5.2)
SODIUM SERPL-SCNC: 144 MMOL/L (ref 136–145)
TRIGL SERPL-MCNC: 93 MG/DL (ref 0–150)
VLDLC SERPL CALC-MCNC: 19 MG/DL
WBC # BLD AUTO: 10.7 K/UL (ref 4–11)

## 2024-08-08 PROCEDURE — 80053 COMPREHEN METABOLIC PANEL: CPT

## 2024-08-08 PROCEDURE — 83735 ASSAY OF MAGNESIUM: CPT

## 2024-08-08 PROCEDURE — 85027 COMPLETE CBC AUTOMATED: CPT

## 2024-08-08 PROCEDURE — 36415 COLL VENOUS BLD VENIPUNCTURE: CPT

## 2024-08-08 PROCEDURE — 80061 LIPID PANEL: CPT

## 2024-08-19 ENCOUNTER — HOSPITAL ENCOUNTER (EMERGENCY)
Age: 69
Discharge: HOME | End: 2024-08-20
Attending: EMERGENCY MEDICINE
Payer: MEDICARE

## 2024-08-19 ENCOUNTER — APPOINTMENT (OUTPATIENT)
Dept: GENERAL RADIOLOGY | Age: 69
End: 2024-08-19
Payer: MEDICARE

## 2024-08-19 DIAGNOSIS — S89.91XA KNEE INJURY, RIGHT, INITIAL ENCOUNTER: Primary | ICD-10-CM

## 2024-08-19 PROCEDURE — 73562 X-RAY EXAM OF KNEE 3: CPT

## 2024-08-19 PROCEDURE — 99283 EMERGENCY DEPT VISIT LOW MDM: CPT

## 2024-08-19 PROCEDURE — 6370000000 HC RX 637 (ALT 250 FOR IP): Performed by: EMERGENCY MEDICINE

## 2024-08-19 RX ORDER — HYDROCODONE BITARTRATE AND ACETAMINOPHEN 5; 325 MG/1; MG/1
1 TABLET ORAL
Status: COMPLETED | OUTPATIENT
Start: 2024-08-19 | End: 2024-08-19

## 2024-08-19 RX ADMIN — HYDROCODONE BITARTRATE AND ACETAMINOPHEN 1 TABLET: 5; 325 TABLET ORAL at 23:30

## 2024-08-19 ASSESSMENT — PAIN DESCRIPTION - ORIENTATION
ORIENTATION: RIGHT
ORIENTATION: RIGHT

## 2024-08-19 ASSESSMENT — PAIN SCALES - GENERAL
PAINLEVEL_OUTOF10: 7
PAINLEVEL_OUTOF10: 7

## 2024-08-19 ASSESSMENT — PAIN DESCRIPTION - LOCATION
LOCATION: KNEE
LOCATION: KNEE

## 2024-08-19 ASSESSMENT — PAIN DESCRIPTION - DESCRIPTORS: DESCRIPTORS: ACHING

## 2024-08-19 ASSESSMENT — PAIN - FUNCTIONAL ASSESSMENT: PAIN_FUNCTIONAL_ASSESSMENT: 0-10

## 2024-08-20 VITALS
OXYGEN SATURATION: 99 % | RESPIRATION RATE: 16 BRPM | DIASTOLIC BLOOD PRESSURE: 45 MMHG | TEMPERATURE: 97.7 F | HEART RATE: 66 BPM | BODY MASS INDEX: 28.37 KG/M2 | WEIGHT: 145.28 LBS | SYSTOLIC BLOOD PRESSURE: 118 MMHG

## 2024-08-20 RX ORDER — NAPROXEN 500 MG/1
500 TABLET ORAL 2 TIMES DAILY WITH MEALS
Qty: 14 TABLET | Refills: 0 | Status: SHIPPED | OUTPATIENT
Start: 2024-08-20 | End: 2024-08-27

## 2024-08-20 ASSESSMENT — PAIN SCALES - GENERAL: PAINLEVEL_OUTOF10: 1

## 2024-08-20 ASSESSMENT — PAIN DESCRIPTION - ORIENTATION: ORIENTATION: RIGHT

## 2024-08-20 ASSESSMENT — PAIN DESCRIPTION - LOCATION: LOCATION: KNEE

## 2024-08-20 ASSESSMENT — PAIN DESCRIPTION - DESCRIPTORS: DESCRIPTORS: ACHING

## 2024-08-20 NOTE — ED PROVIDER NOTES
EMERGENCY MEDICINE ATTENDING NOTE  Vern Collins Jr., DO, FACEP, FAAEM        CHIEF COMPLAINT  Chief Complaint   Patient presents with    Fall     C/o fall earlier today landed on right knee. Pain in right knee progressively got worse and started to swell.         HISTORY OF PRESENT ILLNESS  Dominic Rivera is a 69 y.o. female who presents to the ED for evaluation of knee pain after a fall.  Patient states she slipped on an acorn that was on her deck.  She came down on both of her knees but states more so on the right side.  Had a little bit of achiness to the knee initially however after she went back inside and did a few things and then sat down to rest she noticed the pain to be intensifying and she started develop swelling in the right knee.  She denying any pain in the left knee.  She has a little bit of achiness in the hip and low back but states those are somewhat normal for her and thinks they were just a little bit aggravated by the fall.  States that it was difficult to move the knee and bear weight on it to get here.  Denies numbness or tingling distally.  There issues or concerns.  Did not hit her head or lose consciousness.  Denies use of anticoagulants.    Nursing/triage notes reviewed.  No other complaints, modifying factors or associated symptoms.     REVIEW OF SYSTEMS:  All systems are reviewed and are negative unless noted in the HPI.    PAST MEDICAL HISTORY  Past Medical History:   Diagnosis Date    Allergic rhinitis     Controlled type 2 diabetes mellitus without complication, without long-term current use of insulin ([facility]) 12/16/2019    no meds, low A1C    COPD (chronic obstructive pulmonary disease) ([facility])     Dyslipidemia 07/05/2017    Essential hypertension 05/16/2018    GERD (gastroesophageal reflux disease)     Heart murmur     very early stages    Hypercholesterolemia 05/16/2018    Hyperlipidemia     Hypertension     Lichen planus     external, mainly arms, legs    PONV

## 2024-08-20 NOTE — ED TRIAGE NOTES
C/o fall earlier today landed on right knee. Pain in right knee progressively got worse and started to swell.

## 2024-09-01 SDOH — HEALTH STABILITY: PHYSICAL HEALTH: ON AVERAGE, HOW MANY MINUTES DO YOU ENGAGE IN EXERCISE AT THIS LEVEL?: 10 MIN

## 2024-09-01 SDOH — HEALTH STABILITY: PHYSICAL HEALTH: ON AVERAGE, HOW MANY DAYS PER WEEK DO YOU ENGAGE IN MODERATE TO STRENUOUS EXERCISE (LIKE A BRISK WALK)?: 2 DAYS

## 2024-09-01 ASSESSMENT — LIFESTYLE VARIABLES
HOW OFTEN DO YOU HAVE SIX OR MORE DRINKS ON ONE OCCASION: 1
HOW MANY STANDARD DRINKS CONTAINING ALCOHOL DO YOU HAVE ON A TYPICAL DAY: 1 OR 2
HOW OFTEN DO YOU HAVE A DRINK CONTAINING ALCOHOL: MONTHLY OR LESS
HOW MANY STANDARD DRINKS CONTAINING ALCOHOL DO YOU HAVE ON A TYPICAL DAY: 1
HOW OFTEN DO YOU HAVE A DRINK CONTAINING ALCOHOL: 2

## 2024-09-01 ASSESSMENT — PATIENT HEALTH QUESTIONNAIRE - PHQ9
SUM OF ALL RESPONSES TO PHQ QUESTIONS 1-9: 0
1. LITTLE INTEREST OR PLEASURE IN DOING THINGS: NOT AT ALL
SUM OF ALL RESPONSES TO PHQ QUESTIONS 1-9: 0
2. FEELING DOWN, DEPRESSED OR HOPELESS: NOT AT ALL
SUM OF ALL RESPONSES TO PHQ9 QUESTIONS 1 & 2: 0

## 2024-09-06 SDOH — ECONOMIC STABILITY: FOOD INSECURITY: WITHIN THE PAST 12 MONTHS, YOU WORRIED THAT YOUR FOOD WOULD RUN OUT BEFORE YOU GOT MONEY TO BUY MORE.: NEVER TRUE

## 2024-09-06 SDOH — ECONOMIC STABILITY: INCOME INSECURITY: HOW HARD IS IT FOR YOU TO PAY FOR THE VERY BASICS LIKE FOOD, HOUSING, MEDICAL CARE, AND HEATING?: NOT VERY HARD

## 2024-09-06 SDOH — ECONOMIC STABILITY: FOOD INSECURITY: WITHIN THE PAST 12 MONTHS, THE FOOD YOU BOUGHT JUST DIDN'T LAST AND YOU DIDN'T HAVE MONEY TO GET MORE.: NEVER TRUE

## 2024-09-09 ENCOUNTER — OFFICE VISIT (OUTPATIENT)
Dept: INTERNAL MEDICINE CLINIC | Age: 69
End: 2024-09-09
Payer: MEDICARE

## 2024-09-09 VITALS
WEIGHT: 139.6 LBS | SYSTOLIC BLOOD PRESSURE: 122 MMHG | DIASTOLIC BLOOD PRESSURE: 60 MMHG | TEMPERATURE: 96.8 F | HEART RATE: 87 BPM | BODY MASS INDEX: 27.41 KG/M2 | OXYGEN SATURATION: 97 % | HEIGHT: 60 IN

## 2024-09-09 DIAGNOSIS — I71.40 ABDOMINAL AORTIC ANEURYSM (AAA) WITHOUT RUPTURE, UNSPECIFIED PART (HCC): Chronic | ICD-10-CM

## 2024-09-09 DIAGNOSIS — M81.6 LOCALIZED OSTEOPOROSIS WITHOUT CURRENT PATHOLOGICAL FRACTURE: ICD-10-CM

## 2024-09-09 DIAGNOSIS — Z12.31 ENCOUNTER FOR SCREENING MAMMOGRAM FOR BREAST CANCER: ICD-10-CM

## 2024-09-09 DIAGNOSIS — Z00.00 MEDICARE ANNUAL WELLNESS VISIT, SUBSEQUENT: Primary | ICD-10-CM

## 2024-09-09 DIAGNOSIS — Z12.11 SCREENING FOR COLON CANCER: ICD-10-CM

## 2024-09-09 DIAGNOSIS — E11.9 CONTROLLED TYPE 2 DIABETES MELLITUS WITHOUT COMPLICATION, WITHOUT LONG-TERM CURRENT USE OF INSULIN (HCC): ICD-10-CM

## 2024-09-09 DIAGNOSIS — Z23 NEED FOR PROPHYLACTIC VACCINATION AGAINST DIPHTHERIA-TETANUS-PERTUSSIS (DTP): ICD-10-CM

## 2024-09-09 DIAGNOSIS — Z78.0 POST-MENOPAUSAL: ICD-10-CM

## 2024-09-09 DIAGNOSIS — K74.3 PRIMARY BILIARY CHOLANGITIS (HCC): ICD-10-CM

## 2024-09-09 DIAGNOSIS — Z23 NEED FOR ZOSTER VACCINATION: ICD-10-CM

## 2024-09-09 DIAGNOSIS — Z13.1 ENCOUNTER FOR SCREENING FOR DIABETES MELLITUS: ICD-10-CM

## 2024-09-09 PROBLEM — E44.1 MILD PROTEIN-CALORIE MALNUTRITION (HCC): Status: RESOLVED | Noted: 2022-01-11 | Resolved: 2024-09-09

## 2024-09-09 PROBLEM — K21.9 ESOPHAGEAL REFLUX: Status: ACTIVE | Noted: 2024-09-09

## 2024-09-09 LAB — HBA1C MFR BLD: 6.3 %

## 2024-09-09 PROCEDURE — 99214 OFFICE O/P EST MOD 30 MIN: CPT | Performed by: INTERNAL MEDICINE

## 2024-09-09 PROCEDURE — 3078F DIAST BP <80 MM HG: CPT | Performed by: INTERNAL MEDICINE

## 2024-09-09 PROCEDURE — 1123F ACP DISCUSS/DSCN MKR DOCD: CPT | Performed by: INTERNAL MEDICINE

## 2024-09-09 PROCEDURE — 3074F SYST BP LT 130 MM HG: CPT | Performed by: INTERNAL MEDICINE

## 2024-09-09 PROCEDURE — G0439 PPPS, SUBSEQ VISIT: HCPCS | Performed by: INTERNAL MEDICINE

## 2024-09-09 PROCEDURE — 3044F HG A1C LEVEL LT 7.0%: CPT | Performed by: INTERNAL MEDICINE

## 2024-09-09 PROCEDURE — 83036 HEMOGLOBIN GLYCOSYLATED A1C: CPT | Performed by: INTERNAL MEDICINE

## 2024-09-09 ASSESSMENT — ANXIETY QUESTIONNAIRES
1. FEELING NERVOUS, ANXIOUS, OR ON EDGE: SEVERAL DAYS
3. WORRYING TOO MUCH ABOUT DIFFERENT THINGS: NOT AT ALL
5. BEING SO RESTLESS THAT IT IS HARD TO SIT STILL: NOT AT ALL
IF YOU CHECKED OFF ANY PROBLEMS ON THIS QUESTIONNAIRE, HOW DIFFICULT HAVE THESE PROBLEMS MADE IT FOR YOU TO DO YOUR WORK, TAKE CARE OF THINGS AT HOME, OR GET ALONG WITH OTHER PEOPLE: NOT DIFFICULT AT ALL
4. TROUBLE RELAXING: NOT AT ALL
GAD7 TOTAL SCORE: 2
2. NOT BEING ABLE TO STOP OR CONTROL WORRYING: NOT AT ALL
6. BECOMING EASILY ANNOYED OR IRRITABLE: NOT AT ALL
7. FEELING AFRAID AS IF SOMETHING AWFUL MIGHT HAPPEN: SEVERAL DAYS

## 2024-09-09 ASSESSMENT — PATIENT HEALTH QUESTIONNAIRE - PHQ9
2. FEELING DOWN, DEPRESSED OR HOPELESS: NOT AT ALL
SUM OF ALL RESPONSES TO PHQ QUESTIONS 1-9: 0
1. LITTLE INTEREST OR PLEASURE IN DOING THINGS: NOT AT ALL
SUM OF ALL RESPONSES TO PHQ QUESTIONS 1-9: 0
SUM OF ALL RESPONSES TO PHQ9 QUESTIONS 1 & 2: 0
SUM OF ALL RESPONSES TO PHQ QUESTIONS 1-9: 0
SUM OF ALL RESPONSES TO PHQ QUESTIONS 1-9: 0

## 2024-09-10 ENCOUNTER — TELEPHONE (OUTPATIENT)
Dept: INTERNAL MEDICINE CLINIC | Age: 69
End: 2024-09-10

## 2024-09-27 DIAGNOSIS — J30.1 SEASONAL ALLERGIC RHINITIS DUE TO POLLEN: ICD-10-CM

## 2024-09-27 DIAGNOSIS — E11.9 CONTROLLED TYPE 2 DIABETES MELLITUS WITHOUT COMPLICATION, WITHOUT LONG-TERM CURRENT USE OF INSULIN (HCC): ICD-10-CM

## 2024-09-27 NOTE — TELEPHONE ENCOUNTER
Recent Visits  Date Type Provider Dept   09/09/24 Office Visit Tavo Mclain MD [facility]   03/06/24 Office Visit [facility]clain MD [facility]   09/06/23 Office Visit Tavo Mclain MD [facility]   Showing recent visits within past 540 days with a meds authorizing provider and meeting all other requirements  Future Appointments  No visits were found meeting these conditions.  Showing future appointments within next 150 days with a meds authorizing provider and meeting all other requirements     9/9/2024

## 2024-09-30 RX ORDER — FLUTICASONE PROPIONATE 50 MCG
SPRAY, SUSPENSION (ML) NASAL
Qty: 48 G | Refills: 3 | Status: SHIPPED | OUTPATIENT
Start: 2024-09-30

## 2024-10-01 DIAGNOSIS — J34.89 RHINORRHEA: ICD-10-CM

## 2024-10-01 DIAGNOSIS — K74.3 PRIMARY BILIARY CHOLANGITIS (HCC): ICD-10-CM

## 2024-10-01 DIAGNOSIS — J30.9 ALLERGIC CONJUNCTIVITIS AND RHINITIS, BILATERAL: ICD-10-CM

## 2024-10-01 DIAGNOSIS — H10.13 ALLERGIC CONJUNCTIVITIS AND RHINITIS, BILATERAL: ICD-10-CM

## 2024-10-01 NOTE — TELEPHONE ENCOUNTER
Medication:   Requested Prescriptions     Pending Prescriptions Disp Refills    loratadine (CLARITIN) 10 MG tablet [Pharmacy Med Name: ALLERGY RELF (LORATADINE) 10MG TABS] 90 tablet 1     Sig: TAKE 1 TABLET BY MOUTH DAILY     Last Filled:  4/7/24    Last appt: 9/9/2024   Next appt: 3/10/2025    Last OARRS:        No data to display

## 2024-10-02 RX ORDER — LORATADINE 10 MG/1
10 TABLET ORAL DAILY
Qty: 90 TABLET | Refills: 1 | Status: SHIPPED | OUTPATIENT
Start: 2024-10-02

## 2024-10-07 DIAGNOSIS — I10 HTN, GOAL BELOW 130/80: ICD-10-CM

## 2024-10-07 NOTE — TELEPHONE ENCOUNTER
Recent Visits  Date Type Provider Dept   09/09/24 Office Visit Tavo Mclain MD [facility]   03/06/24 Office Visit Tavo Mclain MD [facility]   09/06/23 Office Visit Tavo Mclain MD [facility]   Showing recent visits within past 540 days with a meds authorizing provider and meeting all other requirements  Future Appointments  No visits were found meeting these conditions.  Showing future appointments within next 150 days with a meds authorizing provider and meeting all other requirements     9/9/2024

## 2024-10-08 ENCOUNTER — HOSPITAL ENCOUNTER (OUTPATIENT)
Dept: GENERAL RADIOLOGY | Age: 69
Discharge: HOME | End: 2024-10-08
Attending: INTERNAL MEDICINE
Payer: MEDICARE

## 2024-10-08 ENCOUNTER — HOSPITAL ENCOUNTER (OUTPATIENT)
Dept: WOMENS IMAGING | Age: 69
Discharge: HOME | End: 2024-10-08
Attending: INTERNAL MEDICINE
Payer: MEDICARE

## 2024-10-08 VITALS — BODY MASS INDEX: 27.09 KG/M2 | HEIGHT: 60 IN | WEIGHT: 138 LBS

## 2024-10-08 DIAGNOSIS — Z12.31 ENCOUNTER FOR SCREENING MAMMOGRAM FOR BREAST CANCER: ICD-10-CM

## 2024-10-08 DIAGNOSIS — M81.6 LOCALIZED OSTEOPOROSIS WITHOUT CURRENT PATHOLOGICAL FRACTURE: ICD-10-CM

## 2024-10-08 PROCEDURE — 77063 BREAST TOMOSYNTHESIS BI: CPT

## 2024-10-08 PROCEDURE — 77080 DXA BONE DENSITY AXIAL: CPT

## 2024-10-10 DIAGNOSIS — I10 HTN, GOAL BELOW 130/80: ICD-10-CM

## 2024-10-10 RX ORDER — LISINOPRIL 5 MG/1
TABLET ORAL
Qty: 90 TABLET | Refills: 1 | Status: SHIPPED | OUTPATIENT
Start: 2024-10-10

## 2024-10-10 RX ORDER — LISINOPRIL 5 MG/1
5 TABLET ORAL DAILY
Qty: 90 TABLET | Refills: 1 | Status: SHIPPED | OUTPATIENT
Start: 2024-10-10

## 2024-10-10 NOTE — TELEPHONE ENCOUNTER
Recent Visits  Date Type Provider Dept   09/09/24 Office Visit Tavo Mclain MD [facility]   03/06/24 Office Visit [facility]clain [facility]   09/06/23 Office Visit Tavo Mclain MD [facility]   Showing recent visits within past 540 days with a meds authorizing provider and meeting all other requirements  Future Appointments  No visits were found meeting these conditions.  Showing future appointments within next 150 days with a meds authorizing provider and meeting all other requirements     9/9/2024

## 2024-11-14 DIAGNOSIS — E78.5 DYSLIPIDEMIA: ICD-10-CM

## 2024-11-15 DIAGNOSIS — K21.9 GASTROESOPHAGEAL REFLUX DISEASE WITHOUT ESOPHAGITIS: ICD-10-CM

## 2024-11-15 RX ORDER — ATORVASTATIN CALCIUM 80 MG/1
80 TABLET, FILM COATED ORAL DAILY
Qty: 90 TABLET | Refills: 1 | Status: SHIPPED | OUTPATIENT
Start: 2024-11-15

## 2024-11-15 NOTE — TELEPHONE ENCOUNTER
Recent Visits  Date Type Provider Dept   09/09/24 Office Visit Tavo Mclain MD [facility]   03/06/24 Office Visit Tavo Mclain MD [facility]   09/06/23 Office Visit Tavo Mclain MD [facility]   Showing recent visits within past 540 days with a meds authorizing provider and meeting all other requirements  Future Appointments  Date Type Provider Dept   03/10/25 Appointment Tavo Mclain MD [facility]   Showing future appointments within next 150 days with a meds authorizing provider and meeting all other requirements     9/9/2024

## 2024-11-18 RX ORDER — FAMOTIDINE 20 MG/1
TABLET, FILM COATED ORAL
Qty: 180 TABLET | Refills: 1 | Status: SHIPPED | OUTPATIENT
Start: 2024-11-18

## 2024-11-18 NOTE — TELEPHONE ENCOUNTER
Recent Visits  Date Type Provider Dept   09/09/24 Office Visit Tavo Mclain MD [facility]   03/06/24 Office Visit Tavo Mclain MD [facility]   09/06/23 Office Visit Tavo Mclain MD [facility]   Showing recent visits within past 540 days with a meds authorizing provider and meeting all other requirements  Future Appointments  Date Type Provider Dept   03/10/25 Appointment Tavo Mclain MD [facility]   Showing future appointments within next 150 days with a meds authorizing provider and meeting all other requirements     9/9/2024

## 2024-12-05 RX ORDER — FLUTICASONE FUROATE AND VILANTEROL TRIFENATATE 200; 25 UG/1; UG/1
POWDER RESPIRATORY (INHALATION)
Qty: 60 EACH | Refills: 5 | Status: SHIPPED | OUTPATIENT
Start: 2024-12-05

## 2024-12-05 NOTE — TELEPHONE ENCOUNTER
Recent Visits  Date Type Provider Dept   09/09/24 Office Visit Tavo Mclain MD [facility]   03/06/24 Office Visit Tavo Mclain MD [facility]   09/06/23 Office Visit Tavo Mclain MD [facility]   Showing recent visits within past 540 days with a meds authorizing provider and meeting all other requirements  Future Appointments  Date Type Provider Dept   03/10/25 Appointment Tavo Mclain MD [facility]   Showing future appointments within next 150 days with a meds authorizing provider and meeting all other requirements     9/9/2024

## 2025-01-22 ENCOUNTER — OFFICE VISIT (OUTPATIENT)
Age: 70
End: 2025-01-22

## 2025-01-22 VITALS
HEIGHT: 61 IN | WEIGHT: 142.8 LBS | OXYGEN SATURATION: 97 % | RESPIRATION RATE: 16 BRPM | TEMPERATURE: 98.2 F | DIASTOLIC BLOOD PRESSURE: 73 MMHG | BODY MASS INDEX: 26.96 KG/M2 | SYSTOLIC BLOOD PRESSURE: 123 MMHG | HEART RATE: 75 BPM

## 2025-01-22 DIAGNOSIS — N30.00 ACUTE CYSTITIS WITHOUT HEMATURIA: ICD-10-CM

## 2025-01-22 DIAGNOSIS — R10.9 FLANK PAIN: Primary | ICD-10-CM

## 2025-01-22 LAB
BILIRUBIN, POC: ABNORMAL
BLOOD URINE, POC: ABNORMAL
CLARITY, POC: ABNORMAL
COLOR, POC: ABNORMAL
GLUCOSE URINE, POC: ABNORMAL MG/DL
KETONES, POC: ABNORMAL MG/DL
LEUKOCYTE EST, POC: ABNORMAL
NITRITE, POC: ABNORMAL
PH, POC: 5.5
PROTEIN, POC: ABNORMAL MG/DL
SPECIFIC GRAVITY, POC: 1.02
UROBILINOGEN, POC: 0.2 MG/DL

## 2025-01-22 RX ORDER — PHENAZOPYRIDINE HYDROCHLORIDE 200 MG/1
200 TABLET, FILM COATED ORAL 3 TIMES DAILY PRN
Qty: 6 TABLET | Refills: 0 | Status: SHIPPED | OUTPATIENT
Start: 2025-01-22 | End: 2025-01-25

## 2025-01-22 RX ORDER — CIPROFLOXACIN 500 MG/1
500 TABLET, FILM COATED ORAL 2 TIMES DAILY
Qty: 10 TABLET | Refills: 0 | Status: SHIPPED | OUTPATIENT
Start: 2025-01-22 | End: 2025-01-27

## 2025-01-22 ASSESSMENT — ENCOUNTER SYMPTOMS
NAUSEA: 1
ABDOMINAL PAIN: 1

## 2025-01-22 NOTE — PROGRESS NOTES
Dominic Rivera (:  1955) is a 69 y.o. female,New patient, here for evaluation of the following chief complaint(s):  Flank Pain (R side with frequent urination and nausea x1.5 day )      ASSESSMENT/PLAN:    ICD-10-CM    1. Flank pain  R10.9 Culture, Urine     POCT Urinalysis no Micro      2. Acute cystitis without hematuria  N30.00 ciprofloxacin (CIPRO) 500 MG tablet     phenazopyridine (PYRIDIUM) 200 MG tablet        Results for orders placed or performed in visit on 25   POCT Urinalysis no Micro   Result Value Ref Range    Color, UA Dark yellow     Clarity, UA Slightly cloudy     Glucose, UA POC Neg mg/dL    Bilirubin, UA Neg     Ketones, UA Neg mg/dL    Spec Grav, UA 1.020     Blood, UA POC Neg     pH, UA 5.5     Protein, UA POC 1+ mg/dL    Urobilinogen, UA 0.2 mg/dL    Leukocytes, UA Neg     Nitrite, UA Neg      Dominic is concerned because she passed out last time she had a UTI. Will start Cipro while waiting for urine culture results If pain worsens or increases significantly Dominic should go to the ED for an evaluation  Drink fluids    Patient/Family verbalized understanding of printed and verbal discharge instructions including follow up care.    Follow up in 7 days if symptoms persist or if symptoms worsen.  Follow up with primary care provider for persistent symptoms  SUBJECTIVE/OBJECTIVE:  Flank pain and right side pain. Small BM that was soft.       History provided by:  Patient  Flank Pain  This is a new problem. The current episode started yesterday. The problem occurs intermittently. Associated symptoms include abdominal pain. She has tried nothing for the symptoms. The treatment provided no relief.           Vitals:    25 1624   BP: 123/73   Site: Right Upper Arm   Position: Sitting   Cuff Size: Medium Adult   Pulse: 75   Resp: 16   Temp: 98.2 °F (36.8 °C)   TempSrc: Oral   SpO2: 97%   Weight: 64.8 kg (142 lb 12.8 oz)   Height: 1.537 m (5' 0.5\")       Review of Systems

## 2025-01-23 LAB — BACTERIA UR CULT: NORMAL

## 2025-01-24 ENCOUNTER — TELEPHONE (OUTPATIENT)
Age: 70
End: 2025-01-24

## 2025-01-24 NOTE — TELEPHONE ENCOUNTER
Called Dominic to discuss her urine culture. She can stop her antibiotic and pyridium. Verbalized understanding of results. To make an appointment with her primary care provider for persistent symptoms.

## 2025-02-27 ENCOUNTER — OFFICE VISIT (OUTPATIENT)
Dept: INTERNAL MEDICINE CLINIC | Age: 70
End: 2025-02-27
Payer: MEDICARE

## 2025-02-27 VITALS
OXYGEN SATURATION: 97 % | WEIGHT: 144 LBS | DIASTOLIC BLOOD PRESSURE: 60 MMHG | BODY MASS INDEX: 27.66 KG/M2 | HEART RATE: 92 BPM | SYSTOLIC BLOOD PRESSURE: 128 MMHG

## 2025-02-27 DIAGNOSIS — B35.4 TINEA CORPORIS: Primary | ICD-10-CM

## 2025-02-27 PROCEDURE — 3078F DIAST BP <80 MM HG: CPT | Performed by: NURSE PRACTITIONER

## 2025-02-27 PROCEDURE — 99213 OFFICE O/P EST LOW 20 MIN: CPT | Performed by: NURSE PRACTITIONER

## 2025-02-27 PROCEDURE — 3074F SYST BP LT 130 MM HG: CPT | Performed by: NURSE PRACTITIONER

## 2025-02-27 PROCEDURE — 1159F MED LIST DOCD IN RCRD: CPT | Performed by: NURSE PRACTITIONER

## 2025-02-27 PROCEDURE — 1123F ACP DISCUSS/DSCN MKR DOCD: CPT | Performed by: NURSE PRACTITIONER

## 2025-02-27 RX ORDER — CLOTRIMAZOLE 1 %
CREAM (GRAM) TOPICAL
Qty: 14 G | Refills: 1 | Status: SHIPPED | OUTPATIENT
Start: 2025-02-27 | End: 2025-03-06

## 2025-02-27 SDOH — ECONOMIC STABILITY: FOOD INSECURITY: WITHIN THE PAST 12 MONTHS, YOU WORRIED THAT YOUR FOOD WOULD RUN OUT BEFORE YOU GOT MONEY TO BUY MORE.: NEVER TRUE

## 2025-02-27 SDOH — ECONOMIC STABILITY: FOOD INSECURITY: WITHIN THE PAST 12 MONTHS, THE FOOD YOU BOUGHT JUST DIDN'T LAST AND YOU DIDN'T HAVE MONEY TO GET MORE.: NEVER TRUE

## 2025-02-27 ASSESSMENT — ENCOUNTER SYMPTOMS
ALLERGIC/IMMUNOLOGIC NEGATIVE: 1
GASTROINTESTINAL NEGATIVE: 1
RESPIRATORY NEGATIVE: 1
EYES NEGATIVE: 1

## 2025-02-27 ASSESSMENT — ANXIETY QUESTIONNAIRES
2. NOT BEING ABLE TO STOP OR CONTROL WORRYING: NOT AT ALL
7. FEELING AFRAID AS IF SOMETHING AWFUL MIGHT HAPPEN: NOT AT ALL
3. WORRYING TOO MUCH ABOUT DIFFERENT THINGS: NOT AT ALL
6. BECOMING EASILY ANNOYED OR IRRITABLE: SEVERAL DAYS
IF YOU CHECKED OFF ANY PROBLEMS ON THIS QUESTIONNAIRE, HOW DIFFICULT HAVE THESE PROBLEMS MADE IT FOR YOU TO DO YOUR WORK, TAKE CARE OF THINGS AT HOME, OR GET ALONG WITH OTHER PEOPLE: NOT DIFFICULT AT ALL
GAD7 TOTAL SCORE: 1
5. BEING SO RESTLESS THAT IT IS HARD TO SIT STILL: NOT AT ALL
1. FEELING NERVOUS, ANXIOUS, OR ON EDGE: NOT AT ALL
4. TROUBLE RELAXING: NOT AT ALL

## 2025-02-27 ASSESSMENT — PATIENT HEALTH QUESTIONNAIRE - PHQ9
2. FEELING DOWN, DEPRESSED OR HOPELESS: SEVERAL DAYS
SUM OF ALL RESPONSES TO PHQ QUESTIONS 1-9: 1
1. LITTLE INTEREST OR PLEASURE IN DOING THINGS: NOT AT ALL
SUM OF ALL RESPONSES TO PHQ QUESTIONS 1-9: 1
SUM OF ALL RESPONSES TO PHQ9 QUESTIONS 1 & 2: 1
SUM OF ALL RESPONSES TO PHQ QUESTIONS 1-9: 1
SUM OF ALL RESPONSES TO PHQ QUESTIONS 1-9: 1

## 2025-02-27 NOTE — PROGRESS NOTES
Dominic Rivera (:  1955) is a 69 y.o. female,Established patient, here for evaluation of the following chief complaint(s):  Rash (Under left breast and on  scar/ x1 week)         Assessment & Plan  Tinea corporis   New, not at goal (unstable), keep area dry and free of irritation and medication adherence emphasized    Orders:    clotrimazole (LOTRIMIN AF) 1 % cream; Apply topically 2 times daily.      No follow-ups on file.       Subjective   HPI Presents today for dermatitis under left breast and c section irritation sites    Review of Systems   Constitutional: Negative.    HENT: Negative.     Eyes: Negative.    Respiratory: Negative.     Gastrointestinal: Negative.    Endocrine: Negative.    Genitourinary: Negative.    Musculoskeletal: Negative.    Skin:  Positive for rash.   Allergic/Immunologic: Negative.    Neurological: Negative.    Hematological: Negative.    Psychiatric/Behavioral: Negative.            Objective   Physical Exam  Constitutional:       Appearance: Normal appearance.   Cardiovascular:      Rate and Rhythm: Normal rate and regular rhythm.   Pulmonary:      Effort: Pulmonary effort is normal.      Breath sounds: Normal breath sounds.   Skin:     Findings: Erythema and rash present. Rash is urticarial.             Comments: Erythematous rash noted under left breast approximately 2 cm, similar area noted in perineal inguinal area that is about 3 to 4 cm some weeping noted no odor   Neurological:      Mental Status: She is alert.                Vitals:    25 1009   BP: 128/60   Pulse: 92   SpO2: 97%      BP Readings from Last 3 Encounters:   25 128/60   25 123/73   24 122/60      Wt Readings from Last 3 Encounters:   25 65.3 kg (144 lb)   25 64.8 kg (142 lb 12.8 oz)   10/08/24 62.6 kg (138 lb)      An electronic signature was used to authenticate this note.    --Jay Su, LINNEA - CNP

## 2025-03-10 ENCOUNTER — OFFICE VISIT (OUTPATIENT)
Dept: INTERNAL MEDICINE CLINIC | Age: 70
End: 2025-03-10
Payer: MEDICARE

## 2025-03-10 VITALS
SYSTOLIC BLOOD PRESSURE: 130 MMHG | BODY MASS INDEX: 27.54 KG/M2 | WEIGHT: 143.4 LBS | HEART RATE: 96 BPM | OXYGEN SATURATION: 95 % | DIASTOLIC BLOOD PRESSURE: 58 MMHG | TEMPERATURE: 97.7 F

## 2025-03-10 DIAGNOSIS — K21.9 GASTROESOPHAGEAL REFLUX DISEASE WITHOUT ESOPHAGITIS: ICD-10-CM

## 2025-03-10 DIAGNOSIS — H10.13 ALLERGIC CONJUNCTIVITIS AND RHINITIS, BILATERAL: ICD-10-CM

## 2025-03-10 DIAGNOSIS — E11.9 CONTROLLED TYPE 2 DIABETES MELLITUS WITHOUT COMPLICATION, WITHOUT LONG-TERM CURRENT USE OF INSULIN: Chronic | ICD-10-CM

## 2025-03-10 DIAGNOSIS — E78.5 DYSLIPIDEMIA: ICD-10-CM

## 2025-03-10 DIAGNOSIS — I10 HTN, GOAL BELOW 130/80: ICD-10-CM

## 2025-03-10 DIAGNOSIS — T14.8XXA WOUND INFECTION: ICD-10-CM

## 2025-03-10 DIAGNOSIS — L08.9 WOUND INFECTION: ICD-10-CM

## 2025-03-10 DIAGNOSIS — J44.9 CHRONIC OBSTRUCTIVE PULMONARY DISEASE, UNSPECIFIED COPD TYPE (HCC): ICD-10-CM

## 2025-03-10 DIAGNOSIS — J34.89 RHINORRHEA: ICD-10-CM

## 2025-03-10 DIAGNOSIS — K74.3 PRIMARY BILIARY CHOLANGITIS (HCC): ICD-10-CM

## 2025-03-10 DIAGNOSIS — J30.9 ALLERGIC CONJUNCTIVITIS AND RHINITIS, BILATERAL: ICD-10-CM

## 2025-03-10 DIAGNOSIS — B37.2 CANDIDIASIS, INTERTRIGO: Primary | ICD-10-CM

## 2025-03-10 DIAGNOSIS — E11.9 CONTROLLED TYPE 2 DIABETES MELLITUS WITHOUT COMPLICATION, WITHOUT LONG-TERM CURRENT USE OF INSULIN (HCC): Chronic | ICD-10-CM

## 2025-03-10 DIAGNOSIS — J30.1 SEASONAL ALLERGIC RHINITIS DUE TO POLLEN: ICD-10-CM

## 2025-03-10 PROCEDURE — G2211 COMPLEX E/M VISIT ADD ON: HCPCS | Performed by: INTERNAL MEDICINE

## 2025-03-10 PROCEDURE — 99214 OFFICE O/P EST MOD 30 MIN: CPT | Performed by: INTERNAL MEDICINE

## 2025-03-10 PROCEDURE — 1160F RVW MEDS BY RX/DR IN RCRD: CPT | Performed by: INTERNAL MEDICINE

## 2025-03-10 PROCEDURE — 1123F ACP DISCUSS/DSCN MKR DOCD: CPT | Performed by: INTERNAL MEDICINE

## 2025-03-10 PROCEDURE — 3078F DIAST BP <80 MM HG: CPT | Performed by: INTERNAL MEDICINE

## 2025-03-10 PROCEDURE — 3074F SYST BP LT 130 MM HG: CPT | Performed by: INTERNAL MEDICINE

## 2025-03-10 PROCEDURE — 1159F MED LIST DOCD IN RCRD: CPT | Performed by: INTERNAL MEDICINE

## 2025-03-10 RX ORDER — FAMOTIDINE 20 MG/1
TABLET, FILM COATED ORAL
Qty: 180 TABLET | Refills: 1 | Status: SHIPPED | OUTPATIENT
Start: 2025-03-10

## 2025-03-10 RX ORDER — LISINOPRIL 5 MG/1
5 TABLET ORAL DAILY
Qty: 90 TABLET | Refills: 1 | Status: SHIPPED | OUTPATIENT
Start: 2025-03-10

## 2025-03-10 RX ORDER — FLUTICASONE PROPIONATE 50 MCG
1 SPRAY, SUSPENSION (ML) NASAL DAILY
Qty: 48 G | Refills: 3 | Status: SHIPPED | OUTPATIENT
Start: 2025-03-10

## 2025-03-10 RX ORDER — FLUTICASONE FUROATE AND VILANTEROL TRIFENATATE 200; 25 UG/1; UG/1
POWDER RESPIRATORY (INHALATION)
Qty: 60 EACH | Refills: 5 | Status: SHIPPED | OUTPATIENT
Start: 2025-03-10

## 2025-03-10 RX ORDER — NYSTATIN 100000 U/G
OINTMENT TOPICAL 3 TIMES DAILY
Qty: 30 G | Refills: 5 | Status: SHIPPED | OUTPATIENT
Start: 2025-03-10 | End: 2025-03-24

## 2025-03-10 RX ORDER — LORATADINE 10 MG/1
10 TABLET ORAL DAILY
Qty: 90 TABLET | Refills: 1 | Status: SHIPPED | OUTPATIENT
Start: 2025-03-10

## 2025-03-10 RX ORDER — ATORVASTATIN CALCIUM 80 MG/1
80 TABLET, FILM COATED ORAL DAILY
Qty: 90 TABLET | Refills: 1 | Status: SHIPPED | OUTPATIENT
Start: 2025-03-10

## 2025-03-10 SDOH — ECONOMIC STABILITY: FOOD INSECURITY: WITHIN THE PAST 12 MONTHS, YOU WORRIED THAT YOUR FOOD WOULD RUN OUT BEFORE YOU GOT MONEY TO BUY MORE.: NEVER TRUE

## 2025-03-10 SDOH — HEALTH STABILITY: PHYSICAL HEALTH: ON AVERAGE, HOW MANY DAYS PER WEEK DO YOU ENGAGE IN MODERATE TO STRENUOUS EXERCISE (LIKE A BRISK WALK)?: 0 DAYS

## 2025-03-10 SDOH — ECONOMIC STABILITY: FOOD INSECURITY: WITHIN THE PAST 12 MONTHS, THE FOOD YOU BOUGHT JUST DIDN'T LAST AND YOU DIDN'T HAVE MONEY TO GET MORE.: NEVER TRUE

## 2025-03-10 SDOH — HEALTH STABILITY: PHYSICAL HEALTH: ON AVERAGE, HOW MANY MINUTES DO YOU ENGAGE IN EXERCISE AT THIS LEVEL?: 0 MIN

## 2025-03-10 ASSESSMENT — ENCOUNTER SYMPTOMS
WHEEZING: 1
HEARTBURN: 0
SORE THROAT: 0
SHORTNESS OF BREATH: 0
COUGH: 1
TROUBLE SWALLOWING: 0
DIFFICULTY BREATHING: 0
RHINORRHEA: 1
SPUTUM PRODUCTION: 0
FREQUENT THROAT CLEARING: 0
HEMOPTYSIS: 0
HOARSE VOICE: 0
CHEST TIGHTNESS: 0

## 2025-03-10 ASSESSMENT — PATIENT HEALTH QUESTIONNAIRE - PHQ9
SUM OF ALL RESPONSES TO PHQ QUESTIONS 1-9: 0
1. LITTLE INTEREST OR PLEASURE IN DOING THINGS: NOT AT ALL
SUM OF ALL RESPONSES TO PHQ QUESTIONS 1-9: 0
2. FEELING DOWN, DEPRESSED OR HOPELESS: NOT AT ALL
SUM OF ALL RESPONSES TO PHQ QUESTIONS 1-9: 0
SUM OF ALL RESPONSES TO PHQ QUESTIONS 1-9: 0

## 2025-03-10 ASSESSMENT — LIFESTYLE VARIABLES
HOW OFTEN DO YOU HAVE A DRINK CONTAINING ALCOHOL: NEVER
HOW MANY STANDARD DRINKS CONTAINING ALCOHOL DO YOU HAVE ON A TYPICAL DAY: PATIENT DOES NOT DRINK

## 2025-03-10 ASSESSMENT — ANXIETY QUESTIONNAIRES
7. FEELING AFRAID AS IF SOMETHING AWFUL MIGHT HAPPEN: NOT AT ALL
5. BEING SO RESTLESS THAT IT IS HARD TO SIT STILL: NOT AT ALL
6. BECOMING EASILY ANNOYED OR IRRITABLE: SEVERAL DAYS
GAD7 TOTAL SCORE: 1
2. NOT BEING ABLE TO STOP OR CONTROL WORRYING: NOT AT ALL
4. TROUBLE RELAXING: NOT AT ALL
3. WORRYING TOO MUCH ABOUT DIFFERENT THINGS: NOT AT ALL
IF YOU CHECKED OFF ANY PROBLEMS ON THIS QUESTIONNAIRE, HOW DIFFICULT HAVE THESE PROBLEMS MADE IT FOR YOU TO DO YOUR WORK, TAKE CARE OF THINGS AT HOME, OR GET ALONG WITH OTHER PEOPLE: NOT DIFFICULT AT ALL
1. FEELING NERVOUS, ANXIOUS, OR ON EDGE: NOT AT ALL

## 2025-03-10 ASSESSMENT — SOCIAL DETERMINANTS OF HEALTH (SDOH)
WITHIN THE LAST YEAR, HAVE TO BEEN RAPED OR FORCED TO HAVE ANY KIND OF SEXUAL ACTIVITY BY YOUR PARTNER OR EX-PARTNER?: NO
IN A TYPICAL WEEK, HOW MANY TIMES DO YOU TALK ON THE PHONE WITH FAMILY, FRIENDS, OR NEIGHBORS?: MORE THAN THREE TIMES A WEEK
WITHIN THE LAST YEAR, HAVE YOU BEEN HUMILIATED OR EMOTIONALLY ABUSED IN OTHER WAYS BY YOUR PARTNER OR EX-PARTNER?: NO
HOW OFTEN DO YOU ATTEND CHURCH OR RELIGIOUS SERVICES?: NEVER
HOW OFTEN DO YOU GET TOGETHER WITH FRIENDS OR RELATIVES?: ONCE A WEEK
WITHIN THE LAST YEAR, HAVE YOU BEEN KICKED, HIT, SLAPPED, OR OTHERWISE PHYSICALLY HURT BY YOUR PARTNER OR EX-PARTNER?: NO
HOW OFTEN DO YOU ATTENT MEETINGS OF THE CLUB OR ORGANIZATION YOU BELONG TO?: NEVER
WITHIN THE LAST YEAR, HAVE YOU BEEN AFRAID OF YOUR PARTNER OR EX-PARTNER?: NO
HOW HARD IS IT FOR YOU TO PAY FOR THE VERY BASICS LIKE FOOD, HOUSING, MEDICAL CARE, AND HEATING?: NOT HARD AT ALL
DO YOU BELONG TO ANY CLUBS OR ORGANIZATIONS SUCH AS CHURCH GROUPS UNIONS, FRATERNAL OR ATHLETIC GROUPS, OR SCHOOL GROUPS?: NO

## 2025-03-10 ASSESSMENT — COPD QUESTIONNAIRES: COPD: 1

## 2025-03-10 NOTE — PROGRESS NOTES
Dominic Rivera (:  1955) is a 69 y.o. female,Established patient, here for evaluation of the following chief complaint(s):  Follow-up (Patient returning today to follow up on chronic conditions)      Assessment & Plan   ASSESSMENT/PLAN:  1. Candidiasis, intertrigo  -     miconazole (ZEASORB-AF) 2 % powder; Apply topically 2 times daily., Disp-45 g, R-1, Normal  -     nystatin (MYCOSTATIN) 723880 UNIT/GM ointment; Apply topically 3 times daily for 14 days Apply topically 2 times daily., Topical, 3 TIMES DAILY Starting Mon 3/10/2025, Until Mon 3/24/2025, For 14 days, Disp-30 g, R-5, Normal  2. Controlled type 2 diabetes mellitus without complication, without long-term current use of insulin (HCC)  -     Hemoglobin A1C; Future  -     Albumin/Creatinine Ratio, Urine  -     HM DIABETES FOOT EXAM  -     fluticasone (FLONASE) 50 MCG/ACT nasal spray; 1 spray by Nasal route daily, Disp-48 g, R-3Normal  3. HTN, goal below 130/80  -     lisinopril (PRINIVIL;ZESTRIL) 5 MG tablet; Take 1 tablet by mouth daily, Disp-90 tablet, R-1Normal  4. Chronic obstructive pulmonary disease, unspecified COPD type (HCC)  -     BREO ELLIPTA 200-25 MCG/ACT AEPB inhaler; INHALE 1 PUFF INTO THE LUNGS DAILY, Disp-60 each, R-5, DAWNormal  5. Primary biliary cholangitis (HCC)  -   continue ursodiol, followed by GI  -     loratadine (CLARITIN) 10 MG tablet; Take 1 tablet by mouth daily, Disp-90 tablet, R-1Normal  6. Dyslipidemia  -     atorvastatin (LIPITOR) 80 MG tablet; Take 1 tablet by mouth daily For choelsterol, Disp-90 tablet, R-1Normal  7. Gastroesophageal reflux disease without esophagitis  -     famotidine (PEPCID) 20 MG tablet; TAKE 1 TABLET BY MOUTH TWICE DAILY, Disp-180 tablet, R-1Normal  8. Rhinorrhea  -     loratadine (CLARITIN) 10 MG tablet; Take 1 tablet by mouth daily, Disp-90 tablet, R-1Normal  9. Allergic conjunctivitis and rhinitis, bilateral  -     loratadine (CLARITIN) 10 MG tablet; Take 1 tablet by mouth daily,

## 2025-03-11 LAB
CREAT UR-MCNC: 94.3 MG/DL (ref 28–259)
EST. AVERAGE GLUCOSE BLD GHB EST-MCNC: 131.2 MG/DL
HBA1C MFR BLD: 6.2 %
MICROALBUMIN UR DL<=1MG/L-MCNC: 1.88 MG/DL
MICROALBUMIN/CREAT UR: 19.9 MG/G (ref 0–30)

## 2025-03-31 DIAGNOSIS — J30.9 ALLERGIC CONJUNCTIVITIS AND RHINITIS, BILATERAL: ICD-10-CM

## 2025-03-31 DIAGNOSIS — I10 HTN, GOAL BELOW 130/80: ICD-10-CM

## 2025-03-31 DIAGNOSIS — J34.89 RHINORRHEA: ICD-10-CM

## 2025-03-31 DIAGNOSIS — H10.13 ALLERGIC CONJUNCTIVITIS AND RHINITIS, BILATERAL: ICD-10-CM

## 2025-03-31 DIAGNOSIS — K74.3 PRIMARY BILIARY CHOLANGITIS (HCC): ICD-10-CM

## 2025-03-31 RX ORDER — LORATADINE 10 MG/1
10 TABLET ORAL DAILY
Qty: 90 TABLET | Refills: 1 | Status: SHIPPED | OUTPATIENT
Start: 2025-03-31

## 2025-03-31 RX ORDER — LISINOPRIL 5 MG/1
5 TABLET ORAL DAILY
Qty: 90 TABLET | Refills: 1 | Status: SHIPPED | OUTPATIENT
Start: 2025-03-31

## 2025-05-10 DIAGNOSIS — E78.5 DYSLIPIDEMIA: ICD-10-CM

## 2025-05-12 ENCOUNTER — PATIENT MESSAGE (OUTPATIENT)
Dept: INTERNAL MEDICINE CLINIC | Age: 70
End: 2025-05-12

## 2025-05-12 DIAGNOSIS — K21.9 GASTROESOPHAGEAL REFLUX DISEASE WITHOUT ESOPHAGITIS: ICD-10-CM

## 2025-05-12 DIAGNOSIS — E78.5 DYSLIPIDEMIA: ICD-10-CM

## 2025-05-12 RX ORDER — ATORVASTATIN CALCIUM 80 MG/1
80 TABLET, FILM COATED ORAL DAILY
Qty: 90 TABLET | Refills: 1 | OUTPATIENT
Start: 2025-05-12

## 2025-05-12 RX ORDER — FAMOTIDINE 20 MG/1
TABLET, FILM COATED ORAL
Qty: 180 TABLET | Refills: 1 | OUTPATIENT
Start: 2025-05-12

## 2025-05-12 RX ORDER — FAMOTIDINE 20 MG/1
20 TABLET, FILM COATED ORAL 2 TIMES DAILY
Qty: 180 TABLET | Refills: 1 | OUTPATIENT
Start: 2025-05-12

## 2025-06-01 DIAGNOSIS — J44.9 CHRONIC OBSTRUCTIVE PULMONARY DISEASE, UNSPECIFIED COPD TYPE (HCC): ICD-10-CM

## 2025-06-02 RX ORDER — FLUTICASONE FUROATE AND VILANTEROL TRIFENATATE 200; 25 UG/1; UG/1
1 POWDER RESPIRATORY (INHALATION) DAILY
Qty: 60 EACH | Refills: 5 | Status: SHIPPED | OUTPATIENT
Start: 2025-06-02

## 2025-06-02 NOTE — TELEPHONE ENCOUNTER
Recent Visits  Date Type Provider Dept   03/10/25 Office Visit Tavo Mclain MD [facility]   02/27/25 Office Visit Jay Su APRN - CNP [facility]   09/09/24 Office Visit Tavo Mclain MD [facility]   03/06/24 Office Visit Tavo Mclain MD [facility]   Showing recent visits within past 540 days with a meds authorizing provider and meeting all other requirements  Future Appointments  Date Type Provider Dept   09/11/25 Appointment Tavo Mclain MD [facility]   Showing future appointments within next 150 days with a meds authorizing provider and meeting all other requirements     3/10/2025

## 2025-08-09 DIAGNOSIS — E78.5 DYSLIPIDEMIA: ICD-10-CM

## 2025-08-12 RX ORDER — ATORVASTATIN CALCIUM 80 MG/1
80 TABLET, FILM COATED ORAL DAILY
Qty: 30 TABLET | Refills: 0 | Status: SHIPPED | OUTPATIENT
Start: 2025-08-12

## 2025-09-01 ENCOUNTER — HOSPITAL ENCOUNTER (EMERGENCY)
Age: 70
Discharge: HOME | End: 2025-09-01
Payer: MEDICARE

## 2025-09-01 ENCOUNTER — APPOINTMENT (OUTPATIENT)
Dept: GENERAL RADIOLOGY | Age: 70
End: 2025-09-01
Payer: MEDICARE

## 2025-09-01 VITALS
RESPIRATION RATE: 16 BRPM | OXYGEN SATURATION: 99 % | HEART RATE: 72 BPM | DIASTOLIC BLOOD PRESSURE: 69 MMHG | SYSTOLIC BLOOD PRESSURE: 142 MMHG | WEIGHT: 140 LBS | BODY MASS INDEX: 27.48 KG/M2 | TEMPERATURE: 98.1 F | HEIGHT: 60 IN

## 2025-09-01 DIAGNOSIS — M25.512 ACUTE PAIN OF LEFT SHOULDER: Primary | ICD-10-CM

## 2025-09-01 PROCEDURE — 99283 EMERGENCY DEPT VISIT LOW MDM: CPT

## 2025-09-01 PROCEDURE — 73030 X-RAY EXAM OF SHOULDER: CPT

## 2025-09-01 PROCEDURE — 6370000000 HC RX 637 (ALT 250 FOR IP): Performed by: PHYSICIAN ASSISTANT

## 2025-09-01 RX ORDER — TRAMADOL HYDROCHLORIDE 50 MG/1
50 TABLET ORAL EVERY 6 HOURS PRN
Qty: 12 TABLET | Refills: 0 | Status: SHIPPED | OUTPATIENT
Start: 2025-09-01 | End: 2025-09-04

## 2025-09-01 RX ORDER — LIDOCAINE 4 G/G
1 PATCH TOPICAL DAILY
Status: DISCONTINUED | OUTPATIENT
Start: 2025-09-01 | End: 2025-09-01 | Stop reason: HOSPADM

## 2025-09-01 RX ORDER — HYDROCODONE BITARTRATE AND ACETAMINOPHEN 5; 325 MG/1; MG/1
1 TABLET ORAL ONCE
Status: COMPLETED | OUTPATIENT
Start: 2025-09-01 | End: 2025-09-01

## 2025-09-01 RX ORDER — CYCLOBENZAPRINE HCL 10 MG
10 TABLET ORAL ONCE
Status: COMPLETED | OUTPATIENT
Start: 2025-09-01 | End: 2025-09-01

## 2025-09-01 RX ORDER — METHYLPREDNISOLONE 4 MG/1
2 TABLET ORAL DAILY
Qty: 3 TABLET | Refills: 0 | Status: SHIPPED | OUTPATIENT
Start: 2025-09-01 | End: 2025-09-07

## 2025-09-01 RX ORDER — ONDANSETRON 4 MG/1
8 TABLET, ORALLY DISINTEGRATING ORAL ONCE
Status: COMPLETED | OUTPATIENT
Start: 2025-09-01 | End: 2025-09-01

## 2025-09-01 RX ADMIN — HYDROCODONE BITARTRATE AND ACETAMINOPHEN 1 TABLET: 5; 325 TABLET ORAL at 15:26

## 2025-09-01 RX ADMIN — ONDANSETRON 8 MG: 4 TABLET, ORALLY DISINTEGRATING ORAL at 16:18

## 2025-09-01 RX ADMIN — CYCLOBENZAPRINE HYDROCHLORIDE 10 MG: 10 TABLET, FILM COATED ORAL at 13:50

## 2025-09-01 ASSESSMENT — ENCOUNTER SYMPTOMS
NAUSEA: 0
COUGH: 0
VOMITING: 0
WHEEZING: 0
DIARRHEA: 0
RHINORRHEA: 0
SHORTNESS OF BREATH: 0
ABDOMINAL PAIN: 0

## 2025-09-01 ASSESSMENT — PAIN DESCRIPTION - ORIENTATION
ORIENTATION: LEFT

## 2025-09-01 ASSESSMENT — PAIN DESCRIPTION - DESCRIPTORS: DESCRIPTORS: ACHING;SHOOTING

## 2025-09-01 ASSESSMENT — PAIN DESCRIPTION - LOCATION
LOCATION: SHOULDER
LOCATION: SHOULDER
LOCATION: NECK;SHOULDER

## 2025-09-01 ASSESSMENT — PAIN SCALES - GENERAL
PAINLEVEL_OUTOF10: 9
PAINLEVEL_OUTOF10: 8
PAINLEVEL_OUTOF10: 9

## 2025-09-01 ASSESSMENT — PAIN DESCRIPTION - PAIN TYPE: TYPE: ACUTE PAIN

## 2025-09-01 ASSESSMENT — PAIN - FUNCTIONAL ASSESSMENT
PAIN_FUNCTIONAL_ASSESSMENT: 0-10
PAIN_FUNCTIONAL_ASSESSMENT: 0-10

## 2025-09-01 ASSESSMENT — LIFESTYLE VARIABLES
HOW MANY STANDARD DRINKS CONTAINING ALCOHOL DO YOU HAVE ON A TYPICAL DAY: 1 OR 2
HOW OFTEN DO YOU HAVE A DRINK CONTAINING ALCOHOL: MONTHLY OR LESS

## 2025-09-01 ASSESSMENT — PAIN DESCRIPTION - FREQUENCY: FREQUENCY: CONTINUOUS

## 2025-09-02 ENCOUNTER — TELEPHONE (OUTPATIENT)
Dept: ORTHOPEDIC SURGERY | Age: 70
End: 2025-09-02

## (undated) DEVICE — TOWEL,OR,DSP,ST,BLUE,STD,4/PK,20PK/CS: Brand: MEDLINE

## (undated) DEVICE — SOLUTION IV IRRIG 500ML 0.9% SODIUM CHL 2F7123

## (undated) DEVICE — GOWN,REINFORCED,POLY,SIRUS,XLNG/XXLG: Brand: MEDLINE

## (undated) DEVICE — GLOVE SURG SZ 85 L12IN FNGR ORTHO 126MIL CRM LTX FREE

## (undated) DEVICE — WIPE INSTR W73XL73CM VISITEC

## (undated) DEVICE — MEDICINE CUP, GRADUATED, STER: Brand: MEDLINE

## (undated) DEVICE — KIT,ANTI FOG,W/SPONGE & FLUID,SOFT PACK: Brand: MEDLINE

## (undated) DEVICE — POSITIONER HD W8XH4XL8.5IN RASPBERRY FOAM SLT

## (undated) DEVICE — DRAPE,EENT,SPLIT,STERILE: Brand: MEDLINE

## (undated) DEVICE — CORD,CAUTERY,BIPOLAR,STERILE: Brand: MEDLINE

## (undated) DEVICE — PACK PROCEDURE SURG EXTREMITY MFFOP CUST

## (undated) DEVICE — DRAPE EENT SPLIT